# Patient Record
Sex: MALE | Race: WHITE | NOT HISPANIC OR LATINO
[De-identification: names, ages, dates, MRNs, and addresses within clinical notes are randomized per-mention and may not be internally consistent; named-entity substitution may affect disease eponyms.]

---

## 2017-03-21 ENCOUNTER — APPOINTMENT (OUTPATIENT)
Dept: INTERNAL MEDICINE | Facility: CLINIC | Age: 67
End: 2017-03-21

## 2017-03-21 VITALS
WEIGHT: 196 LBS | BODY MASS INDEX: 25.15 KG/M2 | OXYGEN SATURATION: 90 % | DIASTOLIC BLOOD PRESSURE: 66 MMHG | TEMPERATURE: 97.9 F | HEART RATE: 74 BPM | HEIGHT: 74 IN | SYSTOLIC BLOOD PRESSURE: 102 MMHG

## 2017-03-21 DIAGNOSIS — Z87.891 PERSONAL HISTORY OF NICOTINE DEPENDENCE: ICD-10-CM

## 2017-03-21 DIAGNOSIS — Z00.00 ENCOUNTER FOR GENERAL ADULT MEDICAL EXAMINATION W/OUT ABNORMAL FINDINGS: ICD-10-CM

## 2017-03-23 LAB
ALBUMIN SERPL ELPH-MCNC: 4.1 G/DL
ALP BLD-CCNC: 58 U/L
ALT SERPL-CCNC: 17 U/L
ANION GAP SERPL CALC-SCNC: 14 MMOL/L
APPEARANCE: CLEAR
AST SERPL-CCNC: 21 U/L
BACTERIA: NEGATIVE
BASOPHILS # BLD AUTO: 0.04 K/UL
BASOPHILS NFR BLD AUTO: 0.9 %
BILIRUB SERPL-MCNC: 0.3 MG/DL
BILIRUBIN URINE: NEGATIVE
BLOOD URINE: NEGATIVE
BUN SERPL-MCNC: 20 MG/DL
CALCIUM SERPL-MCNC: 9.1 MG/DL
CHLORIDE SERPL-SCNC: 102 MMOL/L
CHOLEST SERPL-MCNC: 197 MG/DL
CHOLEST/HDLC SERPL: 4 RATIO
CO2 SERPL-SCNC: 25 MMOL/L
COLOR: YELLOW
CREAT SERPL-MCNC: 0.93 MG/DL
EOSINOPHIL # BLD AUTO: 0.43 K/UL
EOSINOPHIL NFR BLD AUTO: 9.5 %
GLUCOSE QUALITATIVE U: NORMAL MG/DL
GLUCOSE SERPL-MCNC: 104 MG/DL
HBA1C MFR BLD HPLC: 5.4 %
HCT VFR BLD CALC: 39.1 %
HDLC SERPL-MCNC: 49 MG/DL
HGB BLD-MCNC: 13 G/DL
HYALINE CASTS: 4 /LPF
IMM GRANULOCYTES NFR BLD AUTO: 0 %
KETONES URINE: NEGATIVE
LDLC SERPL CALC-MCNC: 132 MG/DL
LEUKOCYTE ESTERASE URINE: NEGATIVE
LYMPHOCYTES # BLD AUTO: 1.2 K/UL
LYMPHOCYTES NFR BLD AUTO: 26.6 %
MAN DIFF?: NORMAL
MCHC RBC-ENTMCNC: 31.5 PG
MCHC RBC-ENTMCNC: 33.2 GM/DL
MCV RBC AUTO: 94.7 FL
MICROSCOPIC-UA: NORMAL
MONOCYTES # BLD AUTO: 0.39 K/UL
MONOCYTES NFR BLD AUTO: 8.6 %
NEUTROPHILS # BLD AUTO: 2.45 K/UL
NEUTROPHILS NFR BLD AUTO: 54.4 %
NITRITE URINE: NEGATIVE
PH URINE: 6
PLATELET # BLD AUTO: 235 K/UL
POTASSIUM SERPL-SCNC: 4.6 MMOL/L
PROT SERPL-MCNC: 6.5 G/DL
PROTEIN URINE: NEGATIVE MG/DL
PSA SERPL-MCNC: 2.19 NG/ML
RBC # BLD: 4.13 M/UL
RBC # FLD: 14.1 %
RED BLOOD CELLS URINE: 2 /HPF
SODIUM SERPL-SCNC: 141 MMOL/L
SPECIFIC GRAVITY URINE: 1.02
SQUAMOUS EPITHELIAL CELLS: 0 /HPF
T4 FREE SERPL-MCNC: 1.3 NG/DL
TRIGL SERPL-MCNC: 79 MG/DL
TSH SERPL-ACNC: 1 UIU/ML
UROBILINOGEN URINE: NORMAL MG/DL
WBC # FLD AUTO: 4.51 K/UL
WHITE BLOOD CELLS URINE: 0 /HPF

## 2017-07-10 ENCOUNTER — APPOINTMENT (OUTPATIENT)
Dept: INTERNAL MEDICINE | Facility: CLINIC | Age: 67
End: 2017-07-10
Payer: MEDICARE

## 2017-07-10 VITALS
RESPIRATION RATE: 16 BRPM | TEMPERATURE: 98.3 F | HEART RATE: 77 BPM | SYSTOLIC BLOOD PRESSURE: 110 MMHG | OXYGEN SATURATION: 96 % | DIASTOLIC BLOOD PRESSURE: 80 MMHG

## 2017-07-10 DIAGNOSIS — Z80.9 FAMILY HISTORY OF MALIGNANT NEOPLASM, UNSPECIFIED: ICD-10-CM

## 2017-07-10 PROCEDURE — G0009: CPT

## 2017-07-10 PROCEDURE — G0439: CPT

## 2017-07-10 PROCEDURE — 36415 COLL VENOUS BLD VENIPUNCTURE: CPT

## 2017-07-10 PROCEDURE — 93000 ELECTROCARDIOGRAM COMPLETE: CPT

## 2017-07-10 PROCEDURE — 90732 PPSV23 VACC 2 YRS+ SUBQ/IM: CPT

## 2017-07-11 LAB
HCV AB SER QL: NONREACTIVE
HCV S/CO RATIO: 0.18 S/CO

## 2017-07-21 ENCOUNTER — APPOINTMENT (OUTPATIENT)
Dept: HEART AND VASCULAR | Facility: CLINIC | Age: 67
End: 2017-07-21

## 2017-07-25 ENCOUNTER — APPOINTMENT (OUTPATIENT)
Dept: HEART AND VASCULAR | Facility: CLINIC | Age: 67
End: 2017-07-25

## 2017-07-26 ENCOUNTER — APPOINTMENT (OUTPATIENT)
Dept: CARDIOTHORACIC SURGERY | Facility: CLINIC | Age: 67
End: 2017-07-26

## 2017-07-26 VITALS
BODY MASS INDEX: 22.59 KG/M2 | HEART RATE: 67 BPM | HEIGHT: 74 IN | WEIGHT: 176 LBS | OXYGEN SATURATION: 98 % | TEMPERATURE: 98.1 F | RESPIRATION RATE: 17 BRPM | DIASTOLIC BLOOD PRESSURE: 90 MMHG | SYSTOLIC BLOOD PRESSURE: 133 MMHG

## 2017-07-26 DIAGNOSIS — Z86.59 PERSONAL HISTORY OF OTHER MENTAL AND BEHAVIORAL DISORDERS: ICD-10-CM

## 2017-07-26 DIAGNOSIS — Z87.898 PERSONAL HISTORY OF OTHER SPECIFIED CONDITIONS: ICD-10-CM

## 2017-07-26 DIAGNOSIS — Z86.39 PERSONAL HISTORY OF OTHER ENDOCRINE, NUTRITIONAL AND METABOLIC DISEASE: ICD-10-CM

## 2017-07-26 DIAGNOSIS — Z86.79 PERSONAL HISTORY OF OTHER DISEASES OF THE CIRCULATORY SYSTEM: ICD-10-CM

## 2017-07-26 DIAGNOSIS — Z87.39 PERSONAL HISTORY OF OTHER DISEASES OF THE MUSCULOSKELETAL SYSTEM AND CONNECTIVE TISSUE: ICD-10-CM

## 2017-07-26 DIAGNOSIS — F10.21 ALCOHOL DEPENDENCE, IN REMISSION: ICD-10-CM

## 2017-07-26 RX ORDER — OXYCODONE 5 MG/1
5 TABLET ORAL
Qty: 12 | Refills: 0 | Status: DISCONTINUED | COMMUNITY
Start: 2017-05-23 | End: 2017-07-26

## 2017-07-26 RX ORDER — AMOXICILLIN AND CLAVULANATE POTASSIUM 875; 125 MG/1; MG/1
875-125 TABLET, COATED ORAL
Qty: 14 | Refills: 0 | Status: DISCONTINUED | COMMUNITY
Start: 2017-05-17 | End: 2017-07-26

## 2017-07-26 RX ORDER — OXYCODONE AND ACETAMINOPHEN 5; 325 MG/1; MG/1
5-325 TABLET ORAL
Qty: 16 | Refills: 0 | Status: DISCONTINUED | COMMUNITY
Start: 2017-05-17 | End: 2017-07-26

## 2017-07-26 RX ORDER — AMOXICILLIN 500 MG/1
500 CAPSULE ORAL
Qty: 30 | Refills: 0 | Status: DISCONTINUED | COMMUNITY
Start: 2017-03-06 | End: 2017-07-26

## 2017-09-22 ENCOUNTER — APPOINTMENT (OUTPATIENT)
Dept: HEART AND VASCULAR | Facility: CLINIC | Age: 67
End: 2017-09-22

## 2017-09-25 ENCOUNTER — APPOINTMENT (OUTPATIENT)
Dept: HEART AND VASCULAR | Facility: CLINIC | Age: 67
End: 2017-09-25
Payer: MEDICARE

## 2017-09-25 VITALS
SYSTOLIC BLOOD PRESSURE: 126 MMHG | HEIGHT: 74 IN | HEART RATE: 94 BPM | BODY MASS INDEX: 22.59 KG/M2 | OXYGEN SATURATION: 97 % | DIASTOLIC BLOOD PRESSURE: 82 MMHG | WEIGHT: 176 LBS

## 2017-09-25 VITALS — TEMPERATURE: 98.3 F

## 2017-09-25 DIAGNOSIS — I34.0 NONRHEUMATIC MITRAL (VALVE) INSUFFICIENCY: ICD-10-CM

## 2017-09-25 PROCEDURE — 93306 TTE W/DOPPLER COMPLETE: CPT | Mod: XE

## 2017-09-25 PROCEDURE — 93000 ELECTROCARDIOGRAM COMPLETE: CPT

## 2017-09-25 PROCEDURE — 99214 OFFICE O/P EST MOD 30 MIN: CPT | Mod: 25

## 2017-10-11 ENCOUNTER — APPOINTMENT (OUTPATIENT)
Dept: HEART AND VASCULAR | Facility: CLINIC | Age: 67
End: 2017-10-11
Payer: MEDICARE

## 2017-10-11 VITALS
DIASTOLIC BLOOD PRESSURE: 80 MMHG | WEIGHT: 176 LBS | TEMPERATURE: 97.8 F | HEART RATE: 91 BPM | BODY MASS INDEX: 22.59 KG/M2 | SYSTOLIC BLOOD PRESSURE: 114 MMHG | OXYGEN SATURATION: 97 % | HEIGHT: 74 IN

## 2017-10-11 PROCEDURE — 93321 DOPPLER ECHO F-UP/LMTD STD: CPT

## 2017-10-11 PROCEDURE — 99214 OFFICE O/P EST MOD 30 MIN: CPT | Mod: 25

## 2017-10-11 PROCEDURE — 93308 TTE F-UP OR LMTD: CPT

## 2017-10-28 ENCOUNTER — TRANSCRIPTION ENCOUNTER (OUTPATIENT)
Age: 67
End: 2017-10-28

## 2018-07-30 PROBLEM — I34.0 SEVERE MITRAL REGURGITATION: Status: ACTIVE | Noted: 2017-07-21

## 2018-09-19 ENCOUNTER — APPOINTMENT (OUTPATIENT)
Dept: INTERNAL MEDICINE | Facility: CLINIC | Age: 68
End: 2018-09-19
Payer: MEDICARE

## 2018-09-19 VITALS
SYSTOLIC BLOOD PRESSURE: 130 MMHG | BODY MASS INDEX: 22.46 KG/M2 | TEMPERATURE: 98 F | HEIGHT: 74 IN | DIASTOLIC BLOOD PRESSURE: 88 MMHG | OXYGEN SATURATION: 98 % | HEART RATE: 85 BPM | WEIGHT: 175 LBS

## 2018-09-19 PROCEDURE — 93000 ELECTROCARDIOGRAM COMPLETE: CPT

## 2018-09-19 PROCEDURE — 90670 PCV13 VACCINE IM: CPT

## 2018-09-19 PROCEDURE — 36415 COLL VENOUS BLD VENIPUNCTURE: CPT

## 2018-09-19 PROCEDURE — G0009: CPT

## 2018-09-19 PROCEDURE — G0008: CPT | Mod: 59

## 2018-09-19 PROCEDURE — 99397 PER PM REEVAL EST PAT 65+ YR: CPT | Mod: 25

## 2018-09-19 PROCEDURE — 90662 IIV NO PRSV INCREASED AG IM: CPT

## 2018-09-19 RX ORDER — CLONAZEPAM 2 MG/1
2 TABLET ORAL
Refills: 0 | Status: DISCONTINUED | COMMUNITY
End: 2018-09-19

## 2018-09-19 RX ORDER — PAROXETINE HYDROCHLORIDE 40 MG/1
TABLET, FILM COATED ORAL
Refills: 0 | Status: DISCONTINUED | COMMUNITY
End: 2018-09-19

## 2018-09-19 RX ORDER — IBUPROFEN 600 MG/1
600 TABLET, FILM COATED ORAL
Qty: 30 | Refills: 0 | Status: DISCONTINUED | COMMUNITY
Start: 2017-03-06 | End: 2018-09-19

## 2018-09-19 RX ORDER — IBUPROFEN 800 MG/1
800 TABLET, FILM COATED ORAL
Qty: 30 | Refills: 0 | Status: DISCONTINUED | COMMUNITY
Start: 2017-05-17 | End: 2018-09-19

## 2018-09-19 NOTE — HISTORY OF PRESENT ILLNESS
[FreeTextEntry1] : wellness [de-identified] : He has been less active recently but overall feels well\par Occasioanl dizziness getting out of bed, lasts 15 seconds - denies room spinning\par His psychiatric medication regimen has not changed\par Has not seen Dr Ga since last October.\par OA of both knees - taking aleve taking it daily. for years

## 2018-09-19 NOTE — PLAN
[FreeTextEntry1] : Wellness complete labs today\par encourage f/up with  Min\par prenar and flu shot today\par f/up with psych routinely\par psa testing today\par  uptodate with GI-colonoscopy

## 2018-09-20 LAB
ALBUMIN SERPL ELPH-MCNC: 4.4 G/DL
ALP BLD-CCNC: 76 U/L
ALT SERPL-CCNC: 27 U/L
ANION GAP SERPL CALC-SCNC: 11 MMOL/L
APPEARANCE: CLEAR
AST SERPL-CCNC: 32 U/L
BASOPHILS # BLD AUTO: 0.03 K/UL
BASOPHILS NFR BLD AUTO: 0.6 %
BILIRUB SERPL-MCNC: 0.3 MG/DL
BILIRUBIN URINE: NEGATIVE
BLOOD URINE: NEGATIVE
BUN SERPL-MCNC: 21 MG/DL
CALCIUM SERPL-MCNC: 9.7 MG/DL
CHLORIDE SERPL-SCNC: 106 MMOL/L
CHOLEST SERPL-MCNC: 239 MG/DL
CHOLEST/HDLC SERPL: 3.3 RATIO
CO2 SERPL-SCNC: 24 MMOL/L
COLOR: YELLOW
CREAT SERPL-MCNC: 0.94 MG/DL
EOSINOPHIL # BLD AUTO: 0.21 K/UL
EOSINOPHIL NFR BLD AUTO: 4 %
GLUCOSE QUALITATIVE U: NEGATIVE MG/DL
GLUCOSE SERPL-MCNC: 90 MG/DL
HBA1C MFR BLD HPLC: 5.1 %
HCT VFR BLD CALC: 39.4 %
HDLC SERPL-MCNC: 73 MG/DL
HGB BLD-MCNC: 12.8 G/DL
IMM GRANULOCYTES NFR BLD AUTO: 0.6 %
KETONES URINE: NEGATIVE
LDLC SERPL CALC-MCNC: 152 MG/DL
LEUKOCYTE ESTERASE URINE: NEGATIVE
LYMPHOCYTES # BLD AUTO: 1.29 K/UL
LYMPHOCYTES NFR BLD AUTO: 24.8 %
MAN DIFF?: NORMAL
MCHC RBC-ENTMCNC: 30.5 PG
MCHC RBC-ENTMCNC: 32.5 GM/DL
MCV RBC AUTO: 93.8 FL
MONOCYTES # BLD AUTO: 0.51 K/UL
MONOCYTES NFR BLD AUTO: 9.8 %
NEUTROPHILS # BLD AUTO: 3.13 K/UL
NEUTROPHILS NFR BLD AUTO: 60.2 %
NITRITE URINE: NEGATIVE
PH URINE: 5
PLATELET # BLD AUTO: 302 K/UL
POTASSIUM SERPL-SCNC: 5.1 MMOL/L
PROT SERPL-MCNC: 6.9 G/DL
PROTEIN URINE: NEGATIVE MG/DL
PSA SERPL-MCNC: 2.14 NG/ML
RBC # BLD: 4.2 M/UL
RBC # FLD: 14.4 %
SODIUM SERPL-SCNC: 141 MMOL/L
SPECIFIC GRAVITY URINE: 1.03
TRIGL SERPL-MCNC: 70 MG/DL
UROBILINOGEN URINE: NEGATIVE MG/DL
WBC # FLD AUTO: 5.2 K/UL

## 2018-09-24 ENCOUNTER — MESSAGE (OUTPATIENT)
Age: 68
End: 2018-09-24

## 2018-12-10 ENCOUNTER — APPOINTMENT (OUTPATIENT)
Dept: HEART AND VASCULAR | Facility: CLINIC | Age: 68
End: 2018-12-10
Payer: MEDICARE

## 2018-12-10 VITALS
WEIGHT: 175 LBS | HEART RATE: 73 BPM | RESPIRATION RATE: 14 BRPM | DIASTOLIC BLOOD PRESSURE: 70 MMHG | SYSTOLIC BLOOD PRESSURE: 120 MMHG | TEMPERATURE: 98.3 F | BODY MASS INDEX: 22.46 KG/M2 | OXYGEN SATURATION: 99 % | HEIGHT: 74 IN

## 2018-12-10 DIAGNOSIS — I34.0 NONRHEUMATIC MITRAL (VALVE) INSUFFICIENCY: ICD-10-CM

## 2018-12-10 PROCEDURE — 93000 ELECTROCARDIOGRAM COMPLETE: CPT

## 2018-12-10 PROCEDURE — 99214 OFFICE O/P EST MOD 30 MIN: CPT

## 2018-12-10 PROCEDURE — 93306 TTE W/DOPPLER COMPLETE: CPT

## 2018-12-10 NOTE — DISCUSSION/SUMMARY
[FreeTextEntry1] : MR s/p repair stable by echo\par dizziness not cardiac related, recommend neuro eval if persists, \par increase activity

## 2018-12-10 NOTE — REASON FOR VISIT
[Follow-Up - Clinic] : a clinic follow-up of [Dizziness] : dizziness [Mitral Regurgitation] : mitral regurgitation

## 2018-12-10 NOTE — PHYSICAL EXAM
[General Appearance - Well Developed] : well developed [Normal Appearance] : normal appearance [Well Groomed] : well groomed [General Appearance - Well Nourished] : well nourished [No Deformities] : no deformities [General Appearance - In No Acute Distress] : no acute distress [Normal Conjunctiva] : the conjunctiva exhibited no abnormalities [Eyelids - No Xanthelasma] : the eyelids demonstrated no xanthelasmas [Normal Oral Mucosa] : normal oral mucosa [No Oral Pallor] : no oral pallor [No Oral Cyanosis] : no oral cyanosis [Normal Jugular Venous A Waves Present] : normal jugular venous A waves present [Normal Jugular Venous V Waves Present] : normal jugular venous V waves present [No Jugular Venous Watson A Waves] : no jugular venous watson A waves [Respiration, Rhythm And Depth] : normal respiratory rhythm and effort [Exaggerated Use Of Accessory Muscles For Inspiration] : no accessory muscle use [Auscultation Breath Sounds / Voice Sounds] : lungs were clear to auscultation bilaterally [Heart Rate And Rhythm] : heart rate and rhythm were normal [Heart Sounds] : normal S1 and S2 [Murmurs] : no murmurs present [Abdomen Soft] : soft [Abdomen Tenderness] : non-tender [Abdomen Mass (___ Cm)] : no abdominal mass palpated [Abnormal Walk] : normal gait [Gait - Sufficient For Exercise Testing] : the gait was sufficient for exercise testing [Nail Clubbing] : no clubbing of the fingernails [Cyanosis, Localized] : no localized cyanosis [Petechial Hemorrhages (___cm)] : no petechial hemorrhages [] : no ischemic changes [Oriented To Time, Place, And Person] : oriented to person, place, and time [Affect] : the affect was normal [Mood] : the mood was normal [No Anxiety] : not feeling anxious

## 2019-03-18 ENCOUNTER — RX RENEWAL (OUTPATIENT)
Age: 69
End: 2019-03-18

## 2019-05-10 ENCOUNTER — APPOINTMENT (OUTPATIENT)
Dept: INTERNAL MEDICINE | Facility: CLINIC | Age: 69
End: 2019-05-10
Payer: MEDICARE

## 2019-05-10 VITALS
RESPIRATION RATE: 14 BRPM | WEIGHT: 185 LBS | TEMPERATURE: 98.6 F | HEART RATE: 96 BPM | OXYGEN SATURATION: 98 % | BODY MASS INDEX: 23.75 KG/M2 | DIASTOLIC BLOOD PRESSURE: 72 MMHG | SYSTOLIC BLOOD PRESSURE: 120 MMHG

## 2019-05-10 PROCEDURE — 99214 OFFICE O/P EST MOD 30 MIN: CPT

## 2019-05-10 PROCEDURE — 36415 COLL VENOUS BLD VENIPUNCTURE: CPT

## 2019-05-10 NOTE — HISTORY OF PRESENT ILLNESS
[de-identified] : Here today with concerns of memory and speech, starts talking and has to pause - difficulty with word finding.   Mother had mild alzheimers,  at age 60.  Reports a long hx of memory issues but has been progressively getting worse over the past year.  \par Taking statin.  No longer having chest pains.  \par Has brought this up with psychiatrist in the past\par Used a lot of drugs growing up but nothing in 10 years.

## 2019-05-10 NOTE — PHYSICAL EXAM
[No Acute Distress] : no acute distress [Well Nourished] : well nourished [Well Developed] : well developed [Normal Gait] : normal gait [Coordination Grossly Intact] : coordination grossly intact [No Focal Deficits] : no focal deficits [Normal Affect] : the affect was normal [Normal Insight/Judgement] : insight and judgment were intact

## 2019-05-10 NOTE — PLAN
[FreeTextEntry1] : Concern for early onset dementia / alzheimers\par Plan for neuro eval and likely  neuro-psych eval as well\par check labs today\par encourgae activites- reading, socializing.  exercise, healthy diet

## 2019-05-12 LAB
ALBUMIN SERPL ELPH-MCNC: 4.2 G/DL
ALP BLD-CCNC: 75 U/L
ALT SERPL-CCNC: 15 U/L
ANION GAP SERPL CALC-SCNC: 12 MMOL/L
AST SERPL-CCNC: 15 U/L
BASOPHILS # BLD AUTO: 0.04 K/UL
BASOPHILS NFR BLD AUTO: 0.7 %
BILIRUB SERPL-MCNC: 0.4 MG/DL
BUN SERPL-MCNC: 23 MG/DL
CALCIUM SERPL-MCNC: 9.5 MG/DL
CHLORIDE SERPL-SCNC: 107 MMOL/L
CO2 SERPL-SCNC: 22 MMOL/L
CREAT SERPL-MCNC: 0.91 MG/DL
EOSINOPHIL # BLD AUTO: 0.16 K/UL
EOSINOPHIL NFR BLD AUTO: 2.9 %
FOLATE SERPL-MCNC: 6.2 NG/ML
GLUCOSE SERPL-MCNC: 100 MG/DL
HCT VFR BLD CALC: 42.7 %
HGB BLD-MCNC: 13.6 G/DL
IMM GRANULOCYTES NFR BLD AUTO: 0.4 %
LYMPHOCYTES # BLD AUTO: 1.08 K/UL
LYMPHOCYTES NFR BLD AUTO: 19.6 %
MAN DIFF?: NORMAL
MCHC RBC-ENTMCNC: 30.2 PG
MCHC RBC-ENTMCNC: 31.9 GM/DL
MCV RBC AUTO: 94.9 FL
MONOCYTES # BLD AUTO: 0.51 K/UL
MONOCYTES NFR BLD AUTO: 9.2 %
NEUTROPHILS # BLD AUTO: 3.71 K/UL
NEUTROPHILS NFR BLD AUTO: 67.2 %
PLATELET # BLD AUTO: 279 K/UL
POTASSIUM SERPL-SCNC: 4.6 MMOL/L
PROT SERPL-MCNC: 7 G/DL
RBC # BLD: 4.5 M/UL
RBC # FLD: 13.8 %
SODIUM SERPL-SCNC: 141 MMOL/L
TSH SERPL-ACNC: 1.08 UIU/ML
VIT B12 SERPL-MCNC: 541 PG/ML
WBC # FLD AUTO: 5.52 K/UL

## 2019-05-31 ENCOUNTER — APPOINTMENT (OUTPATIENT)
Dept: NEUROLOGY | Facility: CLINIC | Age: 69
End: 2019-05-31
Payer: MEDICARE

## 2019-05-31 ENCOUNTER — LABORATORY RESULT (OUTPATIENT)
Age: 69
End: 2019-05-31

## 2019-05-31 VITALS
BODY MASS INDEX: 23.74 KG/M2 | HEART RATE: 89 BPM | TEMPERATURE: 98.4 F | OXYGEN SATURATION: 96 % | DIASTOLIC BLOOD PRESSURE: 89 MMHG | SYSTOLIC BLOOD PRESSURE: 135 MMHG | HEIGHT: 74 IN | WEIGHT: 185 LBS

## 2019-05-31 PROCEDURE — 99205 OFFICE O/P NEW HI 60 MIN: CPT

## 2019-05-31 RX ORDER — BUPROPION HYDROCHLORIDE 150 MG/1
150 TABLET, EXTENDED RELEASE ORAL
Qty: 30 | Refills: 0 | Status: DISCONTINUED | COMMUNITY
Start: 2017-05-16 | End: 2019-05-31

## 2019-06-03 LAB
25(OH)D3 SERPL-MCNC: 23.9 NG/ML
TSH SERPL-ACNC: 0.84 UIU/ML

## 2019-06-27 ENCOUNTER — FORM ENCOUNTER (OUTPATIENT)
Age: 69
End: 2019-06-27

## 2019-06-28 ENCOUNTER — APPOINTMENT (OUTPATIENT)
Dept: MRI IMAGING | Facility: HOSPITAL | Age: 69
End: 2019-06-28
Payer: MEDICARE

## 2019-06-28 ENCOUNTER — OUTPATIENT (OUTPATIENT)
Dept: OUTPATIENT SERVICES | Facility: HOSPITAL | Age: 69
LOS: 1 days | End: 2019-06-28
Payer: MEDICARE

## 2019-06-28 PROCEDURE — 70551 MRI BRAIN STEM W/O DYE: CPT

## 2019-06-28 PROCEDURE — 70551 MRI BRAIN STEM W/O DYE: CPT | Mod: 26

## 2019-09-06 ENCOUNTER — APPOINTMENT (OUTPATIENT)
Dept: INTERNAL MEDICINE | Facility: CLINIC | Age: 69
End: 2019-09-06
Payer: MEDICARE

## 2019-09-06 VITALS
TEMPERATURE: 98.4 F | WEIGHT: 184 LBS | DIASTOLIC BLOOD PRESSURE: 80 MMHG | OXYGEN SATURATION: 96 % | SYSTOLIC BLOOD PRESSURE: 134 MMHG | HEIGHT: 74 IN | BODY MASS INDEX: 23.61 KG/M2 | RESPIRATION RATE: 14 BRPM | HEART RATE: 89 BPM

## 2019-09-06 PROCEDURE — 99214 OFFICE O/P EST MOD 30 MIN: CPT

## 2019-09-06 NOTE — HISTORY OF PRESENT ILLNESS
[de-identified] : He is s/p Neuro eval and waiting for a neuropsych evaluation now.  Not expected until Thanksgiving.  \par He reports he is unable to write his children;s names.  Hard time reading\par Trouble pronouncing words\par  occasional balance issues. \par Remembering names is an issue.    \par He is off most of his psych medications due to insurance issues.  Not seeing psychiatry now as well.  \par MMSE performed today

## 2019-09-06 NOTE — PHYSICAL EXAM
[Coordination Grossly Intact] : coordination grossly intact [Normal] : affect was normal and insight and judgment were intact [Normal Gait] : normal gait [de-identified] : MMSE 24/30

## 2019-09-06 NOTE — PLAN
[FreeTextEntry1] : Strong concern for Dementia.  Needs appropriate neuropsych evaluation.  Message left to speak with Dr Mena about this.  \par  - ? if MRI findings are pertinent?\par Anxiety - Concerning that all of his meds were stopped abruptly.  He needs to follow up with psychiatry ASAP.  Pt understands this.\par follow up 1 month

## 2019-09-11 ENCOUNTER — MESSAGE (OUTPATIENT)
Age: 69
End: 2019-09-11

## 2019-09-18 ENCOUNTER — MESSAGE (OUTPATIENT)
Age: 69
End: 2019-09-18

## 2019-10-09 ENCOUNTER — APPOINTMENT (OUTPATIENT)
Dept: INTERNAL MEDICINE | Facility: CLINIC | Age: 69
End: 2019-10-09
Payer: MEDICARE

## 2019-10-09 VITALS
OXYGEN SATURATION: 98 % | HEIGHT: 74 IN | RESPIRATION RATE: 14 BRPM | WEIGHT: 184 LBS | DIASTOLIC BLOOD PRESSURE: 70 MMHG | TEMPERATURE: 97 F | SYSTOLIC BLOOD PRESSURE: 132 MMHG | HEART RATE: 91 BPM | BODY MASS INDEX: 23.61 KG/M2

## 2019-10-09 DIAGNOSIS — J06.9 ACUTE UPPER RESPIRATORY INFECTION, UNSPECIFIED: ICD-10-CM

## 2019-10-09 DIAGNOSIS — Z23 ENCOUNTER FOR IMMUNIZATION: ICD-10-CM

## 2019-10-09 PROCEDURE — 90662 IIV NO PRSV INCREASED AG IM: CPT

## 2019-10-09 PROCEDURE — 99214 OFFICE O/P EST MOD 30 MIN: CPT

## 2019-10-09 PROCEDURE — G0008: CPT

## 2019-10-09 NOTE — PLAN
[FreeTextEntry1] : URI- viral-  symptomatic treatment discussed in detail\par \par Discussed cutting back on the clonazepam with his memory issues\par Rec bringing medicaitons with him to the neurologists office\par flu shot today

## 2019-10-09 NOTE — REVIEW OF SYSTEMS
[Chills] : no chills [Fever] : no fever [Sore Throat] : sore throat [Nasal Discharge] : nasal discharge [Shortness Of Breath] : no shortness of breath [Wheezing] : no wheezing [Cough] : cough [Negative] : Cardiovascular

## 2019-10-09 NOTE — HISTORY OF PRESENT ILLNESS
[de-identified] : Has a cold,  sinus congestion and sneezing.   - taking otc meds   \roney Has appt with the cognitive neurologist for this month.\roney Believes the donepezil may be helping a little.  could speak a little better yesterday, not as much today.   \par He is back on adderall, wellbutrin and paroxetine, clonazpem seeing Dr Zi Tran.    Reports he is on 8 pills at night and 4 pills in the AM  He uses the clonazepam for sleeping.  \par Taking a medicaiton otc for his knee but cannot remember the name.

## 2019-11-06 ENCOUNTER — MESSAGE (OUTPATIENT)
Age: 69
End: 2019-11-06

## 2019-12-04 ENCOUNTER — APPOINTMENT (OUTPATIENT)
Dept: NEUROLOGY | Facility: CLINIC | Age: 69
End: 2019-12-04

## 2020-07-20 ENCOUNTER — APPOINTMENT (OUTPATIENT)
Dept: INTERNAL MEDICINE | Facility: CLINIC | Age: 70
End: 2020-07-20
Payer: MEDICARE

## 2020-07-20 VITALS
TEMPERATURE: 97.7 F | BODY MASS INDEX: 23.61 KG/M2 | OXYGEN SATURATION: 99 % | SYSTOLIC BLOOD PRESSURE: 120 MMHG | RESPIRATION RATE: 14 BRPM | HEART RATE: 85 BPM | HEIGHT: 74 IN | DIASTOLIC BLOOD PRESSURE: 84 MMHG | WEIGHT: 184 LBS

## 2020-07-20 DIAGNOSIS — S09.93XA UNSPECIFIED INJURY OF FACE, INITIAL ENCOUNTER: ICD-10-CM

## 2020-07-20 PROCEDURE — 99213 OFFICE O/P EST LOW 20 MIN: CPT

## 2020-07-20 NOTE — HISTORY OF PRESENT ILLNESS
[de-identified] : Taking trash into basement, tripped on stairs - hit right cheek - 7 weeks ago.  Did not go to ER. - Bruised a lot - Not healing well.  \par no vision issues no issues smelling or breathing through the nose. \par no LOC , gait has been fine\par  was a mechanical fall, no dizziness .

## 2020-07-20 NOTE — PLAN
[FreeTextEntry1] : no evidence of fracture or sig trauma after this mechanical fall\par  no further workup is necessary = f/up for CPE\par reassurance given

## 2020-12-08 ENCOUNTER — APPOINTMENT (OUTPATIENT)
Dept: INTERNAL MEDICINE | Facility: CLINIC | Age: 70
End: 2020-12-08
Payer: MEDICARE

## 2020-12-08 VITALS
HEART RATE: 78 BPM | OXYGEN SATURATION: 99 % | SYSTOLIC BLOOD PRESSURE: 142 MMHG | RESPIRATION RATE: 14 BRPM | DIASTOLIC BLOOD PRESSURE: 86 MMHG | TEMPERATURE: 98.2 F | BODY MASS INDEX: 23.61 KG/M2 | HEIGHT: 74 IN | WEIGHT: 184 LBS

## 2020-12-08 DIAGNOSIS — R51.9 HEADACHE, UNSPECIFIED: ICD-10-CM

## 2020-12-08 PROCEDURE — 99214 OFFICE O/P EST MOD 30 MIN: CPT

## 2020-12-08 NOTE — HISTORY OF PRESENT ILLNESS
[FreeTextEntry1] : headaches. [de-identified] : 71 yo m with alzheimers bipolar depression.  \par reports headaches for 2 months \par previous psychiatrist retired saw him for 30 yrs.  new psychiatrist changed some of the medications including adderall last month - . Concern the headache could have been from the change in medications.  The psychiatrist does not believe it is related. Before changing medication was not having headaches.\par In the past when he has changed medication he has had similar effects\par Wakes up with it, lasts the whole day but not as strong.   was right sided, now left sided.  no vision changes.  Occasional dizziness. \par Reports he feels angry a lot.\par \par tylenol reportedly does help.  \par New Neurologist: had an MRI last year. \par \par also reporting worsening knee pain b/l  with occasional swelling

## 2020-12-08 NOTE — PLAN
[FreeTextEntry1] : \par Headaches- likely related to change in medicaiton\par no worrisome findings on exam\par \par Neurology referral -he will call to his neurologist right away\par \par Ortho referral for knee pain:   will need tkr likely

## 2020-12-08 NOTE — PHYSICAL EXAM
[Normal] : normal sclera/conjunctiva, pupils are equal, round and reactive to light and extraocular movements are intact [Coordination Grossly Intact] : coordination grossly intact [de-identified] : swelling of both knees, full rom .  [de-identified] : cn 2 - 12 intact.  occasional word finding difficulty

## 2020-12-14 ENCOUNTER — APPOINTMENT (OUTPATIENT)
Dept: ORTHOPEDIC SURGERY | Facility: CLINIC | Age: 70
End: 2020-12-14

## 2020-12-17 ENCOUNTER — APPOINTMENT (OUTPATIENT)
Dept: ORTHOPEDIC SURGERY | Facility: CLINIC | Age: 70
End: 2020-12-17
Payer: MEDICARE

## 2020-12-17 ENCOUNTER — RESULT REVIEW (OUTPATIENT)
Age: 70
End: 2020-12-17

## 2020-12-17 ENCOUNTER — OUTPATIENT (OUTPATIENT)
Dept: OUTPATIENT SERVICES | Facility: HOSPITAL | Age: 70
LOS: 1 days | End: 2020-12-17
Payer: MEDICARE

## 2020-12-17 VITALS
WEIGHT: 185 LBS | HEIGHT: 74 IN | DIASTOLIC BLOOD PRESSURE: 80 MMHG | OXYGEN SATURATION: 99 % | TEMPERATURE: 97.6 F | HEART RATE: 71 BPM | SYSTOLIC BLOOD PRESSURE: 120 MMHG | BODY MASS INDEX: 23.74 KG/M2

## 2020-12-17 DIAGNOSIS — M17.0 BILATERAL PRIMARY OSTEOARTHRITIS OF KNEE: ICD-10-CM

## 2020-12-17 PROCEDURE — 73564 X-RAY EXAM KNEE 4 OR MORE: CPT

## 2020-12-17 PROCEDURE — 99204 OFFICE O/P NEW MOD 45 MIN: CPT

## 2020-12-17 PROCEDURE — 72170 X-RAY EXAM OF PELVIS: CPT

## 2020-12-17 PROCEDURE — 72170 X-RAY EXAM OF PELVIS: CPT | Mod: 26

## 2020-12-17 PROCEDURE — 73564 X-RAY EXAM KNEE 4 OR MORE: CPT | Mod: 26,50

## 2020-12-17 NOTE — DISCUSSION/SUMMARY
[de-identified] : 69y/o male with advanced bilateral knee osteoarthritis\par - Given the failure of conservative treatment options to adequately control his pain and disability, I think he is an excellent candidate for staged bilateral total knee arthroplasty with the left side first.\par - We discussed the details of the procedure, the expected recovery period, and the expected outcome. We discussed the likelihood of satisfaction after complete recovery, and the potential causes of dissatisfaction. The importance of active patient participation in the rehabilitation protocol was emphasized, along with its influence on short and long-term outcomes. Specific risks of total knee replacement were discussed in detail. We discussed the risk of surgical site complications including but not limited to: surgical site infection, wound healing complications, bone fracture, tendon or ligament injury, neurovascular injury, hemorrhage, postoperative stiffness or instability, persistent pain and need for reoperation or manipulation under anesthesia. We discussed surgical blood loss and the possible need for blood transfusion. We discussed the risk of perioperative medical complications, including but not limited to catheter-associated urinary tract infection, venous thromboembolism and other cardiopulmonary complications. We discussed anesthetic options and the risk of anesthesia-related complications. We discussed implant fixation methods; my plan is to use fully cemented fixation in this case. We discussed the variable need to resurface the patella; my plan is to resurface in this case. We discussed the durability of prosthetic knees and limitations related to wear, osteolysis and loosening. The patient was given a copy of my preoperative packet with additional information about the procedure.\par - While he is a good surgical candidate and the disease is radiographically advanced, his pain and loss of function are still somewhat marginal for surgical indication. He will take some time to think it over and touch base once he has decided how he wants to proceed. I told him that I'm happy to continue to treat him either surgically or non-surgically per his own preference. If he decides to hold off on surgery, would probably start off with a left knee aspiration and injection.

## 2020-12-17 NOTE — HISTORY OF PRESENT ILLNESS
[Worsening] : worsening [___ yrs] : [unfilled] year(s) ago [5] : a current pain level of 5/10 [Constant] : ~He/She~ states the symptoms seem to be constant [Bending] : worsened by bending [Walking] : worsened by walking [NSAIDs] : relieved by nonsteroidal anti-inflammatory drugs [de-identified] : 69y/o male referred by Dr. Melara for evaluation of bilateral knee osteoarthritis. Gradually progressive symptoms for 15 years. The left knee is now more painful and swollen. Ambulatory tolerance still unlimited; walked 12 blocks to get to today's apartment after a snowstorm last night. However, he does have constant daily pain. Uses Aleve with some relief. Has been through multiple courses of PT and bilateral knee injections, most recently 3+ years ago. He has a history of MVR 3 years ago but is otherwise healthy. Interested in discussing TKA today. [de-identified] : describes sharp pain

## 2020-12-17 NOTE — CONSULT LETTER
[Dear  ___] : Dear  [unfilled], [Consult Letter:] : I had the pleasure of evaluating your patient, [unfilled]. [Please see my note below.] : Please see my note below. [Consult Closing:] : Thank you very much for allowing me to participate in the care of this patient.  If you have any questions, please do not hesitate to contact me. [Sincerely,] : Sincerely, [FreeTextEntry3] : Emmanuel Higuera MD\par Orthopaedic Surgery - Adult Reconstruction\par [facility] [place]\par [address] [place] [address]\par p. [phone]\par f. [phone]

## 2020-12-21 PROBLEM — J06.9 ACUTE URI: Status: RESOLVED | Noted: 2019-10-09 | Resolved: 2020-12-21

## 2021-01-09 NOTE — PHYSICAL EXAM

## 2021-01-17 ENCOUNTER — OUTPATIENT (OUTPATIENT)
Dept: OUTPATIENT SERVICES | Facility: HOSPITAL | Age: 71
LOS: 1 days | End: 2021-01-17
Payer: MEDICARE

## 2021-01-17 ENCOUNTER — RESULT REVIEW (OUTPATIENT)
Age: 71
End: 2021-01-17

## 2021-01-17 ENCOUNTER — APPOINTMENT (OUTPATIENT)
Dept: CT IMAGING | Facility: HOSPITAL | Age: 71
End: 2021-01-17
Payer: MEDICARE

## 2021-01-17 PROCEDURE — 73700 CT LOWER EXTREMITY W/O DYE: CPT

## 2021-01-17 PROCEDURE — 73700 CT LOWER EXTREMITY W/O DYE: CPT | Mod: 26,LT,MH

## 2021-01-19 ENCOUNTER — APPOINTMENT (OUTPATIENT)
Dept: INTERNAL MEDICINE | Facility: CLINIC | Age: 71
End: 2021-01-19
Payer: MEDICARE

## 2021-01-19 ENCOUNTER — OUTPATIENT (OUTPATIENT)
Dept: OUTPATIENT SERVICES | Facility: HOSPITAL | Age: 71
LOS: 1 days | End: 2021-01-19
Payer: MEDICARE

## 2021-01-19 ENCOUNTER — RESULT REVIEW (OUTPATIENT)
Age: 71
End: 2021-01-19

## 2021-01-19 VITALS
BODY MASS INDEX: 25.06 KG/M2 | RESPIRATION RATE: 14 BRPM | SYSTOLIC BLOOD PRESSURE: 144 MMHG | WEIGHT: 185 LBS | TEMPERATURE: 98.2 F | OXYGEN SATURATION: 99 % | DIASTOLIC BLOOD PRESSURE: 84 MMHG | HEIGHT: 72 IN | HEART RATE: 92 BPM

## 2021-01-19 PROCEDURE — 93000 ELECTROCARDIOGRAM COMPLETE: CPT | Mod: NC

## 2021-01-19 PROCEDURE — 71046 X-RAY EXAM CHEST 2 VIEWS: CPT

## 2021-01-19 PROCEDURE — 99214 OFFICE O/P EST MOD 30 MIN: CPT

## 2021-01-19 PROCEDURE — 71046 X-RAY EXAM CHEST 2 VIEWS: CPT | Mod: 26

## 2021-01-19 PROCEDURE — 36415 COLL VENOUS BLD VENIPUNCTURE: CPT

## 2021-01-19 RX ORDER — DEXTROAMPHETAMINE SULFATE, DEXTROAMPHETAMINE SACCHARATE, AMPHETAMINE SULFATE AND AMPHETAMINE ASPARTATE 7.5; 7.5; 7.5; 7.5 MG/1; MG/1; MG/1; MG/1
30 CAPSULE, EXTENDED RELEASE ORAL
Refills: 0 | Status: DISCONTINUED | COMMUNITY
End: 2021-01-19

## 2021-01-19 RX ORDER — PAROXETINE HYDROCHLORIDE 30 MG/1
30 TABLET, FILM COATED ORAL
Qty: 60 | Refills: 0 | Status: DISCONTINUED | COMMUNITY
Start: 2017-02-03 | End: 2021-01-19

## 2021-01-19 RX ORDER — CHLORHEXIDINE GLUCONATE, 0.12% ORAL RINSE 1.2 MG/ML
0.12 SOLUTION DENTAL
Qty: 473 | Refills: 0 | Status: DISCONTINUED | COMMUNITY
Start: 2017-02-17 | End: 2021-01-19

## 2021-01-19 RX ORDER — QUETIAPINE FUMARATE 25 MG/1
25 TABLET ORAL
Qty: 21 | Refills: 0 | Status: DISCONTINUED | COMMUNITY
Start: 2020-11-12 | End: 2021-01-19

## 2021-01-19 RX ORDER — ZOSTER VACCINE LIVE 19400 [PFU]/.65ML
19400 INJECTION, POWDER, LYOPHILIZED, FOR SUSPENSION SUBCUTANEOUS
Qty: 1 | Refills: 0 | Status: DISCONTINUED | OUTPATIENT
Start: 2017-07-10 | End: 2021-01-19

## 2021-01-19 RX ORDER — CLONAZEPAM 1 MG/1
1 TABLET ORAL
Qty: 120 | Refills: 0 | Status: DISCONTINUED | COMMUNITY
Start: 2017-01-23 | End: 2021-01-19

## 2021-01-19 RX ORDER — DEXTROAMPHETAMINE SACCHARATE, AMPHETAMINE ASPARTATE MONOHYDRATE, DEXTROAMPHETAMINE SULFATE AND AMPHETAMINE SULFATE 6.25; 6.25; 6.25; 6.25 MG/1; MG/1; MG/1; MG/1
25 CAPSULE, EXTENDED RELEASE ORAL
Qty: 30 | Refills: 0 | Status: DISCONTINUED | COMMUNITY
Start: 2017-02-23 | End: 2021-01-19

## 2021-01-19 RX ORDER — IBUPROFEN 400 MG/1
400 TABLET, FILM COATED ORAL
Qty: 30 | Refills: 0 | Status: DISCONTINUED | COMMUNITY
Start: 2020-10-20 | End: 2021-01-19

## 2021-01-19 RX ORDER — CLONAZEPAM 0.5 MG/1
0.5 TABLET ORAL
Qty: 90 | Refills: 0 | Status: DISCONTINUED | COMMUNITY
Start: 2020-09-16 | End: 2021-01-19

## 2021-01-19 RX ORDER — BUPROPION HYDROCHLORIDE 300 MG/1
300 TABLET, EXTENDED RELEASE ORAL
Refills: 0 | Status: DISCONTINUED | COMMUNITY
End: 2021-01-19

## 2021-01-19 RX ORDER — ZOSTER VACCINE RECOMBINANT, ADJUVANTED 50 MCG/0.5
50 KIT INTRAMUSCULAR
Qty: 1 | Refills: 1 | Status: DISCONTINUED | COMMUNITY
Start: 2018-09-19 | End: 2021-01-19

## 2021-01-19 RX ORDER — ARIPIPRAZOLE 10 MG/1
10 TABLET ORAL
Refills: 0 | Status: DISCONTINUED | COMMUNITY
End: 2021-01-19

## 2021-01-19 RX ORDER — DONEPEZIL HYDROCHLORIDE 5 MG/1
5 TABLET ORAL
Qty: 30 | Refills: 3 | Status: DISCONTINUED | COMMUNITY
Start: 2019-09-18 | End: 2021-01-19

## 2021-01-19 NOTE — REVIEW OF SYSTEMS
[Joint Pain] : joint pain [Memory Loss] : memory loss [Depression] : depression [Negative] : Genitourinary

## 2021-01-20 LAB
ALBUMIN SERPL ELPH-MCNC: 4.7 G/DL
ALP BLD-CCNC: 73 U/L
ALT SERPL-CCNC: 22 U/L
ANION GAP SERPL CALC-SCNC: 13 MMOL/L
APPEARANCE: CLEAR
APTT BLD: 33.7 SEC
AST SERPL-CCNC: 27 U/L
BASOPHILS # BLD AUTO: 0.05 K/UL
BASOPHILS NFR BLD AUTO: 1 %
BILIRUB SERPL-MCNC: 0.5 MG/DL
BILIRUBIN URINE: NEGATIVE
BLOOD URINE: NEGATIVE
BUN SERPL-MCNC: 16 MG/DL
CALCIUM SERPL-MCNC: 9.9 MG/DL
CHLORIDE SERPL-SCNC: 104 MMOL/L
CO2 SERPL-SCNC: 23 MMOL/L
COLOR: NORMAL
CREAT SERPL-MCNC: 1.06 MG/DL
EOSINOPHIL # BLD AUTO: 0.29 K/UL
EOSINOPHIL NFR BLD AUTO: 6.1 %
GLUCOSE QUALITATIVE U: NEGATIVE
GLUCOSE SERPL-MCNC: 97 MG/DL
HCT VFR BLD CALC: 42.5 %
HGB BLD-MCNC: 13 G/DL
IMM GRANULOCYTES NFR BLD AUTO: 0.2 %
INR PPP: 0.96 RATIO
KETONES URINE: NEGATIVE
LEUKOCYTE ESTERASE URINE: NEGATIVE
LYMPHOCYTES # BLD AUTO: 1.08 K/UL
LYMPHOCYTES NFR BLD AUTO: 22.6 %
MAN DIFF?: NORMAL
MCHC RBC-ENTMCNC: 30.1 PG
MCHC RBC-ENTMCNC: 30.6 GM/DL
MCV RBC AUTO: 98.4 FL
MONOCYTES # BLD AUTO: 0.53 K/UL
MONOCYTES NFR BLD AUTO: 11.1 %
NEUTROPHILS # BLD AUTO: 2.82 K/UL
NEUTROPHILS NFR BLD AUTO: 59 %
NITRITE URINE: NEGATIVE
PH URINE: 5.5
PLATELET # BLD AUTO: 255 K/UL
POTASSIUM SERPL-SCNC: 4.4 MMOL/L
PROT SERPL-MCNC: 7 G/DL
PROTEIN URINE: NEGATIVE
PT BLD: 11.3 SEC
RBC # BLD: 4.32 M/UL
RBC # FLD: 14.1 %
SODIUM SERPL-SCNC: 140 MMOL/L
SPECIFIC GRAVITY URINE: 1.01
UROBILINOGEN URINE: NORMAL
WBC # FLD AUTO: 4.78 K/UL

## 2021-01-20 NOTE — HISTORY OF PRESENT ILLNESS
[No Pertinent Pulmonary History] : no history of asthma, COPD, sleep apnea, or smoking [No Adverse Anesthesia Reaction] : no adverse anesthesia reaction in self or family member [Aortic Stenosis] : no aortic stenosis [Atrial Fibrillation] : no atrial fibrillation [Coronary Artery Disease] : no coronary artery disease [Recent Myocardial Infarction] : no recent myocardial infarction [Implantable Device/Pacemaker] : no implantable device/pacemaker [FreeTextEntry1] : B/l TKA [FreeTextEntry2] : 1/29/21 [FreeTextEntry3] : Dr. Higuera [FreeTextEntry4] : 71 yo M with hx of MVR in 2017, HLD, Bipolar depression, cognitive issues (multiple concussions from boxing), with chronic bilateral knee pain He has severe pain and has failed conservative therapy.  \par Psychiatric medications have changed,  Medication list updated.

## 2021-01-20 NOTE — PHYSICAL EXAM
[Regular Rhythm] : with a regular rhythm [Normal S1, S2] : normal S1 and S2 [Normal] : affect was normal and insight and judgment were intact [de-identified] : 3/6 claire

## 2021-01-21 ENCOUNTER — APPOINTMENT (OUTPATIENT)
Dept: HEART AND VASCULAR | Facility: CLINIC | Age: 71
End: 2021-01-21

## 2021-01-21 ENCOUNTER — APPOINTMENT (OUTPATIENT)
Dept: HEART AND VASCULAR | Facility: CLINIC | Age: 71
End: 2021-01-21
Payer: MEDICARE

## 2021-01-21 PROCEDURE — 93306 TTE W/DOPPLER COMPLETE: CPT

## 2021-01-22 ENCOUNTER — APPOINTMENT (OUTPATIENT)
Dept: HEART AND VASCULAR | Facility: CLINIC | Age: 71
End: 2021-01-22
Payer: MEDICARE

## 2021-01-22 VITALS
HEART RATE: 90 BPM | WEIGHT: 185 LBS | OXYGEN SATURATION: 99 % | HEIGHT: 72 IN | DIASTOLIC BLOOD PRESSURE: 100 MMHG | BODY MASS INDEX: 25.06 KG/M2 | SYSTOLIC BLOOD PRESSURE: 140 MMHG

## 2021-01-22 PROCEDURE — 99214 OFFICE O/P EST MOD 30 MIN: CPT

## 2021-01-22 NOTE — HISTORY OF PRESENT ILLNESS
[FreeTextEntry1] : for cardiac evaluation prior to knee surgery\par good functional capacity\par no cardiac c/o\par s/p MV repair

## 2021-01-22 NOTE — PHYSICAL EXAM
[Normal Appearance] : normal appearance [General Appearance - Well Developed] : well developed [Well Groomed] : well groomed [General Appearance - Well Nourished] : well nourished [No Deformities] : no deformities [General Appearance - In No Acute Distress] : no acute distress [Normal Conjunctiva] : the conjunctiva exhibited no abnormalities [Eyelids - No Xanthelasma] : the eyelids demonstrated no xanthelasmas [Normal Oral Mucosa] : normal oral mucosa [No Oral Pallor] : no oral pallor [No Oral Cyanosis] : no oral cyanosis [Normal Jugular Venous A Waves Present] : normal jugular venous A waves present [Normal Jugular Venous V Waves Present] : normal jugular venous V waves present [No Jugular Venous Watson A Waves] : no jugular venous watson A waves [Respiration, Rhythm And Depth] : normal respiratory rhythm and effort [Auscultation Breath Sounds / Voice Sounds] : lungs were clear to auscultation bilaterally [Exaggerated Use Of Accessory Muscles For Inspiration] : no accessory muscle use [Heart Rate And Rhythm] : heart rate and rhythm were normal [Heart Sounds] : normal S1 and S2 [Murmurs] : no murmurs present [Abdomen Soft] : soft [Abdomen Tenderness] : non-tender [Abdomen Mass (___ Cm)] : no abdominal mass palpated [Abnormal Walk] : normal gait [Gait - Sufficient For Exercise Testing] : the gait was sufficient for exercise testing [Nail Clubbing] : no clubbing of the fingernails [Cyanosis, Localized] : no localized cyanosis [Petechial Hemorrhages (___cm)] : no petechial hemorrhages [] : no ischemic changes [Oriented To Time, Place, And Person] : oriented to person, place, and time [Affect] : the affect was normal [Mood] : the mood was normal [No Anxiety] : not feeling anxious

## 2021-01-28 ENCOUNTER — TRANSCRIPTION ENCOUNTER (OUTPATIENT)
Age: 71
End: 2021-01-28

## 2021-01-28 VITALS
HEIGHT: 74 IN | OXYGEN SATURATION: 99 % | TEMPERATURE: 98 F | WEIGHT: 187.83 LBS | SYSTOLIC BLOOD PRESSURE: 139 MMHG | DIASTOLIC BLOOD PRESSURE: 84 MMHG | RESPIRATION RATE: 16 BRPM | HEART RATE: 86 BPM

## 2021-01-28 NOTE — H&P ADULT - PROBLEM SELECTOR PLAN 1
Admit to Orthopaedic Service.  Presents today for elective left total knee replacement   Pt medically stable and cleared for procedure today by  Admit to Orthopaedic Service.  Presents today for elective left total knee replacement   Pt medically stable and cleared for procedure today by Dr. Melara; cardiology clearance per Dr. Ga

## 2021-01-28 NOTE — H&P ADULT - NSHPPHYSICALEXAM_GEN_ALL_CORE
MSK:   decreased ROM left knee 2/2 pain  Remainder of physical exam as per medical clearance note MSK:   decreased ROM left knee 2/2 pain  Skin warm and well perfused, no visible wounds/erythema/ecchymoses  EHL/FHL/TA/GS 5/5 motor strength bilateral lower extremities   SLT in tact to distal bilateral lower extremities   DP/PT pulses palpable bilaterally   Remainder of physical exam as per medical clearance note

## 2021-01-28 NOTE — H&P ADULT - HISTORY OF PRESENT ILLNESS
70M with left knee pain  presents for elective left total knee replacement  70M with left knee pain x chronic. Pt denies preceding trauma/injury. Pt states his pain radiates down his left lower extremity  - he takes Aleve as needed for pain control. Pt denies numbness/tingling/weakness of bilateral lower extremities and does not use an assistive ambulatory device at baseline. He denies DVT hx; denies CP, SOB, N/V, tactile fevers, calf pain.   Pt has failed conservative management of his symptoms.     Presents for elective left total knee replacement

## 2021-01-28 NOTE — H&P ADULT - NSHPOUTPATIENTPROVIDERS_GEN_ALL_CORE
outpatient medical clearance per  outpatient medical clearance per Dr. Melara; cardiology clearance per Dr. Ga

## 2021-01-28 NOTE — H&P ADULT - NSHPLABSRESULTS_GEN_ALL_CORE
Povidone iodine nasal swab to be given day of surgery  covid pcr negative on 1/26/21 Povidone iodine nasal swab to be given day of surgery  covid pcr negative on 1/26/21  preop echo 1/22/21 - normal right and left ventricular systolic function; LVEF 69%  preop cbc/ptt/pt/inr/cmp/ua - within normal limits, reviewed by medical clearance   Cr 1.06 on 1/19/21  Preop EKG: NSR, reviewed by medical clearance.   CXR: Within normal limits, per medical clearance.

## 2021-01-29 ENCOUNTER — INPATIENT (INPATIENT)
Facility: HOSPITAL | Age: 71
LOS: 0 days | Discharge: HOME HEALTH | DRG: 470 | End: 2021-01-30
Attending: ORTHOPAEDIC SURGERY | Admitting: ORTHOPAEDIC SURGERY
Payer: COMMERCIAL

## 2021-01-29 ENCOUNTER — RESULT REVIEW (OUTPATIENT)
Age: 71
End: 2021-01-29

## 2021-01-29 ENCOUNTER — APPOINTMENT (OUTPATIENT)
Dept: ORTHOPEDIC SURGERY | Facility: HOSPITAL | Age: 71
End: 2021-01-29

## 2021-01-29 DIAGNOSIS — Z41.9 ENCOUNTER FOR PROCEDURE FOR PURPOSES OTHER THAN REMEDYING HEALTH STATE, UNSPECIFIED: Chronic | ICD-10-CM

## 2021-01-29 DIAGNOSIS — M17.12 UNILATERAL PRIMARY OSTEOARTHRITIS, LEFT KNEE: ICD-10-CM

## 2021-01-29 PROCEDURE — 88305 TISSUE EXAM BY PATHOLOGIST: CPT | Mod: 26

## 2021-01-29 PROCEDURE — S2900 ROBOTIC SURGICAL SYSTEM: CPT | Mod: NC

## 2021-01-29 PROCEDURE — 73560 X-RAY EXAM OF KNEE 1 OR 2: CPT | Mod: 26,LT

## 2021-01-29 PROCEDURE — 0055T BONE SRGRY CMPTR CT/MRI IMAG: CPT

## 2021-01-29 PROCEDURE — 27447 TOTAL KNEE ARTHROPLASTY: CPT | Mod: LT

## 2021-01-29 RX ORDER — POVIDONE-IODINE 5 %
1 AEROSOL (ML) TOPICAL ONCE
Refills: 0 | Status: COMPLETED | OUTPATIENT
Start: 2021-01-29 | End: 2021-01-29

## 2021-01-29 RX ORDER — CELECOXIB 200 MG/1
400 CAPSULE ORAL ONCE
Refills: 0 | Status: COMPLETED | OUTPATIENT
Start: 2021-01-29 | End: 2021-01-29

## 2021-01-29 RX ORDER — OXYCODONE HYDROCHLORIDE 5 MG/1
10 TABLET ORAL EVERY 4 HOURS
Refills: 0 | Status: DISCONTINUED | OUTPATIENT
Start: 2021-01-29 | End: 2021-01-30

## 2021-01-29 RX ORDER — ONDANSETRON 8 MG/1
4 TABLET, FILM COATED ORAL EVERY 6 HOURS
Refills: 0 | Status: DISCONTINUED | OUTPATIENT
Start: 2021-01-29 | End: 2021-01-30

## 2021-01-29 RX ORDER — LAMOTRIGINE 25 MG/1
25 TABLET, ORALLY DISINTEGRATING ORAL
Refills: 0 | Status: DISCONTINUED | OUTPATIENT
Start: 2021-01-29 | End: 2021-01-30

## 2021-01-29 RX ORDER — ARIPIPRAZOLE 15 MG/1
10 TABLET ORAL DAILY
Refills: 0 | Status: DISCONTINUED | OUTPATIENT
Start: 2021-01-29 | End: 2021-01-30

## 2021-01-29 RX ORDER — SODIUM CHLORIDE 9 MG/ML
1000 INJECTION, SOLUTION INTRAVENOUS
Refills: 0 | Status: DISCONTINUED | OUTPATIENT
Start: 2021-01-30 | End: 2021-01-30

## 2021-01-29 RX ORDER — HYDROMORPHONE HYDROCHLORIDE 2 MG/ML
0.5 INJECTION INTRAMUSCULAR; INTRAVENOUS; SUBCUTANEOUS EVERY 4 HOURS
Refills: 0 | Status: DISCONTINUED | OUTPATIENT
Start: 2021-01-29 | End: 2021-01-30

## 2021-01-29 RX ORDER — HYDROMORPHONE HYDROCHLORIDE 2 MG/ML
0.5 INJECTION INTRAMUSCULAR; INTRAVENOUS; SUBCUTANEOUS
Refills: 0 | Status: DISCONTINUED | OUTPATIENT
Start: 2021-01-29 | End: 2021-01-30

## 2021-01-29 RX ORDER — CHLORHEXIDINE GLUCONATE 213 G/1000ML
1 SOLUTION TOPICAL ONCE
Refills: 0 | Status: COMPLETED | OUTPATIENT
Start: 2021-01-29 | End: 2021-01-29

## 2021-01-29 RX ORDER — CEFAZOLIN SODIUM 1 G
2000 VIAL (EA) INJECTION EVERY 8 HOURS
Refills: 0 | Status: COMPLETED | OUTPATIENT
Start: 2021-01-29 | End: 2021-01-29

## 2021-01-29 RX ORDER — GABAPENTIN 400 MG/1
600 CAPSULE ORAL ONCE
Refills: 0 | Status: COMPLETED | OUTPATIENT
Start: 2021-01-29 | End: 2021-01-29

## 2021-01-29 RX ORDER — BUPIVACAINE 13.3 MG/ML
20 INJECTION, SUSPENSION, LIPOSOMAL INFILTRATION ONCE
Refills: 0 | Status: DISCONTINUED | OUTPATIENT
Start: 2021-01-29 | End: 2021-01-30

## 2021-01-29 RX ORDER — INFLUENZA VIRUS VACCINE 15; 15; 15; 15 UG/.5ML; UG/.5ML; UG/.5ML; UG/.5ML
0.7 SUSPENSION INTRAMUSCULAR ONCE
Refills: 0 | Status: DISCONTINUED | OUTPATIENT
Start: 2021-01-29 | End: 2021-01-30

## 2021-01-29 RX ORDER — INFLUENZA VIRUS VACCINE 15; 15; 15; 15 UG/.5ML; UG/.5ML; UG/.5ML; UG/.5ML
0.5 SUSPENSION INTRAMUSCULAR ONCE
Refills: 0 | Status: DISCONTINUED | OUTPATIENT
Start: 2021-01-29 | End: 2021-01-29

## 2021-01-29 RX ORDER — PANTOPRAZOLE SODIUM 20 MG/1
40 TABLET, DELAYED RELEASE ORAL
Refills: 0 | Status: DISCONTINUED | OUTPATIENT
Start: 2021-01-29 | End: 2021-01-30

## 2021-01-29 RX ORDER — MAGNESIUM HYDROXIDE 400 MG/1
30 TABLET, CHEWABLE ORAL DAILY
Refills: 0 | Status: DISCONTINUED | OUTPATIENT
Start: 2021-01-29 | End: 2021-01-30

## 2021-01-29 RX ORDER — KETOROLAC TROMETHAMINE 30 MG/ML
15 SYRINGE (ML) INJECTION EVERY 6 HOURS
Refills: 0 | Status: COMPLETED | OUTPATIENT
Start: 2021-01-29 | End: 2021-01-30

## 2021-01-29 RX ORDER — GABAPENTIN 400 MG/1
300 CAPSULE ORAL ONCE
Refills: 0 | Status: DISCONTINUED | OUTPATIENT
Start: 2021-01-29 | End: 2021-01-29

## 2021-01-29 RX ORDER — ACETAMINOPHEN 500 MG
1000 TABLET ORAL ONCE
Refills: 0 | Status: COMPLETED | OUTPATIENT
Start: 2021-01-29 | End: 2021-01-29

## 2021-01-29 RX ORDER — RIVAROXABAN 15 MG-20MG
10 KIT ORAL ONCE
Refills: 0 | Status: COMPLETED | OUTPATIENT
Start: 2021-01-30 | End: 2021-01-30

## 2021-01-29 RX ORDER — ACETAMINOPHEN 500 MG
975 TABLET ORAL EVERY 8 HOURS
Refills: 0 | Status: DISCONTINUED | OUTPATIENT
Start: 2021-01-29 | End: 2021-01-30

## 2021-01-29 RX ORDER — BUPROPION HYDROCHLORIDE 150 MG/1
300 TABLET, EXTENDED RELEASE ORAL DAILY
Refills: 0 | Status: DISCONTINUED | OUTPATIENT
Start: 2021-01-29 | End: 2021-01-30

## 2021-01-29 RX ORDER — SENNA PLUS 8.6 MG/1
2 TABLET ORAL AT BEDTIME
Refills: 0 | Status: DISCONTINUED | OUTPATIENT
Start: 2021-01-29 | End: 2021-01-30

## 2021-01-29 RX ORDER — OXYCODONE HYDROCHLORIDE 5 MG/1
5 TABLET ORAL EVERY 4 HOURS
Refills: 0 | Status: DISCONTINUED | OUTPATIENT
Start: 2021-01-29 | End: 2021-01-30

## 2021-01-29 RX ORDER — ATORVASTATIN CALCIUM 80 MG/1
20 TABLET, FILM COATED ORAL AT BEDTIME
Refills: 0 | Status: DISCONTINUED | OUTPATIENT
Start: 2021-01-29 | End: 2021-01-30

## 2021-01-29 RX ORDER — MIRTAZAPINE 45 MG/1
15 TABLET, ORALLY DISINTEGRATING ORAL AT BEDTIME
Refills: 0 | Status: DISCONTINUED | OUTPATIENT
Start: 2021-01-29 | End: 2021-01-30

## 2021-01-29 RX ORDER — QUETIAPINE FUMARATE 200 MG/1
100 TABLET, FILM COATED ORAL AT BEDTIME
Refills: 0 | Status: DISCONTINUED | OUTPATIENT
Start: 2021-01-29 | End: 2021-01-30

## 2021-01-29 RX ORDER — CELECOXIB 200 MG/1
200 CAPSULE ORAL DAILY
Refills: 0 | Status: CANCELLED | OUTPATIENT
Start: 2021-02-01 | End: 2021-01-30

## 2021-01-29 RX ORDER — POLYETHYLENE GLYCOL 3350 17 G/17G
17 POWDER, FOR SOLUTION ORAL AT BEDTIME
Refills: 0 | Status: DISCONTINUED | OUTPATIENT
Start: 2021-01-29 | End: 2021-01-30

## 2021-01-29 RX ADMIN — Medication 1 APPLICATION(S): at 07:17

## 2021-01-29 RX ADMIN — Medication 975 MILLIGRAM(S): at 13:55

## 2021-01-29 RX ADMIN — Medication 100 MILLIGRAM(S): at 15:16

## 2021-01-29 RX ADMIN — GABAPENTIN 600 MILLIGRAM(S): 400 CAPSULE ORAL at 07:16

## 2021-01-29 RX ADMIN — OXYCODONE HYDROCHLORIDE 10 MILLIGRAM(S): 5 TABLET ORAL at 23:34

## 2021-01-29 RX ADMIN — ATORVASTATIN CALCIUM 20 MILLIGRAM(S): 80 TABLET, FILM COATED ORAL at 21:33

## 2021-01-29 RX ADMIN — Medication 15 MILLIGRAM(S): at 17:14

## 2021-01-29 RX ADMIN — MIRTAZAPINE 15 MILLIGRAM(S): 45 TABLET, ORALLY DISINTEGRATING ORAL at 21:33

## 2021-01-29 RX ADMIN — LAMOTRIGINE 25 MILLIGRAM(S): 25 TABLET, ORALLY DISINTEGRATING ORAL at 18:24

## 2021-01-29 RX ADMIN — OXYCODONE HYDROCHLORIDE 10 MILLIGRAM(S): 5 TABLET ORAL at 13:55

## 2021-01-29 RX ADMIN — Medication 975 MILLIGRAM(S): at 21:33

## 2021-01-29 RX ADMIN — Medication 15 MILLIGRAM(S): at 11:58

## 2021-01-29 RX ADMIN — CHLORHEXIDINE GLUCONATE 1 APPLICATION(S): 213 SOLUTION TOPICAL at 07:17

## 2021-01-29 RX ADMIN — CELECOXIB 400 MILLIGRAM(S): 200 CAPSULE ORAL at 07:16

## 2021-01-29 RX ADMIN — Medication 100 MILLIGRAM(S): at 23:34

## 2021-01-29 RX ADMIN — Medication 15 MILLIGRAM(S): at 23:34

## 2021-01-29 RX ADMIN — OXYCODONE HYDROCHLORIDE 10 MILLIGRAM(S): 5 TABLET ORAL at 19:27

## 2021-01-29 RX ADMIN — Medication 1000 MILLIGRAM(S): at 07:16

## 2021-01-29 RX ADMIN — QUETIAPINE FUMARATE 100 MILLIGRAM(S): 200 TABLET, FILM COATED ORAL at 21:33

## 2021-01-29 NOTE — PHYSICAL THERAPY INITIAL EVALUATION ADULT - GAIT DEVIATIONS NOTED, PT EVAL
decreased rody/decreased step length/decreased stride length/increased stride width/decreased weight-shifting ability

## 2021-01-29 NOTE — PROGRESS NOTE ADULT - SUBJECTIVE AND OBJECTIVE BOX
Orthopaedic Surgery Post Operative Check    Procedure: left total knee replacement   Surgeon: Dr. Higuera     Pt comfortable without complaints, pain controlled  Denies CP, SOB, N/V, numbness/tingling    Vital Signs Last 24 Hrs  T(C): 36.6 (01-29-21 @ 12:16), Max: 36.6 (01-29-21 @ 12:16)  T(F): 97.9 (01-29-21 @ 12:16), Max: 97.9 (01-29-21 @ 12:16)  HR: 73 (01-29-21 @ 12:16) (70 - 86)  BP: 107/73 (01-29-21 @ 12:16) (98/62 - 112/74)  BP(mean): 72 (01-29-21 @ 11:53) (72 - 89)  RR: 15 (01-29-21 @ 12:16) (14 - 19)  SpO2: 98% (01-29-21 @ 12:16) (98% - 100%)  AVSS    General: Pt Alert and oriented, NAD  DSG C/D/I left knee aquacel   Pulses: DP pulse palpable left lower extremity   Sensation: intact to bilateral lower extremities   Motor: EHL/FHL/TA/GS 5/5 bilateral lower extremities         Post-op X-Ray: prosthesis in place     A/P: 70yMale POD#0 s/p left TKR   - Stable  - Pain Control  - DVT ppx: xarelto  - Post op abx: ancef   - PT, WBS:  wbat   - f/u AM labs     Ortho Pager [phone]

## 2021-01-29 NOTE — PHYSICAL THERAPY INITIAL EVALUATION ADULT - PHYSICAL ASSIST/NONPHYSICAL ASSIST: SIT/SUPINE, REHAB EVAL
Called to notify mom of [facility] results.   N/a message on voice mail with results.     ----- Message from Darlin Johnston MD sent at 2/10/2020  2:30 PM CST -----  Rare MSSA     verbal cues/nonverbal cues (demo/gestures)/1 person assist

## 2021-01-30 ENCOUNTER — TRANSCRIPTION ENCOUNTER (OUTPATIENT)
Age: 71
End: 2021-01-30

## 2021-01-30 VITALS
TEMPERATURE: 98 F | RESPIRATION RATE: 17 BRPM | HEART RATE: 78 BPM | SYSTOLIC BLOOD PRESSURE: 110 MMHG | OXYGEN SATURATION: 96 % | DIASTOLIC BLOOD PRESSURE: 69 MMHG

## 2021-01-30 LAB
ANION GAP SERPL CALC-SCNC: 8 MMOL/L — SIGNIFICANT CHANGE UP (ref 5–17)
BUN SERPL-MCNC: 18 MG/DL — SIGNIFICANT CHANGE UP (ref 7–23)
CALCIUM SERPL-MCNC: 8.4 MG/DL — SIGNIFICANT CHANGE UP (ref 8.4–10.5)
CHLORIDE SERPL-SCNC: 108 MMOL/L — SIGNIFICANT CHANGE UP (ref 96–108)
CO2 SERPL-SCNC: 25 MMOL/L — SIGNIFICANT CHANGE UP (ref 22–31)
CREAT SERPL-MCNC: 1.02 MG/DL — SIGNIFICANT CHANGE UP (ref 0.5–1.3)
GLUCOSE SERPL-MCNC: 111 MG/DL — HIGH (ref 70–99)
HCT VFR BLD CALC: 32 % — LOW (ref 39–50)
HCV AB S/CO SERPL IA: 0.07 S/CO — SIGNIFICANT CHANGE UP
HCV AB SERPL-IMP: SIGNIFICANT CHANGE UP
HGB BLD-MCNC: 10.3 G/DL — LOW (ref 13–17)
MCHC RBC-ENTMCNC: 29.9 PG — SIGNIFICANT CHANGE UP (ref 27–34)
MCHC RBC-ENTMCNC: 32.2 GM/DL — SIGNIFICANT CHANGE UP (ref 32–36)
MCV RBC AUTO: 92.8 FL — SIGNIFICANT CHANGE UP (ref 80–100)
NRBC # BLD: 0 /100 WBCS — SIGNIFICANT CHANGE UP (ref 0–0)
PLATELET # BLD AUTO: 197 K/UL — SIGNIFICANT CHANGE UP (ref 150–400)
POTASSIUM SERPL-MCNC: 4.2 MMOL/L — SIGNIFICANT CHANGE UP (ref 3.5–5.3)
POTASSIUM SERPL-SCNC: 4.2 MMOL/L — SIGNIFICANT CHANGE UP (ref 3.5–5.3)
RBC # BLD: 3.45 M/UL — LOW (ref 4.2–5.8)
RBC # FLD: 13.4 % — SIGNIFICANT CHANGE UP (ref 10.3–14.5)
SODIUM SERPL-SCNC: 141 MMOL/L — SIGNIFICANT CHANGE UP (ref 135–145)
WBC # BLD: 5.86 K/UL — SIGNIFICANT CHANGE UP (ref 3.8–10.5)
WBC # FLD AUTO: 5.86 K/UL — SIGNIFICANT CHANGE UP (ref 3.8–10.5)

## 2021-01-30 PROCEDURE — 36415 COLL VENOUS BLD VENIPUNCTURE: CPT

## 2021-01-30 PROCEDURE — 80048 BASIC METABOLIC PNL TOTAL CA: CPT

## 2021-01-30 PROCEDURE — 97530 THERAPEUTIC ACTIVITIES: CPT

## 2021-01-30 PROCEDURE — 88305 TISSUE EXAM BY PATHOLOGIST: CPT

## 2021-01-30 PROCEDURE — 97116 GAIT TRAINING THERAPY: CPT

## 2021-01-30 PROCEDURE — 97161 PT EVAL LOW COMPLEX 20 MIN: CPT

## 2021-01-30 PROCEDURE — 27447 TOTAL KNEE ARTHROPLASTY: CPT

## 2021-01-30 PROCEDURE — 73560 X-RAY EXAM OF KNEE 1 OR 2: CPT

## 2021-01-30 PROCEDURE — 86803 HEPATITIS C AB TEST: CPT

## 2021-01-30 PROCEDURE — S2900: CPT

## 2021-01-30 PROCEDURE — C1713: CPT

## 2021-01-30 PROCEDURE — C1776: CPT

## 2021-01-30 PROCEDURE — 85027 COMPLETE CBC AUTOMATED: CPT

## 2021-01-30 RX ORDER — SENNA PLUS 8.6 MG/1
2 TABLET ORAL
Qty: 0 | Refills: 0 | DISCHARGE
Start: 2021-01-30

## 2021-01-30 RX ORDER — ASPIRIN/CALCIUM CARB/MAGNESIUM 324 MG
0 TABLET ORAL
Qty: 0 | Refills: 0 | DISCHARGE

## 2021-01-30 RX ORDER — MAGNESIUM HYDROXIDE 400 MG/1
30 TABLET, CHEWABLE ORAL
Qty: 0 | Refills: 0 | DISCHARGE
Start: 2021-01-30

## 2021-01-30 RX ORDER — PANTOPRAZOLE SODIUM 20 MG/1
1 TABLET, DELAYED RELEASE ORAL
Qty: 28 | Refills: 0
Start: 2021-01-30 | End: 2021-02-26

## 2021-01-30 RX ORDER — MORPHINE SULFATE 50 MG/1
1 CAPSULE, EXTENDED RELEASE ORAL
Qty: 10 | Refills: 0
Start: 2021-01-30 | End: 2021-02-03

## 2021-01-30 RX ORDER — ACETAMINOPHEN 500 MG
3 TABLET ORAL
Qty: 0 | Refills: 0 | DISCHARGE
Start: 2021-01-30

## 2021-01-30 RX ORDER — CELECOXIB 200 MG/1
1 CAPSULE ORAL
Qty: 56 | Refills: 0
Start: 2021-01-30 | End: 2021-02-26

## 2021-01-30 RX ORDER — OXYCODONE HYDROCHLORIDE 5 MG/1
1 TABLET ORAL
Qty: 42 | Refills: 0
Start: 2021-01-30 | End: 2021-02-05

## 2021-01-30 RX ORDER — ACETAMINOPHEN 500 MG
3 TABLET ORAL
Qty: 252 | Refills: 0
Start: 2021-01-30 | End: 2021-02-26

## 2021-01-30 RX ORDER — RIVAROXABAN 15 MG-20MG
10 KIT ORAL ONCE
Refills: 0 | Status: COMPLETED | OUTPATIENT
Start: 2021-01-30 | End: 2021-01-30

## 2021-01-30 RX ORDER — POLYETHYLENE GLYCOL 3350 17 G/17G
17 POWDER, FOR SOLUTION ORAL
Qty: 0 | Refills: 0 | DISCHARGE
Start: 2021-01-30

## 2021-01-30 RX ORDER — PANTOPRAZOLE SODIUM 20 MG/1
1 TABLET, DELAYED RELEASE ORAL
Qty: 0 | Refills: 0 | DISCHARGE
Start: 2021-01-30

## 2021-01-30 RX ORDER — RIVAROXABAN 15 MG-20MG
1 KIT ORAL
Qty: 30 | Refills: 0
Start: 2021-01-30 | End: 2021-02-28

## 2021-01-30 RX ADMIN — PANTOPRAZOLE SODIUM 40 MILLIGRAM(S): 20 TABLET, DELAYED RELEASE ORAL at 05:53

## 2021-01-30 RX ADMIN — OXYCODONE HYDROCHLORIDE 10 MILLIGRAM(S): 5 TABLET ORAL at 05:53

## 2021-01-30 RX ADMIN — Medication 15 MILLIGRAM(S): at 05:53

## 2021-01-30 RX ADMIN — Medication 975 MILLIGRAM(S): at 05:53

## 2021-01-30 RX ADMIN — HYDROMORPHONE HYDROCHLORIDE 0.5 MILLIGRAM(S): 2 INJECTION INTRAMUSCULAR; INTRAVENOUS; SUBCUTANEOUS at 02:24

## 2021-01-30 RX ADMIN — OXYCODONE HYDROCHLORIDE 10 MILLIGRAM(S): 5 TABLET ORAL at 11:45

## 2021-01-30 RX ADMIN — Medication 15 MILLIGRAM(S): at 11:45

## 2021-01-30 RX ADMIN — RIVAROXABAN 10 MILLIGRAM(S): KIT at 12:03

## 2021-01-30 NOTE — PROGRESS NOTE ADULT - SUBJECTIVE AND OBJECTIVE BOX
Orthopaedic Surgery    Procedure: left total knee replacement   Surgeon: Dr. Higuera     Pt comfortable without complaints, pain controlled  Denies CP, SOB, N/V, numbness/tingling    Vital Signs Last 24 Hrs  T(C): 36.4 (30 Jan 2021 08:20), Max: 36.9 (29 Jan 2021 20:15)  T(F): 97.6 (30 Jan 2021 08:20), Max: 98.5 (29 Jan 2021 20:15)  HR: 78 (30 Jan 2021 08:20) (70 - 87)  BP: 110/69 (30 Jan 2021 08:20) (98/62 - 140/80)  BP(mean): 72 (29 Jan 2021 11:53) (72 - 89)  RR: 17 (30 Jan 2021 08:20) (14 - 19)  SpO2: 96% (30 Jan 2021 08:20) (96% - 100%)    General: Pt Alert and oriented, NAD  DSG C/D/I left knee aquacel   wwp  Sensation: intact to bilateral lower extremities   Motor: EHL/FHL/TA/GS 5/5 bilateral lower extremities       A/P: 70yMale s/p left TKR 1/29  - Stable  - Pain Control  - DVT ppx: xarelto  - Post op abx: ancef   - PT, WBS:  wbat   - f/u AM labs     Ortho Pager [phone]

## 2021-01-30 NOTE — DISCHARGE NOTE PROVIDER - CARE PROVIDER_API CALL
Emmanuel Higuera)  Orthopedics  [address] [facility], [place] [address]  Phone: [phone]  Fax: [phone]  Follow Up Time:

## 2021-01-30 NOTE — DISCHARGE NOTE PROVIDER - NSDCMRMEDTOKEN_GEN_ALL_CORE_FT
Abilify 10 mg oral tablet: 1 tab(s) orally once a day  acetaminophen 325 mg oral tablet: 3 tab(s) orally every 8 hours  aspirin 81 mg oral tablet:   atorvastatin 20 mg oral tablet: 1 tab(s) orally once a day  buPROPion 300 mg/24 hours (XL) oral tablet, extended release: 1 tab(s) orally every 24 hours  lamoTRIgine 25 mg oral tablet: 1 tab(s) orally 2 times a day  magnesium hydroxide 8% oral suspension: 30 milliliter(s) orally once a day, As needed, Constipation  mirtazapine 15 mg oral tablet: 1 tab(s) orally once a day (at bedtime)  pantoprazole 40 mg oral delayed release tablet: 1 tab(s) orally once a day (before a meal)  polyethylene glycol 3350 oral powder for reconstitution: 17 gram(s) orally once a day (at bedtime)  QUEtiapine 100 mg oral tablet: orally once a day  senna oral tablet: 2 tab(s) orally once a day (at bedtime)   Abilify 10 mg oral tablet: 1 tab(s) orally once a day  acetaminophen 325 mg oral tablet: 3 tab(s) orally every 8 hours MDD:3  Arymo ER 15 mg oral tablet, extended release: 1 tab(s) orally every 12 hours MDD:2   atorvastatin 20 mg oral tablet: 1 tab(s) orally once a day  buPROPion 300 mg/24 hours (XL) oral tablet, extended release: 1 tab(s) orally every 24 hours  celecoxib 200 mg oral capsule: 1 cap(s) orally 2 times a day MDD:2  lamoTRIgine 25 mg oral tablet: 1 tab(s) orally 2 times a day  magnesium hydroxide 8% oral suspension: 30 milliliter(s) orally once a day, As needed, Constipation  mirtazapine 15 mg oral tablet: 1 tab(s) orally once a day (at bedtime)  oxyCODONE 5 mg oral tablet: 1-2 tab(s) orally every 4 hours, As Needed - for severe pain MDD:6  pantoprazole 40 mg oral delayed release tablet: 1 tab(s) orally once a day (before a meal)  pantoprazole 40 mg oral delayed release tablet: 1 tab(s) orally once a day (before a meal)  polyethylene glycol 3350 oral powder for reconstitution: 17 gram(s) orally once a day (at bedtime)  QUEtiapine 100 mg oral tablet: orally once a day  rivaroxaban 10 mg oral tablet: 1 tab(s) orally once a day MDD:1  senna oral tablet: 2 tab(s) orally once a day (at bedtime)   Abilify 10 mg oral tablet: 1 tab(s) orally once a day  acetaminophen 325 mg oral tablet: 3 tab(s) orally every 8 hours MDD:3  Arymo ER 15 mg oral tablet, extended release: 1 tab(s) orally every 12 hours MDD:2   atorvastatin 20 mg oral tablet: 1 tab(s) orally once a day  buPROPion 300 mg/24 hours (XL) oral tablet, extended release: 1 tab(s) orally every 24 hours  celecoxib 200 mg oral capsule: 1 cap(s) orally 2 times a day MDD:2  lamoTRIgine 25 mg oral tablet: 1 tab(s) orally 2 times a day  magnesium hydroxide 8% oral suspension: 30 milliliter(s) orally once a day, As needed, Constipation  mirtazapine 15 mg oral tablet: 1 tab(s) orally once a day (at bedtime)  oxyCODONE 5 mg oral tablet: 1-2 tab(s) orally every 4 hours, As Needed - for severe pain MDD:6  pantoprazole 40 mg oral delayed release tablet: 1 tab(s) orally once a day (before a meal)  polyethylene glycol 3350 oral powder for reconstitution: 17 gram(s) orally once a day (at bedtime)  QUEtiapine 100 mg oral tablet: orally once a day  rivaroxaban 10 mg oral tablet: 1 tab(s) orally once a day MDD:1  senna oral tablet: 2 tab(s) orally once a day (at bedtime)

## 2021-01-30 NOTE — DISCHARGE NOTE PROVIDER - NSDCCPCAREPLAN_GEN_ALL_CORE_FT
PRINCIPAL DISCHARGE DIAGNOSIS  Diagnosis: Primary osteoarthritis of left knee  Assessment and Plan of Treatment: Primary osteoarthritis of left knee

## 2021-01-30 NOTE — DISCHARGE NOTE PROVIDER - NSDCFUADDINST_GEN_ALL_CORE_FT
Weight bear as tolerated with assistive device.  No strenuous activity, heavy lifting, driving or returning to work until cleared by MD.  You may shower - dressing is water-resistant, no soaking in bathtubs.  Remove dressing after post op day 5-7, then leave incision open to air. Keep incision clean and dry.  Try to have regular bowel movements, take stool softener or laxative if necessary.  May take Pepcid or Zantac for upset stomach.  May take Aleve or Naproxen instead of Meloxicam/Celebrex.  Swelling may travel all the way down leg to foot, this is normal and will subside in a few weeks.  Call to schedule an appt with Dr. Higuera for follow up, if you have staples or sutures they will be removed in office.  Contact your doctor if you experience: fever greater than 101.5, chills, chest pain, difficulty breathing, redness or excessive drainage around the incision, other concerns.  Follow up with your primary care provider.  Please follow Dr. Higuera's discharge instructions.    [facility] please take as prescribed.  Swelling may travel all the way down leg to foot, this is normal and will subside in a few weeks.  Call to schedule an appt with Dr. Higuera for follow up, if you have staples or sutures they will be removed in office.  Contact your doctor if you experience: fever greater than 101.5, chills, chest pain, difficulty breathing, redness or excessive drainage around the incision, other concerns.  Follow up with your primary care provider.

## 2021-01-30 NOTE — DISCHARGE NOTE PROVIDER - HOSPITAL COURSE
Admitted  Surgery  Dorita-op Antibiotics  Pain control  DVT prophylaxis  OOB/Physical Therapy Admitted  Surgery L TKA 1/29/2021  Dorita-op Antibiotics  Pain control  DVT prophylaxis  OOB/Physical Therapy

## 2021-01-30 NOTE — DISCHARGE NOTE NURSING/CASE MANAGEMENT/SOCIAL WORK - PATIENT PORTAL LINK FT
You can access the FollowMyHealth Patient Portal offered by [facility] by registering at the following website: [email or web address]. By joining "[employer]"’s FollowMyHealth portal, you will also be able to view your health information using other applications (apps) compatible with our system.

## 2021-01-30 NOTE — DISCHARGE NOTE PROVIDER - NSDCCPGOAL_GEN_ALL_CORE_FT
COPD/PN Week 2 Survey      Responses   [facility] patient discharged from?  [place]   Does the patient have one of the following disease processes/diagnoses(primary or secondary)?  COPD/Pneumonia   Was the primary reason for admission:  COPD exacerbation   Week 2 attempt successful?  No   Call start time  0750   Unsuccessful attempts  Attempt 2   Discharge diagnosis  Metabolic encephalopathy due to hypercarbic respiratory failure and hypoglycemia,     COPD with exacerbation,    L humerus fracture           Krys Napoles RN   To get better and follow your care plan as instructed.

## 2021-02-03 LAB — SURGICAL PATHOLOGY STUDY: SIGNIFICANT CHANGE UP

## 2021-02-05 ENCOUNTER — NON-APPOINTMENT (OUTPATIENT)
Age: 71
End: 2021-02-05

## 2021-02-05 DIAGNOSIS — F31.9 BIPOLAR DISORDER, UNSPECIFIED: ICD-10-CM

## 2021-02-05 DIAGNOSIS — M17.12 UNILATERAL PRIMARY OSTEOARTHRITIS, LEFT KNEE: ICD-10-CM

## 2021-02-05 DIAGNOSIS — I34.0 NONRHEUMATIC MITRAL (VALVE) INSUFFICIENCY: ICD-10-CM

## 2021-02-05 DIAGNOSIS — E78.5 HYPERLIPIDEMIA, UNSPECIFIED: ICD-10-CM

## 2021-02-11 ENCOUNTER — APPOINTMENT (OUTPATIENT)
Dept: ORTHOPEDIC SURGERY | Facility: CLINIC | Age: 71
End: 2021-02-11
Payer: MEDICARE

## 2021-02-11 VITALS
HEART RATE: 93 BPM | BODY MASS INDEX: 25.06 KG/M2 | OXYGEN SATURATION: 98 % | WEIGHT: 185 LBS | DIASTOLIC BLOOD PRESSURE: 70 MMHG | SYSTOLIC BLOOD PRESSURE: 130 MMHG | HEIGHT: 72 IN

## 2021-02-11 PROBLEM — F31.9 BIPOLAR DISORDER, UNSPECIFIED: Chronic | Status: ACTIVE | Noted: 2021-01-28

## 2021-02-11 PROBLEM — E78.5 HYPERLIPIDEMIA, UNSPECIFIED: Chronic | Status: ACTIVE | Noted: 2021-01-28

## 2021-02-11 PROCEDURE — 99024 POSTOP FOLLOW-UP VISIT: CPT

## 2021-02-11 NOTE — HISTORY OF PRESENT ILLNESS
[Neuro Intact] : an unremarkable neurological exam [Vascular Intact] : ~T peripheral vascular exam normal [Negative Bucky's] : maneuvers demonstrated a negative Bucky's sign [de-identified] : First post op left total knee replacement, surgical date 1/29/21 [de-identified] : Doing well. Receiving PT at home, feels that he would like to ramp up the intensity. No wound problems. Pain moderate, recently stopped the morphine ER and is tapering down the oxys. Using a cane. Right knee is moderate painful as well but not hampering his progress with the other side. Compliant with Xarelto. [de-identified] : L knee: incision healed with minimal inferior scabbing, no erythema. Moderate diffuse swelling and ecchymosis of posterior thigh with distal edema to the foot. No skin compromise. L knee ROM 0-90, lig stable ant/post/mary/steve, good quad strength. Ambulating with cane, mild left antalgia.  [de-identified] : 70y/o male 2 weeks s/p L TKA, doing well [de-identified] : Begin outpatient PT\par Taper opioids\par Finish out 30 days Xarelto\par RTC 4wk with new L knee XRs

## 2021-02-25 ENCOUNTER — APPOINTMENT (OUTPATIENT)
Dept: ORTHOPEDIC SURGERY | Facility: CLINIC | Age: 71
End: 2021-02-25
Payer: MEDICARE

## 2021-02-25 VITALS — OXYGEN SATURATION: 98 % | HEART RATE: 86 BPM | SYSTOLIC BLOOD PRESSURE: 120 MMHG | DIASTOLIC BLOOD PRESSURE: 70 MMHG

## 2021-02-25 PROCEDURE — 99024 POSTOP FOLLOW-UP VISIT: CPT

## 2021-02-25 NOTE — HISTORY OF PRESENT ILLNESS
[Neuro Intact] : an unremarkable neurological exam [Vascular Intact] : ~T peripheral vascular exam normal [Negative Bucky's] : maneuvers demonstrated a negative Bucky's sign [de-identified] : s/p left total knee replacement, surgical date 1/29/21 [de-identified] : Coming in a little early as was concerned that he developed some more pain after a PT session lately. Didn't hear a pop or feel anything give. Still walking over an hour each day with no cane. No knee weakness or buckling. No increased swelling. No wound issues. Still participating with PT. [de-identified] : R knee incision healed, benign. Moderate swelling appropriate for this stage. No erythema. ROM 0-115, lig stable mary/steve/ant/post, good quad strength. Ambulating with normal gait pattern. [de-identified] : 72y/o male 1mo s/p L TKA, doing well [de-identified] : Reassurance given\par Cont PT\par RTC 4wk with new L knee XRs

## 2021-03-11 ENCOUNTER — RESULT REVIEW (OUTPATIENT)
Age: 71
End: 2021-03-11

## 2021-03-11 ENCOUNTER — APPOINTMENT (OUTPATIENT)
Dept: ORTHOPEDIC SURGERY | Facility: CLINIC | Age: 71
End: 2021-03-11
Payer: MEDICARE

## 2021-03-11 ENCOUNTER — OUTPATIENT (OUTPATIENT)
Dept: OUTPATIENT SERVICES | Facility: HOSPITAL | Age: 71
LOS: 1 days | End: 2021-03-11
Payer: MEDICARE

## 2021-03-11 VITALS
DIASTOLIC BLOOD PRESSURE: 80 MMHG | HEART RATE: 83 BPM | SYSTOLIC BLOOD PRESSURE: 122 MMHG | TEMPERATURE: 97.7 F | WEIGHT: 185 LBS | HEIGHT: 78 IN | BODY MASS INDEX: 21.4 KG/M2

## 2021-03-11 DIAGNOSIS — Z41.9 ENCOUNTER FOR PROCEDURE FOR PURPOSES OTHER THAN REMEDYING HEALTH STATE, UNSPECIFIED: Chronic | ICD-10-CM

## 2021-03-11 PROCEDURE — 99024 POSTOP FOLLOW-UP VISIT: CPT

## 2021-03-11 PROCEDURE — 73562 X-RAY EXAM OF KNEE 3: CPT | Mod: 26,LT

## 2021-03-11 PROCEDURE — 73562 X-RAY EXAM OF KNEE 3: CPT

## 2021-03-11 NOTE — HISTORY OF PRESENT ILLNESS
[Neuro Intact] : an unremarkable neurological exam [Vascular Intact] : ~T peripheral vascular exam normal [Negative Bucky's] : maneuvers demonstrated a negative Bucky's sign [de-identified] : s/p left total knee replacement, surgical date 1/29/21 [de-identified] : Doing well. Still in outpatient PT. Making good functional progress, walking about an hour daily. No cane. Takes occasional Tylenol. He is thinking about getting the other side done in the near future. [de-identified] : R knee incision healed, benign. Mild swelling appropriate for this stage. No erythema. ROM 0-125, lig stable mary/steve/ant/post, good quad strength. Ambulating with normal gait pattern. [de-identified] : Left knee XRs taken today demonstrate stable position of the components. Limb alignment is normal. [de-identified] : 70y/o male 6wk s/p L TKA, doing well [de-identified] : Cont PT/HEP\par RTC 6wk with new L knee XRs; consider booking R TKA at that time

## 2021-03-11 NOTE — HISTORY OF PRESENT ILLNESS
[Neuro Intact] : an unremarkable neurological exam [Vascular Intact] : ~T peripheral vascular exam normal [Negative Bucky's] : maneuvers demonstrated a negative Bucky's sign [de-identified] : s/p left total knee replacement, surgical date 1/29/21 [de-identified] : Doing well. Still in outpatient PT. Making good functional progress, walking about an hour daily. No cane. Takes occasional Tylenol. He is thinking about getting the other side done in the near future. [de-identified] : R knee incision healed, benign. Mild swelling appropriate for this stage. No erythema. ROM 0-125, lig stable mary/steve/ant/post, good quad strength. Ambulating with normal gait pattern. [de-identified] : Left knee XRs taken today demonstrate stable position of the components. Limb alignment is normal. [de-identified] : 70y/o male 6wk s/p L TKA, doing well [de-identified] : Cont PT/HEP\par RTC 6wk with new L knee XRs; consider booking R TKA at that time

## 2021-03-26 ENCOUNTER — RX RENEWAL (OUTPATIENT)
Age: 71
End: 2021-03-26

## 2021-03-29 ENCOUNTER — RESULT REVIEW (OUTPATIENT)
Age: 71
End: 2021-03-29

## 2021-03-29 ENCOUNTER — OUTPATIENT (OUTPATIENT)
Dept: OUTPATIENT SERVICES | Facility: HOSPITAL | Age: 71
LOS: 1 days | End: 2021-03-29
Payer: MEDICARE

## 2021-03-29 ENCOUNTER — APPOINTMENT (OUTPATIENT)
Dept: ORTHOPEDIC SURGERY | Facility: CLINIC | Age: 71
End: 2021-03-29
Payer: MEDICARE

## 2021-03-29 VITALS — DIASTOLIC BLOOD PRESSURE: 70 MMHG | HEART RATE: 80 BPM | OXYGEN SATURATION: 98 % | SYSTOLIC BLOOD PRESSURE: 130 MMHG

## 2021-03-29 DIAGNOSIS — Z41.9 ENCOUNTER FOR PROCEDURE FOR PURPOSES OTHER THAN REMEDYING HEALTH STATE, UNSPECIFIED: Chronic | ICD-10-CM

## 2021-03-29 PROCEDURE — 73562 X-RAY EXAM OF KNEE 3: CPT | Mod: 26,LT

## 2021-03-29 PROCEDURE — 99024 POSTOP FOLLOW-UP VISIT: CPT

## 2021-03-29 PROCEDURE — 73562 X-RAY EXAM OF KNEE 3: CPT

## 2021-03-29 NOTE — HISTORY OF PRESENT ILLNESS
[de-identified] :  2 months s/p left total knee replacement, surgical date 1/29/21.  [de-identified] : Had a scare while vacationing in [place] last week; had some transiently increased left knee pain without fall or associated systemic symptoms. Feels better after having come back to [place]. Denies calf/thigh pain; no focal redness or swelling.  [de-identified] : R knee incision healed, benign. Mild swelling appropriate for this stage. No erythema. ROM 0-125, lig stable mary/steve/ant/post, good quad strength. Ambulating with normal gait pattern. Additional findings included an unremarkable neurological exam, peripheral vascular exam normal and maneuvers demonstrated a negative Bucky's sign.  [de-identified] : Left knee XRs taken today demonstrate stable position of the components. Limb alignment is normal. \par  [de-identified] : 72y/o male 2mo s/p L TKA, doing well [de-identified] : Reassurance given\par Cont PT/HEP\par RTC 6wk with new L knee XRs, consider booking R TKA at that time

## 2021-04-19 ENCOUNTER — NON-APPOINTMENT (OUTPATIENT)
Age: 71
End: 2021-04-19

## 2021-04-19 DIAGNOSIS — F51.04 PSYCHOPHYSIOLOGIC INSOMNIA: ICD-10-CM

## 2021-04-29 ENCOUNTER — FORM ENCOUNTER (OUTPATIENT)
Age: 71
End: 2021-04-29

## 2021-05-17 ENCOUNTER — RESULT REVIEW (OUTPATIENT)
Age: 71
End: 2021-05-17

## 2021-05-17 ENCOUNTER — APPOINTMENT (OUTPATIENT)
Dept: ORTHOPEDIC SURGERY | Facility: CLINIC | Age: 71
End: 2021-05-17
Payer: MEDICARE

## 2021-05-17 ENCOUNTER — OUTPATIENT (OUTPATIENT)
Dept: OUTPATIENT SERVICES | Facility: HOSPITAL | Age: 71
LOS: 1 days | End: 2021-05-17
Payer: MEDICARE

## 2021-05-17 VITALS — TEMPERATURE: 96.9 F

## 2021-05-17 DIAGNOSIS — Z41.9 ENCOUNTER FOR PROCEDURE FOR PURPOSES OTHER THAN REMEDYING HEALTH STATE, UNSPECIFIED: Chronic | ICD-10-CM

## 2021-05-17 DIAGNOSIS — M17.11 UNILATERAL PRIMARY OSTEOARTHRITIS, RIGHT KNEE: ICD-10-CM

## 2021-05-17 PROCEDURE — 73562 X-RAY EXAM OF KNEE 3: CPT

## 2021-05-17 PROCEDURE — 73562 X-RAY EXAM OF KNEE 3: CPT | Mod: 26,LT

## 2021-05-17 PROCEDURE — 99213 OFFICE O/P EST LOW 20 MIN: CPT

## 2021-05-19 NOTE — DISCUSSION/SUMMARY
[de-identified] : 72y/o male s/p well-functioning left TKA, right knee OA\par - Book for right TKA, he would like to go ASAP\par - Standard medical clearance pathways, Gaetano TENORIO

## 2021-05-19 NOTE — DISCUSSION/SUMMARY
[de-identified] : 72y/o male s/p well-functioning left TKA, right knee OA\par - Book for right TKA, he would like to go ASAP\par - Standard medical clearance pathways, Gaetano TENORIO

## 2021-05-19 NOTE — HISTORY OF PRESENT ILLNESS
[de-identified] : 70y/o male s/p L TKA 1/29/21. Doing well with the left. Nearly finished with PT. Feels like he has unlimited endurance on the left now. However, the right knee is becoming increasingly bothersome and he'd like to schedule R TKA.

## 2021-05-19 NOTE — HISTORY OF PRESENT ILLNESS
[de-identified] : 72y/o male s/p L TKA 1/29/21. Doing well with the left. Nearly finished with PT. Feels like he has unlimited endurance on the left now. However, the right knee is becoming increasingly bothersome and he'd like to schedule R TKA.

## 2021-05-19 NOTE — PHYSICAL EXAM
[de-identified] : General appearance: well nourished and hydrated, pleasant, alert and oriented x 3, cooperative.  \par HEENT: normocephalic, EOM intact, wearing mask, external auditory canal clear.  \par Cardiovascular: no lower leg edema, no varicosities, dorsalis pedis pulses palpable and symmetric.  \par Lymphatics: no palpable lymphadenopathy, no lymphedema.  \par Neurologic: sensation is normal, no muscle weakness in upper or lower extremities, patella tendon reflexes present and symmetric.  \par Dermatologic: skin moist, warm, no rash.  \par Spine: cervical spine with normal lordosis and painless range of motion, thoracic spine with normal kyphosis and painless range of motion, lumbosacral spine with normal lordosis and painless range of motion.\par Gait: normal.  \par \par Left knee:\par - Inspection: negative swelling, ecchymosis, and erythema.  \par - Wounds: healed midline incision.\par - Alignment: normal.  \par - Palpation: no specific tenderness on palpation.  \par - ROM active: 0-130, no pain on extremes of motion\par - Ligamentous laxity: stable to varus, valgus, anterior/posterior stresses.\par - Popliteal angle: 45 degrees\par - Muscle Test: 5/5 quad strength. \par \par Right knee:\par - Inspection: trace effusion, negative ecchymosis and erythema. \par - Wounds: none. \par - Alignment: mild correctable varus.\par - Palpation: medial joint line tenderness on palpation. \par - ROM active: 3-130, discomfort on extremes of motion\par - Ligamentous laxity: negative Lachman, negative ant. drawer test, negative post. drawer test, negative pivot shift test, stable to varus stress, stable to valgus stress. \par - Popliteal angle: 60 degrees\par - Muscle Test: 5/5 quad strength.  [de-identified] : Left knee XRs were taken today, interpreted by me, and reviewed with the patient.\par \par TKA in unchanged position. No evidence of mechanical complication. All components well fixed in acceptable alignment. Patella sits at appropriate height and tracks centrally.

## 2021-05-19 NOTE — PHYSICAL EXAM
[de-identified] : General appearance: well nourished and hydrated, pleasant, alert and oriented x 3, cooperative.  \par HEENT: normocephalic, EOM intact, wearing mask, external auditory canal clear.  \par Cardiovascular: no lower leg edema, no varicosities, dorsalis pedis pulses palpable and symmetric.  \par Lymphatics: no palpable lymphadenopathy, no lymphedema.  \par Neurologic: sensation is normal, no muscle weakness in upper or lower extremities, patella tendon reflexes present and symmetric.  \par Dermatologic: skin moist, warm, no rash.  \par Spine: cervical spine with normal lordosis and painless range of motion, thoracic spine with normal kyphosis and painless range of motion, lumbosacral spine with normal lordosis and painless range of motion.\par Gait: normal.  \par \par Left knee:\par - Inspection: negative swelling, ecchymosis, and erythema.  \par - Wounds: healed midline incision.\par - Alignment: normal.  \par - Palpation: no specific tenderness on palpation.  \par - ROM active: 0-130, no pain on extremes of motion\par - Ligamentous laxity: stable to varus, valgus, anterior/posterior stresses.\par - Popliteal angle: 45 degrees\par - Muscle Test: 5/5 quad strength. \par \par Right knee:\par - Inspection: trace effusion, negative ecchymosis and erythema. \par - Wounds: none. \par - Alignment: mild correctable varus.\par - Palpation: medial joint line tenderness on palpation. \par - ROM active: 3-130, discomfort on extremes of motion\par - Ligamentous laxity: negative Lachman, negative ant. drawer test, negative post. drawer test, negative pivot shift test, stable to varus stress, stable to valgus stress. \par - Popliteal angle: 60 degrees\par - Muscle Test: 5/5 quad strength.  [de-identified] : Left knee XRs were taken today, interpreted by me, and reviewed with the patient.\par \par TKA in unchanged position. No evidence of mechanical complication. All components well fixed in acceptable alignment. Patella sits at appropriate height and tracks centrally.

## 2021-06-11 ENCOUNTER — RX RENEWAL (OUTPATIENT)
Age: 71
End: 2021-06-11

## 2021-06-25 ENCOUNTER — APPOINTMENT (OUTPATIENT)
Dept: INTERNAL MEDICINE | Facility: CLINIC | Age: 71
End: 2021-06-25
Payer: MEDICARE

## 2021-06-25 VITALS
TEMPERATURE: 98.1 F | HEIGHT: 74 IN | HEART RATE: 71 BPM | BODY MASS INDEX: 21.82 KG/M2 | RESPIRATION RATE: 16 BRPM | DIASTOLIC BLOOD PRESSURE: 78 MMHG | OXYGEN SATURATION: 99 % | SYSTOLIC BLOOD PRESSURE: 110 MMHG | WEIGHT: 170 LBS

## 2021-06-25 DIAGNOSIS — K11.7 DISTURBANCES OF SALIVARY SECRETION: ICD-10-CM

## 2021-06-25 DIAGNOSIS — I31.9 DISEASE OF PERICARDIUM, UNSPECIFIED: ICD-10-CM

## 2021-06-25 PROCEDURE — 36415 COLL VENOUS BLD VENIPUNCTURE: CPT

## 2021-06-25 PROCEDURE — 93000 ELECTROCARDIOGRAM COMPLETE: CPT | Mod: NC

## 2021-06-25 PROCEDURE — 99214 OFFICE O/P EST MOD 30 MIN: CPT

## 2021-07-01 PROBLEM — I31.9 PERICARDITIS: Status: ACTIVE | Noted: 2017-09-25

## 2021-07-01 LAB
ALBUMIN SERPL ELPH-MCNC: 4.6 G/DL
ALP BLD-CCNC: 77 U/L
ALT SERPL-CCNC: 13 U/L
ANION GAP SERPL CALC-SCNC: 12 MMOL/L
APPEARANCE: CLEAR
APTT BLD: 34.8 SEC
AST SERPL-CCNC: 23 U/L
BASOPHILS # BLD AUTO: 0.03 K/UL
BASOPHILS NFR BLD AUTO: 0.8 %
BILIRUB SERPL-MCNC: 0.6 MG/DL
BILIRUBIN URINE: NEGATIVE
BLOOD URINE: NEGATIVE
BUN SERPL-MCNC: 17 MG/DL
CALCIUM SERPL-MCNC: 9.9 MG/DL
CHLORIDE SERPL-SCNC: 104 MMOL/L
CO2 SERPL-SCNC: 24 MMOL/L
COLOR: YELLOW
CREAT SERPL-MCNC: 0.9 MG/DL
EOSINOPHIL # BLD AUTO: 0.12 K/UL
EOSINOPHIL NFR BLD AUTO: 3.3 %
GLUCOSE QUALITATIVE U: NEGATIVE
GLUCOSE SERPL-MCNC: 97 MG/DL
HCT VFR BLD CALC: 41.2 %
HGB BLD-MCNC: 12.5 G/DL
IMM GRANULOCYTES NFR BLD AUTO: 0.3 %
INR PPP: 0.98 RATIO
KETONES URINE: NORMAL
LEUKOCYTE ESTERASE URINE: NEGATIVE
LYMPHOCYTES # BLD AUTO: 1 K/UL
LYMPHOCYTES NFR BLD AUTO: 27.6 %
MAN DIFF?: NORMAL
MCHC RBC-ENTMCNC: 29.1 PG
MCHC RBC-ENTMCNC: 30.3 GM/DL
MCV RBC AUTO: 96 FL
MONOCYTES # BLD AUTO: 0.45 K/UL
MONOCYTES NFR BLD AUTO: 12.4 %
NEUTROPHILS # BLD AUTO: 2.01 K/UL
NEUTROPHILS NFR BLD AUTO: 55.6 %
NITRITE URINE: NEGATIVE
PH URINE: 5.5
PLATELET # BLD AUTO: 266 K/UL
POTASSIUM SERPL-SCNC: 4.9 MMOL/L
PROT SERPL-MCNC: 7 G/DL
PROTEIN URINE: NORMAL
PT BLD: 11.6 SEC
RBC # BLD: 4.29 M/UL
RBC # FLD: 15.7 %
SODIUM SERPL-SCNC: 140 MMOL/L
SPECIFIC GRAVITY URINE: 1.03
TSH SERPL-ACNC: 1.15 UIU/ML
UROBILINOGEN URINE: ABNORMAL
VIT B12 SERPL-MCNC: 509 PG/ML
WBC # FLD AUTO: 3.62 K/UL

## 2021-07-01 NOTE — HISTORY OF PRESENT ILLNESS
[No Pertinent Pulmonary History] : no history of asthma, COPD, sleep apnea, or smoking [No Adverse Anesthesia Reaction] : no adverse anesthesia reaction in self or family member [Aortic Stenosis] : no aortic stenosis [Atrial Fibrillation] : no atrial fibrillation [Coronary Artery Disease] : no coronary artery disease [Recent Myocardial Infarction] : no recent myocardial infarction [Implantable Device/Pacemaker] : no implantable device/pacemaker [FreeTextEntry1] : right TKA [FreeTextEntry2] : 7/9/21 [FreeTextEntry3] : Dr. Higuera [FreeTextEntry4] : 69 yo M with hx of MVR in 2017, HLD, Bipolar depression, cognitive issues (multiple concussions from boxing), with chronic bilateral knee pain He has severe pain and has failed conservative therapy.  \par s/p  left TKA in January and doing well, now plan for right TKA.\par Current c/o dry mouth\par Recently stopped his psychiatric medication, but is realizing he needs it. will be seeing a new psychiatrist soon.\par

## 2021-07-01 NOTE — PLAN
[FreeTextEntry1] : Trial of pilocarpine for dry mouth symptoms.\par Rx for clonazpeam for nightime use for sleep

## 2021-07-01 NOTE — ASSESSMENT
Patient Optimized for Surgery] : Patient optimized for surgery [No Further Testing Recommended] : no further testing recommended

## 2021-07-01 NOTE — PHYSICAL EXAM
[Regular Rhythm] : with a regular rhythm [Normal S1, S2] : normal S1 and S2 [Normal] : affect was normal and insight and judgment were intact [de-identified] : 3/6 claire

## 2021-07-06 ENCOUNTER — APPOINTMENT (OUTPATIENT)
Dept: HEART AND VASCULAR | Facility: CLINIC | Age: 71
End: 2021-07-06
Payer: MEDICARE

## 2021-07-06 ENCOUNTER — RESULT CHARGE (OUTPATIENT)
Age: 71
End: 2021-07-06

## 2021-07-06 VITALS
OXYGEN SATURATION: 98 % | HEART RATE: 77 BPM | HEIGHT: 74 IN | TEMPERATURE: 97.8 F | BODY MASS INDEX: 21.82 KG/M2 | WEIGHT: 170 LBS | SYSTOLIC BLOOD PRESSURE: 100 MMHG | DIASTOLIC BLOOD PRESSURE: 70 MMHG

## 2021-07-06 VITALS
TEMPERATURE: 97.8 F | SYSTOLIC BLOOD PRESSURE: 100 MMHG | RESPIRATION RATE: 14 BRPM | BODY MASS INDEX: 21.82 KG/M2 | HEART RATE: 77 BPM | DIASTOLIC BLOOD PRESSURE: 70 MMHG | HEIGHT: 74 IN | WEIGHT: 170 LBS

## 2021-07-06 DIAGNOSIS — M19.90 UNSPECIFIED OSTEOARTHRITIS, UNSPECIFIED SITE: ICD-10-CM

## 2021-07-06 DIAGNOSIS — I65.29 OCCLUSION AND STENOSIS OF UNSPECIFIED CAROTID ARTERY: ICD-10-CM

## 2021-07-06 PROCEDURE — 99214 OFFICE O/P EST MOD 30 MIN: CPT

## 2021-07-06 PROCEDURE — 93880 EXTRACRANIAL BILAT STUDY: CPT

## 2021-07-06 NOTE — PHYSICAL EXAM

## 2021-07-06 NOTE — HISTORY OF PRESENT ILLNESS
[FreeTextEntry1] : for cardiac evaluation prior to TKR\par prior TKR went well\par increased functional capacity\par no cardiac c/o

## 2021-07-06 NOTE — DISCUSSION/SUMMARY
[FreeTextEntry1] : stable cardiac exam, stable echo in 1/21, no cardiac contra indication to planned procedure

## 2021-07-08 ENCOUNTER — TRANSCRIPTION ENCOUNTER (OUTPATIENT)
Age: 71
End: 2021-07-08

## 2021-07-08 VITALS
HEART RATE: 61 BPM | DIASTOLIC BLOOD PRESSURE: 70 MMHG | HEIGHT: 73 IN | TEMPERATURE: 98 F | WEIGHT: 168.43 LBS | SYSTOLIC BLOOD PRESSURE: 119 MMHG | OXYGEN SATURATION: 97 % | RESPIRATION RATE: 16 BRPM

## 2021-07-08 RX ORDER — LAMOTRIGINE 25 MG/1
1 TABLET, ORALLY DISINTEGRATING ORAL
Qty: 0 | Refills: 0 | DISCHARGE

## 2021-07-08 RX ORDER — BUPROPION HYDROCHLORIDE 150 MG/1
1 TABLET, EXTENDED RELEASE ORAL
Qty: 0 | Refills: 0 | DISCHARGE

## 2021-07-08 RX ORDER — POVIDONE-IODINE 5 %
1 AEROSOL (ML) TOPICAL ONCE
Refills: 0 | Status: COMPLETED | OUTPATIENT
Start: 2021-07-09 | End: 2021-07-09

## 2021-07-08 RX ORDER — ARIPIPRAZOLE 15 MG/1
1 TABLET ORAL
Qty: 0 | Refills: 0 | DISCHARGE

## 2021-07-08 RX ORDER — MIRTAZAPINE 45 MG/1
1 TABLET, ORALLY DISINTEGRATING ORAL
Qty: 0 | Refills: 0 | DISCHARGE

## 2021-07-08 RX ORDER — QUETIAPINE FUMARATE 200 MG/1
0 TABLET, FILM COATED ORAL
Qty: 0 | Refills: 0 | DISCHARGE

## 2021-07-08 RX ORDER — ATORVASTATIN CALCIUM 80 MG/1
1 TABLET, FILM COATED ORAL
Qty: 0 | Refills: 0 | DISCHARGE

## 2021-07-08 NOTE — H&P ADULT - NSHPLABSRESULTS_GEN_ALL_CORE
preop cbc/bmp/coags/ua wnl per medical clearance   Cr 0.9  EKG-  Preop chest x-ray wnl per clearance   Echo stable 1/21  [employer] 3/26/21 preop cbc/bmp/coags/ua wnl per medical clearance   Cr 0.9  Preop EKG wnl per clearance   Preop chest x-ray wnl per clearance   Echo stable 1/21 EF 69%  [employer] 3/26/21

## 2021-07-08 NOTE — H&P ADULT - NSHPPHYSICALEXAM_GEN_ALL_CORE
PE:  Decreased ROM secondary to pain. Rest of PE per medical clearance. PE: Skin warm and well perfused. DP pulse palpable right lower extremity. Sensation intact and equal bilateral lower extremities. EHL/TA/GS/FHL firing bilateral lower extremities.   Decreased ROM secondary to pain. Rest of PE per medical clearance.

## 2021-07-08 NOTE — H&P ADULT - PROBLEM SELECTOR PLAN 1
Admit to Orthopaedic Service.  Presents today for elective RIGHT TKR, ROBOTIC ASSISTED.   Pt medically stable and cleared for procedure today by Dr. Ga and Dr. Melara.

## 2021-07-08 NOTE — H&P ADULT - HISTORY OF PRESENT ILLNESS
70yo m c/o right knee pain x   Presents today for elective RIGHT TKR, ROBOTIC ASSISTED.  70yo m c/o right knee pain x   Pt. takes Jacques.   Presents today for elective RIGHT TKR, ROBOTIC ASSISTED.  70 y/o m c/o right knee pain x chronic which he thinks is exacerbated by several years of playing sports. Failed conservative therapies for his symptoms. Denies history of DVT.   Presents today for elective RIGHT TKR, ROBOTIC ASSISTED.

## 2021-07-08 NOTE — H&P ADULT - NSICDXPASTMEDICALHX_GEN_ALL_CORE_FT
PAST MEDICAL HISTORY:  Bipolar depression     HLD (hyperlipidemia)     Memory dysfunction alzheimer

## 2021-07-09 ENCOUNTER — APPOINTMENT (OUTPATIENT)
Dept: ORTHOPEDIC SURGERY | Facility: HOSPITAL | Age: 71
End: 2021-07-09

## 2021-07-09 ENCOUNTER — TRANSCRIPTION ENCOUNTER (OUTPATIENT)
Age: 71
End: 2021-07-09

## 2021-07-09 ENCOUNTER — INPATIENT (INPATIENT)
Facility: HOSPITAL | Age: 71
LOS: 0 days | Discharge: HOME HEALTH | DRG: 470 | End: 2021-07-10
Attending: ORTHOPAEDIC SURGERY | Admitting: ORTHOPAEDIC SURGERY
Payer: COMMERCIAL

## 2021-07-09 DIAGNOSIS — E78.5 HYPERLIPIDEMIA, UNSPECIFIED: ICD-10-CM

## 2021-07-09 DIAGNOSIS — F31.9 BIPOLAR DISORDER, UNSPECIFIED: ICD-10-CM

## 2021-07-09 DIAGNOSIS — Z41.9 ENCOUNTER FOR PROCEDURE FOR PURPOSES OTHER THAN REMEDYING HEALTH STATE, UNSPECIFIED: Chronic | ICD-10-CM

## 2021-07-09 DIAGNOSIS — M19.90 UNSPECIFIED OSTEOARTHRITIS, UNSPECIFIED SITE: ICD-10-CM

## 2021-07-09 DIAGNOSIS — R41.3 OTHER AMNESIA: ICD-10-CM

## 2021-07-09 PROCEDURE — 73560 X-RAY EXAM OF KNEE 1 OR 2: CPT | Mod: 26,RT

## 2021-07-09 PROCEDURE — 0055T BONE SRGRY CMPTR CT/MRI IMAG: CPT

## 2021-07-09 PROCEDURE — 27447 TOTAL KNEE ARTHROPLASTY: CPT | Mod: RT

## 2021-07-09 PROCEDURE — S2900 ROBOTIC SURGICAL SYSTEM: CPT | Mod: NC

## 2021-07-09 RX ORDER — CELECOXIB 200 MG/1
400 CAPSULE ORAL ONCE
Refills: 0 | Status: COMPLETED | OUTPATIENT
Start: 2021-07-09 | End: 2021-07-09

## 2021-07-09 RX ORDER — ACETAMINOPHEN 500 MG
1000 TABLET ORAL ONCE
Refills: 0 | Status: COMPLETED | OUTPATIENT
Start: 2021-07-09 | End: 2021-07-09

## 2021-07-09 RX ORDER — HYDROMORPHONE HYDROCHLORIDE 2 MG/ML
0.5 INJECTION INTRAMUSCULAR; INTRAVENOUS; SUBCUTANEOUS EVERY 4 HOURS
Refills: 0 | Status: DISCONTINUED | OUTPATIENT
Start: 2021-07-09 | End: 2021-07-10

## 2021-07-09 RX ORDER — SODIUM CHLORIDE 9 MG/ML
1000 INJECTION, SOLUTION INTRAVENOUS
Refills: 0 | Status: DISCONTINUED | OUTPATIENT
Start: 2021-07-09 | End: 2021-07-10

## 2021-07-09 RX ORDER — PANTOPRAZOLE SODIUM 20 MG/1
40 TABLET, DELAYED RELEASE ORAL
Refills: 0 | Status: DISCONTINUED | OUTPATIENT
Start: 2021-07-09 | End: 2021-07-10

## 2021-07-09 RX ORDER — OXYCODONE HYDROCHLORIDE 5 MG/1
5 TABLET ORAL EVERY 4 HOURS
Refills: 0 | Status: DISCONTINUED | OUTPATIENT
Start: 2021-07-09 | End: 2021-07-10

## 2021-07-09 RX ORDER — RIVAROXABAN 15 MG-20MG
10 KIT ORAL
Refills: 0 | Status: DISCONTINUED | OUTPATIENT
Start: 2021-07-10 | End: 2021-07-10

## 2021-07-09 RX ORDER — POLYETHYLENE GLYCOL 3350 17 G/17G
17 POWDER, FOR SOLUTION ORAL AT BEDTIME
Refills: 0 | Status: DISCONTINUED | OUTPATIENT
Start: 2021-07-09 | End: 2021-07-10

## 2021-07-09 RX ORDER — FLUOXETINE HCL 10 MG
0 CAPSULE ORAL
Qty: 0 | Refills: 0 | DISCHARGE

## 2021-07-09 RX ORDER — ATORVASTATIN CALCIUM 80 MG/1
20 TABLET, FILM COATED ORAL
Qty: 0 | Refills: 0 | DISCHARGE

## 2021-07-09 RX ORDER — ONDANSETRON 8 MG/1
4 TABLET, FILM COATED ORAL EVERY 6 HOURS
Refills: 0 | Status: DISCONTINUED | OUTPATIENT
Start: 2021-07-09 | End: 2021-07-10

## 2021-07-09 RX ORDER — MIRTAZAPINE 45 MG/1
1 TABLET, ORALLY DISINTEGRATING ORAL
Qty: 0 | Refills: 0 | DISCHARGE

## 2021-07-09 RX ORDER — LAMOTRIGINE 25 MG/1
1 TABLET, ORALLY DISINTEGRATING ORAL
Qty: 0 | Refills: 0 | DISCHARGE

## 2021-07-09 RX ORDER — ATORVASTATIN CALCIUM 80 MG/1
0 TABLET, FILM COATED ORAL
Qty: 0 | Refills: 0 | DISCHARGE

## 2021-07-09 RX ORDER — PILOCARPINE HCL
5 CRYSTALS MISCELLANEOUS THREE TIMES A DAY
Refills: 0 | Status: DISCONTINUED | OUTPATIENT
Start: 2021-07-09 | End: 2021-07-10

## 2021-07-09 RX ORDER — BUPROPION HYDROCHLORIDE 150 MG/1
0 TABLET, EXTENDED RELEASE ORAL
Qty: 0 | Refills: 0 | DISCHARGE

## 2021-07-09 RX ORDER — BUPROPION HYDROCHLORIDE 150 MG/1
1 TABLET, EXTENDED RELEASE ORAL
Qty: 0 | Refills: 0 | DISCHARGE

## 2021-07-09 RX ORDER — SERTRALINE 25 MG/1
25 TABLET, FILM COATED ORAL
Refills: 0 | Status: DISCONTINUED | OUTPATIENT
Start: 2021-07-09 | End: 2021-07-10

## 2021-07-09 RX ORDER — CEFAZOLIN SODIUM 1 G
2000 VIAL (EA) INJECTION EVERY 8 HOURS
Refills: 0 | Status: COMPLETED | OUTPATIENT
Start: 2021-07-09 | End: 2021-07-09

## 2021-07-09 RX ORDER — APREPITANT 80 MG/1
40 CAPSULE ORAL ONCE
Refills: 0 | Status: COMPLETED | OUTPATIENT
Start: 2021-07-09 | End: 2021-07-09

## 2021-07-09 RX ORDER — ATORVASTATIN CALCIUM 80 MG/1
20 TABLET, FILM COATED ORAL AT BEDTIME
Refills: 0 | Status: DISCONTINUED | OUTPATIENT
Start: 2021-07-09 | End: 2021-07-10

## 2021-07-09 RX ORDER — CELECOXIB 200 MG/1
200 CAPSULE ORAL EVERY 12 HOURS
Refills: 0 | Status: DISCONTINUED | OUTPATIENT
Start: 2021-07-10 | End: 2021-07-10

## 2021-07-09 RX ORDER — OXYCODONE HYDROCHLORIDE 5 MG/1
10 TABLET ORAL EVERY 4 HOURS
Refills: 0 | Status: DISCONTINUED | OUTPATIENT
Start: 2021-07-09 | End: 2021-07-10

## 2021-07-09 RX ORDER — RIVASTIGMINE 4.6 MG/24H
0 PATCH, EXTENDED RELEASE TRANSDERMAL
Qty: 0 | Refills: 0 | DISCHARGE

## 2021-07-09 RX ORDER — KETOROLAC TROMETHAMINE 30 MG/ML
15 SYRINGE (ML) INJECTION EVERY 6 HOURS
Refills: 0 | Status: COMPLETED | OUTPATIENT
Start: 2021-07-09 | End: 2021-07-10

## 2021-07-09 RX ORDER — CEFAZOLIN SODIUM 1 G
2000 VIAL (EA) INJECTION EVERY 8 HOURS
Refills: 0 | Status: DISCONTINUED | OUTPATIENT
Start: 2021-07-09 | End: 2021-07-09

## 2021-07-09 RX ORDER — HYDROMORPHONE HYDROCHLORIDE 2 MG/ML
0.5 INJECTION INTRAMUSCULAR; INTRAVENOUS; SUBCUTANEOUS
Refills: 0 | Status: DISCONTINUED | OUTPATIENT
Start: 2021-07-09 | End: 2021-07-10

## 2021-07-09 RX ORDER — SENNA PLUS 8.6 MG/1
2 TABLET ORAL AT BEDTIME
Refills: 0 | Status: DISCONTINUED | OUTPATIENT
Start: 2021-07-09 | End: 2021-07-10

## 2021-07-09 RX ORDER — QUETIAPINE FUMARATE 200 MG/1
1 TABLET, FILM COATED ORAL
Qty: 0 | Refills: 0 | DISCHARGE

## 2021-07-09 RX ORDER — RIVASTIGMINE 4.6 MG/24H
1 PATCH, EXTENDED RELEASE TRANSDERMAL
Qty: 0 | Refills: 0 | DISCHARGE

## 2021-07-09 RX ORDER — CELECOXIB 200 MG/1
0 CAPSULE ORAL
Qty: 0 | Refills: 0 | DISCHARGE

## 2021-07-09 RX ORDER — ACETAMINOPHEN 500 MG
975 TABLET ORAL EVERY 8 HOURS
Refills: 0 | Status: DISCONTINUED | OUTPATIENT
Start: 2021-07-09 | End: 2021-07-10

## 2021-07-09 RX ORDER — BUPIVACAINE 13.3 MG/ML
20 INJECTION, SUSPENSION, LIPOSOMAL INFILTRATION ONCE
Refills: 0 | Status: DISCONTINUED | OUTPATIENT
Start: 2021-07-09 | End: 2021-07-10

## 2021-07-09 RX ORDER — CLONAZEPAM 1 MG
0 TABLET ORAL
Qty: 0 | Refills: 0 | DISCHARGE

## 2021-07-09 RX ORDER — CHLORHEXIDINE GLUCONATE 213 G/1000ML
1 SOLUTION TOPICAL ONCE
Refills: 0 | Status: COMPLETED | OUTPATIENT
Start: 2021-07-09 | End: 2021-07-09

## 2021-07-09 RX ORDER — GABAPENTIN 400 MG/1
600 CAPSULE ORAL ONCE
Refills: 0 | Status: COMPLETED | OUTPATIENT
Start: 2021-07-09 | End: 2021-07-09

## 2021-07-09 RX ORDER — RIVASTIGMINE 4.6 MG/24H
1 PATCH, EXTENDED RELEASE TRANSDERMAL EVERY 24 HOURS
Refills: 0 | Status: DISCONTINUED | OUTPATIENT
Start: 2021-07-09 | End: 2021-07-10

## 2021-07-09 RX ORDER — MAGNESIUM HYDROXIDE 400 MG/1
30 TABLET, CHEWABLE ORAL DAILY
Refills: 0 | Status: DISCONTINUED | OUTPATIENT
Start: 2021-07-09 | End: 2021-07-10

## 2021-07-09 RX ADMIN — Medication 5 MILLIGRAM(S): at 21:37

## 2021-07-09 RX ADMIN — CELECOXIB 400 MILLIGRAM(S): 200 CAPSULE ORAL at 06:09

## 2021-07-09 RX ADMIN — CHLORHEXIDINE GLUCONATE 1 APPLICATION(S): 213 SOLUTION TOPICAL at 06:09

## 2021-07-09 RX ADMIN — RIVASTIGMINE 1 PATCH: 4.6 PATCH, EXTENDED RELEASE TRANSDERMAL at 19:37

## 2021-07-09 RX ADMIN — Medication 15 MILLIGRAM(S): at 17:06

## 2021-07-09 RX ADMIN — Medication 15 MILLIGRAM(S): at 23:45

## 2021-07-09 RX ADMIN — Medication 2000 MILLIGRAM(S): at 17:05

## 2021-07-09 RX ADMIN — APREPITANT 40 MILLIGRAM(S): 80 CAPSULE ORAL at 06:09

## 2021-07-09 RX ADMIN — Medication 15 MILLIGRAM(S): at 12:52

## 2021-07-09 RX ADMIN — OXYCODONE HYDROCHLORIDE 5 MILLIGRAM(S): 5 TABLET ORAL at 21:41

## 2021-07-09 RX ADMIN — SERTRALINE 25 MILLIGRAM(S): 25 TABLET, FILM COATED ORAL at 19:57

## 2021-07-09 RX ADMIN — Medication 15 MILLIGRAM(S): at 17:21

## 2021-07-09 RX ADMIN — Medication 15 MILLIGRAM(S): at 11:33

## 2021-07-09 RX ADMIN — RIVASTIGMINE 1 PATCH: 4.6 PATCH, EXTENDED RELEASE TRANSDERMAL at 17:05

## 2021-07-09 RX ADMIN — OXYCODONE HYDROCHLORIDE 5 MILLIGRAM(S): 5 TABLET ORAL at 22:41

## 2021-07-09 RX ADMIN — SENNA PLUS 2 TABLET(S): 8.6 TABLET ORAL at 21:35

## 2021-07-09 RX ADMIN — Medication 975 MILLIGRAM(S): at 22:35

## 2021-07-09 RX ADMIN — Medication 1000 MILLIGRAM(S): at 06:09

## 2021-07-09 RX ADMIN — Medication 1 APPLICATION(S): at 06:09

## 2021-07-09 RX ADMIN — Medication 975 MILLIGRAM(S): at 21:35

## 2021-07-09 RX ADMIN — Medication 2000 MILLIGRAM(S): at 23:45

## 2021-07-09 RX ADMIN — GABAPENTIN 600 MILLIGRAM(S): 400 CAPSULE ORAL at 06:09

## 2021-07-09 RX ADMIN — SERTRALINE 25 MILLIGRAM(S): 25 TABLET, FILM COATED ORAL at 14:41

## 2021-07-09 RX ADMIN — ATORVASTATIN CALCIUM 20 MILLIGRAM(S): 80 TABLET, FILM COATED ORAL at 21:35

## 2021-07-09 NOTE — DISCHARGE NOTE PROVIDER - NSDCFUADDINST_GEN_ALL_CORE_FT
Your post-operative medications have already been sent to [facility] by Dr. Higuera. Take as directed by Dr. Higuera.   **please refer to Dr. Higuera's attached post-operative instruction sheet for further instructions on your dressing/post-operative care**    Swelling may travel all the way down leg to foot, this is normal and will subside in a few weeks.  Call to schedule an appt with Dr. Higuera for follow up, if you have staples or sutures they will be removed in office.  Contact your doctor if you experience: fever greater than 101.5, chills, chest pain, difficulty breathing, redness or excessive drainage around the incision, other concerns.  Follow up with your primary care provider.  Your post-operative medications have already been sent to [facility] pharmacy by Dr. Higuera. Take as directed by Dr. Higuera.     Swelling may travel all the way down leg to foot, this is normal and will subside in a few weeks.  Call to schedule an appt with Dr. Higuera for follow up, if you have staples or sutures they will be removed in office.  Contact your doctor if you experience: fever greater than 101.5, chills, chest pain, difficulty breathing, redness or excessive drainage around the incision, other concerns.  Follow up with your primary care provider.

## 2021-07-09 NOTE — PHYSICAL THERAPY INITIAL EVALUATION ADULT - ADDITIONAL COMMENTS
Patient lives alone on 3rd floor walk-up apartment. Does not use any DME at baseline. Owns RW and SC. Has home health aid 5 days/ 4 hours. Denies recent Hx of falls.

## 2021-07-09 NOTE — PHYSICAL THERAPY INITIAL EVALUATION ADULT - GENERAL OBSERVATIONS, REHAB EVAL
Patient received semi-harper in bed  in NAD on RA, +SCDs, +PIV, +Cryocuff. Cleared by XIOMARA Pérez. Agreeable to PT.

## 2021-07-09 NOTE — DISCHARGE NOTE PROVIDER - CARE PROVIDER_API CALL
Emmanuel Higuera)  Orthopedics  [address] [facility], [place] [address]  Phone: [phone]  Fax: [phone]  Follow Up Time:

## 2021-07-09 NOTE — DISCHARGE NOTE PROVIDER - HOSPITAL COURSE
Admitted to Orthopedic service Dr. Higuera  Surgery on 7/9/21  Dorita-op Antibiotics  Pain control  DVT prophylaxis  OOB/Physical Therapy

## 2021-07-09 NOTE — PHYSICAL THERAPY INITIAL EVALUATION ADULT - PERTINENT HX OF CURRENT PROBLEM, REHAB EVAL
70 y/o m with PMH of Memory dysfunction alzheimer and bipolar depression, c/o right knee pain x chronic which he thinks is exacerbated by several years of playing sports. Failed conservative therapies for his symptoms. Denies history of DVT.

## 2021-07-09 NOTE — DISCHARGE NOTE PROVIDER - NSDCMRMEDTOKEN_GEN_ALL_CORE_FT
ATORVASTATIN TAB 20M: tab(s) orally once a day (at bedtime)  buPROPion 150 mg/24 hours (XL) oral tablet, extended release: 1 tab(s) orally every 24 hours  clonazePAM 2 mg oral tablet: 1 tab(s) orally once a day (at bedtime)  FLUOXETINE   CAP 20M: cap(s) orally once a day  lamoTRIgine 25 mg oral tablet: 1 tab(s) orally 3 times a day  mirtazapine 15 mg oral tablet: 1 tab(s) orally once a day (at bedtime)  pilocarpine 5 mg oral tablet: 1 tab(s) orally 3 times a day  rivastigmine 9.5 mg/24 hr transdermal film, extended release: 1 patch transdermal once a day  SEROquel 50 mg oral tablet: 1 tab(s) orally once a day (at bedtime)  sertraline 25 mg oral tablet: 1 tab(s) orally 3 times a day   clonazePAM 2 mg oral tablet: 1 tab(s) orally once a day (at bedtime)  FLUOXETINE   CAP 20M: cap(s) orally once a day  pilocarpine 5 mg oral tablet: 1 tab(s) orally 3 times a day  rivastigmine 9.5 mg/24 hr transdermal film, extended release: 1 patch transdermal once a day  sertraline 25 mg oral tablet: 1 tab(s) orally 3 times a day

## 2021-07-10 ENCOUNTER — TRANSCRIPTION ENCOUNTER (OUTPATIENT)
Age: 71
End: 2021-07-10

## 2021-07-10 VITALS
TEMPERATURE: 97 F | HEART RATE: 72 BPM | RESPIRATION RATE: 17 BRPM | SYSTOLIC BLOOD PRESSURE: 127 MMHG | DIASTOLIC BLOOD PRESSURE: 81 MMHG | OXYGEN SATURATION: 98 %

## 2021-07-10 LAB
ANION GAP SERPL CALC-SCNC: 9 MMOL/L — SIGNIFICANT CHANGE UP (ref 5–17)
BUN SERPL-MCNC: 19 MG/DL — SIGNIFICANT CHANGE UP (ref 7–23)
CALCIUM SERPL-MCNC: 8.7 MG/DL — SIGNIFICANT CHANGE UP (ref 8.4–10.5)
CHLORIDE SERPL-SCNC: 108 MMOL/L — SIGNIFICANT CHANGE UP (ref 96–108)
CO2 SERPL-SCNC: 23 MMOL/L — SIGNIFICANT CHANGE UP (ref 22–31)
COVID-19 SPIKE DOMAIN AB INTERP: POSITIVE
COVID-19 SPIKE DOMAIN ANTIBODY RESULT: >250 U/ML — HIGH
CREAT SERPL-MCNC: 0.85 MG/DL — SIGNIFICANT CHANGE UP (ref 0.5–1.3)
GLUCOSE SERPL-MCNC: 93 MG/DL — SIGNIFICANT CHANGE UP (ref 70–99)
HCT VFR BLD CALC: 31.3 % — LOW (ref 39–50)
HGB BLD-MCNC: 10.5 G/DL — LOW (ref 13–17)
MCHC RBC-ENTMCNC: 29.9 PG — SIGNIFICANT CHANGE UP (ref 27–34)
MCHC RBC-ENTMCNC: 33.5 GM/DL — SIGNIFICANT CHANGE UP (ref 32–36)
MCV RBC AUTO: 89.2 FL — SIGNIFICANT CHANGE UP (ref 80–100)
NRBC # BLD: 0 /100 WBCS — SIGNIFICANT CHANGE UP (ref 0–0)
PLATELET # BLD AUTO: 184 K/UL — SIGNIFICANT CHANGE UP (ref 150–400)
POTASSIUM SERPL-MCNC: 3.5 MMOL/L — SIGNIFICANT CHANGE UP (ref 3.5–5.3)
POTASSIUM SERPL-SCNC: 3.5 MMOL/L — SIGNIFICANT CHANGE UP (ref 3.5–5.3)
RBC # BLD: 3.51 M/UL — LOW (ref 4.2–5.8)
RBC # FLD: 14.1 % — SIGNIFICANT CHANGE UP (ref 10.3–14.5)
SARS-COV-2 IGG+IGM SERPL QL IA: >250 U/ML — HIGH
SARS-COV-2 IGG+IGM SERPL QL IA: POSITIVE
SODIUM SERPL-SCNC: 140 MMOL/L — SIGNIFICANT CHANGE UP (ref 135–145)
WBC # BLD: 11.33 K/UL — HIGH (ref 3.8–10.5)
WBC # FLD AUTO: 11.33 K/UL — HIGH (ref 3.8–10.5)

## 2021-07-10 PROCEDURE — 86900 BLOOD TYPING SEROLOGIC ABO: CPT

## 2021-07-10 PROCEDURE — 27447 TOTAL KNEE ARTHROPLASTY: CPT

## 2021-07-10 PROCEDURE — 86769 SARS-COV-2 COVID-19 ANTIBODY: CPT

## 2021-07-10 PROCEDURE — 85027 COMPLETE CBC AUTOMATED: CPT

## 2021-07-10 PROCEDURE — 97116 GAIT TRAINING THERAPY: CPT

## 2021-07-10 PROCEDURE — 86850 RBC ANTIBODY SCREEN: CPT

## 2021-07-10 PROCEDURE — 97161 PT EVAL LOW COMPLEX 20 MIN: CPT

## 2021-07-10 PROCEDURE — 36415 COLL VENOUS BLD VENIPUNCTURE: CPT

## 2021-07-10 PROCEDURE — S2900: CPT

## 2021-07-10 PROCEDURE — 97530 THERAPEUTIC ACTIVITIES: CPT

## 2021-07-10 PROCEDURE — 86901 BLOOD TYPING SEROLOGIC RH(D): CPT

## 2021-07-10 PROCEDURE — 80048 BASIC METABOLIC PNL TOTAL CA: CPT

## 2021-07-10 PROCEDURE — 73560 X-RAY EXAM OF KNEE 1 OR 2: CPT

## 2021-07-10 PROCEDURE — C1776: CPT

## 2021-07-10 RX ORDER — ATORVASTATIN CALCIUM 80 MG/1
0 TABLET, FILM COATED ORAL
Qty: 0 | Refills: 0 | DISCHARGE

## 2021-07-10 RX ADMIN — CELECOXIB 200 MILLIGRAM(S): 200 CAPSULE ORAL at 07:53

## 2021-07-10 RX ADMIN — Medication 975 MILLIGRAM(S): at 07:54

## 2021-07-10 RX ADMIN — Medication 15 MILLIGRAM(S): at 06:55

## 2021-07-10 RX ADMIN — Medication 975 MILLIGRAM(S): at 06:54

## 2021-07-10 RX ADMIN — Medication 15 MILLIGRAM(S): at 07:55

## 2021-07-10 RX ADMIN — Medication 15 MILLIGRAM(S): at 00:00

## 2021-07-10 RX ADMIN — PANTOPRAZOLE SODIUM 40 MILLIGRAM(S): 20 TABLET, DELAYED RELEASE ORAL at 06:55

## 2021-07-10 RX ADMIN — CELECOXIB 200 MILLIGRAM(S): 200 CAPSULE ORAL at 08:53

## 2021-07-10 RX ADMIN — Medication 5 MILLIGRAM(S): at 06:55

## 2021-07-10 RX ADMIN — RIVASTIGMINE 1 PATCH: 4.6 PATCH, EXTENDED RELEASE TRANSDERMAL at 07:51

## 2021-07-10 RX ADMIN — SERTRALINE 25 MILLIGRAM(S): 25 TABLET, FILM COATED ORAL at 07:52

## 2021-07-10 NOTE — PROGRESS NOTE ADULT - SUBJECTIVE AND OBJECTIVE BOX
Ortho Post Op Check    Procedure: R TKR   Surgeon: Dr. Higuera     Pt comfortable without complaints, pain controlled. Denies CP, SOB, N/V, numbness/tingling     T(C): 36.7 (07-09-21 @ 15:42), Max: 36.8 (07-09-21 @ 10:35)  HR: 69 (07-09-21 @ 15:42) (64 - 78)  BP: 108/65 (07-09-21 @ 15:42) (17/63 - 124/60)  RR: 17 (07-09-21 @ 15:42) (10 - 21)  SpO2: 98% (07-09-21 @ 15:42) (95% - 98%)  AVSS    General: Pt Alert and oriented, NAD  Aquacel DSG C/D/I R knee   Pulses: 2+ DP BLE   Sensation: SILT BLE   Motor: EHL/FHL/TA/GS 5/5 BLE     Post-op X-Ray: R knee xray demonstrates total knee prosthesis in good position     A/P: 71yMale POD#0 s/p R TKR   - Stable  - Pain Control  - DVT ppx: Xarelto   - Post op abx: Ancef   - PT, WBS: WBAT    Ortho Pager [phone]
Ortho Progress Note    Subjective:  Pt comfortable without complaints, pain controlled with medication.  Denies CP, SOB, N/V, numbness/tingling.    Objective:    Vital Signs Last 24 Hrs  T(C): 36.3 (07-10-21 @ 08:10), Max: 36.3 (07-10-21 @ 08:10)  T(F): 97.4 (07-10-21 @ 08:10), Max: 97.4 (07-10-21 @ 08:10)  HR: 72 (07-10-21 @ 08:10) (72 - 72)  BP: 127/81 (07-10-21 @ 08:10) (127/81 - 127/81)  BP(mean): --  RR: 17 (07-10-21 @ 08:10) (17 - 17)  SpO2: 98% (07-10-21 @ 08:10) (98% - 98%)  AVSS    Physical Exam:  General: Pt Alert and oriented, NAD  Dressing C/D/I  RLE:  Pulses: DP/PT palpable  Sensation: SILT throughout  Motor: 5/5 EHL/FHL/TA/GS    A/P: 71yMale s/p R TKA by Dr. Higuera on 7/9.  - Stable  - Pain Control  - DVT ppx: SCDs, Xarelto 10mg daily  - PT, WBS: WBAT  - DIspo: HPT    Ortho Pager [phone]

## 2021-07-10 NOTE — DISCHARGE NOTE NURSING/CASE MANAGEMENT/SOCIAL WORK - PATIENT PORTAL LINK FT
You can access the FollowMyHealth Patient Portal offered by [facility] by registering at the following website: [email or web address]. By joining [employer]’s FollowMyHealth portal, you will also be able to view your health information using other applications (apps) compatible with our system.

## 2021-07-13 PROBLEM — R41.3 OTHER AMNESIA: Chronic | Status: ACTIVE | Noted: 2021-01-28

## 2021-07-19 DIAGNOSIS — G30.9 ALZHEIMER'S DISEASE, UNSPECIFIED: ICD-10-CM

## 2021-07-19 DIAGNOSIS — F31.9 BIPOLAR DISORDER, UNSPECIFIED: ICD-10-CM

## 2021-07-19 DIAGNOSIS — F02.80 DEMENTIA IN OTHER DISEASES CLASSIFIED ELSEWHERE, UNSPECIFIED SEVERITY, WITHOUT BEHAVIORAL DISTURBANCE, PSYCHOTIC DISTURBANCE, MOOD DISTURBANCE, AND ANXIETY: ICD-10-CM

## 2021-07-19 DIAGNOSIS — Z79.82 LONG TERM (CURRENT) USE OF ASPIRIN: ICD-10-CM

## 2021-07-19 DIAGNOSIS — E78.5 HYPERLIPIDEMIA, UNSPECIFIED: ICD-10-CM

## 2021-07-19 DIAGNOSIS — M17.11 UNILATERAL PRIMARY OSTEOARTHRITIS, RIGHT KNEE: ICD-10-CM

## 2021-07-19 DIAGNOSIS — M25.761 OSTEOPHYTE, RIGHT KNEE: ICD-10-CM

## 2021-07-26 ENCOUNTER — APPOINTMENT (OUTPATIENT)
Dept: ORTHOPEDIC SURGERY | Facility: CLINIC | Age: 71
End: 2021-07-26
Payer: MEDICARE

## 2021-07-26 ENCOUNTER — NON-APPOINTMENT (OUTPATIENT)
Age: 71
End: 2021-07-26

## 2021-07-26 VITALS
TEMPERATURE: 98 F | DIASTOLIC BLOOD PRESSURE: 70 MMHG | BODY MASS INDEX: 21.82 KG/M2 | HEIGHT: 74 IN | HEART RATE: 73 BPM | SYSTOLIC BLOOD PRESSURE: 120 MMHG | WEIGHT: 170 LBS | OXYGEN SATURATION: 98 %

## 2021-07-26 PROCEDURE — 99024 POSTOP FOLLOW-UP VISIT: CPT

## 2021-07-26 RX ORDER — OXYCODONE 5 MG/1
5 TABLET ORAL
Qty: 50 | Refills: 0 | Status: COMPLETED | COMMUNITY
Start: 2021-01-28 | End: 2021-07-26

## 2021-07-26 RX ORDER — RIVASTIGMINE 9.5 MG/24H
9.5 PATCH, EXTENDED RELEASE TRANSDERMAL
Qty: 30 | Refills: 0 | Status: ACTIVE | COMMUNITY
Start: 2021-07-19

## 2021-07-26 RX ORDER — MORPHINE SULFATE 15 MG/1
15 TABLET, FILM COATED, EXTENDED RELEASE ORAL
Qty: 28 | Refills: 0 | Status: COMPLETED | COMMUNITY
Start: 2021-01-28 | End: 2021-07-26

## 2021-07-26 NOTE — HISTORY OF PRESENT ILLNESS
[de-identified] : 1st POA s/p R TKA, 7/9/21 [de-identified] : Doing well. Pain well controlled, not taking any pain meds. Just finished home PT. No wound or systemic issues. Walking without cane. Compliant with Xarelto. [de-identified] : R knee incision healed with some mild central scabbing, no erythema; benign appearing. Moderate soft tissue swelling, anteromedial thigh ecchymosis. ROM 0-110, lig stable mary/steve/ant/post, good quad strength. Ambulating without assistive device, normal stride length and rody. [de-identified] : 72y/o male 2wk s/p R TKA, doing well [de-identified] : Transition to outpatient PT\par Finish out month of Xarelto\par RTC 4wk with R knee XRs

## 2021-07-29 ENCOUNTER — APPOINTMENT (OUTPATIENT)
Dept: OTOLARYNGOLOGY | Facility: CLINIC | Age: 71
End: 2021-07-29
Payer: MEDICARE

## 2021-07-29 VITALS
DIASTOLIC BLOOD PRESSURE: 80 MMHG | SYSTOLIC BLOOD PRESSURE: 124 MMHG | HEART RATE: 81 BPM | RESPIRATION RATE: 14 BRPM | HEIGHT: 73 IN | TEMPERATURE: 98 F | BODY MASS INDEX: 21.07 KG/M2 | WEIGHT: 159 LBS | OXYGEN SATURATION: 98 %

## 2021-07-29 DIAGNOSIS — R43.0 ANOSMIA: ICD-10-CM

## 2021-07-29 PROCEDURE — 99203 OFFICE O/P NEW LOW 30 MIN: CPT

## 2021-08-11 ENCOUNTER — APPOINTMENT (OUTPATIENT)
Dept: ORTHOPEDIC SURGERY | Facility: CLINIC | Age: 71
End: 2021-08-11
Payer: MEDICARE

## 2021-08-11 ENCOUNTER — TRANSCRIPTION ENCOUNTER (OUTPATIENT)
Age: 71
End: 2021-08-11

## 2021-08-11 ENCOUNTER — INPATIENT (INPATIENT)
Facility: HOSPITAL | Age: 71
LOS: 6 days | Discharge: NURSING, REHAB OR LONG-TERM CARE | DRG: 463 | End: 2021-08-18
Attending: ORTHOPAEDIC SURGERY | Admitting: ORTHOPAEDIC SURGERY
Payer: MEDICARE

## 2021-08-11 VITALS
OXYGEN SATURATION: 97 % | TEMPERATURE: 100 F | WEIGHT: 169.98 LBS | DIASTOLIC BLOOD PRESSURE: 68 MMHG | RESPIRATION RATE: 18 BRPM | HEIGHT: 73 IN | HEART RATE: 69 BPM | SYSTOLIC BLOOD PRESSURE: 120 MMHG

## 2021-08-11 DIAGNOSIS — Z41.9 ENCOUNTER FOR PROCEDURE FOR PURPOSES OTHER THAN REMEDYING HEALTH STATE, UNSPECIFIED: Chronic | ICD-10-CM

## 2021-08-11 LAB
ALBUMIN SERPL ELPH-MCNC: 3.8 G/DL — SIGNIFICANT CHANGE UP (ref 3.3–5)
ALP SERPL-CCNC: 89 U/L — SIGNIFICANT CHANGE UP (ref 40–120)
ALT FLD-CCNC: 10 U/L — SIGNIFICANT CHANGE UP (ref 10–45)
ANION GAP SERPL CALC-SCNC: 10 MMOL/L — SIGNIFICANT CHANGE UP (ref 5–17)
ANISOCYTOSIS BLD QL: SLIGHT — SIGNIFICANT CHANGE UP
APPEARANCE UR: CLEAR — SIGNIFICANT CHANGE UP
APTT BLD: 30.4 SEC — SIGNIFICANT CHANGE UP (ref 27.5–35.5)
AST SERPL-CCNC: 14 U/L — SIGNIFICANT CHANGE UP (ref 10–40)
B PERT IGG+IGM PNL SER: SIGNIFICANT CHANGE UP
BACTERIA # UR AUTO: PRESENT /HPF
BASOPHILS # BLD AUTO: 0 K/UL — SIGNIFICANT CHANGE UP (ref 0–0.2)
BASOPHILS NFR BLD AUTO: 0 % — SIGNIFICANT CHANGE UP (ref 0–2)
BILIRUB SERPL-MCNC: 0.8 MG/DL — SIGNIFICANT CHANGE UP (ref 0.2–1.2)
BILIRUB UR-MCNC: NEGATIVE — SIGNIFICANT CHANGE UP
BLD GP AB SCN SERPL QL: NEGATIVE — SIGNIFICANT CHANGE UP
BUN SERPL-MCNC: 20 MG/DL — SIGNIFICANT CHANGE UP (ref 7–23)
BURR CELLS BLD QL SMEAR: SLIGHT — SIGNIFICANT CHANGE UP
CALCIUM SERPL-MCNC: 9.5 MG/DL — SIGNIFICANT CHANGE UP (ref 8.4–10.5)
CHLORIDE SERPL-SCNC: 100 MMOL/L — SIGNIFICANT CHANGE UP (ref 96–108)
CO2 SERPL-SCNC: 22 MMOL/L — SIGNIFICANT CHANGE UP (ref 22–31)
COLOR FLD: YELLOW — SIGNIFICANT CHANGE UP
COLOR SPEC: YELLOW — SIGNIFICANT CHANGE UP
CREAT SERPL-MCNC: 0.86 MG/DL — SIGNIFICANT CHANGE UP (ref 0.5–1.3)
CRP SERPL-MCNC: 185.3 MG/L — HIGH (ref 0–4)
DIFF PNL FLD: ABNORMAL
EOSINOPHIL # BLD AUTO: 0 K/UL — SIGNIFICANT CHANGE UP (ref 0–0.5)
EOSINOPHIL NFR BLD AUTO: 0 % — SIGNIFICANT CHANGE UP (ref 0–6)
EPI CELLS # UR: SIGNIFICANT CHANGE UP /HPF (ref 0–5)
ERYTHROCYTE [SEDIMENTATION RATE] IN BLOOD: 50 MM/HR — HIGH
FLUID INTAKE SUBSTANCE CLASS: SIGNIFICANT CHANGE UP
FLUID SEGMENTED GRANULOCYTES: 92 % — SIGNIFICANT CHANGE UP
GIANT PLATELETS BLD QL SMEAR: PRESENT — SIGNIFICANT CHANGE UP
GLUCOSE SERPL-MCNC: 117 MG/DL — HIGH (ref 70–99)
GLUCOSE UR QL: NEGATIVE — SIGNIFICANT CHANGE UP
GRAM STN FLD: SIGNIFICANT CHANGE UP
HCT VFR BLD CALC: 32 % — LOW (ref 39–50)
HGB BLD-MCNC: 10.5 G/DL — LOW (ref 13–17)
INR BLD: 1.04 — SIGNIFICANT CHANGE UP (ref 0.88–1.16)
KETONES UR-MCNC: NEGATIVE — SIGNIFICANT CHANGE UP
LEUKOCYTE ESTERASE UR-ACNC: NEGATIVE — SIGNIFICANT CHANGE UP
LYMPHOCYTES # BLD AUTO: 0.43 K/UL — LOW (ref 1–3.3)
LYMPHOCYTES # BLD AUTO: 4.4 % — LOW (ref 13–44)
LYMPHOCYTES # FLD: 2 % — SIGNIFICANT CHANGE UP
MANUAL SMEAR VERIFICATION: SIGNIFICANT CHANGE UP
MCHC RBC-ENTMCNC: 30.2 PG — SIGNIFICANT CHANGE UP (ref 27–34)
MCHC RBC-ENTMCNC: 32.8 GM/DL — SIGNIFICANT CHANGE UP (ref 32–36)
MCV RBC AUTO: 92 FL — SIGNIFICANT CHANGE UP (ref 80–100)
MONOCYTES # BLD AUTO: 0.69 K/UL — SIGNIFICANT CHANGE UP (ref 0–0.9)
MONOCYTES NFR BLD AUTO: 7.1 % — SIGNIFICANT CHANGE UP (ref 2–14)
MONOS+MACROS # FLD: 6 % — SIGNIFICANT CHANGE UP
NEUTROPHILS # BLD AUTO: 8.64 K/UL — HIGH (ref 1.8–7.4)
NEUTROPHILS NFR BLD AUTO: 88.5 % — HIGH (ref 43–77)
NITRITE UR-MCNC: NEGATIVE — SIGNIFICANT CHANGE UP
OVALOCYTES BLD QL SMEAR: SLIGHT — SIGNIFICANT CHANGE UP
PH UR: 6 — SIGNIFICANT CHANGE UP (ref 5–8)
PLAT MORPH BLD: NORMAL — SIGNIFICANT CHANGE UP
PLATELET # BLD AUTO: 242 K/UL — SIGNIFICANT CHANGE UP (ref 150–400)
POIKILOCYTOSIS BLD QL AUTO: SLIGHT — SIGNIFICANT CHANGE UP
POLYCHROMASIA BLD QL SMEAR: SLIGHT — SIGNIFICANT CHANGE UP
POTASSIUM SERPL-MCNC: 3.8 MMOL/L — SIGNIFICANT CHANGE UP (ref 3.5–5.3)
POTASSIUM SERPL-SCNC: 3.8 MMOL/L — SIGNIFICANT CHANGE UP (ref 3.5–5.3)
PROT SERPL-MCNC: 6.9 G/DL — SIGNIFICANT CHANGE UP (ref 6–8.3)
PROT UR-MCNC: NEGATIVE MG/DL — SIGNIFICANT CHANGE UP
PROTHROM AB SERPL-ACNC: 12.5 SEC — SIGNIFICANT CHANGE UP (ref 10.6–13.6)
RBC # BLD: 3.48 M/UL — LOW (ref 4.2–5.8)
RBC # FLD: 13.5 % — SIGNIFICANT CHANGE UP (ref 10.3–14.5)
RBC BLD AUTO: ABNORMAL
RBC CASTS # UR COMP ASSIST: < 5 /HPF — SIGNIFICANT CHANGE UP
RCV VOL RI: HIGH /UL (ref 0–0)
RH IG SCN BLD-IMP: POSITIVE — SIGNIFICANT CHANGE UP
SARS-COV-2 RNA SPEC QL NAA+PROBE: SIGNIFICANT CHANGE UP
SODIUM SERPL-SCNC: 132 MMOL/L — LOW (ref 135–145)
SP GR SPEC: 1.02 — SIGNIFICANT CHANGE UP (ref 1–1.03)
SPECIMEN SOURCE FLD: SIGNIFICANT CHANGE UP
SPECIMEN SOURCE: SIGNIFICANT CHANGE UP
SYNOVIAL CRYSTALS CLARITY: SIGNIFICANT CHANGE UP
SYNOVIAL CRYSTALS COLOR: YELLOW
SYNOVIAL CRYSTALS ID: SIGNIFICANT CHANGE UP
SYNOVIAL CRYSTALS TUBE: SIGNIFICANT CHANGE UP
TOTAL NUCLEATED CELL COUNT, BODY FLUID: SIGNIFICANT CHANGE UP /UL
TUBE TYPE: SIGNIFICANT CHANGE UP
UROBILINOGEN FLD QL: 2 E.U./DL
WBC # BLD: 9.76 K/UL — SIGNIFICANT CHANGE UP (ref 3.8–10.5)
WBC # FLD AUTO: 9.76 K/UL — SIGNIFICANT CHANGE UP (ref 3.8–10.5)
WBC UR QL: < 5 /HPF — SIGNIFICANT CHANGE UP

## 2021-08-11 PROCEDURE — 20610 DRAIN/INJ JOINT/BURSA W/O US: CPT | Mod: 79,RT

## 2021-08-11 PROCEDURE — 93010 ELECTROCARDIOGRAM REPORT: CPT

## 2021-08-11 PROCEDURE — 73562 X-RAY EXAM OF KNEE 3: CPT | Mod: RT

## 2021-08-11 PROCEDURE — 99024 POSTOP FOLLOW-UP VISIT: CPT

## 2021-08-11 PROCEDURE — 99285 EMERGENCY DEPT VISIT HI MDM: CPT

## 2021-08-11 PROCEDURE — 71045 X-RAY EXAM CHEST 1 VIEW: CPT | Mod: 26

## 2021-08-11 PROCEDURE — 99223 1ST HOSP IP/OBS HIGH 75: CPT | Mod: GC

## 2021-08-11 RX ORDER — CLONAZEPAM 1 MG
2 TABLET ORAL AT BEDTIME
Refills: 0 | Status: DISCONTINUED | OUTPATIENT
Start: 2021-08-11 | End: 2021-08-12

## 2021-08-11 RX ORDER — POVIDONE-IODINE 5 %
1 AEROSOL (ML) TOPICAL ONCE
Refills: 0 | Status: DISCONTINUED | OUTPATIENT
Start: 2021-08-11 | End: 2021-08-18

## 2021-08-11 RX ORDER — FLUOXETINE HCL 10 MG
20 CAPSULE ORAL DAILY
Refills: 0 | Status: DISCONTINUED | OUTPATIENT
Start: 2021-08-11 | End: 2021-08-15

## 2021-08-11 RX ORDER — CHLORHEXIDINE GLUCONATE 213 G/1000ML
1 SOLUTION TOPICAL
Refills: 0 | Status: COMPLETED | OUTPATIENT
Start: 2021-08-11 | End: 2021-08-15

## 2021-08-11 RX ORDER — SODIUM CHLORIDE 9 MG/ML
1000 INJECTION, SOLUTION INTRAVENOUS
Refills: 0 | Status: DISCONTINUED | OUTPATIENT
Start: 2021-08-11 | End: 2021-08-18

## 2021-08-11 RX ADMIN — SODIUM CHLORIDE 120 MILLILITER(S): 9 INJECTION, SOLUTION INTRAVENOUS at 23:03

## 2021-08-11 RX ADMIN — Medication 2 MILLIGRAM(S): at 23:02

## 2021-08-11 NOTE — ED PROVIDER NOTE - MUSCULOSKELETAL, MLM
Spine appears normal. R knee swelling, mild effusion, and erythema to anterior knee, pain with flexion and extension, but pt is able to flex and extend.

## 2021-08-11 NOTE — CONSULT NOTE ADULT - PROBLEM SELECTOR RECOMMENDATION 7
Hx of MVR for severe MR in 2017 - repeat ECHO shows mild MR. Patient asymptomatic at this time  -No need for further cardiac imaging

## 2021-08-11 NOTE — HISTORY OF PRESENT ILLNESS
[de-identified] : s/p R TKA, 7/9/21 [de-identified] : Reports that he had been doing well until he overexerted himself at PT about 1 week ago. At that time he noted rapid development of knee swelling, redness, and sharply increased pain. Has been ambulating with a cane. No fever/chills. The central scab from last visit has remained dry per his report. [de-identified] : R knee incision healed with central scabbing. I unroofed the scab and was able to express purulent fluid. The entire knee is erythematous with a tense effusion. ROM 10-40 limited by pain. Ligamentously stable. Ambulating with cane and pronounced right antalgia. [de-identified] : Right knee XRs taken today demonstrate stable position of the components without evidence of mechanical complication. Patella sits at appropriate height and tracks centrally. [de-identified] : 72y/o male with acute periprosthetic infection right knee [de-identified] : Right knee was aspirated with retrieval of frankly purulent fluid, which was sent for the usual panel\par Directed him to go immediately to the [facility] ED for urgent I&D and polyethylene exchange. We discussed the r/b/a of surgery. We discussed the possible need for multistage explant and revision should the DAIR procedure fail. He understands.

## 2021-08-11 NOTE — ED ADULT NURSE NOTE - OBJECTIVE STATEMENT
Presents for R knee pain with planned admission for OR for R knee infection, presenting with pain and swelling to R knee.

## 2021-08-11 NOTE — CONSULT NOTE ADULT - PROBLEM SELECTOR RECOMMENDATION 6
Hemoglobin 10.5 on admission, at baseline per past records.   -Would recommend obtaining further anemia workup on an outpatient basis including iron panel, B12, folate, LDH, retic, haptoglobin, etc. Please resume patient's home medications:  -Rivastigmine 9.5mg transdermal patch

## 2021-08-11 NOTE — CONSULT NOTE ADULT - ASSESSMENT
70 y/o Male with Alzheimer's disease and bipolar depression s/p R TKA by Dr. Higuera on 7/9 who presents to the ED after worsening knee pain and swelling for the past 3 days. Admitted for periprosthetic joint infection with plan for OR washout and possible hardware replacement by ortho. Medicine consulted for pre-operative clearance.

## 2021-08-11 NOTE — CONSULT NOTE ADULT - PROBLEM SELECTOR RECOMMENDATION 5
Hx of MVR for severe MR in 2017 - repeat ECHO shows mild MR. Patient asymptomatic at this time  -No need for further cardiac imaging   -Cleared from a medical standpoint for orthopedic procedure Please resume atorvastatin 20mg (home dose) and obtain repeat lipid panel on this admission

## 2021-08-11 NOTE — H&P ADULT - NSHPPHYSICALEXAM_GEN_ALL_CORE
Physical Exam:   AAOx3  Right knee warm, erythematous and moderately edematous  Passive and Active ROM 0-90 degrees in right knee  SILT in b/l LE  2+ Pedal pulses, warm and well perfused bilaterally  5/5 strength EHL/FHL/TA/GC

## 2021-08-11 NOTE — H&P ADULT - HISTORY OF PRESENT ILLNESS
Orthopaedic Surgery Consult Note    Attending Physician: Dr. Higuera  Consult requested by: ED    CC: R knee pain/swelling    HPI:  71yMale with Alzheimer's disease s/p R TKA by Dr. Higuera on 7/9 who presents to the ED after worsening knee pain and swelling for the past 3 days. Patient denies any recent trauma to the knee. Patient visited Dr. Higuera's office earlier today where a knee aspiration perform by Dr. Higuera which yielded purulent fluid. Otherwise, patient denies fevers, chills, numbness or focal weakness.          Orthopaedic Surgery H&P    Attending Physician: Dr. Higuera    CC: R knee pain/swelling    HPI:  71yMale with Alzheimer's disease s/p R TKA by Dr. Higuera on 7/9 who presents to the ED after worsening knee pain and swelling for the past 3 days. Patient denies any recent trauma to the knee. Patient visited Dr. Higuera's office earlier today where a knee aspiration perform by Dr. Higuera which yielded purulent fluid. Otherwise, patient denies fevers, chills, numbness or focal weakness.

## 2021-08-11 NOTE — CONSULT NOTE ADULT - SUBJECTIVE AND OBJECTIVE BOX
INTERNAL MEDICINE SERVICE INITIAL CONSULT NOTE    HPI:    Attending Physician: Dr. Higuera  Consult requested by: ED    CC: R knee pain/swelling    HPI:  71yMale with Alzheimer's disease s/p R TKA by Dr. Higuera on 7/9 who presents to the ED after worsening knee pain and swelling for the past 3 days. Patient denies any recent trauma to the knee. Patient visited Dr. Higuera's office earlier today where a knee aspiration perform by Dr. Higuera which yielded purulent fluid. Otherwise, patient denies fevers, chills, numbness or focal weakness.       ADDITIONAL MEDICINE HPI:    REVIEW OF SYSTEMS:   Otherwise negative except as specified in HPI    PAST MEDICAL HISTORY:     PAST SURGICAL HISTORY:    FAMILY HISTORY:    SOCIAL HISTORY:  Tobacco use:  EtOH use:  Illicit drug use:    MEDICATIONS:  MEDICATIONS  (STANDING):  chlorhexidine 2% Cloths 1 Application(s) Topical <User Schedule>  lactated ringers. 1000 milliLiter(s) (120 mL/Hr) IV Continuous <Continuous>  povidone iodine 5% Nasal Swab 1 Application(s) Both Nostrils once    MEDICATIONS  (PRN):      ALLERGIES:  Allergies    No Known Allergies    Intolerances        VITAL SIGNS:  Vital Signs Last 24 Hrs  T(C): 37.1 (11 Aug 2021 18:54), Max: 37.5 (11 Aug 2021 16:13)  T(F): 98.8 (11 Aug 2021 18:54), Max: 99.5 (11 Aug 2021 16:13)  HR: 70 (11 Aug 2021 18:54) (69 - 70)  BP: 107/66 (11 Aug 2021 18:54) (107/66 - 120/68)  BP(mean): --  RR: 18 (11 Aug 2021 18:54) (18 - 18)  SpO2: 99% (11 Aug 2021 18:54) (97% - 99%)      PHYSICAL EXAM:  Constitutional: WDWN resting comfortably in bed; NAD  Head: NC/AT  Eyes: PERRL, EOMI, anicteric sclera  ENT: no nasal discharge; uvula midline, no oropharyngeal erythema or exudates; MMM  Neck: supple; no JVD or thyromegaly  Respiratory: CTA B/L; no W/R/R, no retractions  Cardiac: +S1/S2; RRR; no M/R/G; PMI non-displaced  Gastrointestinal: abdomen soft, NT/ND; no rebound or guarding; +BSx4  Genitourinary: normal external genitalia  Back: spine midline, no bony tenderness or step-offs; no CVAT B/L  Extremities: WWP, no clubbing or cyanosis; no peripheral edema  Musculoskeletal: NROM x4; no joint swelling, tenderness or erythema  Vascular: 2+ radial, femoral, DP/PT pulses B/L  Dermatologic: skin warm, dry and intact; no rashes, wounds, or scars  Lymphatic: no submandibular or cervical LAD  Neurologic: AAOx3; CNII-XII grossly intact; no focal deficits  Psychiatric: affect and characteristics of appearance, verbalizations, behaviors are appropriate    LABS:                        10.5   9.76  )-----------( 242      ( 11 Aug 2021 16:41 )             32.0     08-11    132<L>  |  100  |  20  ----------------------------<  117<H>  3.8   |  22  |  0.86    Ca    9.5      11 Aug 2021 16:41    TPro  6.9  /  Alb  3.8  /  TBili  0.8  /  DBili  x   /  AST  14  /  ALT  10  /  AlkPhos  89  08-11    PT/INR - ( 11 Aug 2021 16:41 )   PT: 12.5 sec;   INR: 1.04          PTT - ( 11 Aug 2021 16:41 )  PTT:30.4 sec        CAPILLARY BLOOD GLUCOSE              RADIOLOGY & ADDITIONAL TESTS: Reviewed.   INTERNAL MEDICINE SERVICE INITIAL CONSULT NOTE    Attending Physician: Dr. 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 Higuera on 7/9. Patient was evaluated routinely by Dr. Higuera on 8/11 for post-operative progress and was noted to have purulent discharge from his knee as well as decreased ROM and antalgic gait. Patient denies any recent trauma to the knee. An in-office knee aspiration performed  by Dr. Higuera yielded purulent fluid with 173K nucleated cells. Patient was therefore advised to come to 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 Dr. Higuera also discussed the possibility of multiple stage explant revisions if the DAIR (debridement, antibiotics and implant retention) fails.    Patient was seen and examined in ED. He states that he is in severe pain of his R knee, cannot move R knee. Prior to TKA in July was able to walk long distances with no issue. However since surgery and recent prosthetic infection, he cannot ambulate more than a few feet without being in significant pain. He currently uses a cane to ambulate. Denies any chest pain, trouble breathing, leg swelling, etc.     Other notable HPI: Patient used to box in his youth, and has sustained multiple concussions and head trauma, resulting in memory loss and cognitive issues. He follows with a psychiatrist for bipolar depression. In 2017, patient was noted to have audible heart murmur of mitral regurgitation during routine visit with his PCP, Dr. Zay Melara. At that time he c/o exertional dyspnea and episodic chest pain. He was referred to cardiology, where an ECHO in 2017 revealed partial flail posterior leaflets of the mitral valve, severe MR and EF 65% with biatrial enlargement. Patient was then referred to CT surgery for MVR, which he underwent in 2017. Patient has since had improvement in his exercise tolerance. He was evaluated by a neurologist for memory loss and was started on various AD medications.     REVIEW OF SYSTEMS:   Otherwise negative except as specified in HPI    PAST MEDICAL HISTORY: Alzheimer's disease, depression     PAST SURGICAL HISTORY: R TKA in July 2021, L TKA in Jan 2021. Nasal surgery, MVR     FAMILY HISTORY: no family history of diabetes, heart conditions, etc    SOCIAL HISTORY:  Tobacco use: smoked 1.5 ppd x 15 years, quit 40 years ago   EtOH use: used to drink socially, does not drink anymore  Illicit drug use: denies     Patient lives in an apartment in [place] by himself. Prior to his surgery he was able to ambulate without assistance, however he now uses a cane and his ambulation is severely limited 2/2 pain. He has an aide that visits his house for a couple of hours a day. He is not currently employed but used to work as an .     MEDICATIONS:  MEDICATIONS  (STANDING):  chlorhexidine 2% Cloths 1 Application(s) Topical <User Schedule>  lactated ringers. 1000 milliLiter(s) (120 mL/Hr) IV Continuous <Continuous>  povidone iodine 5% Nasal Swab 1 Application(s) Both Nostrils once    MEDICATIONS  (PRN):      ALLERGIES:  Allergies    No Known Allergies    Intolerances        VITAL SIGNS:  Vital Signs Last 24 Hrs  T(C): 37.1 (11 Aug 2021 18:54), Max: 37.5 (11 Aug 2021 16:13)  T(F): 98.8 (11 Aug 2021 18:54), Max: 99.5 (11 Aug 2021 16:13)  HR: 70 (11 Aug 2021 18:54) (69 - 70)  BP: 107/66 (11 Aug 2021 18:54) (107/66 - 120/68)  BP(mean): --  RR: 18 (11 Aug 2021 18:54) (18 - 18)  SpO2: 99% (11 Aug 2021 18:54) (97% - 99%)      PHYSICAL EXAM:  General: well appearing male resting in bed in NAD  HEENT: PERRLA, EOMI, MMM  Heart: RRR no m/r/g  Lungs: CTAB, no w/r/r  Abdomen: soft, NT, ND   Extremities: R knee warm to touch, slightly more edematous than L. ROM approximately 10-20 degrees on R. Surgical scar present over R knee. No active drainage or excoriations noted. L knee with full ROM. Pulses 2+ in all extremities, WWP.   Neuro: AAOx3, no FNDs    LABS:                        10.5   9.76  )-----------( 242      ( 11 Aug 2021 16:41 )             32.0     08-11    132<L>  |  100  |  20  ----------------------------<  117<H>  3.8   |  22  |  0.86    Ca    9.5      11 Aug 2021 16:41    TPro  6.9  /  Alb  3.8  /  TBili  0.8  /  DBili  x   /  AST  14  /  ALT  10  /  AlkPhos  89  08-11    PT/INR - ( 11 Aug 2021 16:41 )   PT: 12.5 sec;   INR: 1.04          PTT - ( 11 Aug 2021 16:41 )  PTT:30.4 sec        CAPILLARY BLOOD GLUCOSE              RADIOLOGY & ADDITIONAL TESTS: Reviewed.   INTERNAL MEDICINE SERVICE INITIAL CONSULT NOTE    Attending Physician: Dr. 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 Higuera on 7/9. Patient was evaluated routinely by Dr. Higuera on 8/11 for post-operative progress and was noted to have purulent discharge from his knee as well as decreased ROM and antalgic gait. Patient denies any recent trauma to the knee. An in-office knee aspiration performed  by Dr. Higuera yielded purulent fluid with 173K nucleated cells. Patient was therefore advised to come to 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 Dr. Higuera also discussed the possibility of multiple stage explant revisions if the DAIR (debridement, antibiotics and implant retention) fails.    Patient was seen and examined in ED. He states that he is in severe pain of his R knee, cannot move R knee. Prior to TKA in July was able to walk long distances with no issue. However since surgery and recent prosthetic infection, he cannot ambulate more than a few feet without being in significant pain. He currently uses a cane to ambulate. Denies any chest pain, trouble breathing, leg swelling, etc.     Other notable HPI: Patient used to box in his youth, and has sustained multiple concussions and head trauma, resulting in memory loss and cognitive issues. He follows with a psychiatrist for bipolar depression. In 2017, patient was noted to have audible heart murmur of mitral regurgitation during routine visit with his PCP, Dr. Zay Melara. At that time he c/o exertional dyspnea and episodic chest pain. He was referred to cardiology, where an ECHO in 2017 revealed partial flail posterior leaflets of the mitral valve, severe MR and EF 65% with biatrial enlargement. Stress test normal in 2017. Patient was then referred to CT surgery for MVR, which he underwent in 2017. Repeat ECHO in 2021 shows EF 58%, s/p MVR with mild MR, mild AR and mild TR. CCTA in 2021 shows mild (<50% CCA thickening). Patient has since had improvement in his exercise tolerance. He was evaluated by a neurologist for memory loss and was started on various AD medications.     REVIEW OF SYSTEMS:   Otherwise negative except as specified in HPI    PAST MEDICAL HISTORY: Alzheimer's disease, depression     PAST SURGICAL HISTORY: R TKA in July 2021, L TKA in Jan 2021. Nasal surgery, MVR     FAMILY HISTORY: no family history of diabetes, heart conditions, etc    SOCIAL HISTORY:  Tobacco use: smoked 1.5 ppd x 15 years, quit 40 years ago   EtOH use: used to drink socially, does not drink anymore  Illicit drug use: denies     Patient lives in an apartment in [place] by himself. Prior to his surgery he was able to ambulate without assistance, however he now uses a cane and his ambulation is severely limited 2/2 pain. He has an aide that visits his house for a couple of hours a day. He is not currently employed but used to work as an .     MEDICATIONS:  MEDICATIONS  (STANDING):  chlorhexidine 2% Cloths 1 Application(s) Topical <User Schedule>  lactated ringers. 1000 milliLiter(s) (120 mL/Hr) IV Continuous <Continuous>  povidone iodine 5% Nasal Swab 1 Application(s) Both Nostrils once    MEDICATIONS  (PRN):      ALLERGIES:  Allergies    No Known Allergies    Intolerances        VITAL SIGNS:  Vital Signs Last 24 Hrs  T(C): 37.1 (11 Aug 2021 18:54), Max: 37.5 (11 Aug 2021 16:13)  T(F): 98.8 (11 Aug 2021 18:54), Max: 99.5 (11 Aug 2021 16:13)  HR: 70 (11 Aug 2021 18:54) (69 - 70)  BP: 107/66 (11 Aug 2021 18:54) (107/66 - 120/68)  BP(mean): --  RR: 18 (11 Aug 2021 18:54) (18 - 18)  SpO2: 99% (11 Aug 2021 18:54) (97% - 99%)      PHYSICAL EXAM:  General: well appearing male resting in bed in NAD  HEENT: PERRLA, EOMI, MMM  Heart: RRR no m/r/g  Lungs: CTAB, no w/r/r  Abdomen: soft, NT, ND   Extremities: R knee warm to touch, slightly more edematous than L. ROM approximately 10-20 degrees on R. Surgical scar present over R knee. No active drainage or excoriations noted. L knee with full ROM. Pulses 2+ in all extremities, WWP.   Neuro: AAOx3, no FNDs    LABS:                        10.5   9.76  )-----------( 242      ( 11 Aug 2021 16:41 )             32.0     08-11    132<L>  |  100  |  20  ----------------------------<  117<H>  3.8   |  22  |  0.86    Ca    9.5      11 Aug 2021 16:41    TPro  6.9  /  Alb  3.8  /  TBili  0.8  /  DBili  x   /  AST  14  /  ALT  10  /  AlkPhos  89  08-11    PT/INR - ( 11 Aug 2021 16:41 )   PT: 12.5 sec;   INR: 1.04          PTT - ( 11 Aug 2021 16:41 )  PTT:30.4 sec        CAPILLARY BLOOD GLUCOSE              RADIOLOGY & ADDITIONAL TESTS: Reviewed.   INTERNAL MEDICINE SERVICE INITIAL CONSULT NOTE    Attending Physician: Dr. 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 Higuera on 7/9. Patient was evaluated routinely by Dr. Higuera on 8/11 for post-operative progress and was noted to have purulent discharge from his knee as well as decreased ROM and antalgic gait. Patient denies any recent trauma to the knee. An in-office knee aspiration performed  by Dr. Higuera yielded purulent fluid with 173K nucleated cells. 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 Dr. Higuera also discussed the possibility of multiple stage explant revisions if the DAIR (debridement, antibiotics and implant retention) fails.    Patient was seen and examined in ED. He states that he is in severe pain of his R knee, cannot move R knee. Prior to TKA in July was able to walk long distances with no issue. However since surgery and recent prosthetic infection, he cannot ambulate more than a few feet without being in significant pain. He currently uses a cane to ambulate. Denies any chest pain, trouble breathing, leg swelling, etc.     Other notable HPI: Patient used to box in his youth, and has sustained multiple concussions and head trauma, resulting in memory loss and cognitive issues. He follows with a psychiatrist for bipolar depression. In 2017, patient was noted to have audible heart murmur of mitral regurgitation during routine visit with his PCP, Dr. Zay Melara. At that time he c/o exertional dyspnea and episodic chest pain. He was referred to cardiology, where an ECHO in 2017 revealed partial flail posterior leaflets of the mitral valve, severe MR and EF 65% with biatrial enlargement. Stress test normal in 2017. Patient was then referred to CT surgery for MVR, which he underwent in 2017. Repeat ECHO in 2021 shows EF 58%, s/p MVR with mild MR, mild AR and mild TR. CCTA in 2021 shows mild (<50% CCA thickening). Patient has since had improvement in his exercise tolerance. He was evaluated by a neurologist for memory loss and was started on various AD medications.     REVIEW OF SYSTEMS:   Otherwise negative except as specified in HPI    PAST MEDICAL HISTORY: Alzheimer's disease, depression     PAST SURGICAL HISTORY: R TKA in July 2021, L TKA in Jan 2021. Nasal surgery, MVR     FAMILY HISTORY: no family history of diabetes, heart conditions, etc    SOCIAL HISTORY:  Tobacco use: smoked 1.5 ppd x 15 years, quit 40 years ago   EtOH use: used to drink socially, does not drink anymore  Illicit drug use: denies     Patient lives in an apartment in [place] by himself. Prior to his surgery he was able to ambulate without assistance, however he now uses a cane and his ambulation is severely limited 2/2 pain. He has an aide that visits his house for a couple of hours a day. He is not currently employed but used to work as an .     MEDICATIONS:  MEDICATIONS  (STANDING):  chlorhexidine 2% Cloths 1 Application(s) Topical <User Schedule>  lactated ringers. 1000 milliLiter(s) (120 mL/Hr) IV Continuous <Continuous>  povidone iodine 5% Nasal Swab 1 Application(s) Both Nostrils once    MEDICATIONS  (PRN):      ALLERGIES:  Allergies    No Known Allergies    Intolerances      VITAL SIGNS:  Vital Signs Last 24 Hrs  T(C): 37.1 (11 Aug 2021 18:54), Max: 37.5 (11 Aug 2021 16:13)  T(F): 98.8 (11 Aug 2021 18:54), Max: 99.5 (11 Aug 2021 16:13)  HR: 70 (11 Aug 2021 18:54) (69 - 70)  BP: 107/66 (11 Aug 2021 18:54) (107/66 - 120/68)  BP(mean): --  RR: 18 (11 Aug 2021 18:54) (18 - 18)  SpO2: 99% (11 Aug 2021 18:54) (97% - 99%)      PHYSICAL EXAM:  General: well appearing male resting in bed in NAD  HEENT: PERRLA, EOMI, MMM  Heart: RRR no m/r/g  Lungs: CTAB, no w/r/r  Abdomen: soft, NT, ND   Extremities: R knee warm to touch, slightly more edematous than L. ROM approximately 10-20 degrees on R. Surgical scar present over R knee. No active drainage or excoriations noted. L knee with full ROM. Pulses 2+ in all extremities, WWP.   Neuro: AAOx3, no FNDs    LABS:                        10.5   9.76  )-----------( 242      ( 11 Aug 2021 16:41 )             32.0     08-11    132<L>  |  100  |  20  ----------------------------<  117<H>  3.8   |  22  |  0.86    Ca    9.5      11 Aug 2021 16:41    TPro  6.9  /  Alb  3.8  /  TBili  0.8  /  DBili  x   /  AST  14  /  ALT  10  /  AlkPhos  89  08-11    PT/INR - ( 11 Aug 2021 16:41 )   PT: 12.5 sec;   INR: 1.04          PTT - ( 11 Aug 2021 16:41 )  PTT:30.4 sec        CAPILLARY BLOOD GLUCOSE              RADIOLOGY & ADDITIONAL TESTS: Reviewed.   INTERNAL MEDICINE SERVICE INITIAL CONSULT NOTE    Attending Physician: Dr. 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 Higuera on 7/9. Patient was evaluated routinely by Dr. Higuera on 8/11 for post-operative progress and was noted to have purulent discharge from his knee as well as decreased ROM and antalgic gait. Patient denies any recent trauma to the knee. An in-office knee aspiration performed  by Dr. Higuera yielded purulent fluid with 173K nucleated cells. 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 Dr. Higuera also discussed the possibility of multiple stage explant revisions if the DAIR (debridement, antibiotics and implant retention) fails.    Patient was seen and examined in ED. He states that he is in severe pain of his R knee, cannot move R knee. Prior to TKA in July was able to walk long distances with no issue. However since surgery and recent prosthetic infection, he cannot ambulate more than a few feet without being in significant pain. He currently uses a cane to ambulate. Denies any chest pain, trouble breathing, leg swelling, etc.     Other notable HPI: Patient used to box in his youth, and has sustained multiple concussions and head trauma, resulting in memory loss and cognitive issues. He follows with a psychiatrist for bipolar depression. In 2017, patient was noted to have audible heart murmur of mitral regurgitation during routine visit with his PCP, Dr. Zay Melara. At that time he c/o exertional dyspnea and episodic chest pain. He was referred to cardiology, where an ECHO in 2017 revealed partial flail posterior leaflets of the mitral valve, severe MR and EF 65% with biatrial enlargement. Stress test normal in 2017. Patient was then referred to CT surgery for MVR, which he underwent in 2017. Repeat ECHO in 2021 shows EF 58%, s/p MVR with mild MR, mild AR and mild TR. CCTA in 2021 shows mild (<50% CCA thickening). Patient has since had improvement in his exercise tolerance. He was evaluated by a neurologist for memory loss and was started on various AD medications.     REVIEW OF SYSTEMS:   Otherwise negative except as specified in HPI    PAST MEDICAL HISTORY: Alzheimer's disease, depression     PAST SURGICAL HISTORY: R TKA in July 2021, L TKA in Jan 2021. Nasal surgery, MVR in 2017    FAMILY HISTORY: no family history of diabetes, heart conditions, etc    SOCIAL HISTORY:  Tobacco use: smoked 1.5 ppd x 15 years, quit 40 years ago   EtOH use: used to drink socially, does not drink anymore  Illicit drug use: denies     Patient lives in an apartment in [place] by himself. Prior to his surgery he was able to ambulate without assistance, however he now uses a cane and his ambulation is severely limited 2/2 pain. He has an aide that visits his house for a couple of hours a day. He is not currently employed but used to work as an .     MEDICATIONS:  MEDICATIONS  (STANDING):  chlorhexidine 2% Cloths 1 Application(s) Topical <User Schedule>  lactated ringers. 1000 milliLiter(s) (120 mL/Hr) IV Continuous <Continuous>  povidone iodine 5% Nasal Swab 1 Application(s) Both Nostrils once    MEDICATIONS  (PRN):      ALLERGIES:  Allergies    No Known Allergies    Intolerances      VITAL SIGNS:  Vital Signs Last 24 Hrs  T(C): 37.1 (11 Aug 2021 18:54), Max: 37.5 (11 Aug 2021 16:13)  T(F): 98.8 (11 Aug 2021 18:54), Max: 99.5 (11 Aug 2021 16:13)  HR: 70 (11 Aug 2021 18:54) (69 - 70)  BP: 107/66 (11 Aug 2021 18:54) (107/66 - 120/68)  BP(mean): --  RR: 18 (11 Aug 2021 18:54) (18 - 18)  SpO2: 99% (11 Aug 2021 18:54) (97% - 99%)      PHYSICAL EXAM:  General: well appearing male resting in bed in NAD  HEENT: PERRLA, EOMI, MMM  Heart: RRR no m/r/g  Lungs: CTAB, no w/r/r  Abdomen: soft, NT, ND   Extremities: R knee warm to touch, slightly more edematous than L. ROM approximately 10-20 degrees on R. Surgical scar present over R knee. No active drainage or excoriations noted. L knee with full ROM. Pulses 2+ in all extremities, WWP.   Neuro: AAOx3, no FNDs    LABS:                        10.5   9.76  )-----------( 242      ( 11 Aug 2021 16:41 )             32.0     08-11    132<L>  |  100  |  20  ----------------------------<  117<H>  3.8   |  22  |  0.86    Ca    9.5      11 Aug 2021 16:41    TPro  6.9  /  Alb  3.8  /  TBili  0.8  /  DBili  x   /  AST  14  /  ALT  10  /  AlkPhos  89  08-11    PT/INR - ( 11 Aug 2021 16:41 )   PT: 12.5 sec;   INR: 1.04          PTT - ( 11 Aug 2021 16:41 )  PTT:30.4 sec        CAPILLARY BLOOD GLUCOSE              RADIOLOGY & ADDITIONAL TESTS: Reviewed.

## 2021-08-11 NOTE — CONSULT NOTE ADULT - PROBLEM SELECTOR RECOMMENDATION 2
-Would recommend restarting home medications given sundowning episode after admission. Please perform official medication reconciliation by calling patient's pharmacy as he is unable to recall the names of his medications:  -Sertraline 25mg TID  -Buproprion 150mg   -Clonazepam 2mg   -Fluoxetine 20mg   -Lamotrigine 25mg   -Mirtazapine 30mg Patient with hx of recent R knee TKA by Dr. Higuera on July 9th, now with e/o periprosthetic joint infection. No leukocytosis present on CBC. In-office joint aspiration revealed >180K PMNs in purulent fluid. Patient was referred immediately to [facility] ED for next-day washout and polyethylene exchange.   -F/u joint aspirate fluid   -F/u blood cultures   -Post-operative abx pending intraoperative cultures

## 2021-08-11 NOTE — H&P ADULT - NSHPLABSRESULTS_GEN_ALL_CORE
Vital Signs Last 24 Hrs  T(C): 37.1 (11 Aug 2021 18:54), Max: 37.5 (11 Aug 2021 16:13)  T(F): 98.8 (11 Aug 2021 18:54), Max: 99.5 (11 Aug 2021 16:13)  HR: 70 (11 Aug 2021 18:54) (69 - 70)  BP: 107/66 (11 Aug 2021 18:54) (107/66 - 120/68)  BP(mean): --  RR: 18 (11 Aug 2021 18:54) (18 - 18)  SpO2: 99% (11 Aug 2021 18:54) (97% - 99%)      Labs:                        10.5   9.76  )-----------( 242      ( 11 Aug 2021 16:41 )             32.0     08-11    132<L>  |  100  |  20  ----------------------------<  117<H>  3.8   |  22  |  0.86    Ca    9.5      11 Aug 2021 16:41    TPro  6.9  /  Alb  3.8  /  TBili  0.8  /  DBili  x   /  AST  14  /  ALT  10  /  AlkPhos  89  08-11    PT/INR - ( 11 Aug 2021 16:41 )   PT: 12.5 sec;   INR: 1.04          PTT - ( 11 Aug 2021 16:41 )  PTT:30.4 sec    Right Knee Aspiration:  Cell count - [id number]  RBC - 02712  Seg Granulocytes - 92  Culture: Gram + cocci in pairs

## 2021-08-11 NOTE — CONSULT NOTE ADULT - PROBLEM SELECTOR RECOMMENDATION 9
Patient with hx of recent R knee TKA by Dr. Higuera on July 9th, now with e/o periprosthetic joint infection Patient with hx of recent R knee TKA by Dr. Higuera on July 9th, now with e/o periprosthetic joint infection - joint fluid purulent on Patient with hx of recent R knee TKA by Dr. Higuera on July 9th, now with e/o periprosthetic joint infection. No leukocytosis present on CBC. In-office joint aspiration revealed >180K PMNs in purulent fluid. Patient was referred immediately to [facility] ED for next-day washout and polyethylene exchange.   -F/u joint aspirate fluid   -F/u blood cultures   -Post-operative abx pending intraoperative cultures     Medical clearance: patient is medically cleared for OR washout of periprosthetic joint infection of R TKA. Velasquez score 0.3% and RCRI 0. Patient has no hx of adverse reactions to anesthesia and is not on any blood thinning medications except for ASA, which can be held at this time and resumed post-procedure 70 y/o Male with Alzheimer's disease and bipolar depression s/p R TKA by Dr. Higuera on 7/9 who presents to the ED after worsening knee pain and swelling for the past 3 days. Admitted for periprosthetic joint infection with plan for OR washout and possible hardware replacement by ortho. Patient with hx of severe MR s/p MVR in 2017, with subsequent TTEs (most recent in Jan 2021) showing mild MR, TR, AR and normal EF. Patient's METS were >4 prior to R TKA in July 2021, however decreased s/p surgery 2/2 pain. Patient has no hx of CAD, EKG on this admission shows no ischemic changes. No need for further workup or imaging. RCRI 0, Velasquez score 0.3%.   -Patient is at intermediate risk given age for intermediate risk procedure 70 y/o Male with Alzheimer's disease and bipolar depression s/p R TKA by Dr. Higuera on 7/9 who presents to the ED after worsening knee pain and swelling for the past 3 days. Admitted for periprosthetic joint infection with plan for OR washout and possible hardware replacement by ortho. Patient with hx of severe MR s/p MVR in 2017, with subsequent TTEs (most recent in Jan 2021) showing mild MR, TR, AR and normal EF.   -Patient's METS were >4 prior to R TKA in July 2021, however decreased s/p surgery 2/2 pain.   -Denies CP, SOB, AARON, euvolemic on exam. No previous rxn to anesthesia  -Patient has no hx of CAD, EKG NSR, non ischemic.   -RCRI 0, Velasquez score 0.3%.   -Patient is at intermediate risk for intermediate risk procedure, no further workup needed at this time

## 2021-08-11 NOTE — PROCEDURE
[Aspiration] : Aspiration [Right] : of the right [Knee Joint] : knee joint [Diagnostic] : Diagnostic [Patient] : patient [Alcohol] : Alcohol [Betadine] : Betadine [Ethyl Chloride Spray] : ethyl chloride spray was used as a topical anesthetic [Lateral] : lateral [Superior] : superior [18] : an 18-gauge [___ mL Fluid] : [unfilled] mL of [Cloudy] : cloudy [Yellow] : yellow [Bandage Applied] : a bandage [Culture] : culture [Cell Count] : cell count [Gram Stain] : gram stain [Crystal Analysis] : crystal analysis [de-identified] : purulent appearing

## 2021-08-11 NOTE — ED PROVIDER NOTE - CLINICAL SUMMARY MEDICAL DECISION MAKING FREE TEXT BOX
72 y/o M pt presents to ED with R knee pain and swelling. Plan for pre-op labs, consult ortho, and will admit pt to Dr. Higuera's service.

## 2021-08-11 NOTE — CONSULT NOTE ADULT - PROBLEM SELECTOR RECOMMENDATION 8
Hemoglobin 10.5 on admission, at baseline per past records.   -Would recommend obtaining further anemia workup on an outpatient basis including iron panel, B12, folate, LDH, retic, haptoglobin, etc.

## 2021-08-11 NOTE — ED PROVIDER NOTE - OBJECTIVE STATEMENT
70 y/o M pt s/p R knee replacement on 7/9-7/10 performed by Dr. Higuera presents to ED c/o R knee pain and swelling. Pt had visited Dr. Higuera's office today for more pain and swelling to his R knee, and Dr. Higuera thought it looked infected. Dr. Higuera aspirated the knee and found what looked like purulence, so had pt sent in for OR wash out and admission. Pt relates more pain with walking, flexion, and extension. Pt denies fever, or any other acute complaints. He is a poor historian secondary to Alzheimer's, and is taking meds but doesn't remember what it is.

## 2021-08-11 NOTE — CONSULT NOTE ADULT - PROBLEM SELECTOR RECOMMENDATION 3
Please resume atorvastatin 20mg (home dose) and obtain repeat lipid panel on this admission Patient met sepsis criteria on admission, likely 2/2 periprosthetic joint infection  -Should receive 1L bolus to meet fluid resuscitation goals in sepsis   -Continue MIVF   -Post-operative abx pending intraoperative cultures

## 2021-08-11 NOTE — CONSULT NOTE ADULT - PROBLEM SELECTOR RECOMMENDATION 4
Statement Selected Please resume patient's home medications:  -Rivastigmine 9.5mg transdermal patch -Would recommend restarting home medications given sundowning episode after admission. Please perform official medication reconciliation by calling patient's pharmacy as he is unable to recall the names of his medications:  -Please start the following medications: fluoxetine 20mg, sertraline 25mg TID and buproprion 150mg (please clarify buproprion with patient as witholding this medication can decrease seizure threshold)  -Obtain official med rec in AM -Would recommend restarting home medications given sundowning episode after admission. Please perform official medication reconciliation by calling patient's pharmacy as he is unable to recall the names of his medications:  -Please start the following medications: fluoxetine 20mg and buproprion 150mg (please clarify buproprion with patient as witholding this medication can decrease seizure threshold)  -Obtain official med rec in AM

## 2021-08-11 NOTE — H&P ADULT - ATTENDING COMMENTS
Primary R TKA on 7/9/21 with unremarkable immediate postoperative course and benign 2wk followup evaluation. Reports sharply worsening pain, swelling, erythema about the knee for something like a week - short term recall not the best secondary to the moderate dementia from Alzheimer's. I aspirated bailey pus from his knee today. Dx is acute periprosthetic infection. Plan for DAIR tomorrow. He is aware that multistage explant and revision may be required if this procedure fails. Hold antibiotics until surgery.

## 2021-08-11 NOTE — H&P ADULT - ASSESSMENT
71yMale with Alzheimer's disease s/p R TKA by Dr. Higuera on 7/9 who presents with R PJI.  -admit to ortho  -consented for Right knee washout and poly exchange tomorrow with Dr. Higuera  -preop labs  -npo after midnight  -hold antibiotics pending OR cultures  -PT,WBS: WBAT RLE  -Dispo: pending OR

## 2021-08-12 DIAGNOSIS — A41.9 SEPSIS, UNSPECIFIED ORGANISM: ICD-10-CM

## 2021-08-12 DIAGNOSIS — Z01.818 ENCOUNTER FOR OTHER PREPROCEDURAL EXAMINATION: ICD-10-CM

## 2021-08-12 DIAGNOSIS — Z98.890 OTHER SPECIFIED POSTPROCEDURAL STATES: ICD-10-CM

## 2021-08-12 DIAGNOSIS — E78.5 HYPERLIPIDEMIA, UNSPECIFIED: ICD-10-CM

## 2021-08-12 DIAGNOSIS — T84.50XA INFECTION AND INFLAMMATORY REACTION DUE TO UNSPECIFIED INTERNAL JOINT PROSTHESIS, INITIAL ENCOUNTER: ICD-10-CM

## 2021-08-12 DIAGNOSIS — G30.9 ALZHEIMER'S DISEASE, UNSPECIFIED: ICD-10-CM

## 2021-08-12 DIAGNOSIS — F31.9 BIPOLAR DISORDER, UNSPECIFIED: ICD-10-CM

## 2021-08-12 DIAGNOSIS — D64.9 ANEMIA, UNSPECIFIED: ICD-10-CM

## 2021-08-12 LAB
B PERT IGG+IGM PNL SER: ABNORMAL
COLOR FLD: NORMAL
COVID-19 SPIKE DOMAIN AB INTERP: POSITIVE
COVID-19 SPIKE DOMAIN ANTIBODY RESULT: 227 U/ML — HIGH
EOSINOPHIL # FLD MANUAL: 0 %
FLUID INTAKE SUBSTANCE CLASS: NORMAL
GRAM STN FLD: SIGNIFICANT CHANGE UP
LYMPHOCYTES # FLD MANUAL: 4 %
MESOTHL CELL NFR FLD: 0 %
METHOD TYPE: SIGNIFICANT CHANGE UP
MONOS+MACROS NFR FLD MANUAL: 8 %
MSSA DNA SPEC QL NAA+PROBE: SIGNIFICANT CHANGE UP
NEUTS SEG # FLD MANUAL: 88 %
NIGHT BLUE STAIN TISS: SIGNIFICANT CHANGE UP
NRBC # FLD: 0
RBC # FLD MANUAL: ABNORMAL /UL
SARS-COV-2 IGG+IGM SERPL QL IA: 227 U/ML — HIGH
SARS-COV-2 IGG+IGM SERPL QL IA: POSITIVE
SPECIMEN SOURCE: SIGNIFICANT CHANGE UP
SYCRY CLARITY: ABNORMAL
SYCRY COLOR: ABNORMAL
SYCRY ID: NORMAL
SYCRY TUBE: NORMAL
TOTAL CELLS COUNTED FLD: NORMAL /UL
TUBE TYPE: NORMAL
UNIDENT CELLS NFR FLD MANUAL: 0 %
VARIANT LYMPHS # FLD MANUAL: 0 %

## 2021-08-12 PROCEDURE — 93010 ELECTROCARDIOGRAM REPORT: CPT

## 2021-08-12 PROCEDURE — 73560 X-RAY EXAM OF KNEE 1 OR 2: CPT | Mod: 26,RT

## 2021-08-12 PROCEDURE — 27486 REVISE/REPLACE KNEE JOINT: CPT | Mod: 78,52,RT

## 2021-08-12 RX ORDER — CEFAZOLIN SODIUM 1 G
2000 VIAL (EA) INJECTION EVERY 8 HOURS
Refills: 0 | Status: DISCONTINUED | OUTPATIENT
Start: 2021-08-12 | End: 2021-08-18

## 2021-08-12 RX ORDER — RISPERIDONE 4 MG/1
0.5 TABLET ORAL ONCE
Refills: 0 | Status: COMPLETED | OUTPATIENT
Start: 2021-08-12 | End: 2021-08-12

## 2021-08-12 RX ORDER — ACETAMINOPHEN 500 MG
975 TABLET ORAL EVERY 8 HOURS
Refills: 0 | Status: DISCONTINUED | OUTPATIENT
Start: 2021-08-12 | End: 2021-08-18

## 2021-08-12 RX ORDER — ACETAMINOPHEN 500 MG
1000 TABLET ORAL ONCE
Refills: 0 | Status: DISCONTINUED | OUTPATIENT
Start: 2021-08-12 | End: 2021-08-12

## 2021-08-12 RX ORDER — BUPIVACAINE 13.3 MG/ML
20 INJECTION, SUSPENSION, LIPOSOMAL INFILTRATION ONCE
Refills: 0 | Status: DISCONTINUED | OUTPATIENT
Start: 2021-08-12 | End: 2021-08-18

## 2021-08-12 RX ORDER — SERTRALINE 25 MG/1
25 TABLET, FILM COATED ORAL DAILY
Refills: 0 | Status: DISCONTINUED | OUTPATIENT
Start: 2021-08-12 | End: 2021-08-13

## 2021-08-12 RX ORDER — VANCOMYCIN HCL 1 G
1000 VIAL (EA) INTRAVENOUS EVERY 12 HOURS
Refills: 0 | Status: DISCONTINUED | OUTPATIENT
Start: 2021-08-12 | End: 2021-08-12

## 2021-08-12 RX ORDER — ASPIRIN/CALCIUM CARB/MAGNESIUM 324 MG
81 TABLET ORAL
Refills: 0 | Status: DISCONTINUED | OUTPATIENT
Start: 2021-08-13 | End: 2021-08-18

## 2021-08-12 RX ORDER — HYDROMORPHONE HYDROCHLORIDE 2 MG/ML
0.5 INJECTION INTRAMUSCULAR; INTRAVENOUS; SUBCUTANEOUS EVERY 4 HOURS
Refills: 0 | Status: DISCONTINUED | OUTPATIENT
Start: 2021-08-12 | End: 2021-08-18

## 2021-08-12 RX ORDER — HYDROMORPHONE HYDROCHLORIDE 2 MG/ML
0.5 INJECTION INTRAMUSCULAR; INTRAVENOUS; SUBCUTANEOUS
Refills: 0 | Status: DISCONTINUED | OUTPATIENT
Start: 2021-08-12 | End: 2021-08-12

## 2021-08-12 RX ORDER — OXYCODONE HYDROCHLORIDE 5 MG/1
5 TABLET ORAL EVERY 4 HOURS
Refills: 0 | Status: DISCONTINUED | OUTPATIENT
Start: 2021-08-12 | End: 2021-08-18

## 2021-08-12 RX ORDER — OXYCODONE HYDROCHLORIDE 5 MG/1
10 TABLET ORAL EVERY 4 HOURS
Refills: 0 | Status: DISCONTINUED | OUTPATIENT
Start: 2021-08-12 | End: 2021-08-18

## 2021-08-12 RX ORDER — ACETAMINOPHEN 500 MG
1000 TABLET ORAL ONCE
Refills: 0 | Status: COMPLETED | OUTPATIENT
Start: 2021-08-12 | End: 2021-08-12

## 2021-08-12 RX ORDER — SODIUM CHLORIDE 9 MG/ML
250 INJECTION, SOLUTION INTRAVENOUS ONCE
Refills: 0 | Status: COMPLETED | OUTPATIENT
Start: 2021-08-12 | End: 2021-08-12

## 2021-08-12 RX ADMIN — HYDROMORPHONE HYDROCHLORIDE 0.5 MILLIGRAM(S): 2 INJECTION INTRAMUSCULAR; INTRAVENOUS; SUBCUTANEOUS at 15:45

## 2021-08-12 RX ADMIN — Medication 100 MILLIGRAM(S): at 17:17

## 2021-08-12 RX ADMIN — HYDROMORPHONE HYDROCHLORIDE 0.5 MILLIGRAM(S): 2 INJECTION INTRAMUSCULAR; INTRAVENOUS; SUBCUTANEOUS at 15:30

## 2021-08-12 RX ADMIN — Medication 1000 MILLIGRAM(S): at 06:30

## 2021-08-12 RX ADMIN — Medication 400 MILLIGRAM(S): at 06:06

## 2021-08-12 RX ADMIN — SODIUM CHLORIDE 500 MILLILITER(S): 9 INJECTION, SOLUTION INTRAVENOUS at 11:30

## 2021-08-12 RX ADMIN — CHLORHEXIDINE GLUCONATE 1 APPLICATION(S): 213 SOLUTION TOPICAL at 06:29

## 2021-08-12 RX ADMIN — SERTRALINE 25 MILLIGRAM(S): 25 TABLET, FILM COATED ORAL at 19:26

## 2021-08-12 RX ADMIN — SODIUM CHLORIDE 120 MILLILITER(S): 9 INJECTION, SOLUTION INTRAVENOUS at 11:46

## 2021-08-12 RX ADMIN — Medication 975 MILLIGRAM(S): at 22:05

## 2021-08-12 RX ADMIN — Medication 975 MILLIGRAM(S): at 23:00

## 2021-08-12 RX ADMIN — Medication 20 MILLIGRAM(S): at 19:26

## 2021-08-12 RX ADMIN — OXYCODONE HYDROCHLORIDE 10 MILLIGRAM(S): 5 TABLET ORAL at 02:52

## 2021-08-12 RX ADMIN — OXYCODONE HYDROCHLORIDE 10 MILLIGRAM(S): 5 TABLET ORAL at 03:44

## 2021-08-12 RX ADMIN — RISPERIDONE 0.5 MILLIGRAM(S): 4 TABLET ORAL at 05:04
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
## 2021-08-12 NOTE — BRIEF OPERATIVE NOTE - NSICDXBRIEFPROCEDURE_GEN_ALL_CORE_FT
PROCEDURES:  Incision and drainage of right knee with polyethylene liner exchange 12-Aug-2021 10:53:35  Andres Kirby

## 2021-08-12 NOTE — PROVIDER CONTACT NOTE (CHANGE IN STATUS NOTIFICATION) - BACKGROUND
70 y/o M pt s/p R knee replacement on 7/9-7/10 performed by Dr. Higuera presents to ED c/o R knee pain and swelling. 70 y/o M pt s/p R knee replacement on 7/9-7/10 performed by Dr. Higuera presents to ED c/o R knee pain and swelling. hx of Alzheimers, bipolar, and etc.

## 2021-08-12 NOTE — PROGRESS NOTE ADULT - SUBJECTIVE AND OBJECTIVE BOX
Ortho Note    Subjective:  Patient see postop on 1wo. Patient Axox1, alert to self, reoriented to surgery and location. Patient reports sending medication to [facility] on [address] in [place]. reports living alone and is unable to have someone bring in home medication for verification. Patient Right lower extremity wrapped with ace wrap, covered with a knee immobilizer, x1 prevena drain, x1 krysta drain. Patient reports sensation to bilateral lower extremities, able to wiggle toes. ID consulted.  Denies CP, SOB, N/V, numbness/tingling       Vital Signs Last 24 Hrs  T(C): 36.9 (08-12-21 @ 10:43), Max: 36.9 (08-12-21 @ 10:43)  T(F): 98.5 (08-12-21 @ 10:43), Max: 98.5 (08-12-21 @ 10:43)  HR: 80 (08-12-21 @ 14:53) (70 - 82)  BP: 126/73 (08-12-21 @ 14:53) (88/53 - 126/73)  BP(mean): 94 (08-12-21 @ 14:53) (65 - 94)  RR: 26 (08-12-21 @ 14:53) (14 - 26)  SpO2: 99% (08-12-21 @ 14:53) (94% - 100%)  AVSS    Objective:    Physical Exam:  General: Pt Alert and oriented, NAD  Right lower extremity ace warp DSG with knee immoblizer C/D/I  Pulses: +2 pedal pulses wwp toes,   Sensation: silt intact  Motor: EHL/FHL/TA/GS- firing        Plan of Care:  A/P: 71yMale POD#0 s/p Right knee washout and poly exchange  - afebrile, continue to trend wbcs, cultures  - Pain Control- Oxycodone 5-10mg PO Q4h prn moderate to severe pain, Dilaudid 0.5mg Q4h prn breakthrough pain   - DVT ppx: aspirin 81mg PO BID  - PT, WBS: WBAT RLE in KI at all times  - TTE  - appreciate ID recs  bowel regimen, IS use  - Dispo- pending medical clearance and pt clearance    Ortho Pager [phone]

## 2021-08-12 NOTE — CHART NOTE - NSCHARTNOTEFT_GEN_A_CORE
R knee PJI and associated MSSA BSI s/p R knee DAIR 8/12.  Advise d/c vancomycin, continue cefazolin 2g IV q8h, start rifampin 600mg PO q24h, obtain surveillance bcx today, and TTE.  Full consult to follow once I am able to assess patient on floor (presently in post op recovery unit).  Recommendations communicated to ortho TRINO Campos and Dr. Higuera

## 2021-08-12 NOTE — PROVIDER CONTACT NOTE (CHANGE IN STATUS NOTIFICATION) - ACTION/TREATMENT ORDERED:
Provider to bedside. Reoriented pt. oxycodone administered. Pt placed on 1:1 and bed alarms on. Provider to bedside. Reoriented pt. oxycodone administered. Pt placed on enhanced and bed alarms on.

## 2021-08-13 ENCOUNTER — TRANSCRIPTION ENCOUNTER (OUTPATIENT)
Age: 71
End: 2021-08-13

## 2021-08-13 LAB
-  CEFAZOLIN: SIGNIFICANT CHANGE UP
-  CLINDAMYCIN: SIGNIFICANT CHANGE UP
-  ERYTHROMYCIN: SIGNIFICANT CHANGE UP
-  LINEZOLID: SIGNIFICANT CHANGE UP
-  OXACILLIN: SIGNIFICANT CHANGE UP
-  RIFAMPIN: SIGNIFICANT CHANGE UP
-  TRIMETHOPRIM/SULFAMETHOXAZOLE: SIGNIFICANT CHANGE UP
-  VANCOMYCIN: SIGNIFICANT CHANGE UP
ANION GAP SERPL CALC-SCNC: 8 MMOL/L — SIGNIFICANT CHANGE UP (ref 5–17)
BUN SERPL-MCNC: 11 MG/DL — SIGNIFICANT CHANGE UP (ref 7–23)
CALCIUM SERPL-MCNC: 8.9 MG/DL — SIGNIFICANT CHANGE UP (ref 8.4–10.5)
CHLORIDE SERPL-SCNC: 103 MMOL/L — SIGNIFICANT CHANGE UP (ref 96–108)
CO2 SERPL-SCNC: 24 MMOL/L — SIGNIFICANT CHANGE UP (ref 22–31)
CREAT SERPL-MCNC: 0.73 MG/DL — SIGNIFICANT CHANGE UP (ref 0.5–1.3)
CULTURE RESULTS: SIGNIFICANT CHANGE UP
GLUCOSE SERPL-MCNC: 131 MG/DL — HIGH (ref 70–99)
HCT VFR BLD CALC: 28 % — LOW (ref 39–50)
HGB BLD-MCNC: 9.2 G/DL — LOW (ref 13–17)
MCHC RBC-ENTMCNC: 30.4 PG — SIGNIFICANT CHANGE UP (ref 27–34)
MCHC RBC-ENTMCNC: 32.9 GM/DL — SIGNIFICANT CHANGE UP (ref 32–36)
MCV RBC AUTO: 92.4 FL — SIGNIFICANT CHANGE UP (ref 80–100)
METHOD TYPE: SIGNIFICANT CHANGE UP
NRBC # BLD: 0 /100 WBCS — SIGNIFICANT CHANGE UP (ref 0–0)
ORGANISM # SPEC MICROSCOPIC CNT: SIGNIFICANT CHANGE UP
ORGANISM # SPEC MICROSCOPIC CNT: SIGNIFICANT CHANGE UP
PLATELET # BLD AUTO: 215 K/UL — SIGNIFICANT CHANGE UP (ref 150–400)
POTASSIUM SERPL-MCNC: 3.6 MMOL/L — SIGNIFICANT CHANGE UP (ref 3.5–5.3)
POTASSIUM SERPL-SCNC: 3.6 MMOL/L — SIGNIFICANT CHANGE UP (ref 3.5–5.3)
RBC # BLD: 3.03 M/UL — LOW (ref 4.2–5.8)
RBC # FLD: 13.3 % — SIGNIFICANT CHANGE UP (ref 10.3–14.5)
SODIUM SERPL-SCNC: 135 MMOL/L — SIGNIFICANT CHANGE UP (ref 135–145)
SPECIMEN SOURCE: SIGNIFICANT CHANGE UP
WBC # BLD: 7.39 K/UL — SIGNIFICANT CHANGE UP (ref 3.8–10.5)
WBC # FLD AUTO: 7.39 K/UL — SIGNIFICANT CHANGE UP (ref 3.8–10.5)

## 2021-08-13 PROCEDURE — 99233 SBSQ HOSP IP/OBS HIGH 50: CPT

## 2021-08-13 PROCEDURE — 93306 TTE W/DOPPLER COMPLETE: CPT | Mod: 26

## 2021-08-13 PROCEDURE — 99222 1ST HOSP IP/OBS MODERATE 55: CPT

## 2021-08-13 RX ORDER — ATORVASTATIN CALCIUM 80 MG/1
20 TABLET, FILM COATED ORAL AT BEDTIME
Refills: 0 | Status: DISCONTINUED | OUTPATIENT
Start: 2021-08-13 | End: 2021-08-18

## 2021-08-13 RX ORDER — RIVASTIGMINE 4.6 MG/24H
1 PATCH, EXTENDED RELEASE TRANSDERMAL EVERY 24 HOURS
Refills: 0 | Status: DISCONTINUED | OUTPATIENT
Start: 2021-08-13 | End: 2021-08-18

## 2021-08-13 RX ORDER — SERTRALINE 25 MG/1
125 TABLET, FILM COATED ORAL DAILY
Refills: 0 | Status: DISCONTINUED | OUTPATIENT
Start: 2021-08-13 | End: 2021-08-15

## 2021-08-13 RX ADMIN — OXYCODONE HYDROCHLORIDE 10 MILLIGRAM(S): 5 TABLET ORAL at 13:27

## 2021-08-13 RX ADMIN — OXYCODONE HYDROCHLORIDE 10 MILLIGRAM(S): 5 TABLET ORAL at 12:05

## 2021-08-13 RX ADMIN — OXYCODONE HYDROCHLORIDE 10 MILLIGRAM(S): 5 TABLET ORAL at 21:13

## 2021-08-13 RX ADMIN — RIVASTIGMINE 1 PATCH: 4.6 PATCH, EXTENDED RELEASE TRANSDERMAL at 11:08

## 2021-08-13 RX ADMIN — SERTRALINE 125 MILLIGRAM(S): 25 TABLET, FILM COATED ORAL at 13:41

## 2021-08-13 RX ADMIN — Medication 81 MILLIGRAM(S): at 18:53

## 2021-08-13 RX ADMIN — Medication 975 MILLIGRAM(S): at 13:41

## 2021-08-13 RX ADMIN — Medication 100 MILLIGRAM(S): at 18:52

## 2021-08-13 RX ADMIN — Medication 975 MILLIGRAM(S): at 07:10

## 2021-08-13 RX ADMIN — Medication 975 MILLIGRAM(S): at 06:10

## 2021-08-13 RX ADMIN — Medication 975 MILLIGRAM(S): at 21:12

## 2021-08-13 RX ADMIN — RIVASTIGMINE 1 PATCH: 4.6 PATCH, EXTENDED RELEASE TRANSDERMAL at 18:42

## 2021-08-13 RX ADMIN — ATORVASTATIN CALCIUM 20 MILLIGRAM(S): 80 TABLET, FILM COATED ORAL at 21:12

## 2021-08-13 RX ADMIN — Medication 975 MILLIGRAM(S): at 14:26

## 2021-08-13 RX ADMIN — Medication 100 MILLIGRAM(S): at 00:52

## 2021-08-13 RX ADMIN — SODIUM CHLORIDE 120 MILLILITER(S): 9 INJECTION, SOLUTION INTRAVENOUS at 00:51

## 2021-08-13 RX ADMIN — OXYCODONE HYDROCHLORIDE 10 MILLIGRAM(S): 5 TABLET ORAL at 22:10

## 2021-08-13 RX ADMIN — Medication 81 MILLIGRAM(S): at 06:10

## 2021-08-13 RX ADMIN — Medication 975 MILLIGRAM(S): at 21:52

## 2021-08-13 RX ADMIN — Medication 100 MILLIGRAM(S): at 11:08

## 2021-08-13 RX ADMIN — Medication 20 MILLIGRAM(S): at 12:05

## 2021-08-13 NOTE — PROGRESS NOTE ADULT - SUBJECTIVE AND OBJECTIVE BOX
INTERVAL HPI/OVERNIGHT EVENTS: NUNU O/N    SUBJECTIVE: Patient seen and examined at bedside.   NUNU O/N. Pt states pain in knee is controlled. Looking forward to working w PT. No further fevers (borderline temp 100.1 this AM), denies chills, sweats, cough. No chest pain, dyspnea, nausea, abd pain. +Flatus wo BM. Voiding wo issues.     OBJECTIVE:    VITAL SIGNS:  ICU Vital Signs Last 24 Hrs  T(C): 37.2 (13 Aug 2021 10:26), Max: 37.9 (12 Aug 2021 21:20)  T(F): 99 (13 Aug 2021 10:), Max: 100.3 (12 Aug 2021 21:20)  HR: 83 (13 Aug 2021 13:) (70 - 95)  BP: 141/72 (13 Aug 2021 13:) (105/64 - 142/70)  BP(mean): --  ABP: --  ABP(mean): --  RR: 16 (13 Aug 2021 10:) (16 - 20)  SpO2: 97% (13 Aug 2021 13:25) (97% - 100%)         @ : @ 07:00  --------------------------------------------------------  IN: 2760 mL / OUT: 2815 mL / NET: -55 mL     @ : @ 20:26  --------------------------------------------------------  IN: 1770 mL / OUT: 570 mL / NET: 1200 mL      CAPILLARY BLOOD GLUCOSE          PHYSICAL EXAM:  GEN: Male in NAD on RA  HEENT: NC/AT, MMM  CV: RRR, nml S1S2, no murmurs  PULM: nml effort, CTAB  ABD: Soft, non-distended, NABS, non-tender  NEURO  A/O x3, moving all extremities, Sensation intact  Brace over RLE  PSYCH: Appropriate      MEDICATIONS:  MEDICATIONS  (STANDING):  acetaminophen   Tablet .. 975 milliGRAM(s) Oral every 8 hours  aspirin enteric coated 81 milliGRAM(s) Oral two times a day  atorvastatin 20 milliGRAM(s) Oral at bedtime  BUpivacaine liposome 1.3% Injectable (no eMAR) 20 milliLiter(s) Local Injection once  ceFAZolin   IVPB 2000 milliGRAM(s) IV Intermittent every 8 hours  chlorhexidine 2% Cloths 1 Application(s) Topical <User Schedule>  FLUoxetine 20 milliGRAM(s) Oral daily  lactated ringers. 1000 milliLiter(s) (120 mL/Hr) IV Continuous <Continuous>  povidone iodine 5% Nasal Swab 1 Application(s) Both Nostrils once  rifAMPin 600 milliGRAM(s) Oral daily  rivastigmine patch  9.5 mG/24 Hr(s) 1 Patch Transdermal every 24 hours  sertraline 125 milliGRAM(s) Oral daily    MEDICATIONS  (PRN):  HYDROmorphone  Injectable 0.5 milliGRAM(s) IV Push every 4 hours PRN Breakthrough pain  oxyCODONE    IR 5 milliGRAM(s) Oral every 4 hours PRN Moderate Pain (4 - 6)  oxyCODONE    IR 10 milliGRAM(s) Oral every 4 hours PRN Severe Pain (7 - 10)      ALLERGIES:  Allergies    No Known Allergies    Intolerances        LABS:                        9.2    7.39  )-----------( 215      ( 13 Aug 2021 06:09 )             28.0     08-13    135  |  103  |  11  ----------------------------<  131<H>  3.6   |  24  |  0.73    Ca    8.9      13 Aug 2021 06:09        Urinalysis Basic - ( 11 Aug 2021 22:36 )    Color: Yellow / Appearance: Clear / S.020 / pH: x  Gluc: x / Ketone: NEGATIVE  / Bili: Negative / Urobili: 2.0 E.U./dL   Blood: x / Protein: NEGATIVE mg/dL / Nitrite: NEGATIVE   Leuk Esterase: NEGATIVE / RBC: < 5 /HPF / WBC < 5 /HPF   Sq Epi: x / Non Sq Epi: 0-5 /HPF / Bacteria: Present /HPF        RADIOLOGY & ADDITIONAL TESTS: Reviewed.

## 2021-08-13 NOTE — DISCHARGE NOTE PROVIDER - NSDCCPTREATMENT_GEN_ALL_CORE_FT
PRINCIPAL PROCEDURE  Procedure: Incision and drainage of right knee with polyethylene liner exchange  Findings and Treatment: right knee infection

## 2021-08-13 NOTE — DISCHARGE NOTE PROVIDER - NSDCMRMEDTOKEN_GEN_ALL_CORE_FT
clonazePAM 2 mg oral tablet: 1 tab(s) orally once a day (at bedtime)  FLUOXETINE   CAP 20M: cap(s) orally once a day  pilocarpine 5 mg oral tablet: 1 tab(s) orally 3 times a day  rivastigmine 9.5 mg/24 hr transdermal film, extended release: 1 patch transdermal once a day  sertraline 25 mg oral tablet: 1 tab(s) orally 3 times a day   acetaminophen 325 mg oral tablet: 3 tab(s) orally every 8 hours  aspirin 81 mg oral delayed release tablet: 1 tab(s) orally 2 times a day  atorvastatin 20 mg oral tablet: 1 tab(s) orally once a day (at bedtime)  bisacodyl 10 mg rectal suppository: 1 suppository(ies) rectal once a day, As needed, Constipation  ceFAZolin:   clonazePAM 2 mg oral tablet: 1 tab(s) orally once a day (at bedtime)  FLUOXETINE   CAP 20M: cap(s) orally once a day  melatonin 5 mg oral tablet: 1 tab(s) orally once a day (at bedtime), As needed, Sleep  oxyCODONE 10 mg oral tablet: 1 tab(s) orally every 4 hours, As needed, Severe Pain (7 - 10)  oxyCODONE 5 mg oral tablet: 1 tab(s) orally every 4 hours, As needed, Moderate Pain (4 - 6)  pilocarpine 5 mg oral tablet: 1 tab(s) orally 3 times a day  rifAMPin 300 mg oral capsule: 2 cap(s) orally once a day  rivastigmine 9.5 mg/24 hr transdermal film, extended release: 1 patch transdermal once a day  sertraline 25 mg oral tablet: 1 tab(s) orally 3 times a day  sodium chloride 0.9% injectable solution:  injectable    acetaminophen 325 mg oral tablet: 3 tab(s) orally every 8 hours  aspirin 81 mg oral delayed release tablet: 1 tab(s) orally 2 times a day  atorvastatin 20 mg oral tablet: 1 tab(s) orally once a day (at bedtime)  bisacodyl 10 mg rectal suppository: 1 suppository(ies) rectal once a day, As needed, Constipation  ceFAZolin:   clonazePAM 2 mg oral tablet: 1 tab(s) orally once a day (at bedtime)  FLUOXETINE   CAP 20M: cap(s) orally once a day  melatonin 5 mg oral tablet: 1 tab(s) orally once a day (at bedtime), As needed, Sleep  oxyCODONE 10 mg oral tablet: 1 tab(s) orally every 4 hours, As needed, Severe Pain (7 - 10)  oxyCODONE 5 mg oral tablet: 1 tab(s) orally every 4 hours, As needed, Moderate Pain (4 - 6)  pilocarpine 5 mg oral tablet: 1 tab(s) orally 3 times a day  rifAMPin 300 mg oral capsule: 2 cap(s) orally once a day through 9/22/21  sertraline 100 mg oral tablet: 1 tab(s) orally once a day   acetaminophen 325 mg oral tablet: 3 tab(s) orally every 8 hours  aspirin 81 mg oral delayed release tablet: 1 tab(s) orally 2 times a day for 1 month after surgery  atorvastatin 20 mg oral tablet: 1 tab(s) orally once a day (at bedtime)  bisacodyl 10 mg rectal suppository: 1 suppository(ies) rectal once a day, As needed, Constipation  ceFAZolin: 2g IV every 8 hours for 6 weeks  last date of administration: 9/22/21  melatonin 5 mg oral tablet: 1 tab(s) orally once a day (at bedtime), As needed, Sleep  oxyCODONE 10 mg oral tablet: 1 tab(s) orally every 4 hours, As needed, Severe Pain (7 - 10)  oxyCODONE 5 mg oral tablet: 1 tab(s) orally every 4 hours, As needed, Moderate Pain (4 - 6)  rifAMPin 300 mg oral capsule: 2 cap(s) orally once a day through 9/22/21  sertraline 100 mg oral tablet: 1 tab(s) orally once a day

## 2021-08-13 NOTE — DISCHARGE NOTE PROVIDER - CARE PROVIDER_API CALL
Emmanuel Higuera)  Orthopedics  [address], [address] [place] [address]  Phone: [phone]  Fax: [phone]  Follow Up Time: 1 week   Emmanuel Higuera)  Orthopedics  [address], [address] [place] [address]  Phone: [phone]  Fax: [phone]  Follow Up Time: 1 week    Rupert Falcon)  Internal Medicine  [address], [address] [place] [address]  Phone: [phone]  Fax: [phone]  Follow Up Time: 2 weeks

## 2021-08-13 NOTE — PHYSICAL THERAPY INITIAL EVALUATION ADULT - PERTINENT HX OF CURRENT PROBLEM, REHAB EVAL
71yMale with Alzheimer's disease s/p R TKA by Dr. Higuera on 7/9 who presents to the ED after worsening knee pain and swelling for the past 3 days. Patient denies any recent trauma to the knee. Patient visited Dr. Higuera's office earlier today where a knee aspiration perform by Dr. Higuera which yielded purulent fluid. Otherwise, patient denies fevers, chills, numbness or focal weakness. Now, POD#1 s/p Right knee washout and poly exchange

## 2021-08-13 NOTE — PHYSICAL THERAPY INITIAL EVALUATION ADULT - DID THE PATIENT HAVE SURGERY?
71yMale POD#1 s/p Right knee washout and poly exchange/yes 71y Male POD#1 s/p Right knee washout and poly exchange/yes

## 2021-08-13 NOTE — PHYSICAL THERAPY INITIAL EVALUATION ADULT - ADDITIONAL COMMENTS
Patient lives alone on 3rd floor walk up. Does not use any Assistive Devices at baseline. Owns RW and SC after original TKR.  Has home health aid 5 days a week/4 hours a day.

## 2021-08-13 NOTE — DISCHARGE NOTE PROVIDER - PROVIDER TOKENS
PROVIDER:[TOKEN:[[id number]],FOLLOWUP:[1 week]] PROVIDER:[TOKEN:[[id number]],FOLLOWUP:[1 week]],PROVIDER:[TOKEN:[[id number]],FOLLOWUP:[2 weeks]]

## 2021-08-13 NOTE — PROGRESS NOTE ADULT - SUBJECTIVE AND OBJECTIVE BOX
SUBJECTIVE: Pt seen and examined at bedside. Pt feeling well without major complaints. Knee pain is controlled at rest, feeling well. Denies any numbness/tingling.    Vital Signs Last 24 Hrs  T(C): 37.2 (13 Aug 2021 10:26), Max: 37.8 (13 Aug 2021 06:00)  T(F): 99 (13 Aug 2021 10:26), Max: 100.1 (13 Aug 2021 06:00)  HR: 83 (13 Aug 2021 13:25) (70 - 89)  BP: 141/72 (13 Aug 2021 13:25) (105/64 - 141/72)  BP(mean): --  RR: 16 (13 Aug 2021 10:26) (16 - 20)  SpO2: 97% (13 Aug 2021 13:25) (97% - 100%)    Physical Exam:  General: Pt Alert and oriented, NAD  Right lower extremity ace wrap, Cryocuff DSG with knee immoblizer C/D/I  Pulses: +2 pedal pulses wwp toes,   Sensation: silt intact  Motor: EHL/FHL/TA/GS- firing    Assessment/Plan:  71yM s/p R TKA poly exchange/washout by Dr. SORIN Higuera on 08-12  - Afebrile today; continue to trend fever  - Pain Control- tylenol 975mg PO Q8h, Oxycodone 5mg  - DVT ppx: aspirin 81mg PO BID  - PT, WBS: WBAT RLE with knee immobilizer x6 weeks  - Medicine Recs  - ID Recs - Cefazolin 2g Q8 IV + Rifampin 600 PO Q24 until 9/22, followed by chronic PO suppressive therapy with Cephalexin  - F/u surveillance BCx  - TTE neg for vegetations  - bowel regimen, IS use, PPT  - moniter drain output, discontinue only after less than 30cc for 24 hour shift x2 days with PT  - Dispo: [facility] pending auth and acceptance, medical clearance  - PICC line Monday 8/16    Chasity Villalpando, PGY-2  Orthopedic Surgery

## 2021-08-13 NOTE — PHYSICAL THERAPY INITIAL EVALUATION ADULT - MODALITIES TREATMENT COMMENTS
Supine, bilateral LEs: glute sets, quad sets, left Lower Extremity heel slides, ankle pumps (all through full range, 1 set, 10 reps). Patient tolerated therex well. Educated patient to perform therex regimen 3-5x/day, patient verbalized understanding; written handout provided.

## 2021-08-13 NOTE — DISCHARGE NOTE PROVIDER - NSDCFUADDINST_GEN_ALL_CORE_FT
Weight bear as tolerated with assistive device.  No strenuous activity, heavy lifting, driving or returning to work until cleared by MD.  You may shower - dressing is water-resistant, no soaking in bathtubs.  Remove dressing after post op day 5-7, then leave incision open to air. Keep incision clean and dry.  Try to have regular bowel movements, take stool softener or laxative if necessary.  Swelling may travel all the way down leg to foot, this is normal and will subside in a few weeks.  Call to schedule an appt with Dr. TSANG for follow up, if you have staples or sutures they will be removed in office.  Contact your doctor if you experience: fever greater than 101.5, chills, chest pain, difficulty breathing, redness or excessive drainage around the incision, other concerns.  Follow up with your primary care provider.   Weight bear as tolerated with assistive device.  No strenuous activity, heavy lifting, driving or returning to work until cleared by MD.  You may shower - dressing is water-resistant, no soaking in bathtubs.  Remove dressing after post op day 5-7, then leave incision open to air. Keep incision clean and dry.  Try to have regular bowel movements, take stool softener or laxative if necessary.  Swelling may travel all the way down leg to foot, this is normal and will subside in a few weeks.  Call to schedule an appt with Dr. TSANG for follow up, if you have staples or sutures they will be removed in office.  Contact your doctor if you experience: fever greater than 101.5, chills, chest pain, difficulty breathing, redness or excessive drainage around the incision, other concerns.  Follow up with your primary care provider.  We were unable to obtain a dental consult in the hospital for a mouthguard. Skilled rehab facilities have on call dental services, we recommend the facility consult dental services for a mouth guard.    Weight bear as tolerated with assistive device.  No strenuous activity, heavy lifting, driving, exercising or returning to work until cleared by MD.  You may shower - dressing is water-resistant, no soaking in bathtubs.  Remove dressing after post op day 5-7, then leave incision open to air. Keep incision clean and dry.  Try to have regular bowel movements, take stool softener or laxative if necessary.  Swelling may travel all the way down leg to foot, this is normal and will subside in a few weeks.  Call to schedule an appt with Dr. TSANG for follow up, if you have staples or sutures they will be removed in office.  Contact your doctor if you experience: fever greater than 101.5, chills, chest pain, difficulty breathing, redness or excessive drainage around the incision, other concerns.  Follow up with your primary care provider.  We were unable to obtain a dental consult in the hospital for a mouthguard. Skilled rehab facilities have on call dental services, we recommend the facility consult dental services for a mouth guard.   Your in hospital TTE was negative.   Cefazolin 2grams IV Q8h   Rifampin 600mg PO Q24h for 6 week course  until 9-  weekly cbc, cmp, esr, CRP to be faxed to Dr. Gagnon [phone]  Implement picc care daily.   Flush with normal saline before antibiotics and after antibiotics. You have a knee infection with staph aureus. The recommended treatment plan is Ancef 2g IV every 8 hours for 6 weeks (through 9/22/21), and rifampin 600mg orally once daily through 9/22/21. Weekly labs including CBC, CMP, ESR, CRP should be drawn and results faxed to ID Dr. Falcon at [phone].    Additionally, BMP should be drawn while at rehab to assess sodium levels remain improved. Your rivastigmine,  pilocarpine, and klonopin were discontinued due to dry mouth. You should have a follow-up outpatient with your primary care doctor to assess your need for these medications long-term.    Weight bear as tolerated with assistive device and in knee immobilizer. Be sure to keep knee in immobilizer whenever out of bed. Knee immobilizer may be removed when in bed only.     No strenuous activity, heavy lifting, driving, exercising or returning to work until cleared by MD.  You may shower - dressing is water-resistant, no soaking in bathtubs.  Remove dressing after 7 days, then leave incision open to air. Keep incision clean and dry.  Try to have regular bowel movements, take stool softener or laxative if necessary.  Swelling may travel all the way down leg to foot, this is normal and will subside in a few weeks.  Call to schedule an appt with Dr. TSANG for follow up, if you have staples or sutures they will be removed in office.  Contact your doctor if you experience: fever greater than 101.5, chills, chest pain, difficulty breathing, redness or excessive drainage around the incision, other concerns.  Follow up with your primary care provider.    We were unable to obtain a dental consult in the hospital for a mouthguard you requested. Skilled rehab facilities have on call dental services. We recommend the facility consult dental services for a mouth guard.

## 2021-08-13 NOTE — DISCHARGE NOTE PROVIDER - NSDCCPCAREPLAN_GEN_ALL_CORE_FT
PRINCIPAL DISCHARGE DIAGNOSIS  Diagnosis: Infection of knee  Assessment and Plan of Treatment: s/p Right knee washout and poly exchange

## 2021-08-13 NOTE — PHYSICAL THERAPY INITIAL EVALUATION ADULT - GENERAL OBSERVATIONS, REHAB EVAL
Patient received semi-harper in bed  in NAD on RA, +SCDs, +KI, +Telemetry, +PIV, +Dominguez. Cleared by XIOMARA Underwood. Agreeable to PT.

## 2021-08-13 NOTE — CONSULT NOTE ADULT - SUBJECTIVE AND OBJECTIVE BOX
HPI:  Orthopaedic Surgery H&P    Attending Physician: Dr. Higuera    CC: R knee pain/swelling    HPI:  71yMale with Alzheimer's disease s/p R TKA by Dr. Higuera on  who presents to the ED after worsening knee pain and swelling for the past 3 days. Patient denies any recent trauma to the knee. Patient visited Dr. Higuera's office earlier today where a knee aspiration perform by Dr. Higuera which yielded purulent fluid. Otherwise, patient denies fevers, chills, numbness or focal weakness.          (11 Aug 2021 19:49)      PAST MEDICAL & SURGICAL HISTORY:  Bipolar depression    Memory dysfunction  alzheimer    HLD (hyperlipidemia)    Surgery, elective  Mitral valve repair 2017          REVIEW OF SYSTEMS:    General:	 no weakness; no fevers, no chills  Skin/Breast: no rash  Respiratory and Thorax: no SOB, no cough  Cardiovascular:	No chest pain  Gastrointestinal:	 no nausea, vomiting , diarrhea  Genitourinary:	no dysuria, no difficulty urinating, no hematuria  Musculoskeletal:	no weakness, no joint swelling/pain  Neurological:	no focal weakness/numbness  Endocrine:	no polyuria, no polydipsia      ANTIBIOTICS:  MEDICATIONS  (STANDING):  acetaminophen   Tablet .. 975 milliGRAM(s) Oral every 8 hours  aspirin enteric coated 81 milliGRAM(s) Oral two times a day  atorvastatin 20 milliGRAM(s) Oral at bedtime  BUpivacaine liposome 1.3% Injectable (no eMAR) 20 milliLiter(s) Local Injection once  ceFAZolin   IVPB 2000 milliGRAM(s) IV Intermittent every 8 hours  chlorhexidine 2% Cloths 1 Application(s) Topical <User Schedule>  FLUoxetine 20 milliGRAM(s) Oral daily  lactated ringers. 1000 milliLiter(s) (120 mL/Hr) IV Continuous <Continuous>  povidone iodine 5% Nasal Swab 1 Application(s) Both Nostrils once  rifAMPin 600 milliGRAM(s) Oral daily  rivastigmine patch  9.5 mG/24 Hr(s) 1 Patch Transdermal every 24 hours  sertraline 125 milliGRAM(s) Oral daily    MEDICATIONS  (PRN):  HYDROmorphone  Injectable 0.5 milliGRAM(s) IV Push every 4 hours PRN Breakthrough pain  oxyCODONE    IR 5 milliGRAM(s) Oral every 4 hours PRN Moderate Pain (4 - 6)  oxyCODONE    IR 10 milliGRAM(s) Oral every 4 hours PRN Severe Pain (7 - 10)      Allergies    No Known Allergies    Intolerances        SOCIAL HISTORY:    FAMILY HISTORY:      Vital Signs Last 24 Hrs  T(C): 37.8 (13 Aug 2021 06:00), Max: 37.9 (12 Aug 2021 21:20)  T(F): 100.1 (13 Aug 2021 06:00), Max: 100.3 (12 Aug 2021 21:20)  HR: 72 (13 Aug 2021 06:00) (70 - 95)  BP: 132/76 (13 Aug 2021 06:00) (116/69 - 145/88)  BP(mean): 94 (12 Aug 2021 17:53) (81 - 94)  RR: 20 (13 Aug 2021 06:00) (16 - 22)  SpO2: 98% (13 Aug 2021 06:00) (97% - 100%)    PHYSICAL EXAM:  Constitutional:Well-developed, well nourished  Eyes:DANIELLE, EOMI  Ear/Nose/Throat: no oral lesion, no sinus tenderness on percussion	  Neck:no JVD, no lymphadenopathy, supple  Respiratory: CTA oscar  Cardiovascular: S1S2 RRR, no murmurs  Gastrointestinal:soft, (+) BS, no HSM  Extremities: R knee brace  Vascular: DP Pulse:	right normal; left normal            LABS:                        9.2    7.39  )-----------( 215      ( 13 Aug 2021 06:09 )             28.0     08-13    135  |  103  |  11  ----------------------------<  131<H>  3.6   |  24  |  0.73    Ca    8.9      13 Aug 2021 06:09    TPro  6.9  /  Alb  3.8  /  TBili  0.8  /  DBili  x   /  AST  14  /  ALT  10  /  AlkPhos  89  08-11    PT/INR - ( 11 Aug 2021 16:41 )   PT: 12.5 sec;   INR: 1.04          PTT - ( 11 Aug 2021 16:41 )  PTT:30.4 sec  Urinalysis Basic - ( 11 Aug 2021 22:36 )    Color: Yellow / Appearance: Clear / S.020 / pH: x  Gluc: x / Ketone: NEGATIVE  / Bili: Negative / Urobili: 2.0 E.U./dL   Blood: x / Protein: NEGATIVE mg/dL / Nitrite: NEGATIVE   Leuk Esterase: NEGATIVE / RBC: < 5 /HPF / WBC < 5 /HPF   Sq Epi: x / Non Sq Epi: 0-5 /HPF / Bacteria: Present /HPF        MICROBIOLOGY: Culture - Blood (21 @ 17:24)    Specimen Source: .Blood left arm    Culture Results:   No growth at 12 hours    Culture - Body Fluid with Gram Stain (21 @ 18:16)    Gram Stain:   Rare to few Gram positive cocci in pairs  Numerous white blood cells    -  Cefazolin: S <=4    -  Clindamycin: R <=0.25 This isolate is presumed to be clindamycin resistant based on detection of inducible resistance. Clindamycin may still be effective in some patients.    -  Erythromycin: R >4    -  Linezolid: S 2    -  Oxacillin: S <=0.25    -  RIF- Rifampin: S <=1 Should not be used as monotherapy    -  Trimethoprim/Sulfamethoxazole: S <=0.5/9.5    -  Vancomycin: S 2    Specimen Source: Knee Right Knee Aspirate    Culture Results:   Numerous Staphylococcus aureus    Organism Identification: Staphylococcus aureus    Organism: Staphylococcus aureus    Method Type: RENAY      RADIOLOGY & ADDITIONAL STUDIES: reviewed

## 2021-08-13 NOTE — PROGRESS NOTE ADULT - SUBJECTIVE AND OBJECTIVE BOX
Ortho Note    Subjective:  Pt comfortable without complaints, pain controlled with current pain medication regimen. Patient Alert to self, able to express needs.  Patient with right knee immobilizer and x1 krysta drain.    Denies CP, SOB, N/V, numbness/tingling       Vital Signs Last 24 Hrs  T(C): --  T(F): --  HR: --  BP: --  BP(mean): --  RR: --  SpO2: --  AVSS    Objective:    Physical Exam:  General: Pt Alert and oriented, NAD  Right knee DSG C/D/I x1 krysta drain  Pulses: +2 pedal pulses, wwp toes, cap refill less than 3 seconds  Sensation: silt intact  Motor: EHL/FHL/TA/GS- firing        Plan of Care:  A/P: 71yMale POD#1 s/p Right knee washout and poly exchange  - tmax 100.3, wbcs 7.39, continue to trend, continue abxs  - Pain Control- tylenol 975mg PO Q8h, Oxycodone 5mg  - DVT ppx: aspirin 81mg PO BID  - PT, WBS: WBAT RLE with knee immobilizer x6 weeks  - Appreciate Medicine Recs  - Appreciate ID Recs  - bowel regimen, IS use, PPT  - moniter drain output, discontinue only after less than 30cc for 24 hour shift x2 days with PT  -Dispo: [facility] pending auth and acceptance, medical clearance, PICC line    Ortho Pager [phone]

## 2021-08-13 NOTE — CONSULT NOTE ADULT - ASSESSMENT
70 yo male s/p R TKA 7/9/21 with course c/b fever, R knee pain and swelling, found to have R knee PJI s/p DAIR 8/12/21 and associated MSSA BSI.   - f/u surveillance bcx 8/12  - TTE negative for vegetations; defer SUJATHA at this time  - continue cefazolin 2g IV q8h plus rifampin 600mg PO [id number] 6 week course through 9/22/21 followed by transition to chronic suppressive therapy (likely with PO cephalexin)  - anticipate PICC (single lumen) placement Monday    Discussed with primary team and Dr. Chirinos    ID Team 2

## 2021-08-13 NOTE — DISCHARGE NOTE PROVIDER - HOSPITAL COURSE
Admitted- 8-  Surgery- s/p Right knee  Dorita-op Antibiotics  Pain control  DVT prophylaxis  OOB/Physical Therapy  Medicine Consult  ID consult Admitted- 8-  Surgery- s/p Right knee  Dorita-op Antibiotics  Pain control  DVT prophylaxis  OOB/Physical Therapy  Medicine Consult  ID consult  TTE- negative Admitted- 8- with R knee periprosthetic joint infection  Surgery- s/p Right knee washout and polyexchange 8/12/21  Medicine consult for pre-op clearance and optimization/ co-management: Patient had episode of hyponatremia post-operatively (Na-130). Urine lytes were WNL, and sodium improved when labs were repeated later the same day. A repeat BMP should be done at rehab to assess continuing resolution of hyponatremia. Patient also continuously complained of dry mouth. His home rivastigmine patch was discontinued. He should follow-up with his primary care doctor outpatient.  ID consult- OR cultures growing staph aureus. Recommeded treatment plan : Ancef 2g IV every 8 hours for 6 weeks (through 9/22/21), with rifampin 600mg oral daily. Will likely need chronic suppressive therapy with keflex after IV course completed. Weekly CBC, CMP, ESR, CRP should be drawn and results faxed to ID Dr. Falcon at [phone]. A TTE was performed while admitted to assess for vegetations and negative.   Pain control  Physical therapy- Weight bearing as tolerated in knee immobilizer. Knee immobilizer may be removed while patient in bed. Knee immobilizer must be in place at all times when patient is out of bed.  DVT prophylaxis- Aspirin 81mg twice daily for 4 weeks post-op   Admitted- 8- with R knee periprosthetic joint infection  Surgery- s/p Right knee washout and polyexchange 8/12/21  Medicine consult for pre-op clearance and optimization/ co-management: Patient had episode of hyponatremia post-operatively (Na-130). Urine lytes were WNL, and sodium improved when labs were repeated later the same day. A repeat BMP should be done at rehab to assess continuing resolution of hyponatremia. Patient also continuously complained of dry mouth. Patient's home rivastigmine patch (9.5mg/24h), pilocarpine (5mg three times a day), and klonopin (2mg at bedtime) was discontinued. He should follow-up with his primary care doctor outpatient.  ID consult- OR cultures growing staph aureus. Recommeded treatment plan : Ancef 2g IV every 8 hours for 6 weeks (through 9/22/21), with rifampin 600mg oral daily. Will likely need chronic suppressive therapy with keflex after IV course completed. Weekly CBC, CMP, ESR, CRP should be drawn and results faxed to ID Dr. Falcon at [phone]. A TTE was performed while admitted to assess for vegetations and negative.   Pain control  Physical therapy- Weight bearing as tolerated in knee immobilizer. Knee immobilizer may be removed while patient in bed. Knee immobilizer must be in place at all times when patient is out of bed.  DVT prophylaxis- Aspirin 81mg twice daily for 4 weeks post-op

## 2021-08-13 NOTE — DISCHARGE NOTE PROVIDER - CARE PROVIDERS DIRECT ADDRESSES
,DirectAddress_Unknown ,DirectAddress_Unknown,[email or web address]

## 2021-08-14 LAB
-  CEFAZOLIN: SIGNIFICANT CHANGE UP
-  CLINDAMYCIN: SIGNIFICANT CHANGE UP
-  ERYTHROMYCIN: SIGNIFICANT CHANGE UP
-  LINEZOLID: SIGNIFICANT CHANGE UP
-  OXACILLIN: SIGNIFICANT CHANGE UP
-  RIFAMPIN: SIGNIFICANT CHANGE UP
-  TRIMETHOPRIM/SULFAMETHOXAZOLE: SIGNIFICANT CHANGE UP
-  VANCOMYCIN: SIGNIFICANT CHANGE UP
ANION GAP SERPL CALC-SCNC: 9 MMOL/L — SIGNIFICANT CHANGE UP (ref 5–17)
BUN SERPL-MCNC: 17 MG/DL — SIGNIFICANT CHANGE UP (ref 7–23)
CALCIUM SERPL-MCNC: 8.5 MG/DL — SIGNIFICANT CHANGE UP (ref 8.4–10.5)
CHLORIDE SERPL-SCNC: 102 MMOL/L — SIGNIFICANT CHANGE UP (ref 96–108)
CO2 SERPL-SCNC: 25 MMOL/L — SIGNIFICANT CHANGE UP (ref 22–31)
CREAT SERPL-MCNC: 0.79 MG/DL — SIGNIFICANT CHANGE UP (ref 0.5–1.3)
CULTURE RESULTS: SIGNIFICANT CHANGE UP
GLUCOSE SERPL-MCNC: 116 MG/DL — HIGH (ref 70–99)
GRAM STN FLD: SIGNIFICANT CHANGE UP
HCT VFR BLD CALC: 25.7 % — LOW (ref 39–50)
HGB BLD-MCNC: 8.3 G/DL — LOW (ref 13–17)
MCHC RBC-ENTMCNC: 30.3 PG — SIGNIFICANT CHANGE UP (ref 27–34)
MCHC RBC-ENTMCNC: 32.3 GM/DL — SIGNIFICANT CHANGE UP (ref 32–36)
MCV RBC AUTO: 93.8 FL — SIGNIFICANT CHANGE UP (ref 80–100)
METHOD TYPE: SIGNIFICANT CHANGE UP
NRBC # BLD: 0 /100 WBCS — SIGNIFICANT CHANGE UP (ref 0–0)
ORGANISM # SPEC MICROSCOPIC CNT: SIGNIFICANT CHANGE UP
PLATELET # BLD AUTO: 209 K/UL — SIGNIFICANT CHANGE UP (ref 150–400)
POTASSIUM SERPL-MCNC: 3.8 MMOL/L — SIGNIFICANT CHANGE UP (ref 3.5–5.3)
POTASSIUM SERPL-SCNC: 3.8 MMOL/L — SIGNIFICANT CHANGE UP (ref 3.5–5.3)
RBC # BLD: 2.74 M/UL — LOW (ref 4.2–5.8)
RBC # FLD: 13.3 % — SIGNIFICANT CHANGE UP (ref 10.3–14.5)
SODIUM SERPL-SCNC: 136 MMOL/L — SIGNIFICANT CHANGE UP (ref 135–145)
SPECIMEN SOURCE: SIGNIFICANT CHANGE UP
WBC # BLD: 6.34 K/UL — SIGNIFICANT CHANGE UP (ref 3.8–10.5)
WBC # FLD AUTO: 6.34 K/UL — SIGNIFICANT CHANGE UP (ref 3.8–10.5)

## 2021-08-14 RX ADMIN — Medication 81 MILLIGRAM(S): at 17:20

## 2021-08-14 RX ADMIN — CHLORHEXIDINE GLUCONATE 1 APPLICATION(S): 213 SOLUTION TOPICAL at 06:44

## 2021-08-14 RX ADMIN — Medication 975 MILLIGRAM(S): at 14:33

## 2021-08-14 RX ADMIN — OXYCODONE HYDROCHLORIDE 10 MILLIGRAM(S): 5 TABLET ORAL at 12:30

## 2021-08-14 RX ADMIN — SODIUM CHLORIDE 120 MILLILITER(S): 9 INJECTION, SOLUTION INTRAVENOUS at 09:16

## 2021-08-14 RX ADMIN — Medication 975 MILLIGRAM(S): at 22:08

## 2021-08-14 RX ADMIN — ATORVASTATIN CALCIUM 20 MILLIGRAM(S): 80 TABLET, FILM COATED ORAL at 22:09

## 2021-08-14 RX ADMIN — Medication 975 MILLIGRAM(S): at 23:08

## 2021-08-14 RX ADMIN — Medication 100 MILLIGRAM(S): at 09:16

## 2021-08-14 RX ADMIN — RIVASTIGMINE 1 PATCH: 4.6 PATCH, EXTENDED RELEASE TRANSDERMAL at 10:44

## 2021-08-14 RX ADMIN — Medication 20 MILLIGRAM(S): at 11:56

## 2021-08-14 RX ADMIN — Medication 975 MILLIGRAM(S): at 06:44

## 2021-08-14 RX ADMIN — Medication 100 MILLIGRAM(S): at 17:17

## 2021-08-14 RX ADMIN — Medication 100 MILLIGRAM(S): at 01:30

## 2021-08-14 RX ADMIN — Medication 975 MILLIGRAM(S): at 13:14

## 2021-08-14 RX ADMIN — OXYCODONE HYDROCHLORIDE 10 MILLIGRAM(S): 5 TABLET ORAL at 12:13

## 2021-08-14 RX ADMIN — SERTRALINE 125 MILLIGRAM(S): 25 TABLET, FILM COATED ORAL at 11:56

## 2021-08-14 RX ADMIN — OXYCODONE HYDROCHLORIDE 5 MILLIGRAM(S): 5 TABLET ORAL at 04:04

## 2021-08-14 RX ADMIN — Medication 81 MILLIGRAM(S): at 05:27

## 2021-08-14 RX ADMIN — RIVASTIGMINE 1 PATCH: 4.6 PATCH, EXTENDED RELEASE TRANSDERMAL at 06:45

## 2021-08-14 RX ADMIN — Medication 975 MILLIGRAM(S): at 05:27

## 2021-08-14 RX ADMIN — RIVASTIGMINE 1 PATCH: 4.6 PATCH, EXTENDED RELEASE TRANSDERMAL at 17:47

## 2021-08-14 RX ADMIN — OXYCODONE HYDROCHLORIDE 5 MILLIGRAM(S): 5 TABLET ORAL at 05:00

## 2021-08-15 LAB
ANION GAP SERPL CALC-SCNC: 10 MMOL/L — SIGNIFICANT CHANGE UP (ref 5–17)
BUN SERPL-MCNC: 12 MG/DL — SIGNIFICANT CHANGE UP (ref 7–23)
CALCIUM SERPL-MCNC: 8.8 MG/DL — SIGNIFICANT CHANGE UP (ref 8.4–10.5)
CHLORIDE SERPL-SCNC: 102 MMOL/L — SIGNIFICANT CHANGE UP (ref 96–108)
CO2 SERPL-SCNC: 25 MMOL/L — SIGNIFICANT CHANGE UP (ref 22–31)
CREAT SERPL-MCNC: 0.78 MG/DL — SIGNIFICANT CHANGE UP (ref 0.5–1.3)
GLUCOSE SERPL-MCNC: 111 MG/DL — HIGH (ref 70–99)
HCT VFR BLD CALC: 27 % — LOW (ref 39–50)
HGB BLD-MCNC: 9 G/DL — LOW (ref 13–17)
MCHC RBC-ENTMCNC: 30 PG — SIGNIFICANT CHANGE UP (ref 27–34)
MCHC RBC-ENTMCNC: 33.3 GM/DL — SIGNIFICANT CHANGE UP (ref 32–36)
MCV RBC AUTO: 90 FL — SIGNIFICANT CHANGE UP (ref 80–100)
NRBC # BLD: 0 /100 WBCS — SIGNIFICANT CHANGE UP (ref 0–0)
PLATELET # BLD AUTO: 257 K/UL — SIGNIFICANT CHANGE UP (ref 150–400)
POTASSIUM SERPL-MCNC: 3.7 MMOL/L — SIGNIFICANT CHANGE UP (ref 3.5–5.3)
POTASSIUM SERPL-SCNC: 3.7 MMOL/L — SIGNIFICANT CHANGE UP (ref 3.5–5.3)
RBC # BLD: 3 M/UL — LOW (ref 4.2–5.8)
RBC # FLD: 13.2 % — SIGNIFICANT CHANGE UP (ref 10.3–14.5)
SODIUM SERPL-SCNC: 137 MMOL/L — SIGNIFICANT CHANGE UP (ref 135–145)
WBC # BLD: 6.95 K/UL — SIGNIFICANT CHANGE UP (ref 3.8–10.5)
WBC # FLD AUTO: 6.95 K/UL — SIGNIFICANT CHANGE UP (ref 3.8–10.5)

## 2021-08-15 PROCEDURE — 99233 SBSQ HOSP IP/OBS HIGH 50: CPT

## 2021-08-15 RX ORDER — LANOLIN ALCOHOL/MO/W.PET/CERES
5 CREAM (GRAM) TOPICAL AT BEDTIME
Refills: 0 | Status: DISCONTINUED | OUTPATIENT
Start: 2021-08-15 | End: 2021-08-17

## 2021-08-15 RX ORDER — SERTRALINE 25 MG/1
100 TABLET, FILM COATED ORAL DAILY
Refills: 0 | Status: DISCONTINUED | OUTPATIENT
Start: 2021-08-15 | End: 2021-08-18

## 2021-08-15 RX ADMIN — Medication 100 MILLIGRAM(S): at 17:25

## 2021-08-15 RX ADMIN — Medication 975 MILLIGRAM(S): at 21:10

## 2021-08-15 RX ADMIN — Medication 975 MILLIGRAM(S): at 14:31

## 2021-08-15 RX ADMIN — Medication 100 MILLIGRAM(S): at 00:26

## 2021-08-15 RX ADMIN — RIVASTIGMINE 1 PATCH: 4.6 PATCH, EXTENDED RELEASE TRANSDERMAL at 10:35

## 2021-08-15 RX ADMIN — Medication 100 MILLIGRAM(S): at 08:49

## 2021-08-15 RX ADMIN — Medication 975 MILLIGRAM(S): at 13:27

## 2021-08-15 RX ADMIN — RIVASTIGMINE 1 PATCH: 4.6 PATCH, EXTENDED RELEASE TRANSDERMAL at 10:27

## 2021-08-15 RX ADMIN — OXYCODONE HYDROCHLORIDE 10 MILLIGRAM(S): 5 TABLET ORAL at 08:50

## 2021-08-15 RX ADMIN — OXYCODONE HYDROCHLORIDE 10 MILLIGRAM(S): 5 TABLET ORAL at 09:50

## 2021-08-15 RX ADMIN — ATORVASTATIN CALCIUM 20 MILLIGRAM(S): 80 TABLET, FILM COATED ORAL at 21:10

## 2021-08-15 RX ADMIN — CHLORHEXIDINE GLUCONATE 1 APPLICATION(S): 213 SOLUTION TOPICAL at 05:36

## 2021-08-15 RX ADMIN — RIVASTIGMINE 1 PATCH: 4.6 PATCH, EXTENDED RELEASE TRANSDERMAL at 06:01

## 2021-08-15 RX ADMIN — Medication 81 MILLIGRAM(S): at 17:25

## 2021-08-15 RX ADMIN — Medication 975 MILLIGRAM(S): at 22:10

## 2021-08-15 RX ADMIN — RIVASTIGMINE 1 PATCH: 4.6 PATCH, EXTENDED RELEASE TRANSDERMAL at 18:31

## 2021-08-15 NOTE — PROGRESS NOTE ADULT - SUBJECTIVE AND OBJECTIVE BOX
Ortho     Procedure: R TKA I&D, polyexchange   Procedure Date: 8/12  Surgeon: Oh     SUBJECTIVE: Pt seen and examined at bedside. Pt feeling well without major complaints. Knee pain is controlled at rest, feeling well. Denies any numbness/tingling.    Vital Signs Last 24 Hrs  T(C): 36.4 (14 Aug 2021 16:04), Max: 36.8 (14 Aug 2021 13:36)  T(F): 97.6 (14 Aug 2021 16:04), Max: 98.3 (14 Aug 2021 13:36)  HR: 74 (15 Aug 2021 05:31) (67 - 80)  BP: 123/82 (15 Aug 2021 05:31) (114/61 - 148/76)  BP(mean): 92 (14 Aug 2021 16:04) (82 - 92)  RR: 17 (15 Aug 2021 05:31) (16 - 18)  SpO2: 98% (15 Aug 2021 05:31) (92% - 100%)    Physical Exam:  General: Pt Alert and oriented, NAD  Right lower extremity ace wrap, Cryocuff DSG with knee immoblizer C/D/I. Robert drain intact, holding suction, ssg output   Pulses: 2+ DP    Sensation: silt intact sp/dp/t   Motor: EHL/FHL/TA/GS- firing    Assessment/Plan:  71yM s/p R TKA poly exchange/washout by Dr. SORIN Higuera on 08-12  - Afebrile - continue to monitor   - Pain Control  - DVT ppx: aspirin 81 BID   - PT, WBS: WBAT RLE with knee immobilizer x6 weeks  - Monitor STEWART output - remove only when less than 30 cc in 24 hr x 2 days once ambulatory   - Abx:  Cefazolin 2g Q8 IV + Rifampin 600 PO Q24 until 9/22, followed by chronic PO suppressive therapy with Cephalexin, appreciate recs   - F/u surveillance BCx - NGTD   - TTE neg for vegetations  - bowel regimen, IS use, PPT  - Dispo: [facility] pending auth and acceptance, medical clearance  - PICC line Monday 8/16

## 2021-08-16 LAB
ANION GAP SERPL CALC-SCNC: 8 MMOL/L — SIGNIFICANT CHANGE UP (ref 5–17)
BUN SERPL-MCNC: 13 MG/DL — SIGNIFICANT CHANGE UP (ref 7–23)
CALCIUM SERPL-MCNC: 9 MG/DL — SIGNIFICANT CHANGE UP (ref 8.4–10.5)
CHLORIDE SERPL-SCNC: 103 MMOL/L — SIGNIFICANT CHANGE UP (ref 96–108)
CO2 SERPL-SCNC: 25 MMOL/L — SIGNIFICANT CHANGE UP (ref 22–31)
CREAT SERPL-MCNC: 0.91 MG/DL — SIGNIFICANT CHANGE UP (ref 0.5–1.3)
CRP SERPL-MCNC: 91.7 MG/L — HIGH (ref 0–4)
CULTURE RESULTS: SIGNIFICANT CHANGE UP
CULTURE RESULTS: SIGNIFICANT CHANGE UP
ERYTHROCYTE [SEDIMENTATION RATE] IN BLOOD: 64 MM/HR — HIGH
GLUCOSE SERPL-MCNC: 105 MG/DL — HIGH (ref 70–99)
HCT VFR BLD CALC: 29 % — LOW (ref 39–50)
HGB BLD-MCNC: 9.4 G/DL — LOW (ref 13–17)
MCHC RBC-ENTMCNC: 30 PG — SIGNIFICANT CHANGE UP (ref 27–34)
MCHC RBC-ENTMCNC: 32.4 GM/DL — SIGNIFICANT CHANGE UP (ref 32–36)
MCV RBC AUTO: 92.7 FL — SIGNIFICANT CHANGE UP (ref 80–100)
NRBC # BLD: 0 /100 WBCS — SIGNIFICANT CHANGE UP (ref 0–0)
ORGANISM # SPEC MICROSCOPIC CNT: SIGNIFICANT CHANGE UP
PLATELET # BLD AUTO: 284 K/UL — SIGNIFICANT CHANGE UP (ref 150–400)
POTASSIUM SERPL-MCNC: 3.8 MMOL/L — SIGNIFICANT CHANGE UP (ref 3.5–5.3)
POTASSIUM SERPL-SCNC: 3.8 MMOL/L — SIGNIFICANT CHANGE UP (ref 3.5–5.3)
RBC # BLD: 3.13 M/UL — LOW (ref 4.2–5.8)
RBC # FLD: 13.5 % — SIGNIFICANT CHANGE UP (ref 10.3–14.5)
SODIUM SERPL-SCNC: 136 MMOL/L — SIGNIFICANT CHANGE UP (ref 135–145)
SPECIMEN SOURCE: SIGNIFICANT CHANGE UP
SPECIMEN SOURCE: SIGNIFICANT CHANGE UP
WBC # BLD: 6.18 K/UL — SIGNIFICANT CHANGE UP (ref 3.8–10.5)
WBC # FLD AUTO: 6.18 K/UL — SIGNIFICANT CHANGE UP (ref 3.8–10.5)

## 2021-08-16 PROCEDURE — 99233 SBSQ HOSP IP/OBS HIGH 50: CPT

## 2021-08-16 PROCEDURE — 36569 INSJ PICC 5 YR+ W/O IMAGING: CPT

## 2021-08-16 PROCEDURE — 99232 SBSQ HOSP IP/OBS MODERATE 35: CPT

## 2021-08-16 RX ORDER — CHLORHEXIDINE GLUCONATE 213 G/1000ML
1 SOLUTION TOPICAL
Refills: 0 | Status: DISCONTINUED | OUTPATIENT
Start: 2021-08-16 | End: 2021-08-18

## 2021-08-16 RX ORDER — SODIUM CHLORIDE 9 MG/ML
10 INJECTION INTRAMUSCULAR; INTRAVENOUS; SUBCUTANEOUS
Refills: 0 | Status: DISCONTINUED | OUTPATIENT
Start: 2021-08-16 | End: 2021-08-18

## 2021-08-16 RX ADMIN — RIVASTIGMINE 1 PATCH: 4.6 PATCH, EXTENDED RELEASE TRANSDERMAL at 07:26

## 2021-08-16 RX ADMIN — Medication 975 MILLIGRAM(S): at 05:14

## 2021-08-16 RX ADMIN — Medication 100 MILLIGRAM(S): at 00:25

## 2021-08-16 RX ADMIN — Medication 975 MILLIGRAM(S): at 14:54

## 2021-08-16 RX ADMIN — ATORVASTATIN CALCIUM 20 MILLIGRAM(S): 80 TABLET, FILM COATED ORAL at 21:40

## 2021-08-16 RX ADMIN — Medication 100 MILLIGRAM(S): at 18:28

## 2021-08-16 RX ADMIN — Medication 81 MILLIGRAM(S): at 18:27

## 2021-08-16 RX ADMIN — Medication 975 MILLIGRAM(S): at 06:14

## 2021-08-16 RX ADMIN — Medication 100 MILLIGRAM(S): at 09:31

## 2021-08-16 RX ADMIN — SERTRALINE 100 MILLIGRAM(S): 25 TABLET, FILM COATED ORAL at 12:14

## 2021-08-16 RX ADMIN — Medication 81 MILLIGRAM(S): at 05:14

## 2021-08-16 RX ADMIN — RIVASTIGMINE 1 PATCH: 4.6 PATCH, EXTENDED RELEASE TRANSDERMAL at 09:35

## 2021-08-16 RX ADMIN — RIVASTIGMINE 1 PATCH: 4.6 PATCH, EXTENDED RELEASE TRANSDERMAL at 09:31

## 2021-08-16 RX ADMIN — RIVASTIGMINE 1 PATCH: 4.6 PATCH, EXTENDED RELEASE TRANSDERMAL at 18:41

## 2021-08-16 NOTE — PROGRESS NOTE ADULT - SUBJECTIVE AND OBJECTIVE BOX
INTERVAL HPI/OVERNIGHT EVENTS: NUNU O/N    SUBJECTIVE: Patient seen and examined at bedside.   Had BM yesterday. States pain is better today. No numbness. Denies any fever, cough, chest pain, dyspnea, nausea, abd pain. Voiding wo dysuria.       OBJECTIVE:    VITAL SIGNS:  ICU Vital Signs Last 24 Hrs  T(C): 36.8 (16 Aug 2021 08:52), Max: 36.8 (16 Aug 2021 08:52)  T(F): 98.2 (16 Aug 2021 08:52), Max: 98.2 (16 Aug 2021 08:52)  HR: 61 (16 Aug 2021 08:52) (61 - 69)  BP: 133/78 (16 Aug 2021 08:52) (110/66 - 136/93)  BP(mean): --  ABP: --  ABP(mean): --  RR: 17 (16 Aug 2021 08:52) (16 - 18)  SpO2: 97% (16 Aug 2021 08:52) (95% - 100%)        08-15 @ 07:01  -  08-16 @ 07:00  --------------------------------------------------------  IN: 1980 mL / OUT: 3755 mL / NET: -1775 mL    08-16 @ 07:01  -  08-16 @ 13:03  --------------------------------------------------------  IN: 0 mL / OUT: 25 mL / NET: -25 mL      CAPILLARY BLOOD GLUCOSE          PHYSICAL EXAM:  GEN: Male in NAD on RA  HEENT: NC/AT, MMM  CV: RRR, nml S1S2, no murmurs  PULM: nml effort, CTAB  ABD: Soft, non-distended, NABS, non-tender  NEURO  A/O x3, moving all extremities, Sensation intact  Brace over RLE - STEWART drain present  PSYCH: Appropriate    MEDICATIONS:  MEDICATIONS  (STANDING):  acetaminophen   Tablet .. 975 milliGRAM(s) Oral every 8 hours  aspirin enteric coated 81 milliGRAM(s) Oral two times a day  atorvastatin 20 milliGRAM(s) Oral at bedtime  BUpivacaine liposome 1.3% Injectable (no eMAR) 20 milliLiter(s) Local Injection once  ceFAZolin   IVPB 2000 milliGRAM(s) IV Intermittent every 8 hours  chlorhexidine 2% Cloths 1 Application(s) Topical <User Schedule>  lactated ringers. 1000 milliLiter(s) (120 mL/Hr) IV Continuous <Continuous>  povidone iodine 5% Nasal Swab 1 Application(s) Both Nostrils once  rifAMPin 600 milliGRAM(s) Oral daily  rivastigmine patch  9.5 mG/24 Hr(s) 1 Patch Transdermal every 24 hours  sertraline 100 milliGRAM(s) Oral daily    MEDICATIONS  (PRN):  bisacodyl Suppository 10 milliGRAM(s) Rectal daily PRN Constipation  HYDROmorphone  Injectable 0.5 milliGRAM(s) IV Push every 4 hours PRN Breakthrough pain  melatonin 5 milliGRAM(s) Oral at bedtime PRN Sleep  oxyCODONE    IR 5 milliGRAM(s) Oral every 4 hours PRN Moderate Pain (4 - 6)  oxyCODONE    IR 10 milliGRAM(s) Oral every 4 hours PRN Severe Pain (7 - 10)  sodium chloride 0.9% lock flush 10 milliLiter(s) IV Push every 1 hour PRN Pre/post blood products, medications, blood draw, and to maintain line patency      ALLERGIES:  Allergies    No Known Allergies    Intolerances        LABS:                        9.4    6.18  )-----------( 284      ( 16 Aug 2021 08:23 )             29.0     08-16    136  |  103  |  13  ----------------------------<  105<H>  3.8   |  25  |  0.91    Ca    9.0      16 Aug 2021 09:02            RADIOLOGY & ADDITIONAL TESTS: Reviewed.

## 2021-08-16 NOTE — DIETITIAN INITIAL EVALUATION ADULT. - OTHER INFO
Pt admitted for worsening knee pain and swelling x3 days. Recent admission for Rt TKA on 7/09. Now with rt knee infection s/p I&D of rt knee with polyethylene liner exchange on 8/12. PICC line placement today (8/16) for IV abx. SW for d/c needs.   Unable to speak with pt this morning d/t PICC line being placed. Per RN pt with good po intake. Does report dry mouth x 1.5 months, but tries to eat moist foods.   GI: +BM 8/15, abd-soft  Skin: surgical incision noted

## 2021-08-16 NOTE — PROGRESS NOTE ADULT - ATTENDING COMMENTS
Seen by me this morning. Comfortable in bed, though verbalizes ongoing pain in the knee. Has been struggling to walk with PT.     Knee moderately swollen  Robert draining serosanguinous  Prevena intact, no underlying erythema    Plan is for PICC today. Drain removal as well. Cont current antibiotics as per ID. Will monitor for 24hrs and change dressing tomorrow. If all looks well then may be able to consider d/c to [facility] end of day tomorrow.

## 2021-08-16 NOTE — PROGRESS NOTE ADULT - SUBJECTIVE AND OBJECTIVE BOX
Ortho       SUBJECTIVE: Pt seen and examined at bedside. Pt feeling well without major complaints. Knee pain is controlled at rest, feeling well, does not have much else to add this morning. Denies any numbness/tingling.    Vital Signs Last 24 Hrs  T(C): 36.3 (15 Aug 2021 21:09), Max: 37.3 (15 Aug 2021 08:34)  T(F): 97.3 (15 Aug 2021 21:09), Max: 99.1 (15 Aug 2021 08:34)  HR: 61 (16 Aug 2021 00:21) (61 - 72)  BP: 121/71 (16 Aug 2021 00:21) (110/66 - 130/71)  BP(mean): --  RR: 18 (16 Aug 2021 00:21) (16 - 18)  SpO2: 99% (16 Aug 2021 00:21) (99% - 100%)    Physical Exam:  General: Pt Alert and oriented, NAD  Right lower extremity ace wrap, Cryocuff DSG with knee immoblizer C/D/I. Robert drain intact, holding suction, ssg output   Pulses: 2+ DP    Sensation: silt intact sp/dp/t   Motor: EHL/FHL/TA/GS- firing    Assessment/Plan:  71yM s/p R TKA poly exchange/washout by Dr. SORIN Higuera on 08-12  - Afebrile - continue to monitor   - Pain Control  - DVT ppx: aspirin 81 BID   - PT, WBS: WBAT RLE with knee immobilizer x6 weeks  - DC STEWART drain today, monitor for 24 hours after drain pull  - PICC line today 8/16  - Abx:  Cefazolin 2g Q8 IV + Rifampin 600 PO Q24 until 9/22, followed by chronic PO suppressive therapy with Cephalexin, appreciate recs   - F/u surveillance BCx - NGTD   - TTE neg for vegetations  - bowel regimen, IS use, PPT  - Dispo: Home PT, Home infusion services pending clearance    Chasity Villalpando, PGY-2  Orthopedic Surgery

## 2021-08-16 NOTE — PROGRESS NOTE ADULT - SUBJECTIVE AND OBJECTIVE BOX
Ortho Note      Subjective:  Pt comfortable without complaints, pain controlled with current pain medication. Patient Axox1-2 (self and location), reoriented to situation and surgery. RLE wrapped with knee immobilizer and ace wrap and x1 krysta drain.   Denies CP, SOB, N/V, numbness/tingling       Vital Signs Last 24 Hrs  T(C): 36.8 (08-16-21 @ 08:52), Max: 36.8 (08-16-21 @ 08:52)  T(F): 98.2 (08-16-21 @ 08:52), Max: 98.2 (08-16-21 @ 08:52)  HR: 61 (08-16-21 @ 08:52) (61 - 66)  BP: 133/78 (08-16-21 @ 08:52) (133/78 - 136/93)  BP(mean): --  RR: 17 (08-16-21 @ 08:52) (17 - 17)  SpO2: 97% (08-16-21 @ 08:52) (95% - 97%)  AVSS    Objective:    Physical Exam:  General: Pt Alert and oriented, NAD  Right knee immobolizer DSG C/D/I  Pulses: +2 pedal pulses, wwp toes, cap refill less than 3 seconds  Sensation: silt intact  Motor: EHL/FHL/TA/GS- firing        Plan of Care:  A/P: 71yMale POD# s/p   - Stable  - Pain Control  - DVT ppx:  - PT, WBS:     Ortho Pager [phone] Ortho Note      Subjective:  Pt comfortable without complaints, pain controlled with current pain medication. Patient Axox1-2 (self and location), reoriented to situation and surgery. RLE wrapped with knee immobilizer and ace wrap and x1 krysta drain.   Denies CP, SOB, N/V, numbness/tingling       Vital Signs Last 24 Hrs  T(C): 36.8 (08-16-21 @ 08:52), Max: 36.8 (08-16-21 @ 08:52)  T(F): 98.2 (08-16-21 @ 08:52), Max: 98.2 (08-16-21 @ 08:52)  HR: 61 (08-16-21 @ 08:52) (61 - 66)  BP: 133/78 (08-16-21 @ 08:52) (133/78 - 136/93)  BP(mean): --  RR: 17 (08-16-21 @ 08:52) (17 - 17)  SpO2: 97% (08-16-21 @ 08:52) (95% - 97%)  AVSS    Objective:    Physical Exam:  General: Pt Alert and oriented, NAD  Right knee immobolizer DSG C/D/I  Pulses: +2 pedal pulses, wwp toes, cap refill less than 3 seconds  Sensation: silt intact  Motor: EHL/FHL/TA/GS- firing        Plan of Care:  A/P: 71yMale POD#4 s/p Right Knee washout and poly exchange  - afebrile, nontoxic appearance continue to trend WBCS, ESR, CRP  - Pain Control- tylenol 975mg PO Q8h, Dilaudid 0.5mg Q4h prn breakthrough pain, Oxycodone 5-10mg PO Q4h prn moderate to severe pain  - DVT ppx: SCDS, aspirin 81mg PO BID  - PT, WBS: WBAT RLE  - bowel regimen, IS use  - d/c drain today.   - Appreciate medicine recs  - Appreciate ID recs  - Dispo- [facility] pending picc line placement, final ID recs and acceptance    Ortho Pager [phone]

## 2021-08-16 NOTE — PROGRESS NOTE ADULT - SUBJECTIVE AND OBJECTIVE BOX
INTERVAL HPI/OVERNIGHT EVENTS: NUNU. s/p PICC.    CONSTITUTIONAL:  Negative fever or chills, feels well, good appetite  EYES:  Negative  blurry vision or double vision  CARDIOVASCULAR:  Negative for chest pain or palpitations  RESPIRATORY:  Negative for cough, wheezing, or SOB   GASTROINTESTINAL:  Negative for nausea, vomiting, diarrhea, constipation, or abdominal pain  GENITOURINARY:  Negative frequency, urgency or dysuria  NEUROLOGIC:  No headache, confusion, dizziness, lightheadedness      ANTIBIOTICS/RELEVANT:    MEDICATIONS  (STANDING):  acetaminophen   Tablet .. 975 milliGRAM(s) Oral every 8 hours  aspirin enteric coated 81 milliGRAM(s) Oral two times a day  atorvastatin 20 milliGRAM(s) Oral at bedtime  BUpivacaine liposome 1.3% Injectable (no eMAR) 20 milliLiter(s) Local Injection once  ceFAZolin   IVPB 2000 milliGRAM(s) IV Intermittent every 8 hours  chlorhexidine 2% Cloths 1 Application(s) Topical <User Schedule>  lactated ringers. 1000 milliLiter(s) (120 mL/Hr) IV Continuous <Continuous>  povidone iodine 5% Nasal Swab 1 Application(s) Both Nostrils once  rifAMPin 600 milliGRAM(s) Oral daily  rivastigmine patch  9.5 mG/24 Hr(s) 1 Patch Transdermal every 24 hours  sertraline 100 milliGRAM(s) Oral daily    MEDICATIONS  (PRN):  bisacodyl Suppository 10 milliGRAM(s) Rectal daily PRN Constipation  HYDROmorphone  Injectable 0.5 milliGRAM(s) IV Push every 4 hours PRN Breakthrough pain  melatonin 5 milliGRAM(s) Oral at bedtime PRN Sleep  oxyCODONE    IR 5 milliGRAM(s) Oral every 4 hours PRN Moderate Pain (4 - 6)  oxyCODONE    IR 10 milliGRAM(s) Oral every 4 hours PRN Severe Pain (7 - 10)  sodium chloride 0.9% lock flush 10 milliLiter(s) IV Push every 1 hour PRN Pre/post blood products, medications, blood draw, and to maintain line patency        Vital Signs Last 24 Hrs  T(C): 37.1 (16 Aug 2021 13:42), Max: 37.1 (16 Aug 2021 13:42)  T(F): 98.7 (16 Aug 2021 13:42), Max: 98.7 (16 Aug 2021 13:42)  HR: 93 (16 Aug 2021 13:42) (61 - 93)  BP: 119/74 (16 Aug 2021 13:42) (119/74 - 136/93)  BP(mean): --  RR: 20 (16 Aug 2021 13:42) (16 - 20)  SpO2: 100% (16 Aug 2021 13:42) (95% - 100%)    PHYSICAL EXAM:  Constitutional:Well-developed, well nourished  Eyes:DANIELLE, EOMI  Ear/Nose/Throat: no oral lesion, no sinus tenderness on percussion	  Neck:no JVD, no lymphadenopathy, supple  Respiratory: CTA oscar  Cardiovascular: S1S2 RRR, no murmurs  Gastrointestinal:soft, (+) BS, no HSM  Extremities:no e/e/c  Vascular: DP Pulse:	right normal; left normal      LABS:                        9.4    6.18  )-----------( 284      ( 16 Aug 2021 08:23 )             29.0     08-16    136  |  103  |  13  ----------------------------<  105<H>  3.8   |  25  |  0.91    Ca    9.0      16 Aug 2021 09:02            MICROBIOLOGY: reviewed    RADIOLOGY & ADDITIONAL STUDIES: reviewed

## 2021-08-16 NOTE — CONSULT NOTE ADULT - SUBJECTIVE AND OBJECTIVE BOX
Vascular Access Service Consult Note    [facility]         Diagnosis: knee infection    Indications for Vascular Access (Check all that apply)  [ x ]  Antibiotic Therapy       Antibiotic Prescribed:   ceftriaxone x 6 weeks                                                                    [  ]  IV Hydration  [  ]  Total Parenteral Nutrition  [  ]  Chemotherapy  [  ]  Difficult Venous Access  [  ]  CVP monitoring  [  ]  Medications with high potential for tissue necrosis on extravasation  [  ]  Other    Screening (Check all that apply)  Previous Radiation to chest  [  ] Yes      [ x ]  No  Breast Cancer                          [  ] Left     [  ]  Right    [ x ]  No  Lymph Node Dissection         [  ] Left     [  ]  Right    [x  ]  No  Pacemaker or ICD                   [  ] Left     [  ]  Right    [ x ]  No  Upper Extremity DVT             [  ] Left     [  ]  Right    [ x ]  No  Chronic Kidney Disease         [  ]  Yes     [ x ]  No  Hemodialysis                           [  ]  Yes     [x  ]  No  AV Fistula/ Graft                     [  ]  Left    [  ]  Right    [ x ]  No  Temp>101F in past 24 H       [  ]  Yes     [x  ]  No  H/O PICC/Midline                   [  ]  Yes     [x  ]  No    Lab data:                        9.4    6.18  )-----------( 284      ( 16 Aug 2021 08:23 )             29.0     08-15    137  |  102  |  12  ----------------------------<  111<H>  3.7   |  25  |  0.78    Ca    8.8      15 Aug 2021 07:55                I have reviewed the chart, interviewed and examined the patient and determined that this patient:  [x  ] Is a candidate for a PICC line  [  ] Is a candidate for a Midline  [  ] Is not a candidate for vascular access device (reason)    Lumens:    [ x ] Single  [  ] Double

## 2021-08-17 LAB
ANION GAP SERPL CALC-SCNC: 9 MMOL/L — SIGNIFICANT CHANGE UP (ref 5–17)
BUN SERPL-MCNC: 16 MG/DL — SIGNIFICANT CHANGE UP (ref 7–23)
CALCIUM SERPL-MCNC: 9 MG/DL — SIGNIFICANT CHANGE UP (ref 8.4–10.5)
CHLORIDE SERPL-SCNC: 102 MMOL/L — SIGNIFICANT CHANGE UP (ref 96–108)
CO2 SERPL-SCNC: 23 MMOL/L — SIGNIFICANT CHANGE UP (ref 22–31)
CREAT SERPL-MCNC: 0.82 MG/DL — SIGNIFICANT CHANGE UP (ref 0.5–1.3)
CULTURE RESULTS: SIGNIFICANT CHANGE UP
GLUCOSE SERPL-MCNC: 99 MG/DL — SIGNIFICANT CHANGE UP (ref 70–99)
HCT VFR BLD CALC: 28.2 % — LOW (ref 39–50)
HGB BLD-MCNC: 9.2 G/DL — LOW (ref 13–17)
MCHC RBC-ENTMCNC: 29.8 PG — SIGNIFICANT CHANGE UP (ref 27–34)
MCHC RBC-ENTMCNC: 32.6 GM/DL — SIGNIFICANT CHANGE UP (ref 32–36)
MCV RBC AUTO: 91.3 FL — SIGNIFICANT CHANGE UP (ref 80–100)
NRBC # BLD: 0 /100 WBCS — SIGNIFICANT CHANGE UP (ref 0–0)
PLATELET # BLD AUTO: 339 K/UL — SIGNIFICANT CHANGE UP (ref 150–400)
POTASSIUM SERPL-MCNC: 3.6 MMOL/L — SIGNIFICANT CHANGE UP (ref 3.5–5.3)
POTASSIUM SERPL-SCNC: 3.6 MMOL/L — SIGNIFICANT CHANGE UP (ref 3.5–5.3)
RBC # BLD: 3.09 M/UL — LOW (ref 4.2–5.8)
RBC # FLD: 13.3 % — SIGNIFICANT CHANGE UP (ref 10.3–14.5)
SARS-COV-2 RNA SPEC QL NAA+PROBE: SIGNIFICANT CHANGE UP
SODIUM SERPL-SCNC: 134 MMOL/L — LOW (ref 135–145)
SPECIMEN SOURCE: SIGNIFICANT CHANGE UP
WBC # BLD: 8.05 K/UL — SIGNIFICANT CHANGE UP (ref 3.8–10.5)
WBC # FLD AUTO: 8.05 K/UL — SIGNIFICANT CHANGE UP (ref 3.8–10.5)

## 2021-08-17 PROCEDURE — 99233 SBSQ HOSP IP/OBS HIGH 50: CPT | Mod: GC

## 2021-08-17 RX ORDER — LANOLIN ALCOHOL/MO/W.PET/CERES
1 CREAM (GRAM) TOPICAL
Qty: 0 | Refills: 0 | DISCHARGE
Start: 2021-08-17

## 2021-08-17 RX ORDER — OXYCODONE HYDROCHLORIDE 5 MG/1
1 TABLET ORAL
Qty: 0 | Refills: 0 | DISCHARGE
Start: 2021-08-17

## 2021-08-17 RX ORDER — ACETAMINOPHEN 500 MG
3 TABLET ORAL
Qty: 0 | Refills: 0 | DISCHARGE
Start: 2021-08-17

## 2021-08-17 RX ORDER — SODIUM CHLORIDE 9 MG/ML
0 INJECTION INTRAMUSCULAR; INTRAVENOUS; SUBCUTANEOUS
Qty: 0 | Refills: 0 | DISCHARGE
Start: 2021-08-17

## 2021-08-17 RX ORDER — CEFAZOLIN SODIUM 1 G
0 VIAL (EA) INJECTION
Qty: 0 | Refills: 0 | DISCHARGE
Start: 2021-08-17

## 2021-08-17 RX ORDER — ASPIRIN/CALCIUM CARB/MAGNESIUM 324 MG
1 TABLET ORAL
Qty: 0 | Refills: 0 | DISCHARGE
Start: 2021-08-17

## 2021-08-17 RX ORDER — LANOLIN ALCOHOL/MO/W.PET/CERES
5 CREAM (GRAM) TOPICAL AT BEDTIME
Refills: 0 | Status: DISCONTINUED | OUTPATIENT
Start: 2021-08-17 | End: 2021-08-17

## 2021-08-17 RX ORDER — ZALEPLON 10 MG
5 CAPSULE ORAL AT BEDTIME
Refills: 0 | Status: DISCONTINUED | OUTPATIENT
Start: 2021-08-17 | End: 2021-08-18

## 2021-08-17 RX ORDER — ATORVASTATIN CALCIUM 80 MG/1
1 TABLET, FILM COATED ORAL
Qty: 0 | Refills: 0 | DISCHARGE
Start: 2021-08-17

## 2021-08-17 RX ADMIN — Medication 100 MILLIGRAM(S): at 11:06

## 2021-08-17 RX ADMIN — Medication 975 MILLIGRAM(S): at 06:13

## 2021-08-17 RX ADMIN — RIVASTIGMINE 1 PATCH: 4.6 PATCH, EXTENDED RELEASE TRANSDERMAL at 18:15

## 2021-08-17 RX ADMIN — CHLORHEXIDINE GLUCONATE 1 APPLICATION(S): 213 SOLUTION TOPICAL at 06:32

## 2021-08-17 RX ADMIN — Medication 100 MILLIGRAM(S): at 17:59

## 2021-08-17 RX ADMIN — RIVASTIGMINE 1 PATCH: 4.6 PATCH, EXTENDED RELEASE TRANSDERMAL at 06:48

## 2021-08-17 RX ADMIN — Medication 650 MILLIGRAM(S): at 21:30

## 2021-08-17 RX ADMIN — RIVASTIGMINE 1 PATCH: 4.6 PATCH, EXTENDED RELEASE TRANSDERMAL at 09:32

## 2021-08-17 RX ADMIN — Medication 5 MILLIGRAM(S): at 21:30

## 2021-08-17 RX ADMIN — Medication 975 MILLIGRAM(S): at 15:18

## 2021-08-17 RX ADMIN — Medication 975 MILLIGRAM(S): at 05:35

## 2021-08-17 RX ADMIN — Medication 81 MILLIGRAM(S): at 05:35

## 2021-08-17 RX ADMIN — Medication 81 MILLIGRAM(S): at 17:19

## 2021-08-17 RX ADMIN — ATORVASTATIN CALCIUM 20 MILLIGRAM(S): 80 TABLET, FILM COATED ORAL at 21:30

## 2021-08-17 RX ADMIN — Medication 975 MILLIGRAM(S): at 21:37

## 2021-08-17 RX ADMIN — SERTRALINE 100 MILLIGRAM(S): 25 TABLET, FILM COATED ORAL at 12:12

## 2021-08-17 RX ADMIN — Medication 100 MILLIGRAM(S): at 00:31

## 2021-08-17 RX ADMIN — Medication 975 MILLIGRAM(S): at 14:18

## 2021-08-17 NOTE — PROGRESS NOTE ADULT - SUBJECTIVE AND OBJECTIVE BOX
Ortho Note      Subjective:  Pt comfortable without complaints, pain controlled with current pain medication regimen. Patient [id number] today, able to express needs, patient reoriented by provider, goals of plan of care and dispo reviewed with patient.  Patient with right knee immobilizer, right knee prevena dressing intact. Patient expresses frustration with being unable to sleep for the past few nights at the hospital, reviewed medication regimen with patient. Discussed plan to start melatonin po this evening as a sleep aid. Patient verbalized understanding.     Denies CP, SOB, N/V, numbness/tingling       Vital Signs Last 24 Hrs  T(C): 36.3 (08-17-21 @ 09:17), Max: 36.4 (08-17-21 @ 05:46)  T(F): 97.3 (08-17-21 @ 09:17), Max: 97.6 (08-17-21 @ 05:46)  HR: 64 (08-17-21 @ 09:17) (64 - 68)  BP: 131/77 (08-17-21 @ 09:17) (131/77 - 138/78)  BP(mean): --  RR: 18 (08-17-21 @ 09:17) (16 - 18)  SpO2: 97% (08-17-21 @ 09:17) (97% - 98%)  AVSS      Objective:    Physical Exam:  General: Pt Alert and oriented, NAD  Right knee DSG C/D/I x1 krysta drain  Pulses: +2 pedal pulses, wwp toes, cap refill less than 3 seconds  Sensation: silt intact  Motor: EHL/FHL/TA/GS- firing        Plan of Care:  A/P: 71yMale POD#5 s/p Right knee washout and poly exchange  - afebrile, wbcs 8.05, continue to trend, continue abxs as per ID recs  - Pain Control- tylenol 975mg PO Q8h, Oxycodone 5-10mg PO Q4h prn moderate to severe pain   - DVT ppx: aspirin 81mg PO BID  - PT, WBS: WBAT RLE with knee immobilizer x6 weeks  - Appreciate Medicine Recs  - Appreciate ID Recs  - bowel regimen, IS use, PPT  - moniter prevena drain and output  - insomnia- melatonin 5mg PO prn sleep  -Dispo: [facility] pending auth and acceptance, medical clearance,   Ortho Pager [phone]

## 2021-08-17 NOTE — PROGRESS NOTE ADULT - SUBJECTIVE AND OBJECTIVE BOX
Ortho     SUBJECTIVE: Pt seen and examined at bedside. Pt feeling well without major complaints. Knee pain is controlled at rest, unchanged from prior. Denies any numbness/tingling.    Vital Signs Last 24 Hrs  T(C): 36.4 (17 Aug 2021 05:46), Max: 37.1 (16 Aug 2021 13:42)  T(F): 97.6 (17 Aug 2021 05:46), Max: 98.7 (16 Aug 2021 13:42)  HR: 68 (17 Aug 2021 05:46) (61 - 93)  BP: 138/78 (17 Aug 2021 05:46) (119/74 - 148/86)  BP(mean): --  RR: 16 (17 Aug 2021 05:46) (16 - 20)  SpO2: 98% (17 Aug 2021 05:46) (97% - 100%)    Physical Exam:  General: Pt Alert and oriented, NAD  Right lower extremity ace wrap, with knee immoblizer C/D/I. Prevena holding suction  Knee soft and compressible without effusion  Pulses: 2+ DP    Sensation: silt intact sp/dp/t   Motor: EHL/FHL/TA/GS- firing    Assessment/Plan:  71yM s/p R TKA poly exchange/washout by Dr. SORIN Higuera on 08-12  - Afebrile - continue to monitor   - Pain Control  - DVT ppx: aspirin 81 BID   - PT, WBS: WBAT RLE with knee immobilizer x6 weeks  - Drain removed 8/16, monitor for effusion  - s/p PICC line 8/16  - Abx:  Cefazolin 2g Q8 IV + Rifampin 600 PO Q24 until 9/22, followed by chronic PO suppressive therapy with Cephalexin, appreciate recs   - F/u surveillance BCx - NGTD   - TTE neg for vegetations  - bowel regimen, IS use, PPT  - Dispo: Changed to [facility], referrals sent, awaiting acceptance/auth    Chasity Villalpando, PGY-2  Orthopedic Surgery

## 2021-08-17 NOTE — PROGRESS NOTE ADULT - SUBJECTIVE AND OBJECTIVE BOX
Patient is a 71y old  Male who presents with a chief complaint of R Knee pain/swelling (17 Aug 2021 11:24)      INTERVAL HPI/OVERNIGHT EVENTS:  Patient was seen and examined at bedside. As per nurse, no o/n events, patient resting comfortably. Per patient, he states he had some difficulty sleeping and hoping for better sleep tonight. Notes that his pain is controlled, tolerating diet, with ROBF. Patient denies: fever, chills, dizziness, weakness, HA, Changes in vision, CP, palpitations, SOB, cough, N/V/D/C, dysuria, changes in bowel movements, LE numbness or tingling.      PAST MEDICAL & SURGICAL HISTORY:  Bipolar depression    Memory dysfunction  alzheimer    HLD (hyperlipidemia)    Surgery, elective  Mitral valve repair 2017      T(C): 36.3 (08-17-21 @ 09:17), Max: 36.7 (08-16-21 @ 17:50)  HR: 64 (08-17-21 @ 09:17) (62 - 68)  BP: 131/77 (08-17-21 @ 09:17) (121/73 - 148/86)  RR: 18 (08-17-21 @ 09:17) (16 - 18)  SpO2: 97% (08-17-21 @ 09:17) (97% - 99%)  Wt(kg): --  I&O's Summary    16 Aug 2021 07:01  -  17 Aug 2021 07:00  --------------------------------------------------------  IN: 150 mL / OUT: 2325 mL / NET: -2175 mL    17 Aug 2021 07:01  -  17 Aug 2021 13:42  --------------------------------------------------------  IN: 50 mL / OUT: 0 mL / NET: 50 mL        PHYSICAL EXAM:  GENERAL: NAD, laying comfortably in bed  HEAD:  Atraumatic, Normocephalic  EYES: EOMI, PERRLA, conjunctiva and sclera clear  ENMT: No tonsillar erythema, exudates, or enlargement; MMM  NECK: Supple, No JVD  NERVOUS SYSTEM:  Alert & Oriented X2-3 with reorientation, no focal deficits   CHEST/LUNG: Clear to percussion bilaterally; No rales, rhonchi, wheezing, or rubs  HEART: Regular rate and rhythm; No murmurs, rubs, or gallops  ABDOMEN: Soft, Nontender, Nondistended; Bowel sounds present  EXTREMITIES:  2+ Peripheral Pulses, No clubbing, cyanosis, or edema  LYMPH: No lymphadenopathy noted  SKIN: RLE with ace bandage and brace present        LABS:                        9.2    8.05  )-----------( 339      ( 17 Aug 2021 07:46 )             28.2     08-17    134<L>  |  102  |  16  ----------------------------<  99  3.6   |  23  |  0.82    Ca    9.0      17 Aug 2021 07:46          CAPILLARY BLOOD GLUCOSE                MEDICATIONS  (STANDING):  acetaminophen   Tablet .. 975 milliGRAM(s) Oral every 8 hours  aspirin enteric coated 81 milliGRAM(s) Oral two times a day  atorvastatin 20 milliGRAM(s) Oral at bedtime  BUpivacaine liposome 1.3% Injectable (no eMAR) 20 milliLiter(s) Local Injection once  ceFAZolin   IVPB 2000 milliGRAM(s) IV Intermittent every 8 hours  chlorhexidine 2% Cloths 1 Application(s) Topical <User Schedule>  lactated ringers. 1000 milliLiter(s) (120 mL/Hr) IV Continuous <Continuous>  povidone iodine 5% Nasal Swab 1 Application(s) Both Nostrils once  rifAMPin 600 milliGRAM(s) Oral daily  rivastigmine patch  9.5 mG/24 Hr(s) 1 Patch Transdermal every 24 hours  sertraline 100 milliGRAM(s) Oral daily    MEDICATIONS  (PRN):  bisacodyl Suppository 10 milliGRAM(s) Rectal daily PRN Constipation  HYDROmorphone  Injectable 0.5 milliGRAM(s) IV Push every 4 hours PRN Breakthrough pain  melatonin 5 milliGRAM(s) Oral at bedtime PRN Sleep  oxyCODONE    IR 5 milliGRAM(s) Oral every 4 hours PRN Moderate Pain (4 - 6)  oxyCODONE    IR 10 milliGRAM(s) Oral every 4 hours PRN Severe Pain (7 - 10)  sodium chloride 0.9% lock flush 10 milliLiter(s) IV Push every 1 hour PRN Pre/post blood products, medications, blood draw, and to maintain line patency      RADIOLOGY & ADDITIONAL TESTS:    Imaging Personally Reviewed:  [ ] YES  [ ] NO    Consultant(s) Notes Reviewed:  [ ] YES  [ ] NO    Care Discussed with Consultants/Other Providers [ ] YES  [ ] NO

## 2021-08-18 ENCOUNTER — TRANSCRIPTION ENCOUNTER (OUTPATIENT)
Age: 71
End: 2021-08-18

## 2021-08-18 VITALS
TEMPERATURE: 98 F | HEART RATE: 70 BPM | DIASTOLIC BLOOD PRESSURE: 68 MMHG | SYSTOLIC BLOOD PRESSURE: 107 MMHG | OXYGEN SATURATION: 100 % | RESPIRATION RATE: 18 BRPM

## 2021-08-18 LAB
ANION GAP SERPL CALC-SCNC: 8 MMOL/L — SIGNIFICANT CHANGE UP (ref 5–17)
ANION GAP SERPL CALC-SCNC: 9 MMOL/L — SIGNIFICANT CHANGE UP (ref 5–17)
APPEARANCE UR: CLEAR — SIGNIFICANT CHANGE UP
BILIRUB UR-MCNC: NEGATIVE — SIGNIFICANT CHANGE UP
BUN SERPL-MCNC: 15 MG/DL — SIGNIFICANT CHANGE UP (ref 7–23)
BUN SERPL-MCNC: 16 MG/DL — SIGNIFICANT CHANGE UP (ref 7–23)
CALCIUM SERPL-MCNC: 9.1 MG/DL — SIGNIFICANT CHANGE UP (ref 8.4–10.5)
CALCIUM SERPL-MCNC: 9.4 MG/DL — SIGNIFICANT CHANGE UP (ref 8.4–10.5)
CHLORIDE SERPL-SCNC: 101 MMOL/L — SIGNIFICANT CHANGE UP (ref 96–108)
CHLORIDE SERPL-SCNC: 104 MMOL/L — SIGNIFICANT CHANGE UP (ref 96–108)
CO2 SERPL-SCNC: 20 MMOL/L — LOW (ref 22–31)
CO2 SERPL-SCNC: 24 MMOL/L — SIGNIFICANT CHANGE UP (ref 22–31)
COLOR SPEC: YELLOW — SIGNIFICANT CHANGE UP
CREAT SERPL-MCNC: 0.89 MG/DL — SIGNIFICANT CHANGE UP (ref 0.5–1.3)
CREAT SERPL-MCNC: 0.93 MG/DL — SIGNIFICANT CHANGE UP (ref 0.5–1.3)
CRP SERPL-MCNC: 63.5 MG/L — HIGH (ref 0–4)
DIFF PNL FLD: NEGATIVE — SIGNIFICANT CHANGE UP
ERYTHROCYTE [SEDIMENTATION RATE] IN BLOOD: 70 MM/HR — HIGH
GLUCOSE SERPL-MCNC: 108 MG/DL — HIGH (ref 70–99)
GLUCOSE SERPL-MCNC: 108 MG/DL — HIGH (ref 70–99)
GLUCOSE UR QL: NEGATIVE — SIGNIFICANT CHANGE UP
HCT VFR BLD CALC: 30.1 % — LOW (ref 39–50)
HGB BLD-MCNC: 9.8 G/DL — LOW (ref 13–17)
KETONES UR-MCNC: NEGATIVE — SIGNIFICANT CHANGE UP
LEUKOCYTE ESTERASE UR-ACNC: NEGATIVE — SIGNIFICANT CHANGE UP
MCHC RBC-ENTMCNC: 29.8 PG — SIGNIFICANT CHANGE UP (ref 27–34)
MCHC RBC-ENTMCNC: 32.6 GM/DL — SIGNIFICANT CHANGE UP (ref 32–36)
MCV RBC AUTO: 91.5 FL — SIGNIFICANT CHANGE UP (ref 80–100)
NITRITE UR-MCNC: NEGATIVE — SIGNIFICANT CHANGE UP
NRBC # BLD: 0 /100 WBCS — SIGNIFICANT CHANGE UP (ref 0–0)
OSMOLALITY UR: 342 MOSM/KG — SIGNIFICANT CHANGE UP (ref 300–900)
PH UR: 6 — SIGNIFICANT CHANGE UP (ref 5–8)
PLATELET # BLD AUTO: 371 K/UL — SIGNIFICANT CHANGE UP (ref 150–400)
POTASSIUM SERPL-MCNC: 3.9 MMOL/L — SIGNIFICANT CHANGE UP (ref 3.5–5.3)
POTASSIUM SERPL-MCNC: 3.9 MMOL/L — SIGNIFICANT CHANGE UP (ref 3.5–5.3)
POTASSIUM SERPL-SCNC: 3.9 MMOL/L — SIGNIFICANT CHANGE UP (ref 3.5–5.3)
POTASSIUM SERPL-SCNC: 3.9 MMOL/L — SIGNIFICANT CHANGE UP (ref 3.5–5.3)
PROT UR-MCNC: NEGATIVE MG/DL — SIGNIFICANT CHANGE UP
RBC # BLD: 3.29 M/UL — LOW (ref 4.2–5.8)
RBC # FLD: 13.2 % — SIGNIFICANT CHANGE UP (ref 10.3–14.5)
SODIUM SERPL-SCNC: 130 MMOL/L — LOW (ref 135–145)
SODIUM SERPL-SCNC: 136 MMOL/L — SIGNIFICANT CHANGE UP (ref 135–145)
SODIUM UR-SCNC: 60 MMOL/L — SIGNIFICANT CHANGE UP
SP GR SPEC: 1.01 — SIGNIFICANT CHANGE UP (ref 1–1.03)
T4 FREE SERPL-MCNC: 1.25 NG/DL — SIGNIFICANT CHANGE UP (ref 0.93–1.7)
TSH SERPL-MCNC: 1.67 UIU/ML — SIGNIFICANT CHANGE UP (ref 0.27–4.2)
UROBILINOGEN FLD QL: 0.2 E.U./DL — SIGNIFICANT CHANGE UP
WBC # BLD: 7.58 K/UL — SIGNIFICANT CHANGE UP (ref 3.8–10.5)
WBC # FLD AUTO: 7.58 K/UL — SIGNIFICANT CHANGE UP (ref 3.8–10.5)

## 2021-08-18 PROCEDURE — 87040 BLOOD CULTURE FOR BACTERIA: CPT

## 2021-08-18 PROCEDURE — 99285 EMERGENCY DEPT VISIT HI MDM: CPT

## 2021-08-18 PROCEDURE — 84300 ASSAY OF URINE SODIUM: CPT

## 2021-08-18 PROCEDURE — 99233 SBSQ HOSP IP/OBS HIGH 50: CPT

## 2021-08-18 PROCEDURE — 85027 COMPLETE CBC AUTOMATED: CPT

## 2021-08-18 PROCEDURE — 97162 PT EVAL MOD COMPLEX 30 MIN: CPT

## 2021-08-18 PROCEDURE — 87015 SPECIMEN INFECT AGNT CONCNTJ: CPT

## 2021-08-18 PROCEDURE — C1713: CPT

## 2021-08-18 PROCEDURE — 83935 ASSAY OF URINE OSMOLALITY: CPT

## 2021-08-18 PROCEDURE — 85610 PROTHROMBIN TIME: CPT

## 2021-08-18 PROCEDURE — 93307 TTE W/O DOPPLER COMPLETE: CPT

## 2021-08-18 PROCEDURE — 84439 ASSAY OF FREE THYROXINE: CPT

## 2021-08-18 PROCEDURE — 87075 CULTR BACTERIA EXCEPT BLOOD: CPT

## 2021-08-18 PROCEDURE — 85025 COMPLETE CBC W/AUTO DIFF WBC: CPT

## 2021-08-18 PROCEDURE — 86850 RBC ANTIBODY SCREEN: CPT

## 2021-08-18 PROCEDURE — 84443 ASSAY THYROID STIM HORMONE: CPT

## 2021-08-18 PROCEDURE — 93005 ELECTROCARDIOGRAM TRACING: CPT

## 2021-08-18 PROCEDURE — 80053 COMPREHEN METABOLIC PANEL: CPT

## 2021-08-18 PROCEDURE — 36415 COLL VENOUS BLD VENIPUNCTURE: CPT

## 2021-08-18 PROCEDURE — 36573 INSJ PICC RS&I 5 YR+: CPT

## 2021-08-18 PROCEDURE — 86900 BLOOD TYPING SEROLOGIC ABO: CPT

## 2021-08-18 PROCEDURE — 85652 RBC SED RATE AUTOMATED: CPT

## 2021-08-18 PROCEDURE — 81001 URINALYSIS AUTO W/SCOPE: CPT

## 2021-08-18 PROCEDURE — 73560 X-RAY EXAM OF KNEE 1 OR 2: CPT

## 2021-08-18 PROCEDURE — 80048 BASIC METABOLIC PNL TOTAL CA: CPT

## 2021-08-18 PROCEDURE — 97116 GAIT TRAINING THERAPY: CPT

## 2021-08-18 PROCEDURE — 87206 SMEAR FLUORESCENT/ACID STAI: CPT

## 2021-08-18 PROCEDURE — 87186 SC STD MICRODIL/AGAR DIL: CPT

## 2021-08-18 PROCEDURE — 87205 SMEAR GRAM STAIN: CPT

## 2021-08-18 PROCEDURE — 87150 DNA/RNA AMPLIFIED PROBE: CPT

## 2021-08-18 PROCEDURE — U0003: CPT

## 2021-08-18 PROCEDURE — 81003 URINALYSIS AUTO W/O SCOPE: CPT

## 2021-08-18 PROCEDURE — 71045 X-RAY EXAM CHEST 1 VIEW: CPT

## 2021-08-18 PROCEDURE — 87635 SARS-COV-2 COVID-19 AMP PRB: CPT

## 2021-08-18 PROCEDURE — 86901 BLOOD TYPING SEROLOGIC RH(D): CPT

## 2021-08-18 PROCEDURE — 89060 EXAM SYNOVIAL FLUID CRYSTALS: CPT

## 2021-08-18 PROCEDURE — U0005: CPT

## 2021-08-18 PROCEDURE — 86769 SARS-COV-2 COVID-19 ANTIBODY: CPT

## 2021-08-18 PROCEDURE — 97530 THERAPEUTIC ACTIVITIES: CPT

## 2021-08-18 PROCEDURE — C1776: CPT

## 2021-08-18 PROCEDURE — 86140 C-REACTIVE PROTEIN: CPT

## 2021-08-18 PROCEDURE — 87070 CULTURE OTHR SPECIMN AEROBIC: CPT

## 2021-08-18 PROCEDURE — 87116 MYCOBACTERIA CULTURE: CPT

## 2021-08-18 PROCEDURE — 87102 FUNGUS ISOLATION CULTURE: CPT

## 2021-08-18 PROCEDURE — 85730 THROMBOPLASTIN TIME PARTIAL: CPT

## 2021-08-18 PROCEDURE — 89051 BODY FLUID CELL COUNT: CPT

## 2021-08-18 RX ORDER — SERTRALINE 25 MG/1
1 TABLET, FILM COATED ORAL
Qty: 0 | Refills: 0 | DISCHARGE

## 2021-08-18 RX ORDER — FLUOXETINE HCL 10 MG
0 CAPSULE ORAL
Qty: 0 | Refills: 0 | DISCHARGE

## 2021-08-18 RX ORDER — SERTRALINE 25 MG/1
1 TABLET, FILM COATED ORAL
Qty: 0 | Refills: 0 | DISCHARGE
Start: 2021-08-18

## 2021-08-18 RX ORDER — PILOCARPINE HCL
1 CRYSTALS MISCELLANEOUS
Qty: 0 | Refills: 0 | DISCHARGE

## 2021-08-18 RX ORDER — DIPHENHYDRAMINE HCL 50 MG
25 CAPSULE ORAL ONCE
Refills: 0 | Status: COMPLETED | OUTPATIENT
Start: 2021-08-18 | End: 2021-08-18

## 2021-08-18 RX ORDER — CLONAZEPAM 1 MG
1 TABLET ORAL
Qty: 0 | Refills: 0 | DISCHARGE

## 2021-08-18 RX ORDER — RIVASTIGMINE 4.6 MG/24H
1 PATCH, EXTENDED RELEASE TRANSDERMAL
Qty: 0 | Refills: 0 | DISCHARGE

## 2021-08-18 RX ADMIN — SERTRALINE 100 MILLIGRAM(S): 25 TABLET, FILM COATED ORAL at 12:24

## 2021-08-18 RX ADMIN — RIVASTIGMINE 1 PATCH: 4.6 PATCH, EXTENDED RELEASE TRANSDERMAL at 09:47

## 2021-08-18 RX ADMIN — Medication 975 MILLIGRAM(S): at 14:26

## 2021-08-18 RX ADMIN — Medication 975 MILLIGRAM(S): at 06:01

## 2021-08-18 RX ADMIN — Medication 81 MILLIGRAM(S): at 17:40

## 2021-08-18 RX ADMIN — Medication 81 MILLIGRAM(S): at 05:30

## 2021-08-18 RX ADMIN — Medication 100 MILLIGRAM(S): at 10:02

## 2021-08-18 RX ADMIN — Medication 100 MILLIGRAM(S): at 16:47

## 2021-08-18 RX ADMIN — CHLORHEXIDINE GLUCONATE 1 APPLICATION(S): 213 SOLUTION TOPICAL at 06:01

## 2021-08-18 RX ADMIN — Medication 975 MILLIGRAM(S): at 05:30

## 2021-08-18 RX ADMIN — Medication 25 MILLIGRAM(S): at 01:07

## 2021-08-18 RX ADMIN — Medication 100 MILLIGRAM(S): at 00:26

## 2021-08-18 RX ADMIN — Medication 975 MILLIGRAM(S): at 15:26

## 2021-08-18 RX ADMIN — RIVASTIGMINE 1 PATCH: 4.6 PATCH, EXTENDED RELEASE TRANSDERMAL at 06:01

## 2021-08-18 NOTE — PROGRESS NOTE ADULT - REASON FOR ADMISSION
R Knee pain/swelling

## 2021-08-18 NOTE — DISCHARGE NOTE NURSING/CASE MANAGEMENT/SOCIAL WORK - NSDPACMPNYOTHER_GEN_ALL_CORE
Spoke to brother Manuel as Joel is currently finishing his COVID-19 quarantine in [place] with his brother at this time. He reports that Joel is doing okay at home with no emesis. Appetite not at full baseline however he is drinking enough to stay hydrated. Attempted to call mother to update her as well and left a message however Manuel assures me that she has been updated on his condition and that he has been discharged from the hospital and doing much better. I educated Manuel about Joel's discharge medications and he expressed understanding.    Melinda Wilson MD  Pediatric Hospitalist  [facility]     EMT x2

## 2021-08-18 NOTE — PROGRESS NOTE ADULT - SUBJECTIVE AND OBJECTIVE BOX
Ortho Note    Pt comfortable without complaints, pain controlled  Denies CP, SOB, N/V, numbness/tingling     Vital Signs Last 24 Hrs  T(C): 37.4 (08-18-21 @ 08:12), Max: 37.4 (08-18-21 @ 08:12)  T(F): 99.4 (08-18-21 @ 08:12), Max: 99.4 (08-18-21 @ 08:12)  HR: 74 (08-18-21 @ 08:12) (72 - 74)  BP: 133/81 (08-18-21 @ 08:12) (124/79 - 133/81)  BP(mean): --  RR: 17 (08-18-21 @ 08:12) (16 - 17)  SpO2: 99% (08-18-21 @ 08:12) (99% - 99%)  AVSS    General: Pt Alert and oriented, NAD  DSG- prevena changed to aquacel; KI in place  Pulses: +2DP, WWP feet  Sensation: SILT BLE  Motor: 5/5 EHL/FHL/TA/GS                          9.8    7.58  )-----------( 371      ( 18 Aug 2021 05:51 )             30.1     08-18    130<L>  |  101  |  16  ----------------------------<  108<H>  3.9   |  20<L>  |  0.93    Ca    9.1      18 Aug 2021 05:51        A/P: 71yMale POD#6 s/p R knee washout and polyexchange  - CRP downtrending; continue to monitor CBC, CMP, ESR, CRP  - dry mouth- stopping rivastigmine as per medicine recs  - hyponatremia (na -130), fluid restriction added, f/u repeat BMP at 12pm, and UA and lytes  - Pain Control  - DVT ppx: ASA  - PT, WBS: WBAT in KI; KI can be removed while in bed but not while ambulating  - appreciate ID recs- continue ancef 2g q8h through 9/22/22, rifampin 600mg PO; likely chronic suppressive therapy with keflex after completion of IV abx; will follow-up with [facility]  - dispo: [facility] today pending improving hyponatremia    Ortho Pager [phone]

## 2021-08-18 NOTE — PROGRESS NOTE ADULT - PROVIDER SPECIALTY LIST ADULT
Hospitalist
Orthopedics
Hospitalist
Hospitalist
Orthopedics
Hospitalist
Hospitalist
Infectious Disease

## 2021-08-18 NOTE — PROVIDER CONTACT NOTE (OTHER) - SITUATION
Patient completed physical therapy session and monitor technician called to report 5 beats of V-Tach.patient in bed now,alert and denies chest pain, palpitations, or shortness of breath.

## 2021-08-18 NOTE — PROGRESS NOTE ADULT - SUBJECTIVE AND OBJECTIVE BOX
Ortho     SUBJECTIVE: Pt seen and examined at bedside. Pt feeling well without major complaints. Knee pain is controlled at rest, unchanged from prior. Denies any numbness/tingling. Excited to go to rehab today    Vital Signs Last 24 Hrs  T(C): 36.7 (17 Aug 2021 20:22), Max: 36.8 (17 Aug 2021 14:16)  T(F): 98.1 (17 Aug 2021 20:22), Max: 98.2 (17 Aug 2021 14:16)  HR: 64 (18 Aug 2021 00:28) (62 - 70)  BP: 118/87 (18 Aug 2021 00:28) (112/68 - 138/78)  BP(mean): --  RR: 17 (18 Aug 2021 00:28) (16 - 18)  SpO2: 99% (18 Aug 2021 00:28) (97% - 100%)    Physical Exam:  General: Pt Alert and oriented, NAD  Right lower extremity ace wrap, with knee immoblizer C/D/I. Prevena holding suction  Knee soft and compressible without effusion  Pulses: 2+ DP    Sensation: silt intact sp/dp/t   Motor: EHL/FHL/TA/GS- firing    Assessment/Plan:  71yM s/p R TKA poly exchange/washout by Dr. SORIN Higuera on 08-12  - Afebrile - continue to monitor   - Pain Control  - DVT ppx: aspirin 81 BID   - PT, WBS: WBAT RLE with knee immobilizer x6 weeks  - Drain removed 8/16, monitor for effusion  - s/p PICC line 8/16  - Abx:  Cefazolin 2g Q8 IV + Rifampin 600 PO Q24 until 9/22, followed by chronic PO suppressive therapy with Cephalexin, appreciate recs   - F/u surveillance BCx - NGTD   - TTE neg for vegetations  - bowel regimen, IS use, PPT  - Dispo: [facility], accepted and will DC today    Chasity Villalpando, PGY-2  Orthopedic Surgery

## 2021-08-18 NOTE — PROGRESS NOTE ADULT - ASSESSMENT
70 yo male s/p R TKA 7/9/21 with course c/b fever, R knee pain and swelling, found to have R knee PJI s/p DAIR 8/12/21 and associated MSSA BSI.   - f/u surveillance bcx 8/12  - TTE negative for vegetations; defer SUJATHA at this time  - continue cefazolin 2g IV q8h plus rifampin 600mg PO [id number] 6 week course through 9/22/21 followed by transition to chronic suppressive therapy (likely with PO cephalexin)  - weekly CBC, CMP, ESR, CRP to be faxed to [phone]    Will arrange post hospital f/u with me for next month    Discussed with primary team    ID Team 2
71M w h/o alzheimer's disease, HLD, Depression and anxiety, recently s/p R TKA w Dr. Higuera     #Sepsis due to Dorita-joint infection - R knee complicated by MSSA Bacteremia (+BCx 8/11). s/p R TKA 7. ID following  -IV Ancef 2g q8h w PO Rifampin 600mg daily  #Post-op state - pain controlled. On ASA BID for PPX per ortho. On bowel regimen and incentive spirometer  #Normocytic anemia - 9.2 from 10.5 pre-op. Asymptomatic. continue to monitor  #Hyponatremia - resolved  #Alzheimers - c/w rivastigmine 9.5mg patch daily  #HLD - chronic. c/w home atorva 20 qHS  #Depression/Anxiety - on fluoxetine 20mg daily and sertraline 125mg. Would confirm [facility] - pt states psychiatrist recently consolidated medications 1mo ago but pt does not remember specifics    Recs  -F/U PT recs  -F/U ID recs  -Mobilize as able  -Please confirm fluoxetine and sertraline [facility] and prescriber     DISPO: TBD pending progress. Anticipate 6wk IV antibiotics pending clearing BCx, PICC placement w home infusion vs [facility].
71M w h/o alzheimer's disease, HLD, Depression and anxiety, recently s/p R TKA w Dr. Higuera found to have andreea-joint infection growing MSSA – c/b MSSA bacteremia, s/p washout 8/12, TTE negative now on Rifampin 600mg daily and Ancef 2g q8h thru 9/22    #Sepsis due to Andreea-joint infection - R knee complicated by MSSA Bacteremia (+BCx 8/11). s/p R TKA 7. ID following  -IV Ancef 2g q8h w PO Rifampin 600mg daily  #Post-op state - pain controlled. On ASA BID for PPX per ortho. On bowel regimen and incentive spirometer  #Normocytic anemia - stable at 9 from 10.5 pre-op. Asymptomatic. continue to monitor  #Hyponatremia - resolved  #Alzheimers - c/w rivastigmine 9.5mg patch daily  #HLD - chronic. c/w home atorva 20 qHS  #Depression/Anxiety - Started back on sertraline 100mg daily: pt states psychiatrist recently consolidated medications 1mo ago but pt does not remember specifics  #Insomnia- with difficulty sleeping, recommend trial of melatonin, ensure lights out, quiet room at night    Recommendations  -s/p PICC placement, for IV abx to complete course  -dry mouth likely 2/2 rivastigmine (anti-cholinergic properties) which can also contribute to retention. May need to weigh risk/benefit if has urinary retention, has been urinating normally, monitor I/O  -F/U ID recs - BCx 8/12 NGTD. 6wk abx thru 9/22    PMD: Dr. Woods [phone]   DISPO: for [facility], pending discussion with step wife re which rehab
71M w h/o alzheimer's disease, HLD, Depression and anxiety, recently s/p R TKA w Dr. Higuera found to have andreea-prosthetic infection of R knee growing MSSA complicated by MSSA bacteremia, TTE negative, now on IV Ancef and PO Rifampin thru 9/22 - for dispo to [facility]    #Sepsis due to Andreea-joint infection - R knee complicated by MSSA Bacteremia (+BCx 8/11). s/p R TKA 7. ID following  -IV Ancef 2g q8h w PO Rifampin 600mg daily thru 9/22  #Post-op state - pain controlled. On ASA BID for PPX per ortho. On bowel regimen and incentive spirometer  #Normocytic anemia - stable at 9.8 from 9.2.  from 10.5 pre-op. Asymptomatic. continue to monitor  #Hyponatremia -worsened today to 130 from 145.  #Alzheimers - he is on rivastigmine 9.5mg patch daily for more than a year but pt in recent months having more dry mouth. He did note improvement in memory/vocabulary after taking this  #HLD - chronic. c/w home atorva 20 qHS  #Depression/Anxiety - chronic - sertraline 100mg daily d/w outpatient provider.     Recommendations  -UA, Fredy, UOsm ordered and reviewed - consistent with SIADH - which may be 2/2 pain. Started on fluid restriction 1.5L.  -dry mouth likely 2/2 rivastigmine (anti-cholinergic properties). Risks and benefits to stopping this were discussed with patient - counseled pt to continue monitoring memory and vocabulary after stopping. Informed Dr. Woods - PMD  -From medical standpoint, optimized for disposition    DISPO: [facility] for 6wk IV antibiotics pending clearing BCx, PICC placement w home infusion vs KALLI.
71M w h/o alzheimer's disease, HLD, Depression and anxiety, recently s/p R TKA w Dr. Higuera     #Sepsis due to Dorita-joint infection - R knee complicated by MSSA Bacteremia (+BCx 8/11). s/p R TKA 7. ID following  -IV Ancef 2g q8h w PO Rifampin 600mg daily  #Post-op state - pain controlled. On ASA BID for PPX per ortho. On bowel regimen and incentive spirometer  #Normocytic anemia - stable at 9 from 10.5 pre-op. Asymptomatic. continue to monitor  #Hyponatremia - resolved  #Alzheimers - c/w rivastigmine 9.5mg patch daily  #HLD - chronic. c/w home atorva 20 qHS  #Depression/Anxiety - see below: pt states psychiatrist recently consolidated medications 1mo ago but pt does not remember specifics    Recommendations  -Restart sertraline at 100mg daily - to confirm w Dr. Woods [phone] regarding dosage  -stop fluoxetine  -TOV today  -dry mouth likely 2/2 rivastigmine (anti-cholinergic properties) which can also contribute to retention. May need to weigh risk/benefit if has urinary retention    -F/U ID recs - BCx 8/12 NGTD.     DISPO: TBD pending progress. Anticipate 6wk IV antibiotics pending clearing BCx, PICC placement w home infusion vs KALLI.  
71M w h/o alzheimer's disease, HLD, Depression and anxiety, recently s/p R TKA w Dr. Higuera found to have andreea-joint infection growing MSSA – c/b MSSA bacteremia, s/p washout 8/12, TTE negative now on Rifampin 600mg daily and Ancef 2g q8h thru 9/22    #Sepsis due to Andreea-joint infection - R knee complicated by MSSA Bacteremia (+BCx 8/11). s/p R TKA 7. ID following  -IV Ancef 2g q8h w PO Rifampin 600mg daily  #Post-op state - pain controlled. On ASA BID for PPX per ortho. On bowel regimen and incentive spirometer  #Normocytic anemia - stable at 9 from 10.5 pre-op. Asymptomatic. continue to monitor  #Hyponatremia - resolved  #Alzheimers - c/w rivastigmine 9.5mg patch daily  #HLD - chronic. c/w home atorva 20 qHS  #Depression/Anxiety - Started back on sertraline 100mg daily: pt states psychiatrist recently consolidated medications 1mo ago but pt does not remember specifics    Recommendations  -PICC today  -dry mouth likely 2/2 rivastigmine (anti-cholinergic properties) which can also contribute to retention. May need to weigh risk/benefit if has urinary retention  -F/U ID recs - BCx 8/12 NGTD. 6wk abx thru 9/22    PMD: Dr. Woods [phone]   DISPO: [facility] pending referrals. Anticipate 6wk IV antibiotics pending clearing BCx, PICC placement w home infusion vs KALLI.

## 2021-08-18 NOTE — DISCHARGE NOTE NURSING/CASE MANAGEMENT/SOCIAL WORK - NSDPFAC_GEN_ALL_CORE
[facility]/ [address] [place] [address], [place] [address]/ Phone: [phone]

## 2021-08-18 NOTE — PROGRESS NOTE ADULT - SUBJECTIVE AND OBJECTIVE BOX
INTERVAL HPI/OVERNIGHT EVENTS: NUNU O/N    SUBJECTIVE: Patient seen and examined at bedside.   Pt continues to report dry mouth.   States he has been eating and drinking better. Pain is controlled. Denies any fever, chest pain, dyspnea, nausea, abd pain. +BM and is voiding without issues.     OBJECTIVE:    VITAL SIGNS:  ICU Vital Signs Last 24 Hrs  T(C): 37.2 (18 Aug 2021 12:54), Max: 37.4 (18 Aug 2021 08:12)  T(F): 99 (18 Aug 2021 12:54), Max: 99.4 (18 Aug 2021 08:12)  HR: 82 (18 Aug 2021 12:54) (62 - 82)  BP: 112/72 (18 Aug 2021 12:54) (112/68 - 133/81)  BP(mean): --  ABP: --  ABP(mean): --  RR: 18 (18 Aug 2021 12:54) (16 - 18)  SpO2: 98% (18 Aug 2021 12:54) (98% - 100%)        0817 @ 07:  -  18 @ 07:00  --------------------------------------------------------  IN: 150 mL / OUT: 2100 mL / NET: -1950 mL    0818 @ 07:01  -  18 @ 14:38  --------------------------------------------------------  IN: 240 mL / OUT: 1700 mL / NET: -1460 mL      CAPILLARY BLOOD GLUCOSE          PHYSICAL EXAM:  PHYSICAL EXAM:  GEN: Male in NAD on RA  HEENT: NC/AT, MMM  CV: RRR, nml S1S2, no murmurs  PULM: nml effort, CTAB  ABD: Soft, non-distended, NABS, non-tender  NEURO  A/O x3, moving all extremities, Sensation intact  Brace over RLE   PSYCH: Appropriate    MEDICATIONS:  MEDICATIONS  (STANDING):  acetaminophen   Tablet .. 975 milliGRAM(s) Oral every 8 hours  aspirin enteric coated 81 milliGRAM(s) Oral two times a day  atorvastatin 20 milliGRAM(s) Oral at bedtime  BUpivacaine liposome 1.3% Injectable (no eMAR) 20 milliLiter(s) Local Injection once  ceFAZolin   IVPB 2000 milliGRAM(s) IV Intermittent every 8 hours  chlorhexidine 2% Cloths 1 Application(s) Topical <User Schedule>  lactated ringers. 1000 milliLiter(s) (120 mL/Hr) IV Continuous <Continuous>  povidone iodine 5% Nasal Swab 1 Application(s) Both Nostrils once  rifAMPin 600 milliGRAM(s) Oral daily  sertraline 100 milliGRAM(s) Oral daily    MEDICATIONS  (PRN):  bisacodyl Suppository 10 milliGRAM(s) Rectal daily PRN Constipation  HYDROmorphone  Injectable 0.5 milliGRAM(s) IV Push every 4 hours PRN Breakthrough pain  oxyCODONE    IR 5 milliGRAM(s) Oral every 4 hours PRN Moderate Pain (4 - 6)  oxyCODONE    IR 10 milliGRAM(s) Oral every 4 hours PRN Severe Pain (7 - 10)  sodium chloride 0.9% lock flush 10 milliLiter(s) IV Push every 1 hour PRN Pre/post blood products, medications, blood draw, and to maintain line patency  zaleplon 5 milliGRAM(s) Oral at bedtime PRN Insomnia      ALLERGIES:  Allergies    No Known Allergies    Intolerances        LABS:                        9.8    7.58  )-----------( 371      ( 18 Aug 2021 05:51 )             30.1         136  |  104  |  15  ----------------------------<  108<H>  3.9   |  24  |  0.89    Ca    9.4      18 Aug 2021 12:37        Urinalysis Basic - ( 18 Aug 2021 10:32 )    Color: Yellow / Appearance: Clear / S.015 / pH: x  Gluc: x / Ketone: NEGATIVE  / Bili: Negative / Urobili: 0.2 E.U./dL   Blood: x / Protein: NEGATIVE mg/dL / Nitrite: NEGATIVE   Leuk Esterase: NEGATIVE / RBC: x / WBC x   Sq Epi: x / Non Sq Epi: x / Bacteria: x        RADIOLOGY & ADDITIONAL TESTS: Reviewed.

## 2021-08-18 NOTE — DISCHARGE NOTE NURSING/CASE MANAGEMENT/SOCIAL WORK - PATIENT PORTAL LINK FT
You can access the FollowMyHealth Patient Portal offered by [facility] by registering at the following website: [email or web address]. By joining [facility]’s FollowMyHealth portal, you will also be able to view your health information using other applications (apps) compatible with our system.

## 2021-08-18 NOTE — PROVIDER CONTACT NOTE (OTHER) - ACTION/TREATMENT ORDERED:
Angelica murrell notified of event.Vs taken bp 112/72,heart [id number] in NSR,O2 saturation 98% on room air,12 lead EKG done.

## 2021-08-26 DIAGNOSIS — F31.9 BIPOLAR DISORDER, UNSPECIFIED: ICD-10-CM

## 2021-08-26 DIAGNOSIS — T84.53XA INFECTION AND INFLAMMATORY REACTION DUE TO INTERNAL RIGHT KNEE PROSTHESIS, INITIAL ENCOUNTER: ICD-10-CM

## 2021-08-26 DIAGNOSIS — F32.9 MAJOR DEPRESSIVE DISORDER, SINGLE EPISODE, UNSPECIFIED: ICD-10-CM

## 2021-08-26 DIAGNOSIS — E78.5 HYPERLIPIDEMIA, UNSPECIFIED: ICD-10-CM

## 2021-08-26 DIAGNOSIS — G30.9 ALZHEIMER'S DISEASE, UNSPECIFIED: ICD-10-CM

## 2021-08-26 DIAGNOSIS — D64.9 ANEMIA, UNSPECIFIED: ICD-10-CM

## 2021-08-26 DIAGNOSIS — M25.561 PAIN IN RIGHT KNEE: ICD-10-CM

## 2021-08-26 DIAGNOSIS — M65.9 SYNOVITIS AND TENOSYNOVITIS, UNSPECIFIED: ICD-10-CM
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
## 2021-08-26 DIAGNOSIS — E87.1 HYPO-OSMOLALITY AND HYPONATREMIA: ICD-10-CM

## 2021-08-26 DIAGNOSIS — F02.80 DEMENTIA IN OTHER DISEASES CLASSIFIED ELSEWHERE, UNSPECIFIED SEVERITY, WITHOUT BEHAVIORAL DISTURBANCE, PSYCHOTIC DISTURBANCE, MOOD DISTURBANCE, AND ANXIETY: ICD-10-CM

## 2021-08-26 DIAGNOSIS — A41.01 SEPSIS DUE TO METHICILLIN SUSCEPTIBLE STAPHYLOCOCCUS AUREUS: ICD-10-CM

## 2021-08-26 LAB — BACTERIA FLD CULT: ABNORMAL

## 2021-08-30 NOTE — CONSULT LETTER
[Dear  ___] : Dear  [unfilled], [Consult Letter:] : I had the pleasure of evaluating your patient, [unfilled]. [Please see my note below.] : Please see my note below. [Consult Closing:] : Thank you very much for allowing me to participate in the care of this patient.  If you have any questions, please do not hesitate to contact me. [Sincerely,] : Sincerely, [FreeTextEntry3] : Ramsey Brantley MD, FACS\par Professor of Otolaryngology, [employer] at [facility]\par Director, [facility], Department of Otolaryngology, [facility]\par , Head & Neck Service Line, [facility]\par

## 2021-08-30 NOTE — HISTORY OF PRESENT ILLNESS
[de-identified] : 71 years old male patient with history of Persistent dry mouth for the past couple of months.   Patient is present today in the office with medication administration induced dry mouth

## 2021-08-30 NOTE — REASON FOR VISIT
[Initial Evaluation] : an initial evaluation for [FreeTextEntry2] : Persistent dry mouth for the past couple of months.  Patient states his level of severity is a level 10 out of 10 and it occurs constant.  Patient states nothing helps to improve or worsens his Persistent dry mouth for the past couple of months.

## 2021-09-01 ENCOUNTER — APPOINTMENT (OUTPATIENT)
Dept: ORTHOPEDIC SURGERY | Facility: CLINIC | Age: 71
End: 2021-09-01

## 2021-09-07 ENCOUNTER — APPOINTMENT (OUTPATIENT)
Dept: ORTHOPEDIC SURGERY | Facility: CLINIC | Age: 71
End: 2021-09-07
Payer: MEDICARE

## 2021-09-07 PROCEDURE — 99024 POSTOP FOLLOW-UP VISIT: CPT

## 2021-09-07 NOTE — HISTORY OF PRESENT ILLNESS
[Neuro Intact] : an unremarkable neurological exam [Vascular Intact] : ~T peripheral vascular exam normal [Negative Bcuky's] : maneuvers demonstrated a negative Bucky's sign [de-identified] : 1st POA s/p R knee I&D and poly exchange for acute MSSA PJI, 8/12/21. Primary R TKA on 7/9/21. [de-identified] : Still at [facility] mostly for medication administration purposes. Pain has been well controlled for the most part. Has been on Ancef and rifampin as per ID. Still in KI. No wound issues or drainage. Participating in PT. Denies fevers/chills. [de-identified] : R knee: KI removed. ROM 0-70, lig stable mary/steve, incision healed and staples removed by me. Lateral aspect of knee mildly erythematous, small effusion, no fluctuance.  [de-identified] : 72y/o male about 4 weeks s/p R knee I&D, poly exchange for acute MSSA PJI [de-identified] : Cont PT, no more KI\par Cont Ancef/rifampin thru 9/23/21\par F/U ID, labs from facility\par RTC 9/22/21 for right knee aspiration

## 2021-09-11 LAB
CULTURE RESULTS: SIGNIFICANT CHANGE UP
SPECIMEN SOURCE: SIGNIFICANT CHANGE UP

## 2021-09-14 LAB — FUNGUS FLD CULT: NORMAL

## 2021-09-20 ENCOUNTER — APPOINTMENT (OUTPATIENT)
Dept: INFECTIOUS DISEASE | Facility: CLINIC | Age: 71
End: 2021-09-20

## 2021-09-22 ENCOUNTER — APPOINTMENT (OUTPATIENT)
Dept: ORTHOPEDIC SURGERY | Facility: CLINIC | Age: 71
End: 2021-09-22

## 2021-09-28 ENCOUNTER — APPOINTMENT (OUTPATIENT)
Dept: ORTHOPEDIC SURGERY | Facility: CLINIC | Age: 71
End: 2021-09-28
Payer: MEDICARE

## 2021-09-28 PROCEDURE — 99024 POSTOP FOLLOW-UP VISIT: CPT

## 2021-09-28 PROCEDURE — 20610 DRAIN/INJ JOINT/BURSA W/O US: CPT | Mod: 58,RT

## 2021-09-29 LAB
B PERT IGG+IGM PNL SER: ABNORMAL
BASOPHILS # BLD AUTO: 0.04 K/UL
BASOPHILS NFR BLD AUTO: 0.7 %
COLOR FLD: NORMAL
CRP SERPL-MCNC: <3 MG/L
EOSINOPHIL # BLD AUTO: 0.4 K/UL
EOSINOPHIL # FLD MANUAL: 1 %
EOSINOPHIL NFR BLD AUTO: 6.8 %
ERYTHROCYTE [SEDIMENTATION RATE] IN BLOOD BY WESTERGREN METHOD: 9 MM/HR
FLUID INTAKE SUBSTANCE CLASS: NORMAL
HCT VFR BLD CALC: 36.6 %
HGB BLD-MCNC: 11.2 G/DL
IMM GRANULOCYTES NFR BLD AUTO: 0.3 %
LYMPHOCYTES # BLD AUTO: 1.25 K/UL
LYMPHOCYTES # FLD MANUAL: 12 %
LYMPHOCYTES NFR BLD AUTO: 21.2 %
MAN DIFF?: NORMAL
MCHC RBC-ENTMCNC: 28.4 PG
MCHC RBC-ENTMCNC: 30.6 GM/DL
MCV RBC AUTO: 92.9 FL
MONOCYTES # BLD AUTO: 0.65 K/UL
MONOCYTES NFR BLD AUTO: 11 %
MONOS+MACROS NFR FLD MANUAL: 4 %
NEUTROPHILS # BLD AUTO: 3.55 K/UL
NEUTROPHILS NFR BLD AUTO: 60 %
NEUTS SEG # FLD MANUAL: 83 %
PLATELET # BLD AUTO: 303 K/UL
RBC # BLD: 3.94 M/UL
RBC # FLD MANUAL: ABNORMAL /UL
RBC # FLD: 13.7 %
SYCRY CLARITY: ABNORMAL
SYCRY COLOR: ABNORMAL
SYCRY ID: NORMAL
SYCRY TUBE: NORMAL
TOTAL CELLS COUNTED FLD: 3182 /UL
TUBE TYPE: NORMAL
WBC # FLD AUTO: 5.91 K/UL

## 2021-10-01 NOTE — PROCEDURE
[Aspiration] : Aspiration [Right] : of the right [Diagnostic] : Diagnostic [Knee Joint] : knee joint [Patient] : patient [Alcohol] : Alcohol [Betadine] : Betadine [Ethyl Chloride Spray] : ethyl chloride spray was used as a topical anesthetic [Lateral] : lateral [Superior] : superior [18] : an 18-gauge [___ mL Fluid] : [unfilled] mL of [Clear] : clear [Bloody] : bloody [Bandage Applied] : a bandage [Culture] : culture [Cell Count] : cell count [Gram Stain] : gram stain [Crystal Analysis] : crystal analysis [de-identified] : Aspiration performed today, Synovasure negative at 10/15 minutes and questionably faintly positive at 20min,

## 2021-10-01 NOTE — HISTORY OF PRESENT ILLNESS
[de-identified] : 2nd POA s/p R knee I&D and poly exchange for acute MSSA PJI, index 7/9/21, revision 8/12/21. [de-identified] : Went home at end of last week. Has been doing HEP. Right knee pain seems a bit better than previous. Moderate constant swelling, but no wound problems or systemic symptoms. Has been walking with or without cane. [de-identified] : R knee: incision healed, no dehiscence. Moderate effusion. Mild generalized erythema with leg down that mostly resolves with limb elevation. ROM 0-110, lig stable mary/steve/ant/post, good quad strength. Ambulating with mild right antalgia. [de-identified] : 72y/o male 6 weeks s/p R knee I&D and poly exchange for acute MSSA PJI [de-identified] : Aspiration performed today, Synovasure negative at 10/15 minutes and questionably faintly positive at 20min, follow up rest of synovial panel\par Repeat serum labs today\par Resume PT\par Follow up in 2 weeks for repeat serum and synovial fluid testing

## 2021-10-02 LAB
CULTURE RESULTS: SIGNIFICANT CHANGE UP
SPECIMEN SOURCE: SIGNIFICANT CHANGE UP

## 2021-10-12 ENCOUNTER — LABORATORY RESULT (OUTPATIENT)
Age: 71
End: 2021-10-12

## 2021-10-12 ENCOUNTER — APPOINTMENT (OUTPATIENT)
Dept: ORTHOPEDIC SURGERY | Facility: CLINIC | Age: 71
End: 2021-10-12
Payer: MEDICARE

## 2021-10-12 LAB
BASOPHILS # BLD AUTO: 0.05 K/UL
BASOPHILS NFR BLD AUTO: 0.9 %
CRP SERPL-MCNC: <3 MG/L
EOSINOPHIL # BLD AUTO: 0.14 K/UL
EOSINOPHIL NFR BLD AUTO: 2.4 %
ERYTHROCYTE [SEDIMENTATION RATE] IN BLOOD BY WESTERGREN METHOD: 11 MM/HR
HCT VFR BLD CALC: 36.2 %
HGB BLD-MCNC: 11.2 G/DL
IMM GRANULOCYTES NFR BLD AUTO: 0.2 %
LYMPHOCYTES # BLD AUTO: 1.01 K/UL
LYMPHOCYTES NFR BLD AUTO: 17.4 %
MAN DIFF?: NORMAL
MCHC RBC-ENTMCNC: 27.6 PG
MCHC RBC-ENTMCNC: 30.9 GM/DL
MCV RBC AUTO: 89.2 FL
MONOCYTES # BLD AUTO: 0.53 K/UL
MONOCYTES NFR BLD AUTO: 9.1 %
NEUTROPHILS # BLD AUTO: 4.06 K/UL
NEUTROPHILS NFR BLD AUTO: 70 %
PLATELET # BLD AUTO: 339 K/UL
RBC # BLD: 4.06 M/UL
RBC # FLD: 14.4 %
WBC # FLD AUTO: 5.8 K/UL

## 2021-10-12 PROCEDURE — 20610 DRAIN/INJ JOINT/BURSA W/O US: CPT

## 2021-10-12 PROCEDURE — 99024 POSTOP FOLLOW-UP VISIT: CPT

## 2021-10-12 NOTE — HISTORY OF PRESENT ILLNESS
[de-identified] : 3rd POA s/p R knee I&D and poly exchange for acute MSSA PJI, index 7/9/21, revision 8/12/21. [de-identified] : Returning after 2wk antibiotic holiday per my instructions. Reports interval improvement in right knee pain and swelling. No fevers, chills, or other systemic symptoms. [facility] No complaints. [de-identified] : R knee: incision healed, no dehiscence. Moderate suprapatellar swelling with small associated effusion. Resolution of the erythema noted at prior visit. ROM 0-110, lig stable mary/steve/ant/post, good quad strength. Ambulating with mild right limp. [de-identified] : Labs reviewed from last visit. Normalization of serum markers, no leukocytosis, no crystals, about 3200 synovial WBC with 83% PMN. [de-identified] : 70y/o male 8 weeks s/p R knee I&D and poly exchange for acute MSSA PJI [de-identified] : Aspiration done today with 5cc yield of normal blood-tinged synovial fluid, sufficient to send for culture and cell count and also applied to Synovasure assay by me. The assay was negative for periprosthetic infection at 10, 15 and 20 minutes post-procedure.\par Repeat serum labs today\par Cont PT\par RTC 6wk with repeat right knee XRs

## 2021-10-12 NOTE — PROCEDURE
[Aspiration] : Aspiration [Right] : of the right [Knee Joint] : knee joint [Diagnostic] : Diagnostic [Patient] : patient [Alcohol] : Alcohol [Betadine] : Betadine [Ethyl Chloride Spray] : ethyl chloride spray was used as a topical anesthetic [Lateral] : lateral [Superior] : superior [18] : an 18-gauge [___ mL Fluid] : [unfilled] mL of [Clear] : clear [Bloody] : bloody [Bandage Applied] : a bandage [Culture] : culture [Cell Count] : cell count [Gram Stain] : gram stain [Crystal Analysis] : crystal analysis [Tolerated Well] : The patient tolerated the procedure well

## 2021-10-18 LAB — BACTERIA FLD CULT: NORMAL

## 2021-10-28 ENCOUNTER — APPOINTMENT (OUTPATIENT)
Dept: INTERNAL MEDICINE | Facility: CLINIC | Age: 71
End: 2021-10-28
Payer: MEDICARE

## 2021-10-28 VITALS
RESPIRATION RATE: 14 BRPM | OXYGEN SATURATION: 98 % | SYSTOLIC BLOOD PRESSURE: 122 MMHG | HEIGHT: 73 IN | HEART RATE: 68 BPM | DIASTOLIC BLOOD PRESSURE: 78 MMHG | TEMPERATURE: 96.8 F

## 2021-10-28 DIAGNOSIS — R68.2 DRY MOUTH, UNSPECIFIED: ICD-10-CM

## 2021-10-28 DIAGNOSIS — E55.9 VITAMIN D DEFICIENCY, UNSPECIFIED: ICD-10-CM

## 2021-10-28 LAB — FUNGUS FLD CULT: NORMAL

## 2021-10-28 PROCEDURE — 36415 COLL VENOUS BLD VENIPUNCTURE: CPT

## 2021-10-28 NOTE — HISTORY OF PRESENT ILLNESS
[FreeTextEntry1] : headaches. [de-identified] : 71 yo m with alzheimers bipolar depression. HLD \par developed mssa post knee surgery, remains on antibiotics. \par \par Here with c/o dry mouth for one year,  \par \par

## 2021-10-28 NOTE — PLAN
[FreeTextEntry1] : no evidence of thrush reassurance given\par  check vit d level and sjogrens abs\par \par dry mouth most likely related to psychiatric medications\par \par knee infection - close f/up with  Dr Higuera\par \par Bipolar- cont psych regimen

## 2021-10-31 LAB
ENA SS-A AB SER IA-ACNC: <0.2 AL
ENA SS-B AB SER IA-ACNC: <0.2 AL

## 2021-11-15 ENCOUNTER — APPOINTMENT (OUTPATIENT)
Dept: INFECTIOUS DISEASE | Facility: CLINIC | Age: 71
End: 2021-11-15
Payer: MEDICARE

## 2021-11-15 VITALS
HEIGHT: 66 IN | WEIGHT: 178.38 LBS | OXYGEN SATURATION: 99 % | BODY MASS INDEX: 28.67 KG/M2 | TEMPERATURE: 96.7 F | HEART RATE: 70 BPM

## 2021-11-15 VITALS — SYSTOLIC BLOOD PRESSURE: 134 MMHG | DIASTOLIC BLOOD PRESSURE: 95 MMHG

## 2021-11-15 DIAGNOSIS — R41.3 OTHER AMNESIA: ICD-10-CM

## 2021-11-15 PROCEDURE — 99214 OFFICE O/P EST MOD 30 MIN: CPT

## 2021-11-15 NOTE — ASSESSMENT
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 He finished a 6 week course of cefazolin and rifampin and was transitioned to PO cephalexin on 10/12/21, to which he has been adherent. \par - CBC, CMP, ESR, CRP\par - continue cephalexin 500mg PO q8h chronic suppressive therapy--not sure that this is driving vertigo/HA symptoms but if does not improve with trial of meclizine can consider switch to alternative agent\par \par rtc 2 mos [Treatment Education] : treatment education [Treatment Adherence] : treatment adherence [Anticipatory Guidance] : anticipatory guidance

## 2021-11-15 NOTE — HISTORY OF PRESENT ILLNESS
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 He finished a 6 week course of cefazolin and rifampin and was transitioned to PO cephalexin on 10/12/21, to which he has been adherent. For the last 3 weeks he notices new onset HA as well as vertigo symptoms (worse when standing) and associated nausea; rx'd trial of meclizine by PCP. \par Doing PT for R knee. Denies knee pain. No fever or chills. Had arthrocentesis 10/12 with negative culture.

## 2021-11-16 ENCOUNTER — APPOINTMENT (OUTPATIENT)
Dept: INTERNAL MEDICINE | Facility: CLINIC | Age: 71
End: 2021-11-16
Payer: MEDICARE

## 2021-11-16 VITALS
OXYGEN SATURATION: 97 % | DIASTOLIC BLOOD PRESSURE: 80 MMHG | HEART RATE: 86 BPM | HEIGHT: 66 IN | WEIGHT: 177 LBS | RESPIRATION RATE: 14 BRPM | BODY MASS INDEX: 28.45 KG/M2 | SYSTOLIC BLOOD PRESSURE: 124 MMHG | TEMPERATURE: 98.2 F

## 2021-11-16 DIAGNOSIS — R42 DIZZINESS AND GIDDINESS: ICD-10-CM

## 2021-11-16 PROCEDURE — 99214 OFFICE O/P EST MOD 30 MIN: CPT

## 2021-11-16 RX ORDER — RIVAROXABAN 10 MG/1
10 TABLET, FILM COATED ORAL
Qty: 30 | Refills: 0 | Status: DISCONTINUED | COMMUNITY
Start: 2021-01-28 | End: 2021-11-16

## 2021-11-16 RX ORDER — CLONAZEPAM 2 MG/1
2 TABLET ORAL AT BEDTIME
Qty: 30 | Refills: 0 | Status: DISCONTINUED | COMMUNITY
Start: 2021-04-19 | End: 2021-11-16

## 2021-11-16 RX ORDER — SERTRALINE HYDROCHLORIDE 100 MG/1
100 TABLET, FILM COATED ORAL
Qty: 90 | Refills: 0 | Status: DISCONTINUED | COMMUNITY
Start: 2021-07-19 | End: 2021-11-16

## 2021-11-16 RX ORDER — SERTRALINE 25 MG/1
25 TABLET, FILM COATED ORAL
Qty: 60 | Refills: 0 | Status: DISCONTINUED | COMMUNITY
Start: 2021-07-24 | End: 2021-11-16

## 2021-11-16 RX ORDER — CELECOXIB 200 MG/1
200 CAPSULE ORAL TWICE DAILY
Qty: 60 | Refills: 2 | Status: DISCONTINUED | COMMUNITY
Start: 2021-01-28 | End: 2021-11-16

## 2021-11-16 RX ORDER — OXYCODONE 5 MG/1
5 TABLET ORAL
Qty: 40 | Refills: 0 | Status: DISCONTINUED | COMMUNITY
Start: 2021-09-28 | End: 2021-11-16

## 2021-11-16 RX ORDER — ONDANSETRON 4 MG/1
4 TABLET, ORALLY DISINTEGRATING ORAL EVERY 6 HOURS
Qty: 20 | Refills: 2 | Status: DISCONTINUED | COMMUNITY
Start: 2021-11-11 | End: 2021-11-16

## 2021-11-16 RX ORDER — QUETIAPINE FUMARATE 100 MG/1
100 TABLET ORAL
Qty: 30 | Refills: 0 | Status: DISCONTINUED | COMMUNITY
Start: 2021-03-19 | End: 2021-11-16

## 2021-11-16 RX ORDER — MIRTAZAPINE 15 MG/1
15 TABLET, ORALLY DISINTEGRATING ORAL
Qty: 30 | Refills: 0 | Status: DISCONTINUED | COMMUNITY
Start: 2021-04-15 | End: 2021-11-16

## 2021-11-16 RX ORDER — FLUOXETINE HYDROCHLORIDE 20 MG/1
20 CAPSULE ORAL
Qty: 30 | Refills: 0 | Status: DISCONTINUED | COMMUNITY
Start: 2021-06-15 | End: 2021-11-16

## 2021-11-16 RX ORDER — MIRTAZAPINE 30 MG/1
30 TABLET, FILM COATED ORAL
Qty: 30 | Refills: 0 | Status: DISCONTINUED | COMMUNITY
Start: 2021-03-19 | End: 2021-11-16

## 2021-11-16 RX ORDER — FLUOXETINE HYDROCHLORIDE 10 MG/1
10 CAPSULE ORAL
Qty: 45 | Refills: 0 | Status: DISCONTINUED | COMMUNITY
Start: 2021-05-14 | End: 2021-11-16

## 2021-11-16 RX ORDER — LAMOTRIGINE 100 MG/1
100 TABLET ORAL
Qty: 30 | Refills: 0 | Status: DISCONTINUED | COMMUNITY
Start: 2021-03-19 | End: 2021-11-16

## 2021-11-16 RX ORDER — RIVASTIGMINE 4.6 MG/24H
4.6 PATCH, EXTENDED RELEASE TRANSDERMAL
Qty: 30 | Refills: 0 | Status: DISCONTINUED | COMMUNITY
Start: 2021-02-07 | End: 2021-11-16

## 2021-11-16 RX ORDER — ACETAMINOPHEN 500 MG/1
500 TABLET ORAL
Qty: 180 | Refills: 1 | Status: DISCONTINUED | COMMUNITY
Start: 2021-01-28 | End: 2021-11-16

## 2021-11-16 RX ORDER — PILOCARPINE HYDROCHLORIDE 5 MG/1
5 TABLET, FILM COATED ORAL 3 TIMES DAILY
Qty: 90 | Refills: 5 | Status: DISCONTINUED | COMMUNITY
Start: 2021-06-25 | End: 2021-11-16

## 2021-11-16 RX ORDER — MECLIZINE HYDROCHLORIDE 25 MG/1
25 TABLET ORAL 3 TIMES DAILY
Qty: 20 | Refills: 3 | Status: DISCONTINUED | COMMUNITY
Start: 2021-11-08 | End: 2021-11-16

## 2021-11-16 RX ORDER — LAMOTRIGINE 25 MG/1
25 TABLET ORAL
Qty: 90 | Refills: 0 | Status: DISCONTINUED | COMMUNITY
Start: 2021-04-15 | End: 2021-11-16

## 2021-11-16 RX ORDER — MIRTAZAPINE 15 MG/1
15 TABLET, FILM COATED ORAL
Qty: 30 | Refills: 0 | Status: DISCONTINUED | COMMUNITY
Start: 2020-12-24 | End: 2021-11-16

## 2021-11-16 RX ORDER — PANTOPRAZOLE 40 MG/1
40 TABLET, DELAYED RELEASE ORAL DAILY
Qty: 30 | Refills: 2 | Status: DISCONTINUED | COMMUNITY
Start: 2021-01-28 | End: 2021-11-16

## 2021-11-16 RX ORDER — QUETIAPINE FUMARATE 50 MG/1
50 TABLET ORAL
Qty: 30 | Refills: 0 | Status: DISCONTINUED | COMMUNITY
Start: 2020-12-24 | End: 2021-11-16

## 2021-11-16 NOTE — HISTORY OF PRESENT ILLNESS
[FreeTextEntry1] : headaches. [de-identified] : 69 yo m with alzheimers bipolar depression. HLD \par developed mssa post knee surgery, remains on antibiotics. \par recent issue is dizziness - began 2-3 weeks ago.  Started meclizine one week ago which did not help  \par discussion with sister on the phone.\par Taking trazodone at night. \par

## 2021-11-16 NOTE — PHYSICAL EXAM
[Normal] : no acute distress, well nourished, well developed and well-appearing [Normal Insight/Judgement] : insight and judgment were intact [Coordination Grossly Intact] : coordination grossly intact [Normal Gait] : normal gait [de-identified] : rafaln impaciton left > right [de-identified] : cn 2-12 intact [de-identified] : memory issues with recall

## 2021-11-16 NOTE — PLAN
[FreeTextEntry1] : \par \par Dizziness\par Very important to follow up with Dr Mccall- his neurologist\par ophtho referral\par ENT referral (cerumen impaction)\par Alzheimers - for now continue rivazastigmine\par Pt gave permission to speak with sister Shaunna\par

## 2021-11-16 NOTE — REVIEW OF SYSTEMS
[Fatigue] : fatigue [Vision Problems] : vision problems [Earache] : no earache [Hearing Loss] : no hearing loss [Dizziness] : dizziness [Memory Loss] : memory loss [Negative] : Respiratory

## 2021-11-18 LAB
ALBUMIN SERPL ELPH-MCNC: 4.4 G/DL
ALP BLD-CCNC: 86 U/L
ALT SERPL-CCNC: 13 U/L
ANION GAP SERPL CALC-SCNC: 11 MMOL/L
AST SERPL-CCNC: 19 U/L
BASOPHILS # BLD AUTO: 0.04 K/UL
BASOPHILS NFR BLD AUTO: 0.8 %
BILIRUB SERPL-MCNC: 0.4 MG/DL
BUN SERPL-MCNC: 19 MG/DL
CALCIUM SERPL-MCNC: 9.5 MG/DL
CHLORIDE SERPL-SCNC: 103 MMOL/L
CO2 SERPL-SCNC: 25 MMOL/L
CREAT SERPL-MCNC: 0.95 MG/DL
CRP SERPL-MCNC: <3 MG/L
EOSINOPHIL # BLD AUTO: 0.19 K/UL
EOSINOPHIL NFR BLD AUTO: 3.6 %
ERYTHROCYTE [SEDIMENTATION RATE] IN BLOOD BY WESTERGREN METHOD: 8 MM/HR
GLUCOSE SERPL-MCNC: 99 MG/DL
HCT VFR BLD CALC: 37.6 %
HGB BLD-MCNC: 11.4 G/DL
IMM GRANULOCYTES NFR BLD AUTO: 0.4 %
LYMPHOCYTES # BLD AUTO: 0.81 K/UL
LYMPHOCYTES NFR BLD AUTO: 15.5 %
MAN DIFF?: NORMAL
MCHC RBC-ENTMCNC: 25.8 PG
MCHC RBC-ENTMCNC: 30.3 GM/DL
MCV RBC AUTO: 85.1 FL
MONOCYTES # BLD AUTO: 0.61 K/UL
MONOCYTES NFR BLD AUTO: 11.7 %
NEUTROPHILS # BLD AUTO: 3.54 K/UL
NEUTROPHILS NFR BLD AUTO: 68 %
PLATELET # BLD AUTO: 327 K/UL
POTASSIUM SERPL-SCNC: 5.3 MMOL/L
PROT SERPL-MCNC: 6.8 G/DL
RBC # BLD: 4.42 M/UL
RBC # FLD: 14.7 %
SODIUM SERPL-SCNC: 139 MMOL/L
WBC # FLD AUTO: 5.21 K/UL

## 2021-11-19 ENCOUNTER — NON-APPOINTMENT (OUTPATIENT)
Age: 71
End: 2021-11-19

## 2021-11-22 ENCOUNTER — APPOINTMENT (OUTPATIENT)
Dept: OTOLARYNGOLOGY | Facility: CLINIC | Age: 71
End: 2021-11-22
Payer: MEDICARE

## 2021-11-22 VITALS
SYSTOLIC BLOOD PRESSURE: 125 MMHG | BODY MASS INDEX: 24.38 KG/M2 | WEIGHT: 180 LBS | TEMPERATURE: 97.9 F | HEIGHT: 72 IN | OXYGEN SATURATION: 100 % | DIASTOLIC BLOOD PRESSURE: 84 MMHG | HEART RATE: 71 BPM

## 2021-11-22 DIAGNOSIS — H61.20 IMPACTED CERUMEN, UNSPECIFIED EAR: ICD-10-CM

## 2021-11-22 DIAGNOSIS — Z80.9 FAMILY HISTORY OF MALIGNANT NEOPLASM, UNSPECIFIED: ICD-10-CM

## 2021-11-22 DIAGNOSIS — Z78.9 OTHER SPECIFIED HEALTH STATUS: ICD-10-CM

## 2021-11-22 PROCEDURE — 99214 OFFICE O/P EST MOD 30 MIN: CPT | Mod: 25

## 2021-11-22 PROCEDURE — 69210 REMOVE IMPACTED EAR WAX UNI: CPT

## 2021-11-23 ENCOUNTER — APPOINTMENT (OUTPATIENT)
Dept: ORTHOPEDIC SURGERY | Facility: CLINIC | Age: 71
End: 2021-11-23
Payer: MEDICARE

## 2021-11-23 ENCOUNTER — RESULT REVIEW (OUTPATIENT)
Age: 71
End: 2021-11-23

## 2021-11-23 ENCOUNTER — OUTPATIENT (OUTPATIENT)
Dept: OUTPATIENT SERVICES | Facility: HOSPITAL | Age: 71
LOS: 1 days | End: 2021-11-23
Payer: MEDICARE

## 2021-11-23 VITALS
DIASTOLIC BLOOD PRESSURE: 74 MMHG | SYSTOLIC BLOOD PRESSURE: 119 MMHG | OXYGEN SATURATION: 98 % | HEIGHT: 72 IN | WEIGHT: 180 LBS | BODY MASS INDEX: 24.38 KG/M2 | HEART RATE: 61 BPM

## 2021-11-23 DIAGNOSIS — Z41.9 ENCOUNTER FOR PROCEDURE FOR PURPOSES OTHER THAN REMEDYING HEALTH STATE, UNSPECIFIED: Chronic | ICD-10-CM

## 2021-11-23 PROCEDURE — 73564 X-RAY EXAM KNEE 4 OR MORE: CPT | Mod: 26,RT

## 2021-11-23 PROCEDURE — 73564 X-RAY EXAM KNEE 4 OR MORE: CPT

## 2021-11-23 PROCEDURE — 99213 OFFICE O/P EST LOW 20 MIN: CPT

## 2021-11-23 NOTE — HISTORY OF PRESENT ILLNESS
[de-identified] : R TKA 7/9/21\par R knee I&D and poly exchange for MSSA PJI, 8/12/21\par L TKA 1/29/21\par \par 11/23/21: Reports ongoing headaches, dizziness, mild nausea. Has not responded to meclizine therapy. Saw ENT yesterday and has additional workup scheduled. Has mild bilateral knee pain, right worse than left, not to the point that he needs to take any pain medications. Walking an hour a day without assistive device. Feeling more confident with stairs over time. Has not had any recurrence of right knee erythema. No fevers/chills. Compliant with Keflex.

## 2021-11-23 NOTE — DISCUSSION/SUMMARY
[de-identified] : 70y/o male about 3mo s/p R knee I&D and poly exchange for acute MSSA PJI, about 5mo s/p index R TKA and 10mo s/p L TKA\par - Cont PT/HEP\par - Cont Keflex for now; will discuss possibly switching agent with Dr. Falcon given the dizziness. Likely continue antibiotics thru mid-Jan to complete a 3 month PO course\par - RTC 2mo, no new XRs needed

## 2021-11-23 NOTE — HISTORY OF PRESENT ILLNESS
[de-identified] : R TKA 7/9/21\par R knee I&D and poly exchange for MSSA PJI, 8/12/21\par L TKA 1/29/21\par \par 11/23/21: Reports ongoing headaches, dizziness, mild nausea. Has not responded to meclizine therapy. Saw ENT yesterday and has additional workup scheduled. Has mild bilateral knee pain, right worse than left, not to the point that he needs to take any pain medications. Walking an hour a day without assistive device. Feeling more confident with stairs over time. Has not had any recurrence of right knee erythema. No fevers/chills. Compliant with Keflex.

## 2021-11-23 NOTE — DISCUSSION/SUMMARY
[de-identified] : 72y/o male about 3mo s/p R knee I&D and poly exchange for acute MSSA PJI, about 5mo s/p index R TKA and 10mo s/p L TKA\par - Cont PT/HEP\par - Cont Keflex for now; will discuss possibly switching agent with Dr. Falcon given the dizziness. Likely continue antibiotics thru mid-Jan to complete a 3 month PO course\par - RTC 2mo, no new XRs needed

## 2021-11-23 NOTE — PHYSICAL EXAM
[de-identified] : General appearance: well nourished and hydrated, pleasant, alert and oriented x 3, cooperative.  \par HEENT: normocephalic, EOM intact, wearing mask, external auditory canal clear.  \par Cardiovascular: no lower leg edema, no varicosities, dorsalis pedis pulses palpable and symmetric.  \par Lymphatics: no palpable lymphadenopathy, no lymphedema.  \par Neurologic: sensation is normal, no muscle weakness in upper or lower extremities, patella tendon reflexes present and symmetric.  \par Dermatologic: skin moist, warm, no rash.  \par Spine: cervical spine with normal lordosis and painless range of motion, thoracic spine with normal kyphosis and painless range of motion, lumbosacral spine with normal lordosis and painless range of motion.\par Gait: normal.  \par \par Left knee:\par - Soft tissue swelling: none\par - Ecchymosis: none\par - Erythema: none\par - Effusion: none\par - Wounds: healed midline incision\par - Alignment: normal\par - Tenderness: none\par - ROM: 0-125\par - Collateral laxity: none\par - Cruciate laxity: none\par - Popliteal angle (degrees): 40\par - Quad strength: 5/5\par \par Right knee:\par - Soft tissue swelling: increased deep subcutaneous bulk in the suprapatellar pouch, nothing boggy or fluctuant\par - Ecchymosis: none\par - Erythema: none\par - Effusion: small\par - Wounds: healed midline incision\par - Alignment: normal\par - Tenderness: none\par - ROM: 0-100\par - Collateral laxity: none\par - Cruciate laxity: none\par - Popliteal angle (degrees): 40\par - Quad strength: 5/5 [de-identified] : Right knee XRs taken today demonstrate stable position of the components without evidence of mechanical complication. No bony erosions or other osseous change over time. Patella sits at appropriate height and tracks centrally.\par \par All cultures from last aspiration in October remain negative.

## 2021-11-23 NOTE — PHYSICAL EXAM
[de-identified] : General appearance: well nourished and hydrated, pleasant, alert and oriented x 3, cooperative.  \par HEENT: normocephalic, EOM intact, wearing mask, external auditory canal clear.  \par Cardiovascular: no lower leg edema, no varicosities, dorsalis pedis pulses palpable and symmetric.  \par Lymphatics: no palpable lymphadenopathy, no lymphedema.  \par Neurologic: sensation is normal, no muscle weakness in upper or lower extremities, patella tendon reflexes present and symmetric.  \par Dermatologic: skin moist, warm, no rash.  \par Spine: cervical spine with normal lordosis and painless range of motion, thoracic spine with normal kyphosis and painless range of motion, lumbosacral spine with normal lordosis and painless range of motion.\par Gait: normal.  \par \par Left knee:\par - Soft tissue swelling: none\par - Ecchymosis: none\par - Erythema: none\par - Effusion: none\par - Wounds: healed midline incision\par - Alignment: normal\par - Tenderness: none\par - ROM: 0-125\par - Collateral laxity: none\par - Cruciate laxity: none\par - Popliteal angle (degrees): 40\par - Quad strength: 5/5\par \par Right knee:\par - Soft tissue swelling: increased deep subcutaneous bulk in the suprapatellar pouch, nothing boggy or fluctuant\par - Ecchymosis: none\par - Erythema: none\par - Effusion: small\par - Wounds: healed midline incision\par - Alignment: normal\par - Tenderness: none\par - ROM: 0-100\par - Collateral laxity: none\par - Cruciate laxity: none\par - Popliteal angle (degrees): 40\par - Quad strength: 5/5 [de-identified] : Right knee XRs taken today demonstrate stable position of the components without evidence of mechanical complication. No bony erosions or other osseous change over time. Patella sits at appropriate height and tracks centrally.\par \par All cultures from last aspiration in October remain negative.

## 2021-11-24 NOTE — HISTORY OF PRESENT ILLNESS
[de-identified] : 72 yo M getting dizzy for 3 weeks with headaches. He said he is seeing a neurologist and has a dx of Alzheimer's dz. He came here alone. No inciting event and the dizziness is hard for him to describe. He has seen Dr Brantley in the past and has been found to have an opacified r maxillary sinus on imaging. -f/sc denies spinning.

## 2021-11-24 NOTE — PHYSICAL EXAM
[Midline] : trachea located in midline position [Normal] : no rashes [de-identified] : b copious cerumen impaction removed  atraumatically with suction [de-identified] : gait steady

## 2021-11-24 NOTE — ASSESSMENT
[FreeTextEntry1] : cerumen removed\par he said he felt no different\par ha and dizziness, h/o opacified sinus\par rtc with  vng mri

## 2021-12-06 ENCOUNTER — APPOINTMENT (OUTPATIENT)
Dept: OPHTHALMOLOGY | Facility: CLINIC | Age: 71
End: 2021-12-06

## 2021-12-06 ENCOUNTER — APPOINTMENT (OUTPATIENT)
Dept: OTOLARYNGOLOGY | Facility: CLINIC | Age: 71
End: 2021-12-06
Payer: MEDICARE

## 2021-12-06 VITALS
HEART RATE: 59 BPM | DIASTOLIC BLOOD PRESSURE: 84 MMHG | SYSTOLIC BLOOD PRESSURE: 145 MMHG | TEMPERATURE: 98 F | OXYGEN SATURATION: 98 %

## 2021-12-06 DIAGNOSIS — J33.8 OTHER POLYP OF SINUS: ICD-10-CM

## 2021-12-06 DIAGNOSIS — J34.89 OTHER SPECIFIED DISORDERS OF NOSE AND NASAL SINUSES: ICD-10-CM

## 2021-12-06 DIAGNOSIS — R51.9 HEADACHE, UNSPECIFIED: ICD-10-CM

## 2021-12-06 DIAGNOSIS — R42 DIZZINESS AND GIDDINESS: ICD-10-CM

## 2021-12-06 PROCEDURE — 92537 CALORIC VSTBLR TEST W/REC: CPT

## 2021-12-06 PROCEDURE — 31231 NASAL ENDOSCOPY DX: CPT

## 2021-12-06 PROCEDURE — 99214 OFFICE O/P EST MOD 30 MIN: CPT | Mod: 25

## 2021-12-06 PROCEDURE — 92550 TYMPANOMETRY & REFLEX THRESH: CPT

## 2021-12-06 PROCEDURE — 92557 COMPREHENSIVE HEARING TEST: CPT

## 2021-12-06 PROCEDURE — 92540 BASIC VESTIBULAR EVALUATION: CPT

## 2021-12-06 NOTE — PHYSICAL EXAM
[Nasal Endoscopy Performed] : nasal endoscopy was performed, see procedure section for findings [Normal] : no neck adenopathy [de-identified] : gait steady

## 2021-12-06 NOTE — DATA REVIEWED
[de-identified] :  b symm hf snhl reviewed with pt and sister. taeg r uvw reviewed with pt and sister [de-identified] : mri atrophy, t2 signal abnormality r opacified max sinus reviewed with pt and sister

## 2021-12-06 NOTE — ASSESSMENT
[FreeTextEntry1] : 1.r maxillary sinus mass\par -CT scan to r/o mass\par RTC with CT to review findings \par 2, dizziness- likely due ot uvw as it began suddenly could be vn- v rehab ordered\par 3- headache - referred back to his neurologist for this as it is not in area of sinus\par rtc  with ct

## 2021-12-06 NOTE — HISTORY OF PRESENT ILLNESS
[de-identified] : 2 week followup appt for this 70 yo M with Alzheimer's disease, headache and dizziness. He had  vng and mri and is here to review. Here with his sister. The dizziness is disequilibrium. Also had h/o maxillary sinus opacification on old imaging study. Denies facial pain or sinus drainage

## 2021-12-06 NOTE — PROCEDURE
[None] : none [Posterior Lesion] : posterior lesion [Anterior rhinoscopy insufficient to account for symptoms] : anterior rhinoscopy insufficient to account for symptoms [Flexible Endoscope] : examined with the flexible endoscope [Serial Number: ___] : Serial Number: [unfilled] [___ cm] : [unfilled]Ucm polyp(s) on the right [Normal] : the paranasal sinuses had no abnormalities [FreeTextEntry6] : done for sinus opacification on ct\par polypoid r max sinus mass

## 2021-12-09 ENCOUNTER — APPOINTMENT (OUTPATIENT)
Dept: OTOLARYNGOLOGY | Facility: CLINIC | Age: 71
End: 2021-12-09
Payer: MEDICARE

## 2021-12-09 VITALS
HEIGHT: 72 IN | BODY MASS INDEX: 24.38 KG/M2 | HEART RATE: 61 BPM | TEMPERATURE: 97.7 F | WEIGHT: 180 LBS | DIASTOLIC BLOOD PRESSURE: 91 MMHG | OXYGEN SATURATION: 98 % | SYSTOLIC BLOOD PRESSURE: 146 MMHG

## 2021-12-09 DIAGNOSIS — J32.0 CHRONIC MAXILLARY SINUSITIS: ICD-10-CM

## 2021-12-09 PROCEDURE — 99213 OFFICE O/P EST LOW 20 MIN: CPT

## 2021-12-09 NOTE — HISTORY OF PRESENT ILLNESS
[de-identified] : followup 70 yo M with r opacified maxillary sinus, seen with his sister on speakerphone. He has h/o Alzheimer's. He had ct scan and is here to review. he is asymptomatic.

## 2021-12-09 NOTE — DATA REVIEWED
A/O, VSS Irregular heart rate history of afib. pain 7/10 Tylenol, dilaudid iv and po, atarax given for pain control.  Edema and bruising to Left hand.  Elevated on pillows ice applied. Cool to touch Denies numbness able to wiggle. Scruggs catheter dcd due to void  
[de-identified] : ct reviewed with pt in office and sister on cell- r opacified max sinus but I found old ct in [facility] system- no change since 2015.

## 2021-12-09 NOTE — ASSESSMENT
[FreeTextEntry1] : opacified r max sinus\par collapsed, not expansile\par I recommended observation, rtc 1 yr with ct\par he also has l mild sinusitis and tooth erosion into it radiologist read - I called pt and he told me he is scheduled to see detitst to remove this tooth and take care of this problem tmw.

## 2021-12-09 NOTE — REASON FOR VISIT
[Subsequent Evaluation] : a subsequent evaluation for [FreeTextEntry2] : followup opacified r maxillary sinus

## 2021-12-20 ENCOUNTER — APPOINTMENT (OUTPATIENT)
Dept: OPHTHALMOLOGY | Facility: CLINIC | Age: 71
End: 2021-12-20
Payer: MEDICARE

## 2021-12-20 ENCOUNTER — NON-APPOINTMENT (OUTPATIENT)
Age: 71
End: 2021-12-20

## 2021-12-20 PROCEDURE — 92134 CPTRZ OPH DX IMG PST SGM RTA: CPT

## 2021-12-20 PROCEDURE — 92004 COMPRE OPH EXAM NEW PT 1/>: CPT

## 2021-12-28 ENCOUNTER — RX RENEWAL (OUTPATIENT)
Age: 71
End: 2021-12-28

## 2022-01-10 ENCOUNTER — APPOINTMENT (OUTPATIENT)
Dept: INFECTIOUS DISEASE | Facility: CLINIC | Age: 72
End: 2022-01-10
Payer: MEDICARE

## 2022-01-10 ENCOUNTER — APPOINTMENT (OUTPATIENT)
Dept: PHARMACY | Facility: CLINIC | Age: 72
End: 2022-01-10

## 2022-01-10 VITALS
HEART RATE: 60 BPM | HEIGHT: 72 IN | OXYGEN SATURATION: 100 % | SYSTOLIC BLOOD PRESSURE: 143 MMHG | BODY MASS INDEX: 25.36 KG/M2 | TEMPERATURE: 97.6 F | WEIGHT: 187.25 LBS | DIASTOLIC BLOOD PRESSURE: 86 MMHG

## 2022-01-10 DIAGNOSIS — Z96.659 INFECTION AND INFLAMMATORY REACTION DUE TO OTHER INTERNAL JOINT PROSTHESIS, SEQUELA: ICD-10-CM

## 2022-01-10 DIAGNOSIS — T84.59XS INFECTION AND INFLAMMATORY REACTION DUE TO OTHER INTERNAL JOINT PROSTHESIS, SEQUELA: ICD-10-CM

## 2022-01-10 PROCEDURE — 99213 OFFICE O/P EST LOW 20 MIN: CPT

## 2022-01-10 RX ORDER — CEPHALEXIN 500 MG/1
500 CAPSULE ORAL EVERY 8 HOURS
Qty: 270 | Refills: 0 | Status: DISCONTINUED | COMMUNITY
Start: 2021-10-12 | End: 2022-01-10

## 2022-01-10 NOTE — ASSESSMENT
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 He finished a 6 week course of cefazolin and rifampin and was transitioned to PO cephalexin on 10/12/21, then to doxycycline on 11/23/21 for ?vertigo symptoms from cephalexin (persisted after discontinuation of cephalexin so likely not the cause of vertigo).\par - ESR and CRP\par - plan to complete doxycycline 1/12/22 followed by observation off antibiotics \par \par rtc prn [Treatment Education] : treatment education [Risk Reduction] : risk reduction [Anticipatory Guidance] : anticipatory guidance

## 2022-01-10 NOTE — PHYSICAL EXAM
[General Appearance - Alert] : alert [General Appearance - In No Acute Distress] : in no acute distress [] : no respiratory distress [Auscultation Breath Sounds / Voice Sounds] : lungs were clear to auscultation bilaterally [Heart Rate And Rhythm] : heart rate was normal and rhythm regular [Heart Sounds] : normal S1 and S2 [Heart Sounds Gallop] : no gallops [Murmurs] : no murmurs [Heart Sounds Pericardial Friction Rub] : no pericardial rub [Musculoskeletal - Swelling] : no joint swelling [Nail Clubbing] : no clubbing  or cyanosis of the fingernails [Motor Tone] : muscle strength and tone were normal [Sensation] : the sensory exam was normal to light touch and pinprick [Deep Tendon Reflexes (DTR)] : deep tendon reflexes were 2+ and symmetric [No Focal Deficits] : no focal deficits [Oriented To Time, Place, And Person] : oriented to person, place, and time [Affect] : the affect was normal

## 2022-01-11 LAB
CRP SERPL-MCNC: <3 MG/L
ERYTHROCYTE [SEDIMENTATION RATE] IN BLOOD BY WESTERGREN METHOD: 6 MM/HR

## 2022-01-18 ENCOUNTER — APPOINTMENT (OUTPATIENT)
Dept: ORTHOPEDIC SURGERY | Facility: CLINIC | Age: 72
End: 2022-01-18
Payer: MEDICARE

## 2022-01-18 VITALS — WEIGHT: 180 LBS | BODY MASS INDEX: 24.38 KG/M2 | HEIGHT: 72 IN

## 2022-01-18 DIAGNOSIS — Z47.1 AFTERCARE FOLLOWING JOINT REPLACEMENT SURGERY: ICD-10-CM

## 2022-01-18 DIAGNOSIS — Z96.651 AFTERCARE FOLLOWING JOINT REPLACEMENT SURGERY: ICD-10-CM

## 2022-01-18 DIAGNOSIS — Z96.652 AFTERCARE FOLLOWING JOINT REPLACEMENT SURGERY: ICD-10-CM

## 2022-01-18 PROCEDURE — 99213 OFFICE O/P EST LOW 20 MIN: CPT

## 2022-01-18 RX ORDER — TRAZODONE HYDROCHLORIDE 50 MG/1
50 TABLET ORAL
Qty: 20 | Refills: 5 | Status: DISCONTINUED | COMMUNITY
Start: 2021-09-23 | End: 2022-01-18

## 2022-01-18 RX ORDER — DOXYCYCLINE HYCLATE 100 MG/1
100 CAPSULE ORAL
Qty: 100 | Refills: 0 | Status: DISCONTINUED | COMMUNITY
Start: 2021-11-23 | End: 2022-01-18

## 2022-01-18 RX ORDER — TRAZODONE HYDROCHLORIDE 100 MG/1
100 TABLET ORAL
Qty: 90 | Refills: 0 | Status: DISCONTINUED | COMMUNITY
Start: 2021-10-22 | End: 2022-01-18

## 2022-01-18 RX ORDER — ESCITALOPRAM OXALATE 10 MG/1
10 TABLET ORAL
Qty: 60 | Refills: 0 | Status: DISCONTINUED | COMMUNITY
Start: 2021-11-24 | End: 2022-01-18

## 2022-01-18 NOTE — PHYSICAL EXAM
[de-identified] : General appearance: well nourished and hydrated, pleasant, alert and oriented x 3, cooperative.  \par HEENT: normocephalic, EOM intact, wearing mask, external auditory canal clear.  \par Cardiovascular: no lower leg edema, no varicosities, dorsalis pedis pulses palpable and symmetric.  \par Lymphatics: no palpable lymphadenopathy, no lymphedema.  \par Neurologic: sensation is normal, no muscle weakness in upper or lower extremities, patella tendon reflexes present and symmetric.  \par Dermatologic: skin moist, warm, no rash.  \par Spine: cervical spine with normal lordosis and painless range of motion, thoracic spine with normal kyphosis and painless range of motion, lumbosacral spine with normal lordosis and painless range of motion.\par Gait: normal.  \par \par Left knee:\par - Soft tissue swelling: none\par - Ecchymosis: none\par - Erythema: none\par - Effusion: none\par - Wounds: healed midline incision\par - Alignment: normal\par - Tenderness: none\par - ROM: 0-125\par - Collateral laxity: none\par - Cruciate laxity: none\par - Popliteal angle (degrees): 40\par - Quad strength: 5/5\par \par Right knee:\par - Soft tissue swelling: mild suprapatellar\par - Ecchymosis: none\par - Erythema: none\par - Effusion: small\par - Wounds: healed midline incision\par - Alignment: normal\par - Tenderness: none\par - ROM: 0-100\par - Collateral laxity: none\par - Cruciate laxity: none\par - Popliteal angle (degrees): 40\par - Quad strength: 5/5

## 2022-01-18 NOTE — DISCUSSION/SUMMARY
[de-identified] : 70y/o male about 5mo s/p R knee I&D and poly exchange for acute MSSA PJI, about 7mo s/p index R TKA and 1yr s/p L TKA\par - Infection appears to have cleared. We reviewed that any return of symptoms should warrant an immediate call back for re-evaluation.\par - Cont HEP\par - RTC annually with bilateral knee XRs or earlier as needed

## 2022-01-18 NOTE — HISTORY OF PRESENT ILLNESS
[de-identified] : R TKA 7/9/21\par R knee I&D and poly exchange for MSSA PJI, 8/12/21\par L TKA 1/29/21\par \par 1/18/22: Finished PO abx on 1/12/22 and denies any new or worsening symptoms since then. Ambulating without assistive device and going to the gym, mostly using uphill treadmill. Pain well controlled without medications. The dizziness appears to be getting gradually better over time. He was diagnosed with cataracts recently and the ophthalmologist felt that this is likely related to his dizziness; he is undergoing evaluation for surgical management now. No new complaints.\par \par 11/23/21: Reports ongoing headaches, dizziness, mild nausea. Has not responded to meclizine therapy. Saw ENT yesterday and has additional workup scheduled. Has mild bilateral knee pain, right worse than left, not to the point that he needs to take any pain medications. Walking an hour a day without assistive device. Feeling more confident with stairs over time. Has not had any recurrence of right knee erythema. No fevers/chills. Compliant with Keflex.

## 2022-01-18 NOTE — DISCUSSION/SUMMARY
[de-identified] : 70y/o male about 5mo s/p R knee I&D and poly exchange for acute MSSA PJI, about 7mo s/p index R TKA and 1yr s/p L TKA\par - Infection appears to have cleared. We reviewed that any return of symptoms should warrant an immediate call back for re-evaluation.\par - Cont HEP\par - RTC annually with bilateral knee XRs or earlier as needed

## 2022-01-18 NOTE — HISTORY OF PRESENT ILLNESS
[de-identified] : R TKA 7/9/21\par R knee I&D and poly exchange for MSSA PJI, 8/12/21\par L TKA 1/29/21\par \par 1/18/22: Finished PO abx on 1/12/22 and denies any new or worsening symptoms since then. Ambulating without assistive device and going to the gym, mostly using uphill treadmill. Pain well controlled without medications. The dizziness appears to be getting gradually better over time. He was diagnosed with cataracts recently and the ophthalmologist felt that this is likely related to his dizziness; he is undergoing evaluation for surgical management now. No new complaints.\par \par 11/23/21: Reports ongoing headaches, dizziness, mild nausea. Has not responded to meclizine therapy. Saw ENT yesterday and has additional workup scheduled. Has mild bilateral knee pain, right worse than left, not to the point that he needs to take any pain medications. Walking an hour a day without assistive device. Feeling more confident with stairs over time. Has not had any recurrence of right knee erythema. No fevers/chills. Compliant with Keflex.

## 2022-01-18 NOTE — PHYSICAL EXAM
[de-identified] : General appearance: well nourished and hydrated, pleasant, alert and oriented x 3, cooperative.  \par HEENT: normocephalic, EOM intact, wearing mask, external auditory canal clear.  \par Cardiovascular: no lower leg edema, no varicosities, dorsalis pedis pulses palpable and symmetric.  \par Lymphatics: no palpable lymphadenopathy, no lymphedema.  \par Neurologic: sensation is normal, no muscle weakness in upper or lower extremities, patella tendon reflexes present and symmetric.  \par Dermatologic: skin moist, warm, no rash.  \par Spine: cervical spine with normal lordosis and painless range of motion, thoracic spine with normal kyphosis and painless range of motion, lumbosacral spine with normal lordosis and painless range of motion.\par Gait: normal.  \par \par Left knee:\par - Soft tissue swelling: none\par - Ecchymosis: none\par - Erythema: none\par - Effusion: none\par - Wounds: healed midline incision\par - Alignment: normal\par - Tenderness: none\par - ROM: 0-125\par - Collateral laxity: none\par - Cruciate laxity: none\par - Popliteal angle (degrees): 40\par - Quad strength: 5/5\par \par Right knee:\par - Soft tissue swelling: mild suprapatellar\par - Ecchymosis: none\par - Erythema: none\par - Effusion: small\par - Wounds: healed midline incision\par - Alignment: normal\par - Tenderness: none\par - ROM: 0-100\par - Collateral laxity: none\par - Cruciate laxity: none\par - Popliteal angle (degrees): 40\par - Quad strength: 5/5

## 2022-01-20 ENCOUNTER — NON-APPOINTMENT (OUTPATIENT)
Age: 72
End: 2022-01-20

## 2022-02-01 ENCOUNTER — TRANSCRIPTION ENCOUNTER (OUTPATIENT)
Age: 72
End: 2022-02-01

## 2022-02-08 ENCOUNTER — TRANSCRIPTION ENCOUNTER (OUTPATIENT)
Age: 72
End: 2022-02-08

## 2022-02-17 ENCOUNTER — APPOINTMENT (OUTPATIENT)
Dept: INTERNAL MEDICINE | Facility: CLINIC | Age: 72
End: 2022-02-17

## 2022-02-23 ENCOUNTER — APPOINTMENT (OUTPATIENT)
Dept: OPHTHALMOLOGY | Facility: CLINIC | Age: 72
End: 2022-02-23
Payer: MEDICARE

## 2022-02-23 ENCOUNTER — NON-APPOINTMENT (OUTPATIENT)
Age: 72
End: 2022-02-23

## 2022-02-23 PROCEDURE — 92012 INTRM OPH EXAM EST PATIENT: CPT

## 2022-02-23 PROCEDURE — 92136 OPHTHALMIC BIOMETRY: CPT

## 2022-03-02 ENCOUNTER — APPOINTMENT (OUTPATIENT)
Dept: INTERNAL MEDICINE | Facility: CLINIC | Age: 72
End: 2022-03-02

## 2022-03-10 ENCOUNTER — APPOINTMENT (OUTPATIENT)
Dept: OTOLARYNGOLOGY | Facility: CLINIC | Age: 72
End: 2022-03-10

## 2022-03-15 ENCOUNTER — APPOINTMENT (OUTPATIENT)
Dept: INTERNAL MEDICINE | Facility: CLINIC | Age: 72
End: 2022-03-15

## 2022-03-24 ENCOUNTER — EMERGENCY (EMERGENCY)
Facility: HOSPITAL | Age: 72
LOS: 1 days | Discharge: HOME | End: 2022-03-24
Attending: EMERGENCY MEDICINE | Admitting: EMERGENCY MEDICINE
Payer: MEDICARE

## 2022-03-24 VITALS
DIASTOLIC BLOOD PRESSURE: 60 MMHG | OXYGEN SATURATION: 100 % | TEMPERATURE: 98 F | HEART RATE: 70 BPM | RESPIRATION RATE: 18 BRPM | SYSTOLIC BLOOD PRESSURE: 123 MMHG

## 2022-03-24 VITALS
TEMPERATURE: 98 F | HEART RATE: 68 BPM | HEIGHT: 73 IN | OXYGEN SATURATION: 98 % | WEIGHT: 175.05 LBS | RESPIRATION RATE: 18 BRPM | DIASTOLIC BLOOD PRESSURE: 88 MMHG | SYSTOLIC BLOOD PRESSURE: 148 MMHG

## 2022-03-24 DIAGNOSIS — Z41.9 ENCOUNTER FOR PROCEDURE FOR PURPOSES OTHER THAN REMEDYING HEALTH STATE, UNSPECIFIED: Chronic | ICD-10-CM

## 2022-03-24 LAB — TROPONIN T SERPL-MCNC: <0.01 NG/ML — SIGNIFICANT CHANGE UP (ref 0–0.01)

## 2022-03-24 PROCEDURE — 80053 COMPREHEN METABOLIC PANEL: CPT

## 2022-03-24 PROCEDURE — 36415 COLL VENOUS BLD VENIPUNCTURE: CPT

## 2022-03-24 PROCEDURE — U0005: CPT

## 2022-03-24 PROCEDURE — 99283 EMERGENCY DEPT VISIT LOW MDM: CPT

## 2022-03-24 PROCEDURE — 84484 ASSAY OF TROPONIN QUANT: CPT

## 2022-03-24 PROCEDURE — U0003: CPT

## 2022-03-24 PROCEDURE — 85025 COMPLETE CBC W/AUTO DIFF WBC: CPT

## 2022-03-24 PROCEDURE — 99284 EMERGENCY DEPT VISIT MOD MDM: CPT | Mod: CS

## 2022-03-24 NOTE — ED ADULT TRIAGE NOTE - CHIEF COMPLAINT QUOTE
x 1.5 weeks of intermittent CP. PMH fo valve replacement, alzheimer's. x 1.5 weeks of intermittent CP. PMH of a valve replacement, alzheimer's.

## 2022-03-24 NOTE — ED ADULT NURSE NOTE - OBJECTIVE STATEMENT
Patient a+o x4 to person, place, time, situation c/o intermittent cp x 2 weeks. Patient states no pain since coming to the er but has felt intermittent cp and dyspnea with exertion. Hx alzheimer for 3 years, states "depending on the day it's good or bad". Takes baby aspirin daily. Safety measures initiated.

## 2022-03-24 NOTE — ED ADULT TRIAGE NOTE - BMI (KG/M2)
23.1 Niacinamide Counseling: I recommended taking niacin or niacinamide, also know as vitamin B3, twice daily. Recent evidence suggests that taking vitamin B3 (500 mg twice daily) can reduce the risk of actinic keratoses and non-melanoma skin cancers. Side effects of vitamin B3 include flushing and headache.

## 2022-03-24 NOTE — ED ADULT NURSE REASSESSMENT NOTE - NS ED NURSE REASSESS COMMENT FT1
Pt ambulates with a steady gait. Pt home address confirmed with pt and pt's family member. Transportation arranged for patient home. Pending D/C per MD Cabral.

## 2022-03-24 NOTE — ED PROVIDER NOTE - OBJECTIVE STATEMENT
72 yom with history of alzheimers here with chest pain.  pt states that he has had intermittent non exertional non radiating chest pain x 1 week.  able to eat and drink normally.  getting worked up for abdominal pain.

## 2022-03-24 NOTE — ED ADULT TRIAGE NOTE - OTHER COMPLAINTS
Pt a&ox2 to person and place. lives alone. PT states "my children have tried to get me to have a caregiver, but I refuse. I won't have one."

## 2022-03-24 NOTE — ED PROVIDER NOTE - CLINICAL SUMMARY MEDICAL DECISION MAKING FREE TEXT BOX
72 yom with atypical chest pain.  labs unremarkable  seen by cards 6 months ago  will follow up asap with cards  SW for transport

## 2022-03-28 ENCOUNTER — APPOINTMENT (OUTPATIENT)
Dept: HEART AND VASCULAR | Facility: CLINIC | Age: 72
End: 2022-03-28

## 2022-03-28 ENCOUNTER — APPOINTMENT (OUTPATIENT)
Dept: HEART AND VASCULAR | Facility: CLINIC | Age: 72
End: 2022-03-28
Payer: MEDICARE

## 2022-03-28 VITALS
SYSTOLIC BLOOD PRESSURE: 136 MMHG | HEIGHT: 72 IN | WEIGHT: 180 LBS | OXYGEN SATURATION: 93 % | BODY MASS INDEX: 24.38 KG/M2 | TEMPERATURE: 98 F | RESPIRATION RATE: 14 BRPM | DIASTOLIC BLOOD PRESSURE: 80 MMHG | HEART RATE: 71 BPM

## 2022-03-28 VITALS
WEIGHT: 186 LBS | SYSTOLIC BLOOD PRESSURE: 136 MMHG | HEIGHT: 72 IN | DIASTOLIC BLOOD PRESSURE: 80 MMHG | BODY MASS INDEX: 25.19 KG/M2

## 2022-03-28 DIAGNOSIS — G30.9 ALZHEIMER'S DISEASE, UNSPECIFIED: ICD-10-CM

## 2022-03-28 DIAGNOSIS — E78.5 HYPERLIPIDEMIA, UNSPECIFIED: ICD-10-CM

## 2022-03-28 DIAGNOSIS — Z20.822 CONTACT WITH AND (SUSPECTED) EXPOSURE TO COVID-19: ICD-10-CM

## 2022-03-28 DIAGNOSIS — F31.30 BIPOLAR DISORDER, CURRENT EPISODE DEPRESSED, MILD OR MODERATE SEVERITY, UNSPECIFIED: ICD-10-CM

## 2022-03-28 DIAGNOSIS — Z79.82 LONG TERM (CURRENT) USE OF ASPIRIN: ICD-10-CM

## 2022-03-28 DIAGNOSIS — Z98.890 OTHER SPECIFIED POSTPROCEDURAL STATES: ICD-10-CM

## 2022-03-28 DIAGNOSIS — R07.89 OTHER CHEST PAIN: ICD-10-CM

## 2022-03-28 PROCEDURE — 99215 OFFICE O/P EST HI 40 MIN: CPT

## 2022-03-28 PROCEDURE — 93306 TTE W/DOPPLER COMPLETE: CPT

## 2022-03-28 PROCEDURE — 93000 ELECTROCARDIOGRAM COMPLETE: CPT

## 2022-03-28 NOTE — PHYSICAL EXAM

## 2022-03-28 NOTE — REASON FOR VISIT
[Symptom and Test Evaluation] : symptom and test evaluation [Structural Heart and Valve Disease] : structural heart and valve disease [Family Member] : family member

## 2022-03-28 NOTE — DISCUSSION/SUMMARY
[FreeTextEntry1] : stable exam and ecg\par cp/ sob- h/o MV repair, echo stable, repair stable, f/u nuclear stress r/o CAD

## 2022-03-31 ENCOUNTER — APPOINTMENT (OUTPATIENT)
Dept: HEART AND VASCULAR | Facility: CLINIC | Age: 72
End: 2022-03-31
Payer: MEDICARE

## 2022-03-31 DIAGNOSIS — R07.9 CHEST PAIN, UNSPECIFIED: ICD-10-CM

## 2022-03-31 DIAGNOSIS — R06.02 SHORTNESS OF BREATH: ICD-10-CM

## 2022-03-31 PROCEDURE — 78451 HT MUSCLE IMAGE SPECT SING: CPT

## 2022-03-31 PROCEDURE — A9500: CPT

## 2022-03-31 PROCEDURE — 93015 CV STRESS TEST SUPVJ I&R: CPT

## 2022-03-31 PROCEDURE — 99214 OFFICE O/P EST MOD 30 MIN: CPT | Mod: 25

## 2022-03-31 NOTE — DISCUSSION/SUMMARY
[FreeTextEntry1] : cp/sob- negative non invasive cardiac evaluation, results discussed/ reassurance given

## 2022-03-31 NOTE — PHYSICAL EXAM

## 2022-04-07 ENCOUNTER — APPOINTMENT (OUTPATIENT)
Dept: GASTROENTEROLOGY | Facility: CLINIC | Age: 72
End: 2022-04-07

## 2022-05-05 NOTE — DISCHARGE NOTE NURSING/CASE MANAGEMENT/SOCIAL WORK - HAS THE PATIENT USED TOBACCO IN THE PAST 30 DAYS?
Clinic Care Coordination Contact    Follow Up Progress Note      Assessment: SHAMAR MITTAL and Lorena  ID #[id number] contacted Yuridia's mother, Gerald Coronado. She reported that Yuridia went to the doctor, she has a respiratory infection. She was treated and doing better. Gerald Coronado stated that she has had issues with renewing her SNAP benefits, she called the [employer] and [facility] to assist her. Gerald Coronado told them that she has sent in her stubs and proof of information into them but they told her it could take up to 10 days to process the renewal. SHAMAR MITTAL informed her she can send a food resource or to call the [employer] to connect if they are in need of one. She understood but will await the SNAP renewal.    Care Gaps:  There are no preventive care reminders to display for this patient.    Goals addressed this encounter:    Goals Addressed                    This Visit's Progress       1. Psychosocial (pt-stated)   40%      Goal Statement: [place] parents would like to learn how to schedule medical transportation within the next 6 months   Date Goal set: 3/4/22  Barriers: Unfamiliar  Strengths: Motivated  Date to Achieve By: 9/2022  Patient expressed understanding of goal: Yes  Action steps to achieve this goal:  1. SHAMAR CC will set up the first few rides for Yuridia through [employer].  2. SW CC to teach [place] parents how to call [employer] for rides and set up transportation requests.  3. SW CC to follow up with family for additional needs and supports.,              Intervention/Education provided during outreach: SHAMAR MITTAL role     Outreach Frequency: monthly    Plan: SHAMAR MITTAL to follow up within one month.    Monse Marcus, GERMAN  , Care Coordination  [facility]  [facility]  [facility]  [phone]     No

## 2022-05-09 ENCOUNTER — NON-APPOINTMENT (OUTPATIENT)
Age: 72
End: 2022-05-09

## 2022-05-09 ENCOUNTER — APPOINTMENT (OUTPATIENT)
Dept: OPHTHALMOLOGY | Facility: CLINIC | Age: 72
End: 2022-05-09
Payer: MEDICARE

## 2022-05-09 PROCEDURE — 92136 OPHTHALMIC BIOMETRY: CPT | Mod: 26

## 2022-05-09 PROCEDURE — 92012 INTRM OPH EXAM EST PATIENT: CPT

## 2022-05-24 NOTE — PHYSICAL THERAPY INITIAL EVALUATION ADULT - DISCHARGE DISPOSITION, PT EVAL
[facility]    [id number] [facility] [address]    Phone:  [phone]    Fax:  [phone]       Thank You for choosing us for your health care visit. We are glad to serve you and happy to provide you with this summary of your visit. Please help us to ensure we have accurate records. If you find anything that needs to be changed, please let our staff know as soon as possible.          Your Demographic Information     Patient Name Sex Jensen Piña Male 1948       Ethnic Group Patient Race    Not of  or  Origin White      Your Visit Details     Date & Time Provider Department    3/1/2017 3:20 PM Cheyenne Vidales MD [facility]      Your Upcoming Appointment*(Max 10)     2017  1:00 PM CDT   Nurse Visit with [facility] NURSE   [facility] ([facility])    [id number] [facility] [id number] [phone]            2017  1:20 PM CDT   Medicare Well Visit with Cheyenne Vidales MD   [facility] ([facility])    [id number] [facility] [id number] [phone]              Your To Do List     Future Orders Please Complete On or Around Expires    HEPATIC FUNCTION PANEL  Mar 01, 2017 Mar 31, 2017      Conditions Discussed Today or Order-Related Diagnoses        Comments    Elevated LFTs    -  Primary       Your Vitals Were     BP Pulse Temp Resp Height Weight    118/74 80 97.8 °F (36.6 °C) (Oral) 14 5' 8\" (1.727 m) 238 lb (108 kg)    BMI Smoking Status                36.19 kg/m2 Never Smoker          Medications Prescribed or Re-Ordered Today     None      Your Current Medications Are        Disp Refills Start End    phenyTOIN (DILANTIN INFATABS) 50 MG tablet 90 tablet 0 2017     Sig - Route: Chew 3 tablets by mouth daily (at noon). - Oral    Class: Eprescribe    phenyTOIN 
(DILANTIN) 100 MG ER capsule 150 capsule 0 2/23/2017     Sig: Take 2 tabs BID.    Class: Eprescribe    ibuprofen (MOTRIN) 200 MG tablet        Sig - Route: Take 400 mg by mouth every 6 hours as needed for Pain. Indications: Mild to Moderate Pain - Oral    Class: Historical Med    hydrochlorothiazide (MICROZIDE) 12.5 MG capsule 90 capsule 3 11/30/2016     Sig - Route: Take 1 capsule by mouth daily. - Oral    Class: Eprescribe    albuterol 108 (90 BASE) MCG/ACT inhaler 1 Inhaler 0 10/21/2016     Sig - Route: Inhale 2 puffs into the lungs every 4 hours as needed for Shortness of Breath or Wheezing. - Inhalation    Class: Eprescribe    Cholecalciferol ( VITAMIN D3) 4000 UNITS CAPS        Sig - Route: Take 1 capsule by mouth daily. - Oral    Class: Historical Med    Multiple Vitamins-Minerals (MULTIVITAMIN PO)        Sig - Route: Take 1 tablet by mouth daily.  - Oral    Class: Historical Med    aspirin 81 MG tablet   12/23/2013     Sig - Route: Take 1 tablet by mouth daily. - Oral    Class: Historical Med    clopidogrel (PLAVIX) 75 MG tablet   12/23/2013     Sig - Route: Take 1 tablet by mouth daily. - Oral    Class: Historical Med    metoPROLOL (TOPROL-XL) 25 MG 24 hr tablet        Sig - Route: Take 25 mg by mouth daily. - Oral    Class: Historical Med    losartan (COZAAR) 25 MG tablet        Sig - Route: Take 25 mg by mouth daily. - Oral    Class: Historical Med    atorvastatin (LIPITOR) 80 MG tablet        Sig - Route: Take 80 mg by mouth daily. - Oral    Class: Historical Med      Allergies     Amoxicillin HIVES, RASH    Erythromycin NAUSEA    Weak and nauseated    Levofloxacin SEIZURES      Immunizations History as of 3/1/2017     Name Date    Hepatitis B Adult 6/18/2012, 5/11/2011    INFLUENZA QUADRIVALENT 11/24/2014  9:55 AM    Influenza 10/22/2013, 10/18/2013, 10/1/2012, 10/1/2010    Influenza Quadrivalent Preservative Free 10/6/2016    Pneumococcal Conjugate 13 Valent 7/29/2015    Pneumococcal Polysaccharide 
home w/ home PT
Adult 12/26/2014, 10/29/2004    Td:Adult type tetanus/diphtheria 7/15/2008, 6/4/2003, 1/1/2003, 6/12/1991    Tdap 7/15/2008      Problem List as of 3/1/2017     Gout    Hyperlipidemia    Elevated prostate specific antigen (PSA)    Vitamin D deficiency    CAD (coronary artery disease)    ED (erectile dysfunction)    Generalized convulsive epilepsy with intractable epilepsy    NORY on CPAP    Chronic coughing    Wheezing    Obesity, unspecified    Neuropathy of leg    Seizure disorder            Patient Instructions     None      
25-May-2022

## 2022-06-14 NOTE — PATIENT PROFILE ADULT - NS PRO AD ANY ON CHART
Action 4: Continue Other Instructions: Patient had a small flare to left lower leg in January but seems to be improving. Sig For Treatment 2 (If Needed): once weekly Detail Level: Zone Continue Regimen: Otezla 30 mg take 1 PO BID.\\nEnstilar foam to affected areas of body informed patient to alternate with triamcinalone 0.1% cream. No

## 2022-07-11 ENCOUNTER — APPOINTMENT (OUTPATIENT)
Dept: INTERNAL MEDICINE | Facility: CLINIC | Age: 72
End: 2022-07-11

## 2022-07-11 VITALS
DIASTOLIC BLOOD PRESSURE: 80 MMHG | SYSTOLIC BLOOD PRESSURE: 124 MMHG | HEART RATE: 86 BPM | TEMPERATURE: 97.8 F | OXYGEN SATURATION: 96 % | HEIGHT: 72 IN | BODY MASS INDEX: 25.73 KG/M2 | RESPIRATION RATE: 14 BRPM | WEIGHT: 190 LBS

## 2022-07-11 DIAGNOSIS — Z01.818 ENCOUNTER FOR OTHER PREPROCEDURAL EXAMINATION: ICD-10-CM

## 2022-07-11 PROCEDURE — 99214 OFFICE O/P EST MOD 30 MIN: CPT

## 2022-07-11 PROCEDURE — 36415 COLL VENOUS BLD VENIPUNCTURE: CPT

## 2022-07-11 PROCEDURE — 93000 ELECTROCARDIOGRAM COMPLETE: CPT | Mod: NC

## 2022-07-12 ENCOUNTER — NON-APPOINTMENT (OUTPATIENT)
Age: 72
End: 2022-07-12

## 2022-07-13 LAB
ANION GAP SERPL CALC-SCNC: 12 MMOL/L
BASOPHILS # BLD AUTO: 0.02 K/UL
BASOPHILS NFR BLD AUTO: 0.4 %
BUN SERPL-MCNC: 18 MG/DL
CALCIUM SERPL-MCNC: 9.6 MG/DL
CHLORIDE SERPL-SCNC: 105 MMOL/L
CO2 SERPL-SCNC: 24 MMOL/L
CREAT SERPL-MCNC: 1.05 MG/DL
EGFR: 76 ML/MIN/1.73M2
EOSINOPHIL # BLD AUTO: 0.11 K/UL
EOSINOPHIL NFR BLD AUTO: 2.2 %
FERRITIN SERPL-MCNC: 22 NG/ML
GLUCOSE SERPL-MCNC: 89 MG/DL
HCT VFR BLD CALC: 42.6 %
HGB BLD-MCNC: 12.8 G/DL
IMM GRANULOCYTES NFR BLD AUTO: 0.2 %
IRON SATN MFR SERPL: 9 %
IRON SERPL-MCNC: 33 UG/DL
LYMPHOCYTES # BLD AUTO: 1.31 K/UL
LYMPHOCYTES NFR BLD AUTO: 26.3 %
MAN DIFF?: NORMAL
MCHC RBC-ENTMCNC: 26.6 PG
MCHC RBC-ENTMCNC: 30 GM/DL
MCV RBC AUTO: 88.4 FL
MONOCYTES # BLD AUTO: 0.58 K/UL
MONOCYTES NFR BLD AUTO: 11.6 %
NEUTROPHILS # BLD AUTO: 2.95 K/UL
NEUTROPHILS NFR BLD AUTO: 59.3 %
PLATELET # BLD AUTO: 274 K/UL
POTASSIUM SERPL-SCNC: 4.7 MMOL/L
RBC # BLD: 4.82 M/UL
RBC # FLD: 15.6 %
SODIUM SERPL-SCNC: 140 MMOL/L
TIBC SERPL-MCNC: 361 UG/DL
UIBC SERPL-MCNC: 328 UG/DL
VIT B12 SERPL-MCNC: 710 PG/ML
WBC # FLD AUTO: 4.98 K/UL

## 2022-07-15 NOTE — ASU PATIENT PROFILE, ADULT - NSICDXPASTSURGICALHX_GEN_ALL_CORE_FT
PAST SURGICAL HISTORY:  Surgery, elective Mitral valve repair 2017     PAST SURGICAL HISTORY:  S/P colonoscopy     Surgery, elective Mitral valve repair 2017

## 2022-07-15 NOTE — ASU PATIENT PROFILE, ADULT - FALL HARM RISK - UNIVERSAL INTERVENTIONS
Bed in lowest position, wheels locked, appropriate side rails in place/Call bell, personal items and telephone in reach/Instruct patient to call for assistance before getting out of bed or chair/Non-slip footwear when patient is out of bed/[place] to call system/Physically safe environment - no spills, clutter or unnecessary equipment/Purposeful Proactive Rounding/Room/bathroom lighting operational, light cord in reach

## 2022-07-24 NOTE — ADDENDUM
[FreeTextEntry1] : 7/24/22\par Patient remains in good health and is stable to proceed with cataract surgery on 8/15/22.  EKG and labs have been reviewed again.

## 2022-07-24 NOTE — PHYSICAL EXAM
[No Edema] : there was no peripheral edema [Normal] : affect was normal and insight and judgment were intact [de-identified] : 2/6 claire

## 2022-07-24 NOTE — REVIEW OF SYSTEMS
[Vision Problems] : vision problems [Joint Pain] : joint pain [Dizziness] : dizziness [Memory Loss] : memory loss [Negative] : Respiratory [Earache] : no earache [Fatigue] : no fatigue [Hearing Loss] : no hearing loss

## 2022-07-24 NOTE — HISTORY OF PRESENT ILLNESS
[No Pertinent Pulmonary History] : no history of asthma, COPD, sleep apnea, or smoking [No Adverse Anesthesia Reaction] : no adverse anesthesia reaction in self or family member [(Patient denies any chest pain, claudication, dyspnea on exertion, orthopnea, palpitations or syncope)] : Patient denies any chest pain, claudication, dyspnea on exertion, orthopnea, palpitations or syncope [Good (7-10 METs)] : Good (7-10 METs) [Aortic Stenosis] : no aortic stenosis [Atrial Fibrillation] : no atrial fibrillation [Coronary Artery Disease] : no coronary artery disease [Recent Myocardial Infarction] : no recent myocardial infarction [Implantable Device/Pacemaker] : no implantable device/pacemaker [FreeTextEntry2] : 7/18/222 and 8/1/22 [FreeTextEntry1] : cataract b/l  [FreeTextEntry3] : Dr Madsen [FreeTextEntry4] : 69 yo M with hx of Alzheimers,  MVR in 2017, HLD, Bipolar depression, OA \par has b/l catarcts\par continues to exercise 4-5 times a week   \par

## 2022-07-29 NOTE — OPERATIVE REPORT - OPERATIVE RPOSRT DETAILS
SURGEON: Bruce Madsen MD    ASSISTANT: Ally Tan MD    PRE-OP DIAGNOSIS: Cataract, [ left ] eye    POST-OP DIAGNOSIS: Same    ANESTHESIA: MAC, topical    PROCEDURE: cataract extraction, intraocular lens implant [   left ] eye    SPECIMEN/TISSUE REMOVED: None    ESTIMATED BLOOD LOSS: < 1mL    COMPLICATIONS: None    LENS: DIB00 +20.0    PROCEDURE:    The patient was seen in the preoperative area. The risks, benefits, and alternatives to surgery were discussed. All questions were answered.  The patient was given standard preoperative drops. The patient was then brought to the operating room and prepped and draped in the usual sterile fashion for ophthalmic surgery.    A lid speculum was used to expose the eye.  A paracentesis was created with an MVR blade at 3 clock hours from the temporal limbus in the clockwise direction.  Next, 1% Preservative-free Lidocaine was instilled into the anterior chamber followed by viscoat.  A clear corneal incision was created with the aid of a crescent blade and a 2.75 mm sharped tip keratome.  A cystotome and Utrata forceps was used to create a continuous curvilinear capsulorrhexis.  Balanced salt solution was used for hydro dissection.  The nucleus was then phacoemulsified and aspirated using a divide and conquer technique.  The remaining epinucleus and cortical material was aspirated from the eye.  The capsular bag was then reformed using provisc in anticipation of the introduction of an intraocular lens implant.  The implant was injected into the capsular bag.  After centration, the remaining viscoelastic material was removed using the IA handpiece.    The temporal clear corneal and paracentesis incisions were hydrated with balanced salt solution to achieve adequate watertight closure. The wounds were checked for leaks and found to be negative.    A combination of antibiotic and steroid were applied to the surface of the eye, and the eye was shielded.    The patient tolerated the procedure well and was transferred to the recovery room in stable condition. They received standard postoperative instructions and an appointment to follow up the next day.   SURGEON: Bruce Pennington MD    ASSISTANT: Ally Tan MD    PRE-OP DIAGNOSIS: Cataract, [ left ] eye    POST-OP DIAGNOSIS: Same    ANESTHESIA: MAC, topical    PROCEDURE: cataract extraction, intraocular lens implant [   left ] eye    SPECIMEN/TISSUE REMOVED: None    ESTIMATED BLOOD LOSS: < 1mL    COMPLICATIONS: None    LENS: DIB00 +20.0 D    PROCEDURE:    The patient was seen in the preoperative area. The risks, benefits, and alternatives to surgery were discussed. All questions were answered.  The patient was given standard preoperative drops. The patient was then brought to the operating room and prepped and draped in the usual sterile fashion for ophthalmic surgery.    A lid speculum was used to expose the eye.  A paracentesis was created with an MVR blade at 3 clock hours from the temporal limbus in the clockwise direction.  Next, 1% Preservative-free Lidocaine was instilled into the anterior chamber followed by viscoat.  A clear corneal incision was created with the aid of a crescent blade and a 2.75 mm sharped tip keratome.  A cystotome and Utrata forceps was used to create a continuous curvilinear capsulorrhexis.  Balanced salt solution was used for hydro dissection.  The nucleus was then phacoemulsified and aspirated using a divide and conquer technique.  The remaining epinucleus and cortical material was aspirated from the eye.  The capsular bag was then reformed using provisc in anticipation of the introduction of an intraocular lens implant.  The implant was injected into the capsular bag.  After centration, the remaining viscoelastic material was removed using the IA handpiece.    The temporal clear corneal and paracentesis incisions were hydrated with balanced salt solution to achieve adequate watertight closure. The wounds were checked for leaks and found to be negative.    A combination of antibiotic and steroid were applied to the surface of the eye, and the eye was shielded.    The patient tolerated the procedure well and was transferred to the recovery room in stable condition. They received standard postoperative instructions and an appointment to follow up the next day.

## 2022-08-01 ENCOUNTER — APPOINTMENT (OUTPATIENT)
Dept: OPHTHALMOLOGY | Facility: AMBULATORY SURGERY CENTER | Age: 72
End: 2022-08-01

## 2022-08-01 ENCOUNTER — NON-APPOINTMENT (OUTPATIENT)
Age: 72
End: 2022-08-01

## 2022-08-01 ENCOUNTER — OUTPATIENT (OUTPATIENT)
Dept: OUTPATIENT SERVICES | Facility: HOSPITAL | Age: 72
LOS: 1 days | Discharge: HOME | End: 2022-08-01

## 2022-08-01 VITALS
HEART RATE: 60 BPM | WEIGHT: 195.33 LBS | SYSTOLIC BLOOD PRESSURE: 140 MMHG | OXYGEN SATURATION: 100 % | TEMPERATURE: 97 F | RESPIRATION RATE: 16 BRPM | DIASTOLIC BLOOD PRESSURE: 83 MMHG | HEIGHT: 74 IN

## 2022-08-01 VITALS
HEART RATE: 54 BPM | SYSTOLIC BLOOD PRESSURE: 138 MMHG | TEMPERATURE: 97 F | DIASTOLIC BLOOD PRESSURE: 841 MMHG | OXYGEN SATURATION: 99 % | RESPIRATION RATE: 16 BRPM

## 2022-08-01 DIAGNOSIS — Z98.890 OTHER SPECIFIED POSTPROCEDURAL STATES: Chronic | ICD-10-CM

## 2022-08-01 DIAGNOSIS — Z41.9 ENCOUNTER FOR PROCEDURE FOR PURPOSES OTHER THAN REMEDYING HEALTH STATE, UNSPECIFIED: Chronic | ICD-10-CM

## 2022-08-01 PROCEDURE — 66984 XCAPSL CTRC RMVL W/O ECP: CPT | Mod: LT

## 2022-08-01 DEVICE — LENS IOL TECNIS EYHANCE DIB00 20.0D
Type: IMPLANTABLE DEVICE | Site: LEFT | Status: NON-FUNCTIONAL
Removed: 2022-08-01

## 2022-08-01 RX ORDER — CYCLOPENTOLATE HYDROCHLORIDE 10 MG/ML
1 SOLUTION/ DROPS OPHTHALMIC
Refills: 0 | Status: COMPLETED | OUTPATIENT
Start: 2022-08-01 | End: 2022-08-01

## 2022-08-01 RX ORDER — OFLOXACIN 0.3 %
1 DROPS OPHTHALMIC (EYE)
Refills: 0 | Status: COMPLETED | OUTPATIENT
Start: 2022-08-01 | End: 2022-08-01

## 2022-08-01 RX ORDER — PHENYLEPHRINE HCL 2.5 %
1 DROPS OPHTHALMIC (EYE)
Refills: 0 | Status: COMPLETED | OUTPATIENT
Start: 2022-08-01 | End: 2022-08-01

## 2022-08-01 RX ORDER — TROPICAMIDE 1 %
1 DROPS OPHTHALMIC (EYE)
Refills: 0 | Status: COMPLETED | OUTPATIENT
Start: 2022-08-01 | End: 2022-08-01

## 2022-08-01 RX ADMIN — Medication 1 DROP(S): at 11:45

## 2022-08-01 RX ADMIN — Medication 1 DROP(S): at 11:50

## 2022-08-01 RX ADMIN — CYCLOPENTOLATE HYDROCHLORIDE 1 DROP(S): 10 SOLUTION/ DROPS OPHTHALMIC at 11:55

## 2022-08-01 RX ADMIN — Medication 1 DROP(S): at 11:56

## 2022-08-01 RX ADMIN — Medication 1 DROP(S): at 11:55

## 2022-08-01 RX ADMIN — CYCLOPENTOLATE HYDROCHLORIDE 1 DROP(S): 10 SOLUTION/ DROPS OPHTHALMIC at 11:45

## 2022-08-01 RX ADMIN — CYCLOPENTOLATE HYDROCHLORIDE 1 DROP(S): 10 SOLUTION/ DROPS OPHTHALMIC at 11:50

## 2022-08-02 ENCOUNTER — APPOINTMENT (OUTPATIENT)
Dept: OPHTHALMOLOGY | Facility: CLINIC | Age: 72
End: 2022-08-02

## 2022-08-02 ENCOUNTER — NON-APPOINTMENT (OUTPATIENT)
Age: 72
End: 2022-08-02

## 2022-08-02 PROCEDURE — 99024 POSTOP FOLLOW-UP VISIT: CPT

## 2022-08-08 ENCOUNTER — APPOINTMENT (OUTPATIENT)
Dept: OPHTHALMOLOGY | Facility: CLINIC | Age: 72
End: 2022-08-08

## 2022-08-08 ENCOUNTER — NON-APPOINTMENT (OUTPATIENT)
Age: 72
End: 2022-08-08

## 2022-08-08 PROCEDURE — 99024 POSTOP FOLLOW-UP VISIT: CPT

## 2022-08-12 NOTE — ASU PATIENT PROFILE, ADULT - NSICDXPASTSURGICALHX_GEN_ALL_CORE_FT
PAST SURGICAL HISTORY:  H/O cataract extraction left    S/P colonoscopy     Surgery, elective Mitral valve repair 2017

## 2022-08-12 NOTE — ASU PATIENT PROFILE, ADULT - FALL HARM RISK - UNIVERSAL INTERVENTIONS
Bed in lowest position, wheels locked, appropriate side rails in place/Call bell, personal items and telephone in reach/Instruct patient to call for assistance before getting out of bed or chair/Non-slip footwear when patient is out of bed/[place] to call system/Physically safe environment - no spills, clutter or unnecessary equipment/Purposeful Proactive Rounding/Room/bathroom lighting operational, light cord in reach

## 2022-08-13 NOTE — PHYSICAL EXAM
Problem: At Risk for Falls  Goal: # Takes action to control fall-related risks  Outcome: Outcome Met, Continue evaluating goal progress toward completion     Problem: Activity Intolerance  Goal: # Functional status is maintained or returned to baseline  Outcome: Outcome Met, Continue evaluating goal progress toward completion     Problem: Breathing Pattern Ineffective  Goal: Air exchange is effective, demonstrated by Sp02 sat of greater then or = 92% (or as ordered)  Outcome: Outcome Met, Continue evaluating goal progress toward completion  Goal: Respiratory pattern is quiet and regular without report of SOB  Outcome: Outcome Met, Continue evaluating goal progress toward completion     Problem: VTE, Risk for  Goal: # No s/s of VTE  Outcome: Outcome Met, Continue evaluating goal progress toward completion     Problem: Fluid Volume Excess  Goal: # Fluid Volume Excess Symptoms Resolved  Description: Treatment often consists of oxygen and respiratory support with diuretic therapy at doses that exceed usual dose (typically doubled).  Monitor patient response to treatment.    Acute dyspnea should resolve quickly if dose is adequate and kidney function is adequate. Dyspnea/SOB should only be observed with Activity after effective treatment. Patient should be able to lie down comfortably, without SOB.  Outcome: Outcome Not Met, Continue to Monitor      [de-identified] : General appearance: well nourished and hydrated, pleasant, alert and oriented x 3, cooperative.  \par HEENT: normocephalic, EOM intact, wearing mask, external auditory canal clear.  \par Cardiovascular: no lower leg edema, no varicosities, dorsalis pedis pulses palpable and symmetric.  \par Lymphatics: no palpable lymphadenopathy, no lymphedema.  \par Neurologic: sensation is normal, no muscle weakness in upper or lower extremities, patella tendon reflexes present and symmetric.  \par Dermatologic: skin moist, warm, no rash.  \par Spine: cervical spine with normal lordosis and painless range of motion, thoracic spine with normal kyphosis and painless range of motion, lumbosacral spine with normal lordosis and painless range of motion.\par Gait: slight left antalgia.\par \par Left knee:\par - Inspection: large effusion, negative ecchymosis and erythema.  \par - Wounds: none.  \par - Alignment: mild correctable valgus.\par - Palpation: medial and lateral joint line tenderness on palpation.  \par - ROM active: 0-100, pain with deepening flexion.\par - Ligamentous laxity: negative Lachman, negative ant. drawer test, negative post. drawer test, negative pivot shift test, stable to varus stress, increased laxity to valgus stress with a firm endpoint.  \par - Popliteal angle: 60 degrees\par - Muscle Test: 5/5 quad strength.  \par \par Right knee:\par - Inspection: trace effusion, negative ecchymosis and erythema.  \par - Wounds: none.  \par - Alignment: mild correctable varus.\par - Palpation: medial joint line tenderness on palpation.  \par - ROM active: 3-130, discomfort on extremes of motion\par - Ligamentous laxity: negative Lachman, negative ant. drawer test, negative post. drawer test, negative pivot shift test, stable to varus stress, stable to valgus stress.  \par - Popliteal angle: 60 degrees\par - Muscle Test: 5/5 quad strength. [de-identified] : AP pelvis and 4 views of the bilateral knees (weightbearing AP, weightbearing Zelaya, weightbearing lateral, and Sunrise) were obtained today and reviewed with the patient.\par \par Bilateral hips demonstrate normal alignment without arthritis (Tonnis 0). There are bilateral femoral cam lesions. There is no fracture or prior fracture deformity. There is no radiographic evidence of osteonecrosis.\par \par Rightward lumbar scoliosis is noted.\par \par Bilateral sacroiliac joints appear normal without arthrosis.\par \par The left knee demonstrates mild valgus malalignment. There is tricompartmental osteoarthritis most pronounced laterally, Kellgren-Jonathon 4. The patella sits at appropriate height and tracks centrally.\par \par The right knee demonstrates mild varus malalignment. There is tricompartmental osteoarthritis most pronounced medially, Kellgren-Jonathon 4. The patella sits at appropriate height and tracks centrally.\par

## 2022-08-15 ENCOUNTER — APPOINTMENT (OUTPATIENT)
Dept: OPHTHALMOLOGY | Facility: AMBULATORY SURGERY CENTER | Age: 72
End: 2022-08-15

## 2022-08-15 ENCOUNTER — NON-APPOINTMENT (OUTPATIENT)
Age: 72
End: 2022-08-15

## 2022-08-15 ENCOUNTER — TRANSCRIPTION ENCOUNTER (OUTPATIENT)
Age: 72
End: 2022-08-15

## 2022-08-15 ENCOUNTER — OUTPATIENT (OUTPATIENT)
Dept: OUTPATIENT SERVICES | Facility: HOSPITAL | Age: 72
LOS: 1 days | Discharge: HOME | End: 2022-08-15

## 2022-08-15 VITALS
RESPIRATION RATE: 18 BRPM | OXYGEN SATURATION: 98 % | HEART RATE: 62 BPM | DIASTOLIC BLOOD PRESSURE: 82 MMHG | SYSTOLIC BLOOD PRESSURE: 139 MMHG | TEMPERATURE: 98 F

## 2022-08-15 VITALS
TEMPERATURE: 97 F | OXYGEN SATURATION: 100 % | SYSTOLIC BLOOD PRESSURE: 143 MMHG | DIASTOLIC BLOOD PRESSURE: 88 MMHG | HEIGHT: 73 IN | RESPIRATION RATE: 16 BRPM | WEIGHT: 196.87 LBS | HEART RATE: 57 BPM

## 2022-08-15 DIAGNOSIS — Z98.49 CATARACT EXTRACTION STATUS, UNSPECIFIED EYE: Chronic | ICD-10-CM

## 2022-08-15 DIAGNOSIS — Z98.890 OTHER SPECIFIED POSTPROCEDURAL STATES: Chronic | ICD-10-CM

## 2022-08-15 DIAGNOSIS — Z41.9 ENCOUNTER FOR PROCEDURE FOR PURPOSES OTHER THAN REMEDYING HEALTH STATE, UNSPECIFIED: Chronic | ICD-10-CM

## 2022-08-15 PROCEDURE — 66984 XCAPSL CTRC RMVL W/O ECP: CPT | Mod: RT,79

## 2022-08-15 DEVICE — LENS IOL TECNIS EYHANCE DIB00 21.0D
Type: IMPLANTABLE DEVICE | Site: RIGHT | Status: NON-FUNCTIONAL
Removed: 2022-08-15

## 2022-08-15 RX ORDER — OFLOXACIN 0.3 %
1 DROPS OPHTHALMIC (EYE)
Refills: 0 | Status: COMPLETED | OUTPATIENT
Start: 2022-08-15 | End: 2022-08-15

## 2022-08-15 RX ORDER — CYCLOPENTOLATE HYDROCHLORIDE 10 MG/ML
1 SOLUTION/ DROPS OPHTHALMIC
Refills: 0 | Status: COMPLETED | OUTPATIENT
Start: 2022-08-15 | End: 2022-08-15

## 2022-08-15 RX ORDER — TROPICAMIDE 1 %
1 DROPS OPHTHALMIC (EYE)
Refills: 0 | Status: COMPLETED | OUTPATIENT
Start: 2022-08-15 | End: 2022-08-15

## 2022-08-15 RX ORDER — ACETAMINOPHEN 500 MG
650 TABLET ORAL ONCE
Refills: 0 | Status: DISCONTINUED | OUTPATIENT
Start: 2022-08-15 | End: 2022-08-15

## 2022-08-15 RX ORDER — PHENYLEPHRINE HCL 2.5 %
1 DROPS OPHTHALMIC (EYE)
Refills: 0 | Status: COMPLETED | OUTPATIENT
Start: 2022-08-15 | End: 2022-08-15

## 2022-08-15 RX ORDER — ONDANSETRON 8 MG/1
4 TABLET, FILM COATED ORAL ONCE
Refills: 0 | Status: DISCONTINUED | OUTPATIENT
Start: 2022-08-15 | End: 2022-08-15

## 2022-08-15 RX ORDER — RIVASTIGMINE 4.6 MG/24H
90.5 PATCH, EXTENDED RELEASE TRANSDERMAL
Qty: 0 | Refills: 0 | DISCHARGE

## 2022-08-15 RX ADMIN — Medication 1 DROP(S): at 09:05

## 2022-08-15 RX ADMIN — Medication 1 DROP(S): at 09:15

## 2022-08-15 RX ADMIN — Medication 1 DROP(S): at 09:10

## 2022-08-15 RX ADMIN — CYCLOPENTOLATE HYDROCHLORIDE 1 DROP(S): 10 SOLUTION/ DROPS OPHTHALMIC at 09:05

## 2022-08-15 RX ADMIN — CYCLOPENTOLATE HYDROCHLORIDE 1 DROP(S): 10 SOLUTION/ DROPS OPHTHALMIC at 09:15

## 2022-08-15 RX ADMIN — CYCLOPENTOLATE HYDROCHLORIDE 1 DROP(S): 10 SOLUTION/ DROPS OPHTHALMIC at 09:10

## 2022-08-15 NOTE — ASU DISCHARGE PLAN (ADULT/PEDIATRIC) - NS MD DC FALL RISK RISK
For information on Fall & Injury Prevention, visit: [email or web address] OR  [email or web address] OR  https://www.cdc.gov/steadi/patient.html

## 2022-08-16 ENCOUNTER — APPOINTMENT (OUTPATIENT)
Dept: OPHTHALMOLOGY | Facility: CLINIC | Age: 72
End: 2022-08-16

## 2022-08-16 ENCOUNTER — NON-APPOINTMENT (OUTPATIENT)
Age: 72
End: 2022-08-16

## 2022-08-16 PROCEDURE — 99024 POSTOP FOLLOW-UP VISIT: CPT

## 2022-08-16 NOTE — OPERATIVE REPORT - OPERATIVE RPOSRT DETAILS
SURGEON: Bruce Pennington MD    ASSISTANT: Ally Tan MD    PRE-OP DIAGNOSIS: Cataract, [ right ] eye    POST-OP DIAGNOSIS: Same    ANESTHESIA: MAC, topical    PROCEDURE: cataract extraction, intraocular lens implant [  right  ] eye    SPECIMEN/TISSUE REMOVED: None    ESTIMATED BLOOD LOSS: < 1mL    COMPLICATIONS: None    LENS: DIB00 +21.0 D    PROCEDURE:    The patient was seen in the preoperative area. The risks, benefits, and alternatives to surgery were discussed. All questions were answered.  The patient was given standard preoperative drops. The patient was then brought to the operating room and prepped and draped in the usual sterile fashion for ophthalmic surgery.    A lid speculum was used to expose the eye.  A paracentesis was created with an MVR blade at 3 clock hours from the temporal limbus in the clockwise direction.  Next, 1% Preservative-free Lidocaine was instilled into the anterior chamber followed by viscoat.  A clear corneal incision was created with the aid of a crescent blade and a 2.75 mm sharped tip keratome.  A cystotome and Utrata forceps was used to create a continuous curvilinear capsulorrhexis.  Balanced salt solution was used for hydro dissection.  The nucleus was then phacoemulsified and aspirated using a divide and conquer technique.  The remaining epinucleus and cortical material was aspirated from the eye.  The capsular bag was then reformed using provisc in anticipation of the introduction of an intraocular lens implant.  The implant was injected into the capsular bag.  After centration, the remaining viscoelastic material was removed using the IA handpiece.    The temporal clear corneal and paracentesis incisions were hydrated with balanced salt solution to achieve adequate watertight closure. The wounds were checked for leaks and found to be negative.    A combination of antibiotic and steroid were applied to the surface of the eye, and the eye was shielded.    The patient tolerated the procedure well and was transferred to the recovery room in stable condition. They received standard postoperative instructions and an appointment to follow up the next day.

## 2022-08-22 ENCOUNTER — NON-APPOINTMENT (OUTPATIENT)
Age: 72
End: 2022-08-22

## 2022-08-22 ENCOUNTER — APPOINTMENT (OUTPATIENT)
Dept: OPHTHALMOLOGY | Facility: CLINIC | Age: 72
End: 2022-08-22

## 2022-08-22 PROCEDURE — 99024 POSTOP FOLLOW-UP VISIT: CPT

## 2022-08-23 NOTE — PROVIDER CONTACT NOTE (CHANGE IN STATUS NOTIFICATION) - ASSESSMENT
Juanita calling back requesting a written consent to have the procedure. Please fax letter stating patient is cleared.     Juanita   [phone]  F: [phone]   Pt sundowning and AOx1. Pt sundowning and keeps trying to get OOB. Pt agitated and violent. Pt was reoriented multiple times. Pt denies sob, chest pain, blurry vision and dizziness.

## 2022-09-12 ENCOUNTER — NON-APPOINTMENT (OUTPATIENT)
Age: 72
End: 2022-09-12

## 2022-09-12 ENCOUNTER — TRANSCRIPTION ENCOUNTER (OUTPATIENT)
Age: 72
End: 2022-09-12

## 2022-09-12 ENCOUNTER — APPOINTMENT (OUTPATIENT)
Dept: OPHTHALMOLOGY | Facility: CLINIC | Age: 72
End: 2022-09-12

## 2022-09-12 PROCEDURE — 99024 POSTOP FOLLOW-UP VISIT: CPT

## 2022-09-15 ENCOUNTER — APPOINTMENT (OUTPATIENT)
Dept: OPHTHALMOLOGY | Facility: CLINIC | Age: 72
End: 2022-09-15

## 2022-09-15 ENCOUNTER — NON-APPOINTMENT (OUTPATIENT)
Age: 72
End: 2022-09-15

## 2022-09-15 PROCEDURE — 92014 COMPRE OPH EXAM EST PT 1/>: CPT | Mod: 24

## 2022-10-27 ENCOUNTER — NON-APPOINTMENT (OUTPATIENT)
Age: 72
End: 2022-10-27

## 2022-10-27 ENCOUNTER — APPOINTMENT (OUTPATIENT)
Dept: OPHTHALMOLOGY | Facility: CLINIC | Age: 72
End: 2022-10-27

## 2022-10-27 PROCEDURE — 92083 EXTENDED VISUAL FIELD XM: CPT

## 2022-10-27 PROCEDURE — 92014 COMPRE OPH EXAM EST PT 1/>: CPT | Mod: 24

## 2022-11-14 ENCOUNTER — NON-APPOINTMENT (OUTPATIENT)
Age: 72
End: 2022-11-14

## 2022-11-15 ENCOUNTER — TRANSCRIPTION ENCOUNTER (OUTPATIENT)
Age: 72
End: 2022-11-15

## 2022-11-16 ENCOUNTER — APPOINTMENT (OUTPATIENT)
Dept: OPHTHALMOLOGY | Facility: CLINIC | Age: 72
End: 2022-11-16

## 2022-11-16 ENCOUNTER — NON-APPOINTMENT (OUTPATIENT)
Age: 72
End: 2022-11-16

## 2022-11-16 PROCEDURE — 92014 COMPRE OPH EXAM EST PT 1/>: CPT

## 2022-12-08 ENCOUNTER — NON-APPOINTMENT (OUTPATIENT)
Age: 72
End: 2022-12-08

## 2022-12-20 ENCOUNTER — NON-APPOINTMENT (OUTPATIENT)
Age: 72
End: 2022-12-20

## 2022-12-20 ENCOUNTER — APPOINTMENT (OUTPATIENT)
Dept: OPHTHALMOLOGY | Facility: CLINIC | Age: 72
End: 2022-12-20
Payer: MEDICARE

## 2022-12-20 PROCEDURE — 95060 OPH MUCOUS MEMBRANE TESTS: CPT

## 2022-12-20 PROCEDURE — 92012 INTRM OPH EXAM EST PATIENT: CPT

## 2023-01-02 NOTE — PROGRESS NOTE ADULT - SUBJECTIVE AND OBJECTIVE BOX
Prior Authorization Approval    Authorization Effective Date: 1/2/2023  Authorization Expiration Date: 7/1/2023  Medication: Tramadol HCl  mg tablets  Approved Dose/Quantity:   Reference #: [id number]   Insurance Company: [employer] - Phone [phone] Fax [phone]  Which Pharmacy is filling the prescription (Not needed for infusion/clinic administered): [facility] [address] [place] [address]  Pharmacy Notified: Yes  Patient Notified: Yes       INTERVAL HPI/OVERNIGHT EVENTS: NUNU O/N    SUBJECTIVE: Patient seen and examined at bedside.   Pt states pain is controlled. No numbness. Does endorse dry mouth for approx 1.5mo. Has not had BM yet. Denies any fever, chest pain, dyspnea, cough, nausea, abd pain.     Called [facility] [employer]  Sertraline 125mg daily - 7/24/21 - Rx by Dr. Nayeli Woods [phone] (pt states he only takes 100mg as instructed)  Morphine sulfate 15mg BID #28 7/15/21  Oxycodone 5mg 1-2 tab q6h #50 7/15/21  Rivastigmine patch  Pilocarpine 5 TID x90d 6/25/21    **Fluoxetine 20mg sent 6/15 but not refilled      OBJECTIVE:    VITAL SIGNS:  ICU Vital Signs Last 24 Hrs  T(C): 36.4 (15 Aug 2021 16:35), Max: 37.3 (15 Aug 2021 08:34)  T(F): 97.5 (15 Aug 2021 16:35), Max: 99.1 (15 Aug 2021 08:34)  HR: 68 (15 Aug 2021 16:35) (68 - 74)  BP: 110/66 (15 Aug 2021 16:35) (110/66 - 136/79)  BP(mean): --  ABP: --  ABP(mean): --  RR: 17 (15 Aug 2021 16:35) (16 - 18)  SpO2: 100% (15 Aug 2021 16:35) (98% - 100%)        08-14 @ 07:01  -  08-15 @ 07:00  --------------------------------------------------------  IN: 1420 mL / OUT: 5270 mL / NET: -3850 mL    08-15 @ 07:01  -  08-15 @ 18:47  --------------------------------------------------------  IN: 1980 mL / OUT: 1405 mL / NET: 575 mL      CAPILLARY BLOOD GLUCOSE          PHYSICAL EXAM:  GEN: Male in NAD on RA  HEENT: NC/AT, MMM  CV: RRR, nml S1S2, no murmurs  PULM: nml effort, CTAB  ABD: Soft, non-distended, NABS, non-tender  NEURO  A/O x3, moving all extremities, Sensation intact  Brace over RLE - STEWART drain present  PSYCH: Appropriate      MEDICATIONS:  MEDICATIONS  (STANDING):  acetaminophen   Tablet .. 975 milliGRAM(s) Oral every 8 hours  aspirin enteric coated 81 milliGRAM(s) Oral two times a day  atorvastatin 20 milliGRAM(s) Oral at bedtime  BUpivacaine liposome 1.3% Injectable (no eMAR) 20 milliLiter(s) Local Injection once  ceFAZolin   IVPB 2000 milliGRAM(s) IV Intermittent every 8 hours  lactated ringers. 1000 milliLiter(s) (120 mL/Hr) IV Continuous <Continuous>  povidone iodine 5% Nasal Swab 1 Application(s) Both Nostrils once  rifAMPin 600 milliGRAM(s) Oral daily  rivastigmine patch  9.5 mG/24 Hr(s) 1 Patch Transdermal every 24 hours  sertraline 100 milliGRAM(s) Oral daily    MEDICATIONS  (PRN):  bisacodyl Suppository 10 milliGRAM(s) Rectal daily PRN Constipation  HYDROmorphone  Injectable 0.5 milliGRAM(s) IV Push every 4 hours PRN Breakthrough pain  oxyCODONE    IR 5 milliGRAM(s) Oral every 4 hours PRN Moderate Pain (4 - 6)  oxyCODONE    IR 10 milliGRAM(s) Oral every 4 hours PRN Severe Pain (7 - 10)      ALLERGIES:  Allergies    No Known Allergies    Intolerances        LABS:                        9.0    6.95  )-----------( 257      ( 15 Aug 2021 07:55 )             27.0     08-15    137  |  102  |  12  ----------------------------<  111<H>  3.7   |  25  |  0.78    Ca    8.8      15 Aug 2021 07:55            RADIOLOGY & ADDITIONAL TESTS: Reviewed.

## 2023-01-24 ENCOUNTER — APPOINTMENT (OUTPATIENT)
Dept: OPHTHALMOLOGY | Facility: CLINIC | Age: 73
End: 2023-01-24

## 2023-01-31 ENCOUNTER — APPOINTMENT (OUTPATIENT)
Dept: ORTHOPEDIC SURGERY | Facility: CLINIC | Age: 73
End: 2023-01-31

## 2023-02-21 ENCOUNTER — NON-APPOINTMENT (OUTPATIENT)
Age: 73
End: 2023-02-21

## 2023-02-21 ENCOUNTER — APPOINTMENT (OUTPATIENT)
Dept: OPHTHALMOLOGY | Facility: CLINIC | Age: 73
End: 2023-02-21
Payer: MEDICARE

## 2023-02-21 PROCEDURE — 92012 INTRM OPH EXAM EST PATIENT: CPT | Mod: 25

## 2023-02-21 PROCEDURE — 68761 CLOSE TEAR DUCT OPENING: CPT | Mod: E4,E2

## 2023-04-18 ENCOUNTER — NON-APPOINTMENT (OUTPATIENT)
Age: 73
End: 2023-04-18

## 2023-04-18 ENCOUNTER — APPOINTMENT (OUTPATIENT)
Dept: OPHTHALMOLOGY | Facility: CLINIC | Age: 73
End: 2023-04-18
Payer: MEDICARE

## 2023-04-18 PROCEDURE — 92012 INTRM OPH EXAM EST PATIENT: CPT

## 2023-05-10 ENCOUNTER — APPOINTMENT (OUTPATIENT)
Dept: OPHTHALMOLOGY | Facility: CLINIC | Age: 73
End: 2023-05-10
Payer: MEDICARE

## 2023-05-10 ENCOUNTER — NON-APPOINTMENT (OUTPATIENT)
Age: 73
End: 2023-05-10

## 2023-05-10 PROCEDURE — 92250 FUNDUS PHOTOGRAPHY W/I&R: CPT

## 2023-05-10 PROCEDURE — 92014 COMPRE OPH EXAM EST PT 1/>: CPT

## 2023-06-06 ENCOUNTER — APPOINTMENT (OUTPATIENT)
Dept: OPHTHALMOLOGY | Facility: CLINIC | Age: 73
End: 2023-06-06

## 2023-07-25 ENCOUNTER — NON-APPOINTMENT (OUTPATIENT)
Age: 73
End: 2023-07-25

## 2023-07-26 ENCOUNTER — TRANSCRIPTION ENCOUNTER (OUTPATIENT)
Age: 73
End: 2023-07-26

## 2023-07-26 ENCOUNTER — NON-APPOINTMENT (OUTPATIENT)
Age: 73
End: 2023-07-26

## 2023-07-27 ENCOUNTER — APPOINTMENT (OUTPATIENT)
Dept: INTERNAL MEDICINE | Facility: CLINIC | Age: 73
End: 2023-07-27
Payer: MEDICARE

## 2023-07-27 VITALS
TEMPERATURE: 97.1 F | WEIGHT: 190 LBS | OXYGEN SATURATION: 99 % | HEIGHT: 72 IN | RESPIRATION RATE: 14 BRPM | DIASTOLIC BLOOD PRESSURE: 80 MMHG | HEART RATE: 68 BPM | BODY MASS INDEX: 25.73 KG/M2 | SYSTOLIC BLOOD PRESSURE: 134 MMHG

## 2023-07-27 DIAGNOSIS — R01.1 CARDIAC MURMUR, UNSPECIFIED: ICD-10-CM

## 2023-07-27 DIAGNOSIS — Z00.00 ENCOUNTER FOR GENERAL ADULT MEDICAL EXAMINATION W/OUT ABNORMAL FINDINGS: ICD-10-CM

## 2023-07-27 PROCEDURE — G0439: CPT

## 2023-07-27 PROCEDURE — 36415 COLL VENOUS BLD VENIPUNCTURE: CPT

## 2023-07-27 RX ORDER — ESCITALOPRAM OXALATE 10 MG/1
10 TABLET ORAL
Qty: 90 | Refills: 2 | Status: ACTIVE | COMMUNITY
Start: 2023-07-27 | End: 1900-01-01

## 2023-07-27 RX ORDER — ATORVASTATIN CALCIUM 20 MG/1
20 TABLET, FILM COATED ORAL
Qty: 90 | Refills: 3 | Status: ACTIVE | COMMUNITY
Start: 2018-09-24 | End: 1900-01-01

## 2023-07-27 NOTE — REVIEW OF SYSTEMS
[Vision Problems] : vision problems [Joint Pain] : joint pain [Dizziness] : dizziness [Memory Loss] : memory loss [Negative] : Respiratory [Fatigue] : no fatigue [Earache] : no earache [Hearing Loss] : no hearing loss

## 2023-07-27 NOTE — PLAN
[FreeTextEntry1] : Depression \par Very important to follow up with Dr Mccall- his neurologist\par ophtho routinely\par ENT referral (cerumen impaction)\par Alzheimers - close f/up with neuro\par Depression -melva restart lexapro and monitor\par HLD- restart statin\par Pt gave permission to speak with sister Shaunna\par

## 2023-07-27 NOTE — PHYSICAL EXAM
[No Edema] : there was no peripheral edema [Normal] : affect was normal and insight and judgment were intact [de-identified] : 2/6 claire

## 2023-07-27 NOTE — HISTORY OF PRESENT ILLNESS
[FreeTextEntry1] : wellness [de-identified] : 72 yo m with alzheimers bipolar depression. HLD \par here today with his sister\par decreased hearing in left ear\par persistent viison issues\par he stopped his statin and the asa 81mg.  \par more depresssed  - has tried TMS and ketamine in the past.  Seeing neurologist.\par Psychiatrist - was seeing Dr Nayeli Woods.

## 2023-07-28 LAB
ALBUMIN SERPL ELPH-MCNC: 4.3 G/DL
ALP BLD-CCNC: 76 U/L
ALT SERPL-CCNC: 19 U/L
ANION GAP SERPL CALC-SCNC: 11 MMOL/L
AST SERPL-CCNC: 21 U/L
BILIRUB SERPL-MCNC: 0.6 MG/DL
BUN SERPL-MCNC: 18 MG/DL
CALCIUM SERPL-MCNC: 9.5 MG/DL
CHLORIDE SERPL-SCNC: 108 MMOL/L
CHOLEST SERPL-MCNC: 224 MG/DL
CO2 SERPL-SCNC: 20 MMOL/L
CREAT SERPL-MCNC: 0.94 MG/DL
EGFR: 86 ML/MIN/1.73M2
ESTIMATED AVERAGE GLUCOSE: 108 MG/DL
GLUCOSE SERPL-MCNC: 87 MG/DL
HBA1C MFR BLD HPLC: 5.4 %
HDLC SERPL-MCNC: 41 MG/DL
LDLC SERPL CALC-MCNC: 154 MG/DL
NONHDLC SERPL-MCNC: 184 MG/DL
POTASSIUM SERPL-SCNC: 4.5 MMOL/L
PROT SERPL-MCNC: 6.7 G/DL
SODIUM SERPL-SCNC: 139 MMOL/L
TRIGL SERPL-MCNC: 161 MG/DL
TSH SERPL-ACNC: 0.91 UIU/ML
VIT B12 SERPL-MCNC: 638 PG/ML

## 2023-08-03 NOTE — DIETITIAN INITIAL EVALUATION ADULT. - WEIGHT FOR BMI (LBS)
[de-identified] : All medical record entries made by "Federico Jimenez" acting as a scribe for this encounter under the direction of "Dr. Valderrama." I have reviewed the chart and agree that the record accurately reflects my personal performance of the history, physical exam, assessment and plan. I have also personally directed, reviewed and agreed with the chart.  67 M s/p L L5-S1 microdiscectomy presenting with postoperative seroma - Pain control. percocet prescribed - WBAT - Physical therapy  - Compressive dressing applied - F/u in 2 weeks
170

## 2023-08-07 NOTE — ASU PREOP CHECKLIST - WEIGHT IN KG
----- Message from Xiomy Hyatt sent at 8/7/2023  2:43 PM CDT -----  Contact: Oneida  Type:  Patient Returning Call    Who Called: Oneida  Who Left Message for Patient: Misa  Does the patient know what this is regarding?: test results  Would the patient rather a call back or a response via [facility]? call  Best Call Back Number: [phone]   Additional Information: Patient reports missing a call and request another call back.       
I spoke with the patient about this. Please see labs results for additional documentation.   
88.6

## 2023-08-10 ENCOUNTER — APPOINTMENT (OUTPATIENT)
Dept: OTOLARYNGOLOGY | Facility: CLINIC | Age: 73
End: 2023-08-10
Payer: MEDICARE

## 2023-08-10 VITALS
HEIGHT: 74 IN | DIASTOLIC BLOOD PRESSURE: 83 MMHG | OXYGEN SATURATION: 98 % | HEART RATE: 66 BPM | BODY MASS INDEX: 25.41 KG/M2 | SYSTOLIC BLOOD PRESSURE: 129 MMHG | TEMPERATURE: 97.3 F | WEIGHT: 198 LBS

## 2023-08-10 DIAGNOSIS — H61.23 IMPACTED CERUMEN, BILATERAL: ICD-10-CM

## 2023-08-10 DIAGNOSIS — H90.3 SENSORINEURAL HEARING LOSS, BILATERAL: ICD-10-CM

## 2023-08-10 PROCEDURE — 99212 OFFICE O/P EST SF 10 MIN: CPT | Mod: 25

## 2023-08-10 PROCEDURE — 69210 REMOVE IMPACTED EAR WAX UNI: CPT

## 2023-08-10 NOTE — PHYSICAL EXAM
[Normal] : tympanic membranes are normal in both ears [de-identified] : b copious amounts of cerumen removed atraumatically with suction [de-identified] : gait steady

## 2023-08-10 NOTE — HISTORY OF PRESENT ILLNESS
[de-identified] : 1.5 yr follow up visit for this 72 y/o M here with his sister. He has been diagnosed with h/o Alzheimer's disease. He has been told he has cerumen impaction b and his hearing seems decreased. Denies cotton swab use.

## 2023-08-10 NOTE — ASSESSMENT
[FreeTextEntry1] : cerumen removed hearing was clearly improved recommended  his sister said they did not have time today but would call to schedule rtc with

## 2023-09-27 ENCOUNTER — APPOINTMENT (OUTPATIENT)
Dept: INTERNAL MEDICINE | Facility: CLINIC | Age: 73
End: 2023-09-27
Payer: MEDICARE

## 2023-09-27 VITALS
HEART RATE: 69 BPM | DIASTOLIC BLOOD PRESSURE: 88 MMHG | OXYGEN SATURATION: 97 % | HEIGHT: 74 IN | RESPIRATION RATE: 14 BRPM | TEMPERATURE: 98.4 F | BODY MASS INDEX: 25.41 KG/M2 | WEIGHT: 198 LBS | SYSTOLIC BLOOD PRESSURE: 143 MMHG

## 2023-09-27 DIAGNOSIS — Z02.2 ENCOUNTER FOR EXAMINATION FOR ADMISSION TO RESIDENTIAL INSTITUTION: ICD-10-CM

## 2023-09-27 PROCEDURE — 99212 OFFICE O/P EST SF 10 MIN: CPT

## 2023-10-02 ENCOUNTER — TRANSCRIPTION ENCOUNTER (OUTPATIENT)
Age: 73
End: 2023-10-02

## 2023-10-02 LAB
M TB IFN-G BLD-IMP: NEGATIVE
QUANTIFERON TB PLUS MITOGEN MINUS NIL: 8.06 IU/ML
QUANTIFERON TB PLUS NIL: 0.01 IU/ML
QUANTIFERON TB PLUS TB1 MINUS NIL: 0 IU/ML
QUANTIFERON TB PLUS TB2 MINUS NIL: 0.01 IU/ML

## 2023-10-08 ENCOUNTER — NON-APPOINTMENT (OUTPATIENT)
Age: 73
End: 2023-10-08

## 2023-10-10 ENCOUNTER — APPOINTMENT (OUTPATIENT)
Dept: INTERNAL MEDICINE | Facility: CLINIC | Age: 73
End: 2023-10-10
Payer: MEDICARE

## 2023-10-10 VITALS
HEART RATE: 76 BPM | WEIGHT: 197 LBS | DIASTOLIC BLOOD PRESSURE: 69 MMHG | SYSTOLIC BLOOD PRESSURE: 123 MMHG | BODY MASS INDEX: 25.28 KG/M2 | TEMPERATURE: 97.6 F | HEIGHT: 74 IN | OXYGEN SATURATION: 99 %

## 2023-10-10 DIAGNOSIS — G30.9 ALZHEIMER'S DISEASE, UNSPECIFIED: ICD-10-CM

## 2023-10-10 DIAGNOSIS — F02.80 ALZHEIMER'S DISEASE, UNSPECIFIED: ICD-10-CM

## 2023-10-10 PROCEDURE — 90662 IIV NO PRSV INCREASED AG IM: CPT

## 2023-10-10 PROCEDURE — 99214 OFFICE O/P EST MOD 30 MIN: CPT | Mod: 25

## 2023-10-10 PROCEDURE — G0008: CPT

## 2024-02-22 NOTE — PHYSICAL THERAPY INITIAL EVALUATION ADULT - PERTINENT HX OF CURRENT PROBLEM, REHAB EVAL
Subjective   History of Present Illness  48-year-old female with no known past medical history presents to the emergency room with left elbow pain and lower back pain after a fall she sustained 2 weeks ago.  Patient states she has been seen numerous times after her fall, however at no visit has she had her left elbow evaluated.  She has full range of motion, however states pain is elicited with movement.  She denies any previous left elbow injuries in the past.  She also endorses low back pain which she states has recently developed.  Aggravating factors include movement.  Denies any alleviating factors.  Denies any other complaints or concerns at this time.    History provided by:  Patient   used: No        Review of Systems   Constitutional: Negative.  Negative for fever.   HENT: Negative.     Respiratory: Negative.     Cardiovascular: Negative.  Negative for chest pain.   Gastrointestinal: Negative.  Negative for abdominal pain.   Endocrine: Negative.    Genitourinary: Negative.  Negative for dysuria.   Musculoskeletal:  Positive for back pain.        (+) left elbow pain   Skin: Negative.    Neurological: Negative.    Psychiatric/Behavioral: Negative.     All other systems reviewed and are negative.      No past medical history on file.    Allergies   Allergen Reactions    Hydrocodone Shortness Of Breath    Omnicef [Cefdinir] Rash       No past surgical history on file.    No family history on file.    Social History     Socioeconomic History    Marital status: Legally            Objective   Physical Exam  Vitals and nursing note reviewed.   Constitutional:       General: She is not in acute distress.     Appearance: She is well-developed. She is not diaphoretic.   HENT:      Head: Normocephalic and atraumatic.      Right Ear: External ear normal.      Left Ear: External ear normal.      Nose: Nose normal.   Eyes:      Conjunctiva/sclera: Conjunctivae normal.      Pupils: Pupils are  equal, round, and reactive to light.   Neck:      Vascular: No JVD.      Trachea: No tracheal deviation.   Cardiovascular:      Rate and Rhythm: Normal rate and regular rhythm.      Heart sounds: Normal heart sounds. No murmur heard.  Pulmonary:      Effort: Pulmonary effort is normal. No respiratory distress.      Breath sounds: Normal breath sounds. No wheezing.   Abdominal:      General: Bowel sounds are normal.      Palpations: Abdomen is soft.      Tenderness: There is no abdominal tenderness.   Musculoskeletal:         General: No deformity. Normal range of motion.      Right elbow: Normal.      Left elbow: Normal range of motion. Tenderness present in medial epicondyle.      Cervical back: Normal, normal range of motion and neck supple.      Thoracic back: Normal.      Lumbar back: Tenderness present.   Skin:     General: Skin is warm and dry.      Coloration: Skin is not pale.      Findings: No erythema or rash.   Neurological:      Mental Status: She is alert and oriented to person, place, and time.      Cranial Nerves: No cranial nerve deficit.   Psychiatric:         Behavior: Behavior normal.         Thought Content: Thought content normal.         Procedures           ED Course  ED Course as of 02/22/24 2007   Thu Feb 22, 2024   1606 CT Lumbar Spine Without Contrast [TK]   1606 XR Elbow 3+ View Left [TK]      ED Course User Index  [TK] Brittaney Bueno PA-C                                   CT Lumbar Spine Without Contrast   Final Result   No acute process.                   This report was finalized on 2/22/2024 4:01 PM by Jacky Acosta M.D..          XR Elbow 3+ View Left   Final Result   No acute fracture.                       This report was finalized on 2/22/2024 3:51 PM by Jacky Acosta M.D..                        Medical Decision Making  48-year-old female with no known past medical history presents to the emergency room with left elbow pain and lower back pain after a fall she  sustained 2 weeks ago.  Patient states she has been seen numerous times after her fall, however at no visit has she had her left elbow evaluated.  She has full range of motion, however states pain is elicited with movement.  She denies any previous left elbow injuries in the past.  She also endorses low back pain which she states has recently developed.  Aggravating factors include movement.  Denies any alleviating factors.  Denies any other complaints or concerns at this time.      Problems Addressed:  Elbow strain, left, initial encounter: complicated acute illness or injury  Fall, subsequent encounter: complicated acute illness or injury  Low back pain, unspecified back pain laterality, unspecified chronicity, unspecified whether sciatica present: complicated acute illness or injury    Amount and/or Complexity of Data Reviewed  Radiology: ordered. Decision-making details documented in ED Course.    Risk  Prescription drug management.        Final diagnoses:   Fall, subsequent encounter   Elbow strain, left, initial encounter   Low back pain, unspecified back pain laterality, unspecified chronicity, unspecified whether sciatica present       ED Disposition  ED Disposition       ED Disposition   Discharge    Condition   Stable    Comment   --               Nikolai Parish MD  [address] [place] [address]   [facility] [address] [phone]    In 2 days           Medication List        New Prescriptions      orphenadrine 100 MG 12 hr tablet  Commonly known as: NORFLEX  Take 1 tablet by mouth 2 (Two) Times a Day As Needed for Muscle Spasms or Mild Pain.               Where to Get Your Medications        These medications were sent to [facility] [address] [place] [address] - [phone]  - [phone] [address] [address]      Phone: [phone]   orphenadrine 100 MG 12 hr tablet            Brittaney Bueno PA-C  02/22/24 2008     70M with left knee pain x chronic. Pt denies preceding trauma/injury. Pt states his pain radiates down his left lower extremity  - he takes Aleve as needed for pain control. Pt denies numbness/tingling/weakness of bilateral lower extremities and does not use an assistive ambulatory device at baseline

## 2024-03-28 ENCOUNTER — APPOINTMENT (OUTPATIENT)
Dept: GERIATRICS | Facility: CLINIC | Age: 74
End: 2024-03-28

## 2024-03-28 ENCOUNTER — APPOINTMENT (OUTPATIENT)
Dept: INTERNAL MEDICINE | Facility: CLINIC | Age: 74
End: 2024-03-28

## 2024-04-29 ENCOUNTER — APPOINTMENT (OUTPATIENT)
Dept: INTERNAL MEDICINE | Facility: CLINIC | Age: 74
End: 2024-04-29
Payer: MEDICARE

## 2024-04-29 VITALS
WEIGHT: 177 LBS | OXYGEN SATURATION: 97 % | DIASTOLIC BLOOD PRESSURE: 79 MMHG | BODY MASS INDEX: 23.98 KG/M2 | SYSTOLIC BLOOD PRESSURE: 123 MMHG | TEMPERATURE: 98 F | HEIGHT: 72 IN | HEART RATE: 69 BPM

## 2024-04-29 DIAGNOSIS — F31.9 BIPOLAR DISORDER, UNSPECIFIED: ICD-10-CM

## 2024-04-29 DIAGNOSIS — E78.5 HYPERLIPIDEMIA, UNSPECIFIED: ICD-10-CM

## 2024-04-29 PROCEDURE — 90480 ADMN SARSCOV2 VAC 1/ONLY CMP: CPT | Mod: GY

## 2024-04-29 PROCEDURE — 99214 OFFICE O/P EST MOD 30 MIN: CPT

## 2024-04-29 PROCEDURE — 91322 SARSCOV2 VAC 50 MCG/0.5ML IM: CPT

## 2024-04-29 PROCEDURE — 36415 COLL VENOUS BLD VENIPUNCTURE: CPT

## 2024-04-29 PROCEDURE — G2211 COMPLEX E/M VISIT ADD ON: CPT

## 2024-04-29 NOTE — PHYSICAL EXAM
[Normal Sclera/Conjunctiva] : normal sclera/conjunctiva [Normal Outer Ear/Nose] : the outer ears and nose were normal in appearance [No Edema] : there was no peripheral edema [Normal] : soft, non-tender, non-distended, no masses palpated, no HSM and normal bowel sounds [Speech Grossly Normal] : speech grossly normal [Normal Mood] : the mood was normal [de-identified] : L hand scalely patch

## 2024-04-29 NOTE — REVIEW OF SYSTEMS
[Negative] : Heme/Lymph [FreeTextEntry2] : Pt denies all symptoms on ROS, but is not a reliable historian

## 2024-04-29 NOTE — HISTORY OF PRESENT ILLNESS
[de-identified] : Mr. DON AN is a 74-year-old male with history of Alzheimer's, bipolar depression, L hearing loss, and HLD presenting today for follow up. He denies any healthy issues, but is not a reliable historian. His son (present at the visit) and wife (joins by phone) report he is doing okay and no concerns. He has not been taking any medications.

## 2024-04-30 LAB
ALBUMIN SERPL ELPH-MCNC: 4.4 G/DL
ALP BLD-CCNC: 83 U/L
ALT SERPL-CCNC: 18 U/L
ANION GAP SERPL CALC-SCNC: 12 MMOL/L
AST SERPL-CCNC: 23 U/L
BASOPHILS # BLD AUTO: 0.04 K/UL
BASOPHILS NFR BLD AUTO: 0.6 %
BILIRUB SERPL-MCNC: 0.4 MG/DL
BUN SERPL-MCNC: 18 MG/DL
CALCIUM SERPL-MCNC: 9.6 MG/DL
CHLORIDE SERPL-SCNC: 107 MMOL/L
CHOLEST SERPL-MCNC: 201 MG/DL
CO2 SERPL-SCNC: 22 MMOL/L
CREAT SERPL-MCNC: 1.04 MG/DL
EGFR: 76 ML/MIN/1.73M2
EOSINOPHIL # BLD AUTO: 0.2 K/UL
EOSINOPHIL NFR BLD AUTO: 3.2 %
ESTIMATED AVERAGE GLUCOSE: 114 MG/DL
GLUCOSE SERPL-MCNC: 102 MG/DL
HBA1C MFR BLD HPLC: 5.6 %
HCT VFR BLD CALC: 43.6 %
HDLC SERPL-MCNC: 50 MG/DL
HGB BLD-MCNC: 14.2 G/DL
IMM GRANULOCYTES NFR BLD AUTO: 0.2 %
LDLC SERPL CALC-MCNC: 129 MG/DL
LYMPHOCYTES # BLD AUTO: 1.16 K/UL
LYMPHOCYTES NFR BLD AUTO: 18.5 %
MAN DIFF?: NORMAL
MCHC RBC-ENTMCNC: 29.5 PG
MCHC RBC-ENTMCNC: 32.6 GM/DL
MCV RBC AUTO: 90.5 FL
MONOCYTES # BLD AUTO: 0.63 K/UL
MONOCYTES NFR BLD AUTO: 10 %
NEUTROPHILS # BLD AUTO: 4.24 K/UL
NEUTROPHILS NFR BLD AUTO: 67.5 %
NONHDLC SERPL-MCNC: 152 MG/DL
PLATELET # BLD AUTO: 293 K/UL
POTASSIUM SERPL-SCNC: 4.8 MMOL/L
PROT SERPL-MCNC: 7 G/DL
RBC # BLD: 4.82 M/UL
RBC # FLD: 14.5 %
SODIUM SERPL-SCNC: 141 MMOL/L
TRIGL SERPL-MCNC: 128 MG/DL
WBC # FLD AUTO: 6.28 K/UL

## 2024-09-26 ENCOUNTER — INPATIENT (INPATIENT)
Facility: HOSPITAL | Age: 74
LOS: 2 days | Discharge: NURSING, REHAB OR LONG-TERM CARE | DRG: 951 | End: 2024-09-29
Attending: STUDENT IN AN ORGANIZED HEALTH CARE EDUCATION/TRAINING PROGRAM | Admitting: STUDENT IN AN ORGANIZED HEALTH CARE EDUCATION/TRAINING PROGRAM
Payer: MEDICARE

## 2024-09-26 VITALS
TEMPERATURE: 98 F | HEART RATE: 65 BPM | WEIGHT: 169.98 LBS | SYSTOLIC BLOOD PRESSURE: 123 MMHG | RESPIRATION RATE: 17 BRPM | OXYGEN SATURATION: 97 % | DIASTOLIC BLOOD PRESSURE: 79 MMHG | HEIGHT: 72 IN

## 2024-09-26 DIAGNOSIS — F31.9 BIPOLAR DISORDER, UNSPECIFIED: ICD-10-CM

## 2024-09-26 DIAGNOSIS — E78.5 HYPERLIPIDEMIA, UNSPECIFIED: ICD-10-CM

## 2024-09-26 DIAGNOSIS — Z74.2 NEED FOR ASSISTANCE AT HOME AND NO OTHER HOUSEHOLD MEMBER ABLE TO RENDER CARE: ICD-10-CM

## 2024-09-26 DIAGNOSIS — H91.90 UNSPECIFIED HEARING LOSS, UNSPECIFIED EAR: ICD-10-CM

## 2024-09-26 DIAGNOSIS — Z41.9 ENCOUNTER FOR PROCEDURE FOR PURPOSES OTHER THAN REMEDYING HEALTH STATE, UNSPECIFIED: Chronic | ICD-10-CM

## 2024-09-26 DIAGNOSIS — Z75.1 PERSON AWAITING ADMISSION TO ADEQUATE FACILITY ELSEWHERE: ICD-10-CM

## 2024-09-26 DIAGNOSIS — Z96.651 PRESENCE OF RIGHT ARTIFICIAL KNEE JOINT: ICD-10-CM

## 2024-09-26 DIAGNOSIS — Z79.02 LONG TERM (CURRENT) USE OF ANTITHROMBOTICS/ANTIPLATELETS: ICD-10-CM

## 2024-09-26 DIAGNOSIS — Z79.899 OTHER LONG TERM (CURRENT) DRUG THERAPY: ICD-10-CM

## 2024-09-26 DIAGNOSIS — Z91.83 WANDERING IN DISEASES CLASSIFIED ELSEWHERE: ICD-10-CM

## 2024-09-26 DIAGNOSIS — F02.811 DEMENTIA IN OTHER DISEASES CLASSIFIED ELSEWHERE, UNSPECIFIED SEVERITY, WITH AGITATION: ICD-10-CM

## 2024-09-26 DIAGNOSIS — G30.9 ALZHEIMER'S DISEASE, UNSPECIFIED: ICD-10-CM

## 2024-09-26 DIAGNOSIS — Z53.20 PROCEDURE AND TREATMENT NOT CARRIED OUT BECAUSE OF PATIENT'S DECISION FOR UNSPECIFIED REASONS: ICD-10-CM

## 2024-09-26 DIAGNOSIS — D64.9 ANEMIA, UNSPECIFIED: ICD-10-CM

## 2024-09-26 DIAGNOSIS — Z86.19 PERSONAL HISTORY OF OTHER INFECTIOUS AND PARASITIC DISEASES: ICD-10-CM

## 2024-09-26 DIAGNOSIS — E44.0 MODERATE PROTEIN-CALORIE MALNUTRITION: ICD-10-CM

## 2024-09-26 DIAGNOSIS — Z98.42 CATARACT EXTRACTION STATUS, LEFT EYE: ICD-10-CM

## 2024-09-26 DIAGNOSIS — D50.9 IRON DEFICIENCY ANEMIA, UNSPECIFIED: ICD-10-CM

## 2024-09-26 DIAGNOSIS — Z98.890 OTHER SPECIFIED POSTPROCEDURAL STATES: Chronic | ICD-10-CM

## 2024-09-26 DIAGNOSIS — Z98.49 CATARACT EXTRACTION STATUS, UNSPECIFIED EYE: Chronic | ICD-10-CM

## 2024-09-26 DIAGNOSIS — I25.10 ATHEROSCLEROTIC HEART DISEASE OF NATIVE CORONARY ARTERY WITHOUT ANGINA PECTORIS: ICD-10-CM

## 2024-09-26 DIAGNOSIS — Z79.82 LONG TERM (CURRENT) USE OF ASPIRIN: ICD-10-CM

## 2024-09-26 DIAGNOSIS — F05 DELIRIUM DUE TO KNOWN PHYSIOLOGICAL CONDITION: ICD-10-CM

## 2024-09-26 DIAGNOSIS — Z29.9 ENCOUNTER FOR PROPHYLACTIC MEASURES, UNSPECIFIED: ICD-10-CM

## 2024-09-26 DIAGNOSIS — Z04.89 ENCOUNTER FOR EXAMINATION AND OBSERVATION FOR OTHER SPECIFIED REASONS: ICD-10-CM

## 2024-09-26 DIAGNOSIS — R62.7 ADULT FAILURE TO THRIVE: ICD-10-CM

## 2024-09-26 LAB
ALBUMIN SERPL ELPH-MCNC: 3.9 G/DL — SIGNIFICANT CHANGE UP (ref 3.3–5)
ALP SERPL-CCNC: 85 U/L — SIGNIFICANT CHANGE UP (ref 40–120)
ALT FLD-CCNC: 13 U/L — SIGNIFICANT CHANGE UP (ref 10–45)
ANION GAP SERPL CALC-SCNC: 8 MMOL/L — SIGNIFICANT CHANGE UP (ref 5–17)
AST SERPL-CCNC: 19 U/L — SIGNIFICANT CHANGE UP (ref 10–40)
BASOPHILS # BLD AUTO: 0.05 K/UL — SIGNIFICANT CHANGE UP (ref 0–0.2)
BASOPHILS NFR BLD AUTO: 0.8 % — SIGNIFICANT CHANGE UP (ref 0–2)
BILIRUB SERPL-MCNC: 0.3 MG/DL — SIGNIFICANT CHANGE UP (ref 0.2–1.2)
BUN SERPL-MCNC: 22 MG/DL — SIGNIFICANT CHANGE UP (ref 7–23)
CALCIUM SERPL-MCNC: 8.8 MG/DL — SIGNIFICANT CHANGE UP (ref 8.4–10.5)
CHLORIDE SERPL-SCNC: 110 MMOL/L — HIGH (ref 96–108)
CO2 SERPL-SCNC: 24 MMOL/L — SIGNIFICANT CHANGE UP (ref 22–31)
CREAT SERPL-MCNC: 0.81 MG/DL — SIGNIFICANT CHANGE UP (ref 0.5–1.3)
EGFR: 93 ML/MIN/1.73M2 — SIGNIFICANT CHANGE UP
EOSINOPHIL # BLD AUTO: 0.22 K/UL — SIGNIFICANT CHANGE UP (ref 0–0.5)
EOSINOPHIL NFR BLD AUTO: 3.5 % — SIGNIFICANT CHANGE UP (ref 0–6)
GLUCOSE SERPL-MCNC: 97 MG/DL — SIGNIFICANT CHANGE UP (ref 70–99)
HCT VFR BLD CALC: 38.1 % — LOW (ref 39–50)
HGB BLD-MCNC: 12.5 G/DL — LOW (ref 13–17)
IMM GRANULOCYTES NFR BLD AUTO: 0.2 % — SIGNIFICANT CHANGE UP (ref 0–0.9)
LYMPHOCYTES # BLD AUTO: 1.26 K/UL — SIGNIFICANT CHANGE UP (ref 1–3.3)
LYMPHOCYTES # BLD AUTO: 20.1 % — SIGNIFICANT CHANGE UP (ref 13–44)
MCHC RBC-ENTMCNC: 30.5 PG — SIGNIFICANT CHANGE UP (ref 27–34)
MCHC RBC-ENTMCNC: 32.8 GM/DL — SIGNIFICANT CHANGE UP (ref 32–36)
MCV RBC AUTO: 92.9 FL — SIGNIFICANT CHANGE UP (ref 80–100)
MONOCYTES # BLD AUTO: 0.55 K/UL — SIGNIFICANT CHANGE UP (ref 0–0.9)
MONOCYTES NFR BLD AUTO: 8.8 % — SIGNIFICANT CHANGE UP (ref 2–14)
NEUTROPHILS # BLD AUTO: 4.17 K/UL — SIGNIFICANT CHANGE UP (ref 1.8–7.4)
NEUTROPHILS NFR BLD AUTO: 66.6 % — SIGNIFICANT CHANGE UP (ref 43–77)
NRBC # BLD: 0 /100 WBCS — SIGNIFICANT CHANGE UP (ref 0–0)
PLATELET # BLD AUTO: 257 K/UL — SIGNIFICANT CHANGE UP (ref 150–400)
POTASSIUM SERPL-MCNC: 4.4 MMOL/L — SIGNIFICANT CHANGE UP (ref 3.5–5.3)
POTASSIUM SERPL-SCNC: 4.4 MMOL/L — SIGNIFICANT CHANGE UP (ref 3.5–5.3)
PROT SERPL-MCNC: 6.6 G/DL — SIGNIFICANT CHANGE UP (ref 6–8.3)
RBC # BLD: 4.1 M/UL — LOW (ref 4.2–5.8)
RBC # FLD: 13 % — SIGNIFICANT CHANGE UP (ref 10.3–14.5)
SODIUM SERPL-SCNC: 142 MMOL/L — SIGNIFICANT CHANGE UP (ref 135–145)
WBC # BLD: 6.26 K/UL — SIGNIFICANT CHANGE UP (ref 3.8–10.5)
WBC # FLD AUTO: 6.26 K/UL — SIGNIFICANT CHANGE UP (ref 3.8–10.5)

## 2024-09-26 PROCEDURE — 99285 EMERGENCY DEPT VISIT HI MDM: CPT

## 2024-09-26 PROCEDURE — 99223 1ST HOSP IP/OBS HIGH 75: CPT | Mod: GC

## 2024-09-26 RX ORDER — ATORVASTATIN CALCIUM 10 MG/1
20 TABLET, FILM COATED ORAL AT BEDTIME
Refills: 0 | Status: DISCONTINUED | OUTPATIENT
Start: 2024-09-26 | End: 2024-09-29

## 2024-09-26 RX ORDER — 5-HYDROXYTRYPTOPHAN (5-HTP) 100 MG
3 TABLET,DISINTEGRATING ORAL AT BEDTIME
Refills: 0 | Status: DISCONTINUED | OUTPATIENT
Start: 2024-09-26 | End: 2024-09-29

## 2024-09-26 RX ORDER — ENOXAPARIN SODIUM 150 MG/ML
40 INJECTION SUBCUTANEOUS EVERY 24 HOURS
Refills: 0 | Status: DISCONTINUED | OUTPATIENT
Start: 2024-09-27 | End: 2024-09-29

## 2024-09-26 RX ORDER — ESCITALOPRAM OXALATE 10 MG
1 TABLET ORAL
Refills: 0 | DISCHARGE

## 2024-09-26 RX ORDER — ESCITALOPRAM OXALATE 10 MG
10 TABLET ORAL DAILY
Refills: 0 | Status: DISCONTINUED | OUTPATIENT
Start: 2024-09-27 | End: 2024-09-29

## 2024-09-26 RX ORDER — ACETAMINOPHEN 325 MG
650 TABLET ORAL EVERY 6 HOURS
Refills: 0 | Status: DISCONTINUED | OUTPATIENT
Start: 2024-09-26 | End: 2024-09-29

## 2024-09-26 RX ORDER — ONDANSETRON HCL/PF 4 MG/2 ML
4 VIAL (ML) INJECTION EVERY 8 HOURS
Refills: 0 | Status: DISCONTINUED | OUTPATIENT
Start: 2024-09-26 | End: 2024-09-29

## 2024-09-26 RX ADMIN — ATORVASTATIN CALCIUM 20 MILLIGRAM(S): 10 TABLET, FILM COATED ORAL at 23:28

## 2024-09-26 NOTE — ED PROVIDER NOTE - CLINICAL SUMMARY MEDICAL DECISION MAKING FREE TEXT BOX
Pt with worsening dementia, inadequate care at home. Appears to be unsafe home environment.  Brought in by son at request of private  for hospital admission, NH placement.  Patient has no medical complaints. He has feces on himself on exam.

## 2024-09-26 NOTE — H&P ADULT - ASSESSMENT
73yo M w/ pmhx of Alzheimers (baseline AOx1), Bipolar depression, HLD, L hearing loss, and BASHIR who presents with worsening dementia and inability to take care of himself

## 2024-09-26 NOTE — H&P ADULT - PROBLEM SELECTOR PLAN 2
Presents with Hgb 12.5. Normocytic. Per son, had one episode of dark stool 2 weeks ago but none since. Son also presents paperwork noting colonoscopy done in 2015 but unclear on results. Outpatient labs show fluctuation in Hgb between 12-14 in the last 3 years. Iron studies done in the past noted BASHIR    Plan:  - f/u Iron studies  - f/u TSH  - maintain active T&S  - transfuse Hgb <7

## 2024-09-26 NOTE — H&P ADULT - HISTORY OF PRESENT ILLNESS
DON AN, 74y, Male  MRN-[id number]  Patient is a 74y old  Male who presents with a chief complaint of     HPI:      DON AN, 74y, Male  MRN-[id number]  Patient is a 74y old  Male who presents with a chief complaint of     HPI: Patient is a 75yo M w/ pmhx of Alzheimers (baseline AOx1), Bipolar depression, HLD, L hearing loss, and BASHIR who presents from home due to inability to take care of himself. Patient is accompanies by his son who states that the patient has become much more forgetful over the last few months and is found in a disheveled state whenever the son visits. Patient has been found wandering around his apartment building naked at times and been threatened with eviction. Patient is unable to feed himself or use the bathroom on his own and has relied on adult diapers. Patient's son was able to get a Medicaid SW and the SW recommended the atient be brought here for further assistence and evaluation.  Per the son, patient has not had any recent falls that he is aware of but his coordination has been worse. Denies any tremors.     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 Patient is accompanies by his son who states that the patient has become much more forgetful over the last few months and is found in a disheveled state whenever the son visits. Patient has been found wandering around his apartment building naked at times and been threatened with eviction. Patient is unable to feed himself or use the bathroom on his own and has relied on adult diapers. Patient's son was able to get a Medicaid SW and the SW recommended the atient be brought here for further assistence and evaluation.  Per the son, patient has not had any recent falls that he is aware of but his coordination has been worse. Denies any tremors.     In the ED, vitals: T 98.3, HR 65, /79, RR 17 sat 97% on RA  Labs significant for Hgb 12.5 and Chloride 110

## 2024-09-26 NOTE — ED ADULT NURSE REASSESSMENT NOTE - NS ED NURSE REASSESS COMMENT FT1
assumed pt care from XIOMARA Galvan. pt is awake,alert. not in any distress. son at the bedside. awaiting for bed assignment. vital signs stable.

## 2024-09-26 NOTE — H&P ADULT - PROBLEM SELECTOR PLAN 3
Previously on Lexapro 10mg qd but patient was noncompliant. Per son, patient having mood swings at times along with baseline confusion    Plan:  - c/w home med

## 2024-09-26 NOTE — ED ADULT TRIAGE NOTE - HEART RATE (BEATS/MIN)
"----- Message from Garima Sultana MD sent at 9/23/2019  2:24 PM CDT -----  Please call :     Both lesions basal cell carcinoma .     Mohs referral for the lesion on the forehead     40\" appointment with myself for removal of the basal cell carcinoma on the right lateral chest wall     Thank you,   Garima Sultana M.D.  " 66

## 2024-09-26 NOTE — ED PROVIDER NOTE - OBJECTIVE STATEMENT
75 yo M w PMH of dementia, brought to ED by son for c/f safety at home. Pt does not have 24h home care. Per son, pts SW requested he be admitted due to potentially unsafe home environment. Pt has had recent decline in mental status. Is currently AAO x1-2. No medical complaints. Family seeking NH placement.

## 2024-09-26 NOTE — H&P ADULT - PROBLEM SELECTOR PLAN 1
Patient with Alzheimers, baseline AOx1 to person (only first name), who has had worsening coordination and memory over the past few months with inability to take care of himself. Per son, can not feed himself or go to the bathroom on his own. Requires assistance at home vs placement at NH. Previously on Rivastigmine but dc'd due to side effects    Plan:  - f/u TSH, B12, folate  - f/u PT  - f/u SW  - fall precautions  - meals with assistance

## 2024-09-26 NOTE — ED ADULT NURSE NOTE - ALCOHOL PRE SCREEN (AUDIT - C)
Statement Selected Metatypical Bcc Histology Text: The dermis contains distinctive tumor cell masses of various shapes and sizes composed of cells with large oval or elongated nuclei with relatively little cytoplasm.  The nuclei are homogenous.  There is no pronounced variation in size or intensity of staining and no significant anaplastic appearance.  The cells at the outer aspect of the tumor masses show palisading of nuclei.  Tumor masses are surrounded by a connective tissue stroma and in some areas there is retraction artifact of the tumor nodule away from the surrounding stroma.  Squamous differentiation consistent with metatypical BCC is noted.

## 2024-09-26 NOTE — ED ADULT TRIAGE NOTE - CHIEF COMPLAINT QUOTE
Pt presents to ED with son at bedside requesting help at home. Pt has hx alzheimer's. Son states, " I had been away and came to check on him , he has a home health aid, the house isn't safe, he's been wandering, he was dirty I called APS and SW and they told us to come to the hospital."

## 2024-09-26 NOTE — ED PROVIDER NOTE - PHYSICAL EXAMINATION
CONSTITUTIONAL: Non-toxic; in no apparent distress, + legs covered in feces  HEAD: Normocephalic; atraumatic  EYES: No icterus or injection  ENMT: External appears normal  NECK: Supple; normal ROM, no visible mass, symmetric, trachea midline  CARD: Extremities warm and well perfused, normal HR, normal BP  RESP: No resp distress, symmetric chest rise  ABD: No obvious ascites or abd distension  EXT: Grossly normal ROM in all four extremities  SKIN: Warm, dry, no rash  NEURO: Gait normal, grossly symmetric face, strength intact throughout extremities.

## 2024-09-26 NOTE — H&P ADULT - NSHPLABSRESULTS_GEN_ALL_CORE
LABS:                        12.5   6.26  )-----------( 257      ( 26 Sep 2024 19:58 )             38.1     09-26    142  |  110[H]  |  22  ----------------------------<  97  4.4   |  24  |  0.81    Ca    8.8      26 Sep 2024 19:58    TPro  6.6  /  Alb  3.9  /  TBili  0.3  /  DBili  x   /  AST  19  /  ALT  13  /  AlkPhos  85  09-26    LIVER FUNCTIONS - ( 26 Sep 2024 19:58 )  Alb: 3.9 g/dL / Pro: 6.6 g/dL / ALK PHOS: 85 U/L / ALT: 13 U/L / AST: 19 U/L / GGT: x             Urinalysis Basic - ( 26 Sep 2024 19:58 )    Color: x / Appearance: x / SG: x / pH: x  Gluc: 97 mg/dL / Ketone: x  / Bili: x / Urobili: x   Blood: x / Protein: x / Nitrite: x   Leuk Esterase: x / RBC: x / WBC x   Sq Epi: x / Non Sq Epi: x / Bacteria: x      CAPILLARY BLOOD GLUCOSE              RADIOLOGY & ADDITIONAL TESTS: Reviewed.

## 2024-09-26 NOTE — ED ADULT NURSE NOTE - NSFALLHARMRISKINTERV_ED_ALL_ED
Assistance OOB with selected safe patient handling equipment if applicable/Assistance with ambulation/Communicate risk of Fall with Harm to all staff, patient, and family/Monitor gait and stability/Monitor for mental status changes and reorient to person, place, and time, as needed/Provide visual cue: red socks, yellow wristband, yellow gown, etc/Reinforce activity limits and safety measures with patient and family/Toileting schedule using arm’s reach rule for commode and bathroom/Use of alarms - bed, stretcher, chair and/or video monitoring/Bed in lowest position, wheels locked, appropriate side rails in place/Call bell, personal items and telephone in reach/Instruct patient to call for assistance before getting out of bed/chair/stretcher/Non-slip footwear applied when patient is off stretcher/[place] to call system/Physically safe environment - no spills, clutter or unnecessary equipment/Purposeful Proactive Rounding/Room/bathroom lighting operational, light cord in reach

## 2024-09-26 NOTE — H&P ADULT - TIME BILLING
Bedside exam and interview     Reviewed vitals, labs     Discussed patient's plan of care with housestaff     Documentation of encounter    Excludes teaching time and/or separately reported services.

## 2024-09-26 NOTE — H&P ADULT - NSHPPHYSICALEXAM_GEN_ALL_CORE
PHYSICAL EXAM:  Constitutional: resting comfortably in bed; NAD  HEENT: NC/AT, PER, anicteric sclera, no nasal discharge; MMM  Neck: supple; no JVD or thyromegaly  Respiratory: CTA B/L; no W/R/R  Cardiac: +S1/S2; RRR; no M/R/G  Gastrointestinal: soft, NT/ND; no rebound or guarding  Back: spine midline, no CVAT B/L  Extremities: WWP, no clubbing, cyanosis, or peripheral edema  Musculoskeletal: no joint swelling, tenderness or erythema  Vascular: 2+ radial  Dermatologic: skin warm, dry and intact; no rashes, wounds, or scars  Neurologic: AAOx3; CNII-XII grossly intact; no focal deficits  Psychiatric: affect and characteristics of appearance, verbalizations, behaviors are appropriate PHYSICAL EXAM:  Constitutional: resting comfortably in bed; NAD  HEENT: NC/AT, PER, anicteric sclera, no nasal discharge; MMM  Neck: supple; no JVD or thyromegaly  Respiratory: CTA B/L; no W/R/R  Cardiac: +S1/S2; RRR; no M/R/G  Gastrointestinal: soft, NT/ND; no rebound or guarding  Extremities: WWP, no clubbing, cyanosis, or peripheral edema  Musculoskeletal: no joint swelling, tenderness or erythema  Vascular: 2+ radial  Dermatologic: skin warm, dry and intact; BL knee scars from knee replacements  Neurologic: AAOx1; CNII-XII grossly intact; no focal deficits

## 2024-09-26 NOTE — H&P ADULT - ATTENDING COMMENTS
75yo M w/ pmhx of Alzheimers (baseline AOx1), Bipolar depression, HLD, L hearing loss, and BASHIR who presents with worsening dementia and inability to take care of himself, admitted for possible placement     #Dementia   #FTT  #Bipolar disorder   #BASHIR    Plan   -PT/SW consult  -fall/asp precautions   -c/w home meds

## 2024-09-26 NOTE — H&P ADULT - PROBLEM SELECTOR PLAN 5
F: PO  E: replete as needed, Mg >2 and Phos >3  N: regular diet w/ assistance for feeding    DVT: lovenox  FULL CODE

## 2024-09-27 ENCOUNTER — TRANSCRIPTION ENCOUNTER (OUTPATIENT)
Age: 74
End: 2024-09-27

## 2024-09-27 PROCEDURE — 99222 1ST HOSP IP/OBS MODERATE 55: CPT

## 2024-09-27 PROCEDURE — 99232 SBSQ HOSP IP/OBS MODERATE 35: CPT | Mod: GC

## 2024-09-27 PROCEDURE — 93010 ELECTROCARDIOGRAM REPORT: CPT

## 2024-09-27 RX ORDER — HALOPERIDOL LACTATE 2 MG/ML
2 CONCENTRATE, ORAL ORAL EVERY 6 HOURS
Refills: 0 | Status: DISCONTINUED | OUTPATIENT
Start: 2024-09-27 | End: 2024-09-29

## 2024-09-27 RX ORDER — OLANZAPINE 2.5 MG
5 TABLET ORAL ONCE
Refills: 0 | Status: COMPLETED | OUTPATIENT
Start: 2024-09-27 | End: 2024-09-27

## 2024-09-27 RX ORDER — HALOPERIDOL LACTATE 2 MG/ML
5 CONCENTRATE, ORAL ORAL ONCE
Refills: 0 | Status: COMPLETED | OUTPATIENT
Start: 2024-09-27 | End: 2024-09-27

## 2024-09-27 RX ADMIN — Medication 3 MILLIGRAM(S): at 23:49

## 2024-09-27 RX ADMIN — ENOXAPARIN SODIUM 40 MILLIGRAM(S): 150 INJECTION SUBCUTANEOUS at 23:03

## 2024-09-27 RX ADMIN — ENOXAPARIN SODIUM 40 MILLIGRAM(S): 150 INJECTION SUBCUTANEOUS at 00:30

## 2024-09-27 RX ADMIN — Medication 5 MILLIGRAM(S): at 04:36

## 2024-09-27 RX ADMIN — Medication 10 MILLIGRAM(S): at 13:24

## 2024-09-27 RX ADMIN — ATORVASTATIN CALCIUM 20 MILLIGRAM(S): 10 TABLET, FILM COATED ORAL at 21:47

## 2024-09-27 RX ADMIN — Medication 5 MILLIGRAM(S): at 03:00

## 2024-09-27 NOTE — DISCHARGE NOTE PROVIDER - CARE PROVIDER_API CALL
[facility] [place]  [facility]  [address]  [place] [address]  Phone: [phone]  Fax: [phone]  Follow Up Time: 1 week

## 2024-09-27 NOTE — DIETITIAN INITIAL EVALUATION ADULT - PERTINENT LABORATORY DATA
Nutritionally pertinent labs 9/27  Sodium 142 mmol/L (WNL)   Potassium 4.4 mmol/L (WNL)   BUN/Creatinine 22/0.81 (WNL)   Glucose 97 mg/dl (WNL)   Calcium 8.8 mg/dl (WNL)

## 2024-09-27 NOTE — DIETITIAN INITIAL EVALUATION ADULT - EDUCATION DIETARY MODIFICATIONS
Education not appropriate at this time in setting of pt cognitive status, RD remains available for dietary education prn./not applicable

## 2024-09-27 NOTE — DIETITIAN INITIAL EVALUATION ADULT - LAB (SPECIFY)
Monitor electrolytes, renal indices, and BMP Monitor electrolytes, renal indices, blood glucose, and BMP

## 2024-09-27 NOTE — PHYSICAL THERAPY INITIAL EVALUATION ADULT - CRITERIA FOR SKILLED THERAPEUTIC INTERVENTIONS
Problem: Adult Mental Health  Intervention: Implement and maintain protective environment  Perform 15 minute safety checks, maintain suicide precautions.  Patient seen up and walking around the unit. Patient took scheduled medications without difficulty and requested prn Ambien for difficulty sleeping. Patient reported wanted to discharge tomorrow because her daughter needs her help. Writer talked with patient about how the stress of helping her daughter is what brought her to the hospital. Patient reported feeling ready to go back there because the children will be at school tomorrow and she will start looking for a new place to live with her . Patient denies suicidal thoughts and reports she will remain safe while in the hospital. Writer will continue to monitor.       Goal: Reports decrease in thoughts of suicide  Outcome: Outcome Met, Continue evaluating goal progress toward completion  Nursing Suicide Assessment Note - Inpatient    Current assessment:    Current C-SSRS score: Negative screen= no ideation, behaviors or history      Protective Factors / Reason for Living: Social supports, Responsibility to children    Interventions:   · Maintain current plan of care.         impairments found/functional limitations in following categories/rehab potential/therapy frequency/predicted duration of therapy intervention/anticipated equipment needs at discharge/anticipated discharge recommendation

## 2024-09-27 NOTE — DIETITIAN INITIAL EVALUATION ADULT - REASON INDICATOR FOR ASSESSMENT
Nutrition consult for MST >2   Information obtained from electronic medical record   Charts reviewed, events noted

## 2024-09-27 NOTE — BH CONSULTATION LIAISON ASSESSMENT NOTE - NSBHCHARTREVIEWVS_PSY_A_CORE FT
Vital Signs Last 24 Hrs  T(C): 36.4 (27 Sep 2024 04:09), Max: 36.9 (26 Sep 2024 20:36)  T(F): 97.6 (27 Sep 2024 04:09), Max: 98.5 (26 Sep 2024 20:36)  HR: 61 (27 Sep 2024 04:09) (61 - 68)  BP: 148/80 (27 Sep 2024 04:09) (121/79 - 148/80)  BP(mean): --  RR: 18 (27 Sep 2024 04:09) (17 - 18)  SpO2: 100% (27 Sep 2024 04:09) (97% - 100%)    Parameters below as of 27 Sep 2024 04:09  Patient On (Oxygen Delivery Method): room air

## 2024-09-27 NOTE — PROGRESS NOTE ADULT - ASSESSMENT
***INCOMPLETE NOTE*** 73yo M w/ pmhx of Alzheimers (baseline AOx1), Bipolar depression, HLD, L hearing loss, and BASHIR who presents with worsening dementia and inability to take care of himself

## 2024-09-27 NOTE — DIETITIAN NUTRITION RISK NOTIFICATION - ADDITIONAL COMMENTS/DIETITIAN RECOMMENDATIONS
Per [facility] patient has had a steady weight loss - October 2023 197 pounds, April 2024- 177 pounds; current admission weight 170 pounds indicates 27lb/14% weight loss over 11 months - not clinically significant. Nutrition Focused Physical Exam significant for moderate fat wasting in the orbital and buccal area, mild muscle wasting in the shoulders, and moderate muscle wasting in the thighs, calves, clavicle and temples. Suspect prolonged inadequate intake in setting of dementia. 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

## 2024-09-27 NOTE — DIETITIAN INITIAL EVALUATION ADULT - OTHER INFO
74 year old male with a past medical history of Alzheimer's disease, bipolar depression, hyperlipidemia, left hearing loss, iron deficiency anemia, right total knee arthoplasty that developed a knee infection with methicillin-susceptible Staphylococcus aureus bacteremia, dementia, and post surgical history of cataract extraction and colonoscopy. Patient presents to [facility] with Alzheimer's disease with dementia.   Patient seen on 07UR at bedside with nurse aid. Patient disoriented and agitated at visit, consistent with baseline per nursing. Interview deferred to nurse. Nurse reports patient having breakfast of eggs, pancakes, pudding and orange juice. States that patient had 90% of his breakfast but did not have the orange juice. Reports patient is able to indicate when he is hungry and full. Patient is ordered for soft and bite sized diet. Nurse reports patient not having a bowel movement since admission. Denies nausea/vomiting/diarrhea/constipation and difficulty chewing/swallowing.   Per [facility] patient has had a steady weight loss- October 2023 197 pounds, April 2024- 177 pounds, September 2024-170 pounds, 14% weight loss over 11 months, not clinically significant. Patient has a dosing weight of 170 ponds/77.1 kg with a BMI of 23.1, ideal body weight of 178 pounds, % ideal body weight 95%. Per Nutrition Focused Physical Exam, RD observed moderate fat wasting in the orbital and buccal area, mild muscle wasting in the shoulders, and moderate muscle wasting in the thighs, calves, clavicle and temples. 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 Patient is not experiencing pain 0/10, has no edema, no noted bowel movement and a skin tear on the right inner thigh per flowsheet.   Patient is ordered for escitalopram, atorvastatin and ondansetron.   Nutritionally pertinent labs 9/27  Sodium 142 mmol/L (WNL)   Potassium 4.4 mmol/L (WNL)   BUN/Creatinine 22/0.81 (WNL)   Glucose 97 mg/dl (WNL)   Calcium 8.8 mg/dl (WNL)  74 year old male with a past medical history of Alzheimer's disease, bipolar depression, hyperlipidemia, left hearing loss, iron deficiency anemia, right total knee arthoplasty complicated by knee infection with methicillin-susceptible Staphylococcus aureus bacteremia, cataract extraction, and colonoscopy. Patient presents to [facility] [place] [facility] with worsening dementia and inability to care for self, admitted for management.     Patient seen on 07UR at bedside with nurse aid. Patient disoriented and agitated at visit, consistent with baseline per nursing. Interview deferred to nurse. Nurse reports patient completed 90% breakfast tray of eggs, pancakes, and pudding. Reports patient is able to indicate when he is hungry and full. Patient is ordered for soft and bite sized diet, pt noted with inadequate dentition. Nurse denies pt with nausea/vomiting/diarrhea, no bowel movement since admission (9/26).   [facility] had a steady weight loss - October 2023 197 pounds, April 2024- 177 pounds; current admission weight 170 pounds indicates 27lb/14% weight loss over 11 months - not clinically significant. BMI of 23.1, ideal body weight of 178 pounds, % ideal body weight 95%. Nutrition Focused Physical Exam significant for moderate fat wasting in the orbital and buccal area, mild muscle wasting in the shoulders, and moderate muscle wasting in the thighs, calves, clavicle and temples. Suspect prolonged inadequate intake in setting of dementia. 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 Patient is not experiencing pain 0/10, no edema, skin tear on the right inner thigh, Abebe score 17 per flowsheet.   Patient is ordered for escitalopram, atorvastatin and ondansetron.   Nutritionally pertinent labs 9/27  Sodium 142 mmol/L (WNL)   Potassium 4.4 mmol/L (WNL)   BUN/Creatinine 22/0.81 (WNL)   Glucose 97 mg/dl (WNL)   Calcium 8.8 mg/dl (WNL)

## 2024-09-27 NOTE — BH CONSULTATION LIAISON ASSESSMENT NOTE - CURRENT MEDICATION
MEDICATIONS  (STANDING):  atorvastatin 20 milliGRAM(s) Oral at bedtime  enoxaparin Injectable 40 milliGRAM(s) SubCutaneous every 24 hours  escitalopram 10 milliGRAM(s) Oral daily    MEDICATIONS  (PRN):  acetaminophen     Tablet .. 650 milliGRAM(s) Oral every 6 hours PRN Temp greater or equal to 38C (100.4F), Mild Pain (1 - 3)  melatonin 3 milliGRAM(s) Oral at bedtime PRN Insomnia  ondansetron Injectable 4 milliGRAM(s) IV Push every 8 hours PRN Nausea and/or Vomiting

## 2024-09-27 NOTE — BH CONSULTATION LIAISON ASSESSMENT NOTE - SUMMARY
72 y/o male with a PMHx of CAD, bipolar depression, and advanced dementia brought to the hospital due to inability to care for himself. Patient experienced a sudden deterioration of his cognitive status in May. Currently incontinent and severely impaired cognitively. Presumably of Lexapro for the last year. Reportedly not violent at home but agitated overnight. Unclear what his specific dementia diagnosis is, unlikely to be AD given his preserved ability to navigate the city and not get lost. Regardless of his specific dementia diagnosis patient functional level suggestion poor prognosis, granting a palliative evaluation for possible hospice services.      From a behavioral control standpoint, reasonable to initiate a Haldol 2mg q6h PRN with appropriate QTC monitoring. Standing Rexulti may be indicated for his particular presentation, however we will have to check availability with pharmacy.

## 2024-09-27 NOTE — PROGRESS NOTE ADULT - SUBJECTIVE AND OBJECTIVE BOX
***INCOMPLETE NOTE*** Patient is a 74y old  Male who presents with a chief complaint of DEMENTIA     (27 Sep 2024 12:56)       INTERVAL HPI/OVERNIGHT EVENTS: Overnight, patient was agitated and at times combative in the ED. Received Zyprexa 5mg IM x1 and Haldol 5mg IM x1.     SUBJECTIVE: Patient seen and examined this morning. Patient requested to be left alone and was unable to provide any ROS. Patient refused lab draws this morning.    MEDICATIONS  (STANDING):  atorvastatin 20 milliGRAM(s) Oral at bedtime  enoxaparin Injectable 40 milliGRAM(s) SubCutaneous every 24 hours  escitalopram 10 milliGRAM(s) Oral daily    MEDICATIONS  (PRN):  acetaminophen     Tablet .. 650 milliGRAM(s) Oral every 6 hours PRN Temp greater or equal to 38C (100.4F), Mild Pain (1 - 3)  haloperidol     Tablet 2 milliGRAM(s) Oral every 6 hours PRN agitation  melatonin 3 milliGRAM(s) Oral at bedtime PRN Insomnia  ondansetron Injectable 4 milliGRAM(s) IV Push every 8 hours PRN Nausea and/or Vomiting    Allergies    No Known Allergies    Intolerances      Vital Signs Last 24 Hrs  T(C): 36.4 (27 Sep 2024 13:30), Max: 36.9 (26 Sep 2024 20:36)  T(F): 97.5 (27 Sep 2024 13:30), Max: 98.5 (26 Sep 2024 20:36)  HR: 55 (27 Sep 2024 13:30) (55 - 68)  BP: 147/82 (27 Sep 2024 13:30) (121/79 - 148/80)  BP(mean): --  RR: 18 (27 Sep 2024 13:30) (17 - 18)  SpO2: 98% (27 Sep 2024 13:30) (97% - 100%)    Parameters below as of 27 Sep 2024 13:30  Patient On (Oxygen Delivery Method): room air      PHYSICAL EXAM:  Constitutional - NAD, Comfortable  HEENT - NCAT, EOMI  Chest - Breathing comfortably on room air, CTAB   Cardio - Warm and well perfused, RRR  Abdomen -  Soft, NTND  Extremities - No peripheral edema, No calf tenderness. (+)Healing abrasion on posterior Right thigh with surrounding erythema. No fluctuation, induration  Neurologic - Awake and alert. Could not assess orientation  Psychiatric - Irritable affect    LABS:                        12.5   6.26  )-----------( 257      ( 26 Sep 2024 19:58 )             38.1     26 Sep 2024 19:58    142    |  110    |  22     ----------------------------<  97     4.4     |  24     |  0.81     Ca    8.8        26 Sep 2024 19:58    TPro  6.6    /  Alb  3.9    /  TBili  0.3    /  DBili  x      /  AST  19     /  ALT  13     /  AlkPhos  85     26 Sep 2024 19:58      CAPILLARY BLOOD GLUCOSE        BLOOD CULTURE    RADIOLOGY & ADDITIONAL TESTS: Reviewed

## 2024-09-27 NOTE — BH CONSULTATION LIAISON ASSESSMENT NOTE - OTHER PAST PSYCHIATRIC HISTORY (INCLUDE DETAILS REGARDING ONSET, COURSE OF ILLNESS, INPATIENT/OUTPATIENT TREATMENT)
Per son, hx of bipolar depression, unknown if prior hospitalizations, unknown prior medication trials

## 2024-09-27 NOTE — PHYSICAL THERAPY INITIAL EVALUATION ADULT - ADDITIONAL COMMENTS
Pt is AO x1 at baseline and is a poor historian. As Care coordination note, Pt lives with ex-wife in an 2nd floor walk up apartment.

## 2024-09-27 NOTE — DISCHARGE NOTE PROVIDER - NSDCMRMEDTOKEN_GEN_ALL_CORE_FT
atorvastatin 20 mg oral tablet: 1 tab(s) orally once a day (at bedtime)  Lexapro 10 mg oral tablet: 1 tab(s) orally once a day   acetaminophen 325 mg oral tablet: 2 tab(s) orally every 6 hours As needed Temp greater or equal to 38C (100.4F), Mild Pain (1 - 3)  atorvastatin 20 mg oral tablet: 1 tab(s) orally once a day (at bedtime)  enoxaparin: 40 milligram(s) subcutaneous every 24 hours  haloperidol 2 mg oral tablet: 1 tab(s) orally every 6 hours As needed agitation  Lexapro 10 mg oral tablet: 1 tab(s) orally once a day  melatonin 3 mg oral tablet: 1 tab(s) orally once a day (at bedtime) As needed Insomnia  ondansetron 2 mg/mL injectable solution: 4 milligram(s) injectable every 8 hours as needed for  nausea

## 2024-09-27 NOTE — PHYSICAL THERAPY INITIAL EVALUATION ADULT - PERTINENT HX OF CURRENT PROBLEM, REHAB EVAL
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 Patient is accompanies by his son who states that the patient has become much more forgetful over the last few months and is found in a disheveled state whenever the son visits. Patient has been found wandering around his apartment building naked at times and been threatened with eviction. Patient is unable to feed himself or use the bathroom on his own and has relied on adult diapers. Patient's son was able to get a Medicaid SW and the SW recommended the atient be brought here for further assistence and evaluation.  Per the son, patient has not had any recent falls that he is aware of but his coordination has been worse. Denies any tremors.

## 2024-09-27 NOTE — DIETITIAN INITIAL EVALUATION ADULT - MALNUTRITION
Social and environmental kcal-protein malnutrition  Social and environmental moderate kcal-protein malnutrition

## 2024-09-27 NOTE — PATIENT PROFILE ADULT - FALL HARM RISK - HARM RISK INTERVENTIONS
Assistance with ambulation/Assistance OOB with selected safe patient handling equipment/Communicate Risk of Fall with Harm to all staff/Discuss with provider need for PT consult/Monitor gait and stability/Reinforce activity limits and safety measures with patient and family/Tailored Fall Risk Interventions/Visual Cue: Yellow wristband and red socks/Bed in lowest position, wheels locked, appropriate side rails in place/Call bell, personal items and telephone in reach/Instruct patient to call for assistance before getting out of bed or chair/Non-slip footwear when patient is out of bed/[place] to call system/Physically safe environment - no spills, clutter or unnecessary equipment/Purposeful Proactive Rounding/Room/bathroom lighting operational, light cord in reach

## 2024-09-27 NOTE — BH CONSULTATION LIAISON ASSESSMENT NOTE - RISK ASSESSMENT
Chronic risk of harm to self and others is elevated due to hx of mental illness (bipolar depression). Acute risk is not elevated given lack of SI/HI/AVH.

## 2024-09-27 NOTE — DIETITIAN INITIAL EVALUATION ADULT - OTHER CALCULATIONS
Patient has an ideal body weight of 178 pounds, 95% of ideal body weight. Since patient is within % ideal body weight, current weight was used to estimate needs. Needs adjusted for advanced age and malnutrition

## 2024-09-27 NOTE — DIETITIAN INITIAL EVALUATION ADULT - NUTRITIONGOAL OUTCOME1
Patient to consistently meet >75% nutrient needs.  Patient to consistently meet >75% nutrient needs. Reduce signs and symptoms of protein-calorie malnutrition.

## 2024-09-27 NOTE — DIETITIAN INITIAL EVALUATION ADULT - ADD RECOMMEND
1. Continue soft and bite sized diet   >> Dietary to provide magic cup to patient to promote PO intake   2. Encourage and monitor PO intake, honor preferences as medically able   3.Monitor labs, weight trends, skin integrity and GI tolerance   4. pain and bowel regimen defer to team  5. RD to remain available for future nutrition interventions and education.  1. Continue soft and bite sized diet.   >> Dietary to provide magic cup x1/day to patient to promote PO intake   >> Encourage and monitor PO intake, honor preferences as medically able. Nursing assistance with meals prn.   2. Monitor labs, weight trends, skin integrity and GI tolerance.   3. Pain and bowel regimens defer to team.  4. RD to remain available for future nutrition interventions and education.

## 2024-09-27 NOTE — BH CONSULTATION LIAISON ASSESSMENT NOTE - NSBHMSESPEECH_PSY_A_CORE
Normal volume, rate, productivity, spontaneity and articulation Non-verbal: unable to assess speech further

## 2024-09-27 NOTE — CONSULT NOTE ADULT - SUBJECTIVE AND OBJECTIVE BOX
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 Patient is accompanies by his son who states that the patient has become much more forgetful over the last few months and is found in a disheveled state whenever the son visits. Patient has been found wandering around his apartment building naked at times and been threatened with eviction. Patient is unable to feed himself or use the bathroom on his own and has relied on adult diapers. Patient's son was able to get a Medicaid SW and the SW recommended the atient be brought here for further assistence and evaluation.  Per the son, patient has not had any recent falls that he is aware of but his coordination has been worse. Denies any tremors.     In the ED, vitals: T 98.3, HR 65, /79, RR 17 sat 97% on RA  Labs significant for Hgb 12.5 and Chloride 110     (26 Sep 2024 20:39)    PAST MEDICAL & SURGICAL HISTORY:  Bipolar depression      Memory dysfunction  alzheimer      HLD (hyperlipidemia)      Surgery, elective  Mitral valve repair 2017      S/P colonoscopy      H/O cataract extraction  left        MEDICATIONS  (STANDING):  atorvastatin 20 milliGRAM(s) Oral at bedtime  enoxaparin Injectable 40 milliGRAM(s) SubCutaneous every 24 hours  escitalopram 10 milliGRAM(s) Oral daily    MEDICATIONS  (PRN):  acetaminophen     Tablet .. 650 milliGRAM(s) Oral every 6 hours PRN Temp greater or equal to 38C (100.4F), Mild Pain (1 - 3)  melatonin 3 milliGRAM(s) Oral at bedtime PRN Insomnia  ondansetron Injectable 4 milliGRAM(s) IV Push every 8 hours PRN Nausea and/or Vomiting      FAMILY HISTORY:      CBC Full  -  ( 26 Sep 2024 19:58 )  WBC Count : 6.26 K/uL  RBC Count : 4.10 M/uL  Hemoglobin : 12.5 g/dL  Hematocrit : 38.1 %  Platelet Count - Automated : 257 K/uL  Mean Cell Volume : 92.9 fl  Mean Cell Hemoglobin : 30.5 pg  Mean Cell Hemoglobin Concentration : 32.8 gm/dL  Auto Neutrophil # : 4.17 K/uL  Auto Lymphocyte # : 1.26 K/uL  Auto Monocyte # : 0.55 K/uL  Auto Eosinophil # : 0.22 K/uL  Auto Basophil # : 0.05 K/uL  Auto Neutrophil % : 66.6 %  Auto Lymphocyte % : 20.1 %  Auto Monocyte % : 8.8 %  Auto Eosinophil % : 3.5 %  Auto Basophil % : 0.8 %      09-26    142  |  110[H]  |  22  ----------------------------<  97  4.4   |  24  |  0.81    Ca    8.8      26 Sep 2024 19:58    TPro  6.6  /  Alb  3.9  /  TBili  0.3  /  DBili  x   /  AST  19  /  ALT  13  /  AlkPhos  85  09-26      Urinalysis Basic - ( 26 Sep 2024 19:58 )    Color: x / Appearance: x / SG: x / pH: x  Gluc: 97 mg/dL / Ketone: x  / Bili: x / Urobili: x   Blood: x / Protein: x / Nitrite: x   Leuk Esterase: x / RBC: x / WBC x   Sq Epi: x / Non Sq Epi: x / Bacteria: x        Radiology :             Review of Systems : per HPI         Vital Signs Last 24 Hrs  T(C): 36.4 (27 Sep 2024 04:09), Max: 36.9 (26 Sep 2024 20:36)  T(F): 97.6 (27 Sep 2024 04:09), Max: 98.5 (26 Sep 2024 20:36)  HR: 61 (27 Sep 2024 04:09) (61 - 68)  BP: 148/80 (27 Sep 2024 04:09) (121/79 - 148/80)  BP(mean): --  RR: 18 (27 Sep 2024 04:09) (17 - 18)  SpO2: 100% (27 Sep 2024 04:09) (97% - 100%)    Parameters below as of 27 Sep 2024 04:09  Patient On (Oxygen Delivery Method): room air            Physical Exam:   74 y o man lying comfortably in semi Cheng's position , awake , alert , no acute complaints     Head: normocephalic , atraumatic    Eyes: PERRLA , EOMI , no nystagmus , sclera anicteric    ENT / FACE: neg nasal discharge , uvula midline , no oropharyngeal erythema / exudate    Neck: supple , negative JVD , negative carotid bruits , no thyromegaly    Chest: CTA bilaterally     Cardiovascular: regular rate and rhythm , neg murmurs / rubs / gallops    Abdomen: soft , non distended , no tenderness to palpation in all 4 quadrants ,  normal bowel sounds     Lower Extremities: WWP , neg cyanosis /clubbing / edema     Musculoskeletal:  post TKR  midline scars     Neurologic Exam:     Alert and oriented to person     Cranial Nerves:           II:                         pupils equal , round and reactive to light , visual fields intact         III/ IV/VI:             extraocular movements intact , neg nystagmus , neg ptosis        V:                        facial sensation intact , V1-3 normal        VII:                      face symmetric , no droop , normal eye closure and smile        VIII:                     hearing intact to finger rub bilaterally        IX and X:             no hoarseness , gag intact , palate/ uvula rise symmetrically        XI:                       SCM / trapezius strength intact bilateral        XII:                      no tongue deviation    Motor Exam:        > 3+/5 x 4 extremities      Sensation:         intact to light touch x 4 extremities                            no neglect or extinction on double simultaneous testing    DTR:           biceps/brachioradialis: equal                            patella/ankle: equal          neg Babinski     Coordination:            Finger to Nose:  neg dysmetria bilaterally        Gait:  not tested         PM&R Impression: admitted for worsening dementia and inability to take care of himself     - adult FTT    - deconditioned       Recommendations / Plan:       1) Physical / Occupational therapy focusing on therapeutic exercises , equipment evaluation , bed mobility/transfer out of bed evaluation , progressive ambulation with assistive devices prn .    2) Current disposition plan recommendation:  [facility] placement

## 2024-09-27 NOTE — DISCHARGE NOTE PROVIDER - DETAILS OF MALNUTRITION DIAGNOSIS/DIAGNOSES
This patient has been assessed with a concern for Malnutrition and was treated during this hospitalization for the following Nutrition diagnosis/diagnoses:     -  09/27/2024: Moderate protein-calorie malnutrition

## 2024-09-27 NOTE — DISCHARGE NOTE PROVIDER - HOSPITAL COURSE
73yo M w/ pmhx of Alzheimers (baseline AOx1), Bipolar depression, HLD, L hearing loss, and BASHIR who presents with worsening dementia and inability to take care of himself.    Hospital course (by problem):     1. Alzheimer dementia.   ·  Plan: Patient with Alzheimers, baseline AOx1 to person (only first name), who has had worsening coordination and memory over the past few months with inability to take care of himself. Per son, can not feed himself or go to the bathroom on his own. Requires assistance at home vs placement at NH. Previously on Rivastigmine but dc'd due to side effects.  Psych consulted, recs appreciated. Evaluated by physical therapy and recommended for subacute rehab.    - f/u TSH, B12, folate - unable to be assessed as patient declined lab draws  - Delirium, fall precautions.   - Per nutrition recs, recommended for soft & bite sized diet w/ assistance with meals.  - Haldol 2mg q6h PO PRN for agitation.    2. Anemia.   ·  Plan: Presents with Hgb 12.5. Normocytic. Per son, had one episode of dark stool 2 weeks ago but none since. Son also presents paperwork noting colonoscopy done in 2015 but unclear on results. Outpatient labs show fluctuation in Hgb between 12-14 in the last 3 years. Iron studies done in the past noted BASHIR    Plan:  - f/u Iron studies, TSH - unable to be assessed as patient declined lab draws    3. Bipolar depression.   ·  Plan: Previously on Lexapro 10mg qd but patient was noncompliant. Per son, patient having mood swings at times along with baseline confusion  Psych consulted, recs appreciated. Per collateral from son, patient has been on Lexapro for most of his life.    - Continue Lexapro 10mg daily upon discharge    4. HLD (hyperlipidemia).   ·  Plan: Home med of Atorvastatin 20mg but patient was noncompliant    - Continue Atorvastatin 20mg daily upon discharge    Patient was discharged to: [facility]    New medications: None  Changes to old medications: None  Medications that were stopped: None    Items to follow up as outpatient: PCP follow up    Physical exam at the time of discharge:  Constitutional - NAD, Comfortable  HEENT - NCAT, EOMI  Chest - Breathing comfortably on room air, CTAB   Cardio - Warm and well perfused, RRR  Abdomen -  Soft, NTND  Extremities - No peripheral edema, No calf tenderness. (+)Healing abrasion on posterior Right thigh with surrounding erythema. No fluctuation, induration  Neurologic - Awake and alert. Could not assess orientation  Psychiatric - Irritable affect       73yo M w/ pmhx of Alzheimers (baseline AOx1), Bipolar depression, HLD, L hearing loss, and BASHIR who presents with worsening dementia and inability to take care of himself.    Hospital course (by problem):     1. Alzheimer dementia.   ·  Plan: Patient with Alzheimers, baseline AOx1 to person (only first name), who has had worsening coordination and memory over the past few months with inability to take care of himself. Per son, can not feed himself or go to the bathroom on his own. Requires assistance at home vs placement at NH. Previously on Rivastigmine but dc'd due to side effects.  Psych consulted, recs appreciated. Evaluated by physical therapy and recommended for subacute rehab.  TSH 0.88, Free T4 1.14, B12 490, Folate 16.4    - Delirium, fall precautions.   - Per nutrition recs, recommended for soft & bite sized diet w/ assistance with meals.  - Haldol 2mg q6h PO PRN for agitation.    2. Anemia.   ·  Plan: Presents with Hgb 12.5. Normocytic. Per son, had one episode of dark stool 2 weeks ago but none since. Son also presents paperwork noting colonoscopy done in 2015 but unclear on results. Outpatient labs show fluctuation in Hgb between 12-14 in the last 3 years. Iron studies done in the past noted BASHIR    Plan:  - CTM    3. Bipolar depression.   ·  Plan: Previously on Lexapro 10mg qd but patient was noncompliant. Per son, patient having mood swings at times along with baseline confusion  Psych consulted, recs appreciated. Per collateral from son, patient has been on Lexapro for most of his life.    - Continue Lexapro 10mg daily upon discharge    4. HLD (hyperlipidemia).   ·  Plan: Home med of Atorvastatin 20mg but patient was noncompliant    - Continue Atorvastatin 20mg daily upon discharge    Patient was discharged to: [facility]    New medications: None  Changes to old medications: None  Medications that were stopped: None    Items to follow up as outpatient: PCP follow up    Physical exam at the time of discharge:  Constitutional - NAD, Comfortable  HEENT - NCAT, EOMI  Chest - Breathing comfortably on room air, CTAB   Cardio - Warm and well perfused, RRR  Abdomen -  Soft, NTND  Extremities - No peripheral edema, No calf tenderness. (+)Healing abrasion on posterior Right thigh with surrounding erythema. No fluctuation, induration  Neurologic - Awake and alert. Could not assess orientation  Psychiatric - Irritable affect

## 2024-09-27 NOTE — DISCHARGE NOTE PROVIDER - ATTENDING DISCHARGE PHYSICAL EXAMINATION:
Attending Attestation - Patient seen and examined    Exam on day of discharge:  Appears comfortable   MMM  Normal WOB on RA, CTAB   RRR, no mrg   Abdomen soft, nontender, nondistended  Extremities warm and without edema   AO to self, no focal deficits noted     Pending labs/tests: None  Provider F/U: PCP    Patient stable for discharge to [facility].    30 min spent on DC. Discussed with housestaff.    Thank you for allowing the [facility] to care for your patient. For any questions after your discharge not answered by your primary care physician or providers you have been scheduled with for follow-up above, please call [phone] and ask to speak to the Senior House Officer

## 2024-09-27 NOTE — DISCHARGE NOTE PROVIDER - NSDCCPCAREPLAN_GEN_ALL_CORE_FT
PRINCIPAL DISCHARGE DIAGNOSIS  Diagnosis: Alzheimer dementia  Assessment and Plan of Treatment: You were admitted to [facility] for inability to care for yourself in the setting of advanced dementia. In the hospital, you were screened for active medical issues and received as-needed medications to relieve agitation. You were also seen by a psychiatrist, physical therapist, and dietician, who all provided recommendations to improve your day-to-day functioning.   After leaving the hospital, please continue to take all your medications as prescribed. Please follow up with your primary care physician.

## 2024-09-27 NOTE — CONSULT NOTE ADULT - ASSESSMENT
NP was made aware, Thanh Cast GURDEEP Whitman   I M    74 y o M w/ pmhx of Alzheimers (baseline AOx1), Bipolar depression, HLD, L hearing loss, and BASHIR who presents with worsening dementia and inability to take care of himself    Problem/Plan - 1:  ·  Problem: Alzheimer dementia.   ·  Plan: Patient with Alzheimers, baseline AOx1 to person (only first name), who has had worsening coordination and memory over the past few months with inability to take care of himself. Per son, can not feed himself or go to the bathroom on his own. Requires assistance at home vs placement at NH. Previously on Rivastigmine but dc'd due to side effects    Plan:  - f/u TSH, B12, folate  - f/u PT  - f/u SW  - fall precautions  - meals with assistance.    Problem/Plan - 2:  ·  Problem: Anemia.   ·  Plan: Presents with Hgb 12.5. Normocytic. Per son, had one episode of dark stool 2 weeks ago but none since. Son also presents paperwork noting colonoscopy done in 2015 but unclear on results. Outpatient labs show fluctuation in Hgb between 12-14 in the last 3 years. Iron studies done in the past noted BASHIR    Plan:  - f/u Iron studies  - f/u TSH  - maintain active T&S  - transfuse Hgb <7.    Problem/Plan - 3:  ·  Problem: Bipolar depression.   ·  Plan: Previously on Lexapro 10mg qd but patient was noncompliant. Per son, patient having mood swings at times along with baseline confusion    Plan:  - c/w home med.    Problem/Plan - 4:  ·  Problem: HLD (hyperlipidemia).   ·  Plan: Home med of Atorvastatin 20mg but patient was noncompliant    Plan:  - c/w home med.    Problem/Plan - 5:  ·  Problem: Prophylactic measure.   ·  Plan: F: PO  E: replete as needed, Mg >2 and Phos >3  N: regular diet w/ assistance for feeding    DVT: lovenox  FULL CODE.   GURDEEP Whitman

## 2024-09-28 LAB
ANION GAP SERPL CALC-SCNC: 9 MMOL/L — SIGNIFICANT CHANGE UP (ref 5–17)
BASOPHILS # BLD AUTO: 0.04 K/UL — SIGNIFICANT CHANGE UP (ref 0–0.2)
BASOPHILS NFR BLD AUTO: 0.7 % — SIGNIFICANT CHANGE UP (ref 0–2)
BUN SERPL-MCNC: 18 MG/DL — SIGNIFICANT CHANGE UP (ref 7–23)
CALCIUM SERPL-MCNC: 9.2 MG/DL — SIGNIFICANT CHANGE UP (ref 8.4–10.5)
CHLORIDE SERPL-SCNC: 107 MMOL/L — SIGNIFICANT CHANGE UP (ref 96–108)
CO2 SERPL-SCNC: 25 MMOL/L — SIGNIFICANT CHANGE UP (ref 22–31)
CREAT SERPL-MCNC: 0.84 MG/DL — SIGNIFICANT CHANGE UP (ref 0.5–1.3)
EGFR: 92 ML/MIN/1.73M2 — SIGNIFICANT CHANGE UP
EOSINOPHIL # BLD AUTO: 0.21 K/UL — SIGNIFICANT CHANGE UP (ref 0–0.5)
EOSINOPHIL NFR BLD AUTO: 3.5 % — SIGNIFICANT CHANGE UP (ref 0–6)
FOLATE SERPL-MCNC: 16.4 NG/ML — SIGNIFICANT CHANGE UP
GLUCOSE SERPL-MCNC: 98 MG/DL — SIGNIFICANT CHANGE UP (ref 70–99)
HCT VFR BLD CALC: 45 % — SIGNIFICANT CHANGE UP (ref 39–50)
HGB BLD-MCNC: 14.7 G/DL — SIGNIFICANT CHANGE UP (ref 13–17)
IMM GRANULOCYTES NFR BLD AUTO: 0.5 % — SIGNIFICANT CHANGE UP (ref 0–0.9)
LYMPHOCYTES # BLD AUTO: 1.17 K/UL — SIGNIFICANT CHANGE UP (ref 1–3.3)
LYMPHOCYTES # BLD AUTO: 19.5 % — SIGNIFICANT CHANGE UP (ref 13–44)
MAGNESIUM SERPL-MCNC: 2 MG/DL — SIGNIFICANT CHANGE UP (ref 1.6–2.6)
MCHC RBC-ENTMCNC: 31.4 PG — SIGNIFICANT CHANGE UP (ref 27–34)
MCHC RBC-ENTMCNC: 32.7 GM/DL — SIGNIFICANT CHANGE UP (ref 32–36)
MCV RBC AUTO: 96.2 FL — SIGNIFICANT CHANGE UP (ref 80–100)
MONOCYTES # BLD AUTO: 0.55 K/UL — SIGNIFICANT CHANGE UP (ref 0–0.9)
MONOCYTES NFR BLD AUTO: 9.2 % — SIGNIFICANT CHANGE UP (ref 2–14)
NEUTROPHILS # BLD AUTO: 3.99 K/UL — SIGNIFICANT CHANGE UP (ref 1.8–7.4)
NEUTROPHILS NFR BLD AUTO: 66.6 % — SIGNIFICANT CHANGE UP (ref 43–77)
NRBC # BLD: 0 /100 WBCS — SIGNIFICANT CHANGE UP (ref 0–0)
PHOSPHATE SERPL-MCNC: 3.6 MG/DL — SIGNIFICANT CHANGE UP (ref 2.5–4.5)
PLATELET # BLD AUTO: 259 K/UL — SIGNIFICANT CHANGE UP (ref 150–400)
POTASSIUM SERPL-MCNC: 4.4 MMOL/L — SIGNIFICANT CHANGE UP (ref 3.5–5.3)
POTASSIUM SERPL-SCNC: 4.4 MMOL/L — SIGNIFICANT CHANGE UP (ref 3.5–5.3)
RBC # BLD: 4.68 M/UL — SIGNIFICANT CHANGE UP (ref 4.2–5.8)
RBC # FLD: 13.4 % — SIGNIFICANT CHANGE UP (ref 10.3–14.5)
SODIUM SERPL-SCNC: 141 MMOL/L — SIGNIFICANT CHANGE UP (ref 135–145)
T4 FREE SERPL-MCNC: 1.14 NG/DL — SIGNIFICANT CHANGE UP (ref 0.93–1.7)
TSH SERPL-MCNC: 0.88 UIU/ML — SIGNIFICANT CHANGE UP (ref 0.27–4.2)
VIT B12 SERPL-MCNC: 490 PG/ML — SIGNIFICANT CHANGE UP (ref 232–1245)
WBC # BLD: 5.99 K/UL — SIGNIFICANT CHANGE UP (ref 3.8–10.5)
WBC # FLD AUTO: 5.99 K/UL — SIGNIFICANT CHANGE UP (ref 3.8–10.5)

## 2024-09-28 PROCEDURE — 99232 SBSQ HOSP IP/OBS MODERATE 35: CPT | Mod: GC

## 2024-09-28 RX ORDER — ONDANSETRON HCL/PF 4 MG/2 ML
4 VIAL (ML) INJECTION
Qty: 0 | Refills: 0 | DISCHARGE
Start: 2024-09-28

## 2024-09-28 RX ORDER — HALOPERIDOL LACTATE 2 MG/ML
1 CONCENTRATE, ORAL ORAL
Qty: 0 | Refills: 0 | DISCHARGE
Start: 2024-09-28

## 2024-09-28 RX ORDER — 5-HYDROXYTRYPTOPHAN (5-HTP) 100 MG
1 TABLET,DISINTEGRATING ORAL
Qty: 0 | Refills: 0 | DISCHARGE
Start: 2024-09-28

## 2024-09-28 RX ORDER — ACETAMINOPHEN 325 MG
2 TABLET ORAL
Qty: 0 | Refills: 0 | DISCHARGE
Start: 2024-09-28

## 2024-09-28 RX ORDER — ENOXAPARIN SODIUM 150 MG/ML
40 INJECTION SUBCUTANEOUS
Qty: 0 | Refills: 0 | DISCHARGE
Start: 2024-09-28

## 2024-09-28 RX ADMIN — ATORVASTATIN CALCIUM 20 MILLIGRAM(S): 10 TABLET, FILM COATED ORAL at 21:49

## 2024-09-28 RX ADMIN — Medication 10 MILLIGRAM(S): at 12:28

## 2024-09-28 RX ADMIN — ENOXAPARIN SODIUM 40 MILLIGRAM(S): 150 INJECTION SUBCUTANEOUS at 23:12

## 2024-09-28 RX ADMIN — Medication 3 MILLIGRAM(S): at 23:11

## 2024-09-28 NOTE — PROGRESS NOTE ADULT - PROBLEM SELECTOR PLAN 5
F: PO  E: replete as needed, Mg >2 and Phos >3  N: regular diet w/ assistance for feeding    DVT: lovenox  FULL CODE
F: PO  E: replete as needed, Mg >2 and Phos >3  N: regular diet w/ assistance for feeding    DVT: lovenox  FULL CODE

## 2024-09-28 NOTE — PROGRESS NOTE ADULT - PROBLEM SELECTOR PLAN 1
Patient with Alzheimers, baseline AOx1 to person (only first name), who has had worsening coordination and memory over the past few months with inability to take care of himself. Per son, can not feed himself or go to the bathroom on his own. Requires assistance at home vs placement at NH. Previously on Rivastigmine but dc'd due to side effects  TSH, B12, Folate wnl  PT recs - [facility]    Plan:  - Psych consulted, recs appreciated  - Delirium, fall precautions  - meals with assistance  - Haldol 2mg q6h PO PRN for agitation
Patient with Alzheimers, baseline AOx1 to person (only first name), who has had worsening coordination and memory over the past few months with inability to take care of himself. Per son, can not feed himself or go to the bathroom on his own. Requires assistance at home vs placement at NH. Previously on Rivastigmine but dc'd due to side effects    Plan:  - Psych consulted, recs appreciated  - f/u TSH, B12, folate  - f/u PT  - f/u SW  - Delirium, fall precautions  - meals with assistance  - Haldol 2mg q6h PO PRN for agitation

## 2024-09-28 NOTE — PROGRESS NOTE ADULT - ASSESSMENT
***INCOMPLETE NOTE*** 75yo M w/ pmhx of Alzheimers (baseline AOx1), Bipolar depression, HLD, L hearing loss, and BASHIR who presents with worsening dementia and inability to take care of himself

## 2024-09-28 NOTE — PROGRESS NOTE ADULT - SUBJECTIVE AND OBJECTIVE BOX
***INCOMPLETE NOTE*** Patient is a 74y old  Male who presents with a chief complaint of Inability to care for himself (27 Sep 2024 19:56)       INTERVAL HPI/OVERNIGHT EVENTS: No acute events overnight.    SUBJECTIVE: Patient seen and examined this morning. He reports feeling well. Patient denies fevers/chills, chest pain, shortness of breath, abdominal pain, nausea/vomiting, urinary discomfort/frequency, and diarrhea.     All other review of systems negative except otherwise noted in HPI above.    MEDICATIONS  (STANDING):  atorvastatin 20 milliGRAM(s) Oral at bedtime  enoxaparin Injectable 40 milliGRAM(s) SubCutaneous every 24 hours  escitalopram 10 milliGRAM(s) Oral daily    MEDICATIONS  (PRN):  acetaminophen     Tablet .. 650 milliGRAM(s) Oral every 6 hours PRN Temp greater or equal to 38C (100.4F), Mild Pain (1 - 3)  haloperidol     Tablet 2 milliGRAM(s) Oral every 6 hours PRN agitation  melatonin 3 milliGRAM(s) Oral at bedtime PRN Insomnia  ondansetron Injectable 4 milliGRAM(s) IV Push every 8 hours PRN Nausea and/or Vomiting    Allergies    No Known Allergies    Intolerances      Vital Signs Last 24 Hrs  T(C): 36.7 (28 Sep 2024 20:20), Max: 36.7 (28 Sep 2024 20:20)  T(F): 98 (28 Sep 2024 20:20), Max: 98 (28 Sep 2024 20:20)  HR: 66 (28 Sep 2024 20:20) (49 - 69)  BP: 134/82 (28 Sep 2024 20:20) (134/79 - 147/88)  BP(mean): 99 (28 Sep 2024 20:20) (99 - 99)  RR: 18 (28 Sep 2024 20:20) (18 - 18)  SpO2: 98% (28 Sep 2024 20:20) (94% - 100%)    Parameters below as of 28 Sep 2024 20:20  Patient On (Oxygen Delivery Method): room air      PHYSICAL EXAM:  Constitutional - NAD, Comfortable  HEENT - NCAT, EOMI  Neck - No limited ROM  Chest - Breathing comfortably on room air, CTAB   Cardio - Warm and well perfused, RRR  Abdomen -  Soft, NTND  Extremities - No peripheral edema, No calf tenderness   Neurologic - Awake and alert. Speaking fluently  Psychiatric - Mood stable, Affect WNL    LABS:                        14.7   5.99  )-----------( 259      ( 28 Sep 2024 10:00 )             45.0     28 Sep 2024 10:00    141    |  107    |  18     ----------------------------<  98     4.4     |  25     |  0.84     Ca    9.2        28 Sep 2024 10:00  Phos  3.6       28 Sep 2024 10:00  Mg     2.0       28 Sep 2024 10:00        CAPILLARY BLOOD GLUCOSE        BLOOD CULTURE    RADIOLOGY & ADDITIONAL TESTS: Reviewed

## 2024-09-28 NOTE — PROGRESS NOTE ADULT - PROBLEM SELECTOR PLAN 2
Presents with Hgb 12.5. Normocytic. Per son, had one episode of dark stool 2 weeks ago but none since. Son also presents paperwork noting colonoscopy done in 2015 but unclear on results. Outpatient labs show fluctuation in Hgb between 12-14 in the last 3 years. Iron studies done in the past noted BASHIR    Plan:  - f/u Iron studies  - f/u TSH  - maintain active T&S  - transfuse Hgb <7
Presents with Hgb 12.5. Normocytic. Per son, had one episode of dark stool 2 weeks ago but none since. Son also presents paperwork noting colonoscopy done in 2015 but unclear on results. Outpatient labs show fluctuation in Hgb between 12-14 in the last 3 years. Iron studies done in the past noted BASHIR    Plan:  - maintain active T&S  - transfuse Hgb <7

## 2024-09-28 NOTE — PROGRESS NOTE ADULT - PROBLEM SELECTOR PLAN 4
Home med of Atorvastatin 20mg but patient was noncompliant    Plan:  - c/w home med
Home med of Atorvastatin 20mg but patient was noncompliant    Plan:  - c/w home med

## 2024-09-28 NOTE — PROGRESS NOTE ADULT - PROBLEM SELECTOR PLAN 3
Previously on Lexapro 10mg qd but patient was noncompliant. Per son, patient having mood swings at times along with baseline confusion    Plan:  - Psych consulted, recs appreciated  - c/w home med
Previously on Lexapro 10mg qd but patient was noncompliant. Per son, patient having mood swings at times along with baseline confusion    Plan:  - Psych consulted, recs appreciated  - c/w home med

## 2024-09-29 VITALS
TEMPERATURE: 97 F | RESPIRATION RATE: 18 BRPM | HEART RATE: 63 BPM | OXYGEN SATURATION: 99 % | SYSTOLIC BLOOD PRESSURE: 119 MMHG | DIASTOLIC BLOOD PRESSURE: 76 MMHG

## 2024-09-29 PROCEDURE — 84100 ASSAY OF PHOSPHORUS: CPT

## 2024-09-29 PROCEDURE — 97161 PT EVAL LOW COMPLEX 20 MIN: CPT

## 2024-09-29 PROCEDURE — 83735 ASSAY OF MAGNESIUM: CPT

## 2024-09-29 PROCEDURE — 85025 COMPLETE CBC W/AUTO DIFF WBC: CPT

## 2024-09-29 PROCEDURE — 93005 ELECTROCARDIOGRAM TRACING: CPT

## 2024-09-29 PROCEDURE — 80053 COMPREHEN METABOLIC PANEL: CPT

## 2024-09-29 PROCEDURE — 82746 ASSAY OF FOLIC ACID SERUM: CPT

## 2024-09-29 PROCEDURE — 99285 EMERGENCY DEPT VISIT HI MDM: CPT

## 2024-09-29 PROCEDURE — 36415 COLL VENOUS BLD VENIPUNCTURE: CPT

## 2024-09-29 PROCEDURE — 84443 ASSAY THYROID STIM HORMONE: CPT

## 2024-09-29 PROCEDURE — 99239 HOSP IP/OBS DSCHRG MGMT >30: CPT

## 2024-09-29 PROCEDURE — 84439 ASSAY OF FREE THYROXINE: CPT

## 2024-09-29 PROCEDURE — 82607 VITAMIN B-12: CPT

## 2024-09-29 PROCEDURE — 80048 BASIC METABOLIC PNL TOTAL CA: CPT

## 2024-09-29 RX ORDER — HALOPERIDOL LACTATE 2 MG/ML
2 CONCENTRATE, ORAL ORAL ONCE
Refills: 0 | Status: COMPLETED | OUTPATIENT
Start: 2024-09-29 | End: 2024-09-29

## 2024-09-29 RX ADMIN — Medication 10 MILLIGRAM(S): at 11:22

## 2024-09-29 RX ADMIN — Medication 2 MILLIGRAM(S): at 04:23

## 2024-09-29 NOTE — PROGRESS NOTE ADULT - ASSESSMENT
{\rtf1\qampbl22054\ansi\mutwhku3425\ftnbj\uc1\deff0  {\fonttbl{\f0 \fnil Segoe UI;}{\f1 \fnil \fcharset0 Segoe UI;}{\f2 \fnil Times New Benjamin;}}  {\colortbl ;\bbe420\aghwo201\ktoe818 ;\red0\green0\blue0 ;\red0\green0\oecq069 ;\red0\green0\blue0 ;}  {\stylesheet{\f0\fs20 Normal;}{\cs1 Default Paragraph Font;}{\cs2\f0\fs16 Line Number;}{\cs3\f2\fs24\ul\cf3 Hyperlink;}}  {\*\revtbl{Unknown;}}  \vyqxlu31235\zvgben46660\bujpj1607\fahmh8326\stqtb4410\upyxp8482\svagehj159\dxfgidj393\nogrowautofit\uqmmee500\formshade\nofeaturethrottle1\dntblnsbdb\fet4\aendnotes\aftnnrlc\pgbrdrhead\pgbrdrfoot  \sectd\oavjyu55548\dtqdrm69686\guttersxn0\canmqhsv3428\vqoqyvxp5665\lqpeferj8731\qedpgqfx6545\sscpguf535\almozpy334\sbkpage\pgncont\pgndec  \plain\plain\f0\fs24\ql\plain\f0\fs24\plain\f0\fs20\qznv9817\hich\f0\dbch\f0\loch\f0\fs20\par   I M\par  \par  74 y o M w/ pmhx of Alzheimers (baseline AOx1), Bipolar depression, HLD, L hearing loss, and BASHIR who presents with worsening dementia and inability to take care of himself\par  \plain\f1\fs20\zorn2784\hich\f1\dbch\f1\loch\f1\cf2\fs20\strike\plain\f1\fs20\czdm2175\hich\f1\dbch\f1\loch\f1\cf2\fs20\plain\f0\fs20\jcgi8125\hich\f0\dbch\f0\loch\f0\fs20\par  \plain\f1\fs20\dcrh6181\hich\f1\dbch\f1\loch\f1\cf2\fs20\ul{\field{\*\fldinst HYPERLINK [id number] }{\fldrslt Nutritional Assessment:}}\plain\f0\fs20\yrvh3394\hich\f0\dbch\f0\loch\f0\fs20\ql\par  \trowd\zlwtne88\lastrow\mevjila14\trpaddfl3\wqsbiiu59\trpaddfr3\trpaddt0\trpaddft3\trpaddb0\trpaddfb3\trleft0  \clvertalt\gmymze88\clpadft3\whrgnn98\clpadfr3\clpadl0\clpadfl3\clpadb0\clpadfb3\okdub0304  \clvertalt\guhqqh62\clpadft3\ichfbl74\clpadfr3\clpadl0\clpadfl3\clpadb0\clpadfb3\ufmrd7951  \pard\intbl\ssparaaux0\s0\fi-120\li120\ql\plain\f0\fs24{\*\bkmkstart [id number]}{\*\bkmkend [id number]}\plain\f0\fs20\orls7438\hich\f0\dbch\f0\loch\f0\fs20 \'b7 Nutritional Assessment\cell  \pard\intbl\ssparaaux0\s0\li300\ri300\ql\plain\f0\fs24\plain\f0\fs20\ntxl1223\hich\f0\dbch\f0\loch\f0\fs20 This patient has been assessed with a concern for Malnutrition and has been determined to have a diagnosis/diagnoses of Moderate protein-calorie   malnutrition.\par  \par  This patient is being managed with: \par  Diet Soft and Bite Sized-\par  Entered: Sep 27 2024  9:19AM\cell  \intbl\row  \pard\ssparaaux0\s0\ql\plain\f0\fs24\plain\f0\fs20\tyzm4756\hich\f0\dbch\f0\loch\f0\fs20\par  \plain\f1\fs20\nghe5267\hich\f1\dbch\f1\loch\f1\cf2\fs20\ul{\field{\*\fldinst HYPERLINK [id number] }{\fldrslt Problem/Plan - 1:}}\plain\f0\fs20\cfad5678\hich\f0\dbch\f0\loch\f0\fs20\ql\par  \'b7  {\*\bkmkstart [id number]}{\*\bkmkend vd97989270301}Problem: {\*\bkmkstart da90130374690}{\*\bkmkend dl50057872272}Alzheimer dementia. \par  \'b7  {\*\bkmkstart yg40955263094}{\*\bkmkend mw47734450082}Plan: {\*\bkmkstart dv68716561327}{\*\bkmkend rv89187622127}Patient with Alzheimers, baseline AOx1 to person (only first name), who has had worsening coordination and memory over the past few   months with inability to take care of himself. Per son, can not feed himself or go to the bathroom on his own. Requires assistance at home vs placement at NH. Previously on Rivastigmine but dc'd due to side effects\par  TSH, B12, Folate wnl\par  PT recs - [facility]\par  \par  Plan:\par  - Psych consulted, recs appreciated\par  - Delirium, fall precautions\par  - meals with assistance\par  - Haldol 2mg q6h PO PRN for agitation.\par  \par  \plain\f1\fs20\inrf4783\hich\f1\dbch\f1\loch\f1\cf2\fs20\ul{\field{\*\fldinst HYPERLINK [id number] }{\fldrslt Problem/Plan - 2:}}\plain\f0\fs20\lyyu9659\hich\f0\dbch\f0\loch\f0\fs20\ql\par  \'b7  {\*\bkmkstart mb64155149708}{\*\bkmkend dg37919368330}Problem: {\*\bkmkstart qq95630005022}{\*\bkmkend wa55381067970}Anemia. \par  \'b7  {\*\bkmkstart wv80371255487}{\*\bkmkend bh83750178899}Plan: {\*\bkmkstart mn58952679279}{\*\bkmkend lr16585155023}Presents with Hgb 12.5. Normocytic. Per son, had one episode of dark stool 2 weeks ago but none since. Son also presents paperwork   noting colonoscopy done in 2015 but unclear on results. Outpatient labs show fluctuation in Hgb between 12-14 in the last 3 years. Iron studies done in the past noted BASHIR\par  \par  Plan:\par  - maintain active T&S\par  - transfuse Hgb <7.\par  \par  \plain\f1\fs20\nksr3416\hich\f1\dbch\f1\loch\f1\cf2\fs20\ul{\field{\*\fldinst HYPERLINK [id number] }{\fldrslt Problem/Plan - 3:}}\plain\f0\fs20\bmxc2583\hich\f0\dbch\f0\loch\f0\fs20\ql\par  \'b7  {\*\bkmkstart du72637772946}{\*\bkmkend ef51625002783}Problem: {\*\bkmkstart qe89200929508}{\*\bkmkend eg43523001218}Bipolar depression. \par  \'b7  {\*\bkmkstart cu34807871527}{\*\bkmkend qv70367776769}Plan: {\*\bkmkstart sb89816673643}{\*\bkmkend px95092409252}Previously on Lexapro 10mg qd but patient was noncompliant. Per son, patient having mood swings at times along with baseline confusion\par  \par  Plan:\par  - Psych consulted, recs appreciated\par  - c/w home med.\par  \par  \plain\f1\fs20\glee3626\hich\f1\dbch\f1\loch\f1\cf2\fs20\ul{\field{\*\fldinst HYPERLINK [id number] }{\fldrslt Problem/Plan - 4:}}\plain\f0\fs20\kknx7798\hich\f0\dbch\f0\loch\f0\fs20\ql\par  \'b7  {\*\bkmkstart vb48799434262}{\*\bkmkend pa39526989317}Problem: {\*\bkmkstart uv09746986691}{\*\bkmkend ib14234399600}HLD (hyperlipidemia). \par  \'b7  {\*\bkmkstart rj91323887464}{\*\bkmkend rc98905946841}Plan: {\*\bkmkstart cn10490481134}{\*\bkmkend qg00924848861}Home med of Atorvastatin 20mg but patient was noncompliant\par  \par  Plan:\par  - c/w home med.\par  \par  \plain\f1\fs20\dbqa9905\hich\f1\dbch\f1\loch\f1\cf2\fs20\ul{\field{\*\fldinst HYPERLINK [id number] }{\fldrslt Problem/Plan - 5:}}\plain\f0\fs20\ijoc5585\hich\f0\dbch\f0\loch\f0\fs20\ql\par  \'b7  {\*\bkmkstart ih83414183723}{\*\bkmkend cz75150881120}Problem: {\*\bkmkstart ic12493355853}{\*\bkmkend xj99718690622}Prophylactic measure. \par  \'b7  {\*\bkmkstart kh52399747747}{\*\bkmkend un79054128056}Plan: {\*\bkmkstart ql53276614404}{\*\bkmkend fm55374609962}F: PO\par  E: replete as needed, Mg >2 and Phos >3\par  N: regular diet w/ assistance for feeding\par  \par  DVT: lovenox\par  FULL CODE{\*\bkmkstart bkcommentCR}{\*\bkmkend bkcommentCR}.\par  \par  \par  \par  \par  \par  }

## 2024-09-29 NOTE — PROGRESS NOTE ADULT - NUTRITIONAL ASSESSMENT
This patient has been assessed with a concern for Malnutrition and has been determined to have a diagnosis/diagnoses of Moderate protein-calorie malnutrition.    This patient is being managed with:   Diet Soft and Bite Sized-  Entered: Sep 27 2024  9:19AM  
This patient has been assessed with a concern for Malnutrition and has been determined to have a diagnosis/diagnoses of Moderate protein-calorie malnutrition.    This patient is being managed with:   Diet Soft and Bite Sized-  Entered: Sep 27 2024  9:19AM

## 2024-09-29 NOTE — DISCHARGE NOTE NURSING/CASE MANAGEMENT/SOCIAL WORK - PATIENT PORTAL LINK FT
You can access the FollowMyHealth Patient Portal offered by [facility] by registering at the following website: [email or web address]. By joining [facility]’s FollowMyHealth portal, you will also be able to view your health information using other applications (apps) compatible with our system.

## 2024-09-29 NOTE — PROGRESS NOTE ADULT - SUBJECTIVE AND OBJECTIVE BOX
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 Patient is accompanies by his son who states that the patient has become much more forgetful over the last few months and is found in a disheveled state whenever the son visits. Patient has been found wandering around his apartment building naked at times and been threatened with eviction. Patient is unable to feed himself or use the bathroom on his own and has relied on adult diapers. Patient's son was able to get a Medicaid SW and the SW recommended the atient be brought here for further assistence and evaluation.  Per the son, patient has not had any recent falls that he is aware of but his coordination has been worse. Denies any tremors.     In the ED, vitals: T 98.3, HR 65, /79, RR 17 sat 97% on RA  Labs significant for Hgb 12.5 and Chloride 110     (26 Sep 2024 20:39)                            14.7   5.99  )-----------( 259      ( 28 Sep 2024 10:00 )             45.0       09-28    141  |  107  |  18  ----------------------------<  98  4.4   |  25  |  0.84    Ca    9.2      28 Sep 2024 10:00  Phos  3.6     09-28  Mg     2.0     09-28      Vital Signs Last 24 Hrs  T(C): 36.7 (28 Sep 2024 20:20), Max: 36.7 (28 Sep 2024 20:20)  T(F): 98 (28 Sep 2024 20:20), Max: 98 (28 Sep 2024 20:20)  HR: 53 (29 Sep 2024 06:59) (53 - 69)  BP: 111/67 (29 Sep 2024 06:59) (111/67 - 147/88)  BP(mean): 81 (29 Sep 2024 06:59) (81 - 99)  RR: 18 (29 Sep 2024 06:59) (18 - 18)  SpO2: 99% (29 Sep 2024 06:59) (94% - 99%)    Parameters below as of 29 Sep 2024 06:59  Patient On (Oxygen Delivery Method): room air        MEDICATIONS  (STANDING):  atorvastatin 20 milliGRAM(s) Oral at bedtime  enoxaparin Injectable 40 milliGRAM(s) SubCutaneous every 24 hours  escitalopram 10 milliGRAM(s) Oral daily    MEDICATIONS  (PRN):  acetaminophen     Tablet .. 650 milliGRAM(s) Oral every 6 hours PRN Temp greater or equal to 38C (100.4F), Mild Pain (1 - 3)  haloperidol     Tablet 2 milliGRAM(s) Oral every 6 hours PRN agitation  melatonin 3 milliGRAM(s) Oral at bedtime PRN Insomnia  ondansetron Injectable 4 milliGRAM(s) IV Push every 8 hours PRN Nausea and/or Vomiting      T(C): 36.7 (09-28-24 @ 20:20), Max: 36.7 (09-28-24 @ 20:20)  HR: 53 (09-29-24 @ 06:59) (53 - 69)  BP: 111/67 (09-29-24 @ 06:59) (111/67 - 147/88)  RR: 18 (09-29-24 @ 06:59) (18 - 18)  SpO2: 99% (09-29-24 @ 06:59) (94% - 99%)    Physical Exam:   74 y o man lying comfortably in semi Cheng's position , awake , alert , no acute complaints     Head: normocephalic , atraumatic    Eyes: PERRLA , EOMI , no nystagmus , sclera anicteric    ENT / FACE: neg nasal discharge , uvula midline , no oropharyngeal erythema / exudate    Neck: supple , negative JVD , negative carotid bruits , no thyromegaly    Chest: CTA bilaterally     Cardiovascular: regular rate and rhythm , neg murmurs / rubs / gallops    Abdomen: soft , non distended , no tenderness to palpation in all 4 quadrants ,  normal bowel sounds     Lower Extremities: WWP , neg cyanosis /clubbing / edema     Musculoskeletal:  post TKR  midline scars     Neurologic Exam:     Alert and oriented to person     Cranial Nerves:           II:                         pupils equal , round and reactive to light , visual fields intact         III/ IV/VI:             extraocular movements intact , neg nystagmus , neg ptosis        V:                        facial sensation intact , V1-3 normal        VII:                      face symmetric , no droop , normal eye closure and smile        VIII:                     hearing intact to finger rub bilaterally        IX and X:             no hoarseness , gag intact , palate/ uvula rise symmetrically        XI:                       SCM / trapezius strength intact bilateral        XII:                      no tongue deviation    Motor Exam:        > 3+/5 x 4 extremities      Sensation:         intact to light touch x 4 extremities                            no neglect or extinction on double simultaneous testing    DTR:           biceps/brachioradialis: equal                            patella/ankle: equal          neg Babinski     Coordination:            Finger to Nose:  neg dysmetria bilaterally      Initial Functional Status Assessment :       Previous Level of Function:     · Ambulation Skills	needs device and assist  · Transfer Skills	needs device and assist  · ADL Skills	needs device and assist  · Additional Comments	Pt is AO x1 at baseline and is a poor historian. As Care coordination note, Pt lives with ex-wife in an 2nd floor walk up apartment.    Cognitive Status Examination:   · Orientation	person  · Level of Consciousness	confused  · Follows Commands and Answers Questions	50% of the time; able to follow single-step instructions  · Personal Safety and Judgment	impaired  · Short Term Memory	impaired  · Long Term Memory	impaired    Manual Muscle Testing:   · Manual Muscle Testing Results	grossly assessed due to  at least 3/5 BL UE & LE based on antigravity mobility.    Bed Mobility: Rolling/Turning:     · Level of Upson	contact guard  · Physical Assist/Nonphysical Assist	verbal cues; 1 person assist; nonverbal cues (demo/gestures)  · Assistive Device	bed rails    Bed Mobility: Scooting/Bridging:     · Level of Upson	contact guard  · Physical Assist/Nonphysical Assist	verbal cues; nonverbal cues (demo/gestures); 1 person assist  · Assistive Device	bed rails    Bed Mobility: Sit to Supine:     · Level of Upson	contact guard  · Physical Assist/Nonphysical Assist	verbal cues; nonverbal cues (demo/gestures); 1 person assist  · Assistive Device	bed rails    Bed Mobility: Supine to Sit:     · Level of Upson	contact guard  · Physical Assist/Nonphysical Assist	verbal cues; nonverbal cues (demo/gestures); 1 person assist  · Assistive Device	bed rails    Bed Mobility Analysis:     · Bed Mobility Limitations	decreased ability to use legs for bridging/pushing  · Impairments Contributing to Impaired Bed Mobility	cognition; impaired balance; decreased strength; impaired postural control    Transfer: Sit to Stand:     · Level of Upson	contact guard  · Physical Assist/Nonphysical Assist	verbal cues; nonverbal cues (demo/gestures); 1 person assist  · Weight-Bearing Restrictions	weight-bearing as tolerated  · Assistive Device	Hand held assist    Transfer: Stand to Sit:     · Level of Upson	contact guard  · Physical Assist/Nonphysical Assist	verbal cues; nonverbal cues (demo/gestures); 1 person assist  · Weight-Bearing Restrictions	weight-bearing as tolerated  · Assistive Device	Hand held assist    Sit/Stand Transfer Safety Analysis:     · Transfer Safety Concerns Noted	losing balance; decreased weight-shifting ability  · Impairments Contributing to Impaired Transfers	impaired balance; impaired postural control; decreased strength    Gait Skills:     · Level of Upson	contact guard  · Physical Assist/Nonphysical Assist	verbal cues; nonverbal cues (demo/gestures); 1 person assist  · Weight-Bearing Restrictions	weight-bearing as tolerated  · Assistive Device	Hand held assist  · Gait Distance	20ft    Gait Analysis:     · Gait Pattern Used	2-point gait  · Gait Deviations Noted	decreased rody; decreased step length; decreased stride length; decreased weight-shifting ability  · Impairments Contributing to Gait Deviations	impaired balance; decreased flexibility; impaired postural control; decreased strength    Balance Skills Assessment:     · Sitting Balance: Static	fair minus  · Sitting Balance: Dynamic	fair minus  · Sit-to-Stand Balance	poor balance  · Standing Balance: Static	fair minus  · Standing Balance: Dynamic	fair minus  · Systems Impairment Contributing to Balance Disturbance	cognitive  · Identified Impairments Contributing to Balance Disturbance	impaired postural control    Clinical Impressions:   · Criteria for Skilled Therapeutic Interventions	impairments found; rehab potential; anticipated equipment needs at discharge; functional limitations in following categories; therapy frequency; predicted duration of therapy intervention; anticipated discharge recommendation  · Impairments Found (describe specific impairments)	aerobic capacity/endurance; arousal, attention, and cognition; gait, locomotion, and balance; muscle strength; posture  · Functional Limitations in Following Categories (describe specific limitations)	self-care; home management; community/leisure        PM&R Impression : as above    Current disposition plan recommendation :    subacute rehab placement

## 2024-09-29 NOTE — PROGRESS NOTE ADULT - TIME BILLING
Bedside exam and interview   Reviewed vitals, labs   Discussed patient's plan of care with housestaff   Documentation of encounter  Excludes teaching and separately reported services

## 2024-09-29 NOTE — DISCHARGE NOTE NURSING/CASE MANAGEMENT/SOCIAL WORK - NSDPFAC_GEN_ALL_CORE
[facility]

## 2024-09-29 NOTE — PROGRESS NOTE ADULT - ATTENDING COMMENTS
73 yo M with a PMH of Alzheimer's dementia, bipolar depression, HLD, L hearing loss, and BASHIR who presented with worsening dementia, agitation, and inability to care for himself.     VS reviewed and stable     Exam:   Appears somewhat disheveled   MMM  Normal WOB on RA  Abdomen nondistended   Extremities warm and without edema   Unable to participate with answering orientation questions, does not appear to have obvious focal deficit     Exam somewhat limited by patient agitation.    Labs reviewed, very mild anemia with hgb 12, otherwise normal.     Imaging: none     Plan:   #Alzheimer's dementia  #Delirium w/ agitation   #Bipolar depression   #Iron deficiency anemia   -Consult psychiatry for assistance with agitation iso advanced dementia   -Obtain collateral from outpatient providers regarding psych regimen (is on SSRI w/o mood stabilizer?)  -Delirium precautions   -PT evaluation, SW evaluation     Rest as per resident note.
73 yo M with a PMH of Alzheimer's dementia, bipolar depression, HLD, L hearing loss, and BASHIR who presented with worsening dementia, agitation, and inability to care for himself.     VS reviewed and stable     Exam:   Appears somewhat disheveled   MMM  Normal WOB on RA, CTAB   RRR, no mrg   Abdomen nondistended   Extremities warm and without edema   Unable to participate with answering orientation questions, does not appear to have obvious focal deficit     Labs reviewed, normal yesterday.    Imaging: none     Plan:   #Alzheimer's dementia  #Delirium w/ agitation   #Anxiety and depression   #Iron deficiency anemia   -Appreciate psychiatry assistance, recommended PRNs for agitation   -Repeat EKG to monitor qtc   -Delirium precautions     Rest as per resident note.
75 yo M with a PMH of Alzheimer's dementia, bipolar depression, HLD, L hearing loss, and BASHIR who presented with worsening dementia, agitation, and inability to care for himself.     VS reviewed and stable     Exam:   Appears somewhat disheveled   MMM  Normal WOB on RA, CTAB   RRR, no mrg  Abdomen nondistended   Extremities warm and without edema   Alert and oriented to self, pleasant, answering questions appropriately sometimes     Labs reviewed, none new this AM.    Imaging: none     Plan:   #Alzheimer's dementia  #Delirium w/ agitation   #Bipolar depression   #Iron deficiency anemia   -Appreciate psychiatry recs   -Delirium precautions   -Pending [facility] placement     Rest as per resident note.

## 2024-09-29 NOTE — PROGRESS NOTE ADULT - SUBJECTIVE AND OBJECTIVE BOX
Progress Note  INCOMPLETE NOTE    INTERVAL EVENTS:   No acute events overnight.    SUBJECTIVE:   Patient seen and examined at bedside. Condition largely unchanged from yesterday. No acute complaints at this time.    ROS:  Negative unless otherwise stated above.    PHYSICAL EXAM:  General: Alert and oriented x 3. No acute distress.   HEENT: Moist mucous membranes. Anicteric. No cervical lymphadenopathy.  Cardiovascular: Regular rate and rhythm. No murmur. Normal JVP.  Lungs: Clear to auscultation bilaterally. No accessory muscle use.  Abdomen: Soft, non-tender and non-distended. No palpable masses.  Extremities: No edema. Non-tender. Warm.  Skin: No rashes or lesions.   Neurologic: No apparent focal neurological deficits. CN II-XII grossly intact, but not individually tested.  Psychiatric: Cooperative. Appropriate mood and affect.    VITAL SIGNS:  Vital Signs Last 24 Hrs  T(C): 36.7 (28 Sep 2024 20:20), Max: 36.7 (28 Sep 2024 20:20)  T(F): 98 (28 Sep 2024 20:20), Max: 98 (28 Sep 2024 20:20)  HR: 53 (29 Sep 2024 06:59) (53 - 69)  BP: 111/67 (29 Sep 2024 06:59) (111/67 - 147/88)  BP(mean): 81 (29 Sep 2024 06:59) (81 - 99)  RR: 18 (29 Sep 2024 06:59) (18 - 18)  SpO2: 99% (29 Sep 2024 06:59) (94% - 99%)    Parameters below as of 29 Sep 2024 06:59  Patient On (Oxygen Delivery Method): room air      INPATIENT MEDICATIONS:   MEDICATIONS  (STANDING):  atorvastatin 20 milliGRAM(s) Oral at bedtime  enoxaparin Injectable 40 milliGRAM(s) SubCutaneous every 24 hours  escitalopram 10 milliGRAM(s) Oral daily    MEDICATIONS  (PRN):  acetaminophen     Tablet .. 650 milliGRAM(s) Oral every 6 hours PRN Temp greater or equal to 38C (100.4F), Mild Pain (1 - 3)  haloperidol     Tablet 2 milliGRAM(s) Oral every 6 hours PRN agitation  melatonin 3 milliGRAM(s) Oral at bedtime PRN Insomnia  ondansetron Injectable 4 milliGRAM(s) IV Push every 8 hours PRN Nausea and/or Vomiting    ALLERGIES:  Allergies    No Known Allergies    Intolerances    LABS:                       14.7   5.99  )-----------( 259      ( 28 Sep 2024 10:00 )             45.0     09-28    141  |  107  |  18  ----------------------------<  98  4.4   |  25  |  0.84    Ca    9.2      28 Sep 2024 10:00  Phos  3.6     09-28  Mg     2.0     09-28        Urinalysis Basic - ( 28 Sep 2024 10:00 )    Color: x / Appearance: x / SG: x / pH: x  Gluc: 98 mg/dL / Ketone: x  / Bili: x / Urobili: x   Blood: x / Protein: x / Nitrite: x   Leuk Esterase: x / RBC: x / WBC x   Sq Epi: x / Non Sq Epi: x / Bacteria: x      CAPILLARY BLOOD GLUCOSE        RADIOLOGY & ADDITIONAL TESTS: Reviewed.

## 2024-10-08 ENCOUNTER — TRANSCRIPTION ENCOUNTER (OUTPATIENT)
Age: 74
End: 2024-10-08

## 2024-11-11 ENCOUNTER — APPOINTMENT (OUTPATIENT)
Dept: INTERNAL MEDICINE | Facility: CLINIC | Age: 74
End: 2024-11-11

## 2024-12-27 NOTE — PROCEDURE NOTE - PICC: IMPLANT LOT NUMBER
OCCUPATIONAL THERAPY TREATMENT  Patient: Kim Markham (51 y.o. female)  Date: 12/27/2024  Primary Diagnosis: Acute right-sided congestive heart failure (HCC) [I50.811]  SVT (supraventricular tachycardia) (HCC) [I47.10]  Essential hypertension [I10]  Hypoxia [R09.02]  COPD exacerbation (HCC) [J44.1]  Procedure(s) (LRB):  TRACHEOSTOMY (N/A) 23 Days Post-Op   Precautions: Fall Risk                Recommendations for nursing mobility: Out of bed to chair for meals, Encourage HEP in prep for ADLs/mobility; see handout for details, AD and gt belt for bed to chair , Amb to bathroom with AD and gait belt, and Assist x1    In place during session: EKG/telemetry   Chart, occupational therapy assessment, plan of care, and goals were reviewed.  ASSESSMENT  Patient continues with skilled OT services and is progressing towards goals. Pt presented semi supine upon GARDUNO arrival, agreeable to session. Pt A&O x 4. Pt is supervision for bed mobility.  Pt CGA to stand and ambulate into the bathroom, and for toileting.  Pt required vcs for walker safety  Pt is SBA for anterior and posterior hygiene.  Pt stood at the sink, while standing, her L LE cramped up and she stood on one leg and supported self with unilateral UE. Pt brushed her teeth, however, seemed to be rushing to sit in down.  Pt educated in energy conservation and safety.  Pt sat in recliner and used an electric razor and shampoo cap.  Pt left  in recliner with all known needs met. Therapist spoke with nursing regarding safety concerns, pt reported getting up and going into the bathroom by herself.  Nursing reported pt has been doing this and she felt pt was safe. (See below for objective details and assist levels).     Overall pt tolerated session fair today with toileting.  Pt demonstrates poor standing endurance.  Current OT recommendations for discharge High intensity and comprehensive skilled occupational therapy in a multidisciplinary setting as patient is working  patient    Activity Tolerance:   Fair     After treatment patient left in no apparent distress:   Bed locked and returned to lowest position, Patient left in no apparent distress sitting up in chair, Call bell within reach, and Updated patient's board on functional status and mobility recommendations, and nsg updated     COMMUNICATION/EDUCATION:   The patient’s plan of care was discussed with: Physical therapist and Registered nurse    Patient Education  Education Given To: Patient  Education Provided: Plan of Care;Energy Conservation;Transfer Training  Education Provided Comments: walker management & safety  Education Method: Verbal  Barriers to Learning: Cognition  Education Outcome: Continued education needed;Verbalized understanding    Thank you for this referral.  MARGARET Sanchez  Minutes: 31     bard 4 Wolof single lumen power picc line length 43cm tip at SVC/RA junction [id number]

## 2025-03-04 NOTE — ED ADULT TRIAGE NOTE - INTERNATIONAL TRAVEL
In regards to the Event (or) MCT monitor you ordered for Gogo Pryor, a report meeting notification criteria was received from [facility] on 3/4/25 and uploaded into Epic for your review.    Elise Blanton   [facility]   No

## 2025-04-24 NOTE — ED ADULT NURSE NOTE - CADM POA CENTRAL LINE
1.Return to ER immediately if having new or worsening symptoms.    2.Follow up with PCP in 2-3 days.  If unable to follow up with your PCP, call Aurora maria at [phone].    3.Take medications as prescribed.       No

## 2025-05-24 ENCOUNTER — EMERGENCY (EMERGENCY)
Facility: HOSPITAL | Age: 75
LOS: 1 days | End: 2025-05-24
Attending: EMERGENCY MEDICINE | Admitting: EMERGENCY MEDICINE
Payer: MEDICARE

## 2025-05-24 VITALS
SYSTOLIC BLOOD PRESSURE: 104 MMHG | OXYGEN SATURATION: 96 % | HEART RATE: 84 BPM | RESPIRATION RATE: 18 BRPM | DIASTOLIC BLOOD PRESSURE: 80 MMHG

## 2025-05-24 DIAGNOSIS — R54 AGE-RELATED PHYSICAL DEBILITY: ICD-10-CM
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
## 2025-05-24 DIAGNOSIS — R45.1 RESTLESSNESS AND AGITATION: ICD-10-CM

## 2025-05-24 DIAGNOSIS — S80.211A ABRASION, RIGHT KNEE, INITIAL ENCOUNTER: ICD-10-CM

## 2025-05-24 DIAGNOSIS — F02.80 DEMENTIA IN OTHER DISEASES CLASSIFIED ELSEWHERE, UNSPECIFIED SEVERITY, WITHOUT BEHAVIORAL DISTURBANCE, PSYCHOTIC DISTURBANCE, MOOD DISTURBANCE, AND ANXIETY: ICD-10-CM

## 2025-05-24 DIAGNOSIS — Z98.49 CATARACT EXTRACTION STATUS, UNSPECIFIED EYE: Chronic | ICD-10-CM

## 2025-05-24 DIAGNOSIS — Z41.9 ENCOUNTER FOR PROCEDURE FOR PURPOSES OTHER THAN REMEDYING HEALTH STATE, UNSPECIFIED: Chronic | ICD-10-CM

## 2025-05-24 DIAGNOSIS — R07.89 OTHER CHEST PAIN: ICD-10-CM

## 2025-05-24 DIAGNOSIS — Z98.890 OTHER SPECIFIED POSTPROCEDURAL STATES: Chronic | ICD-10-CM
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
## 2025-05-24 DIAGNOSIS — R91.8 OTHER NONSPECIFIC ABNORMAL FINDING OF LUNG FIELD: ICD-10-CM

## 2025-05-24 DIAGNOSIS — D64.9 ANEMIA, UNSPECIFIED: ICD-10-CM

## 2025-05-24 DIAGNOSIS — Z91.81 HISTORY OF FALLING: ICD-10-CM

## 2025-05-24 DIAGNOSIS — G20.A1 PARKINSON'S DISEASE WITHOUT DYSKINESIA, WITHOUT MENTION OF FLUCTUATIONS: ICD-10-CM

## 2025-05-24 DIAGNOSIS — G30.9 ALZHEIMER'S DISEASE, UNSPECIFIED: ICD-10-CM

## 2025-05-24 DIAGNOSIS — Z98.890 OTHER SPECIFIED POSTPROCEDURAL STATES: ICD-10-CM

## 2025-05-24 PROCEDURE — 99285 EMERGENCY DEPT VISIT HI MDM: CPT | Mod: FS

## 2025-05-24 RX ORDER — OLANZAPINE 10 MG/1
5 TABLET ORAL ONCE
Refills: 0 | Status: COMPLETED | OUTPATIENT
Start: 2025-05-24 | End: 2025-05-24

## 2025-05-24 RX ADMIN — Medication 375 MILLIGRAM(S): at 23:28

## 2025-05-24 RX ADMIN — OLANZAPINE 5 MILLIGRAM(S): 10 TABLET ORAL at 23:27

## 2025-05-24 NOTE — ED PROVIDER NOTE - ATTENDING APP SHARED VISIT CONTRIBUTION OF CARE
i discussed the care of the pt directly with the ACP while the pt was in the ED. i have reviewed the ACP note and agree w/ the history, exam and plan of care other than as noted above.    hx parkinsons here for fall  cachectic appearing frail  video footage of the fall per ex-wife, hit chest wall on table and slid to floor  plan for imaging to r/o traumatic injury

## 2025-05-24 NOTE — ED PROVIDER NOTE - PHYSICAL EXAMINATION
CONSTITUTIONAL: elderly male in NAD, appears frail.  easily agitated.  SKIN: Normal color and turgor.    HEAD: atraumatic. + temporal wasting.  EYES: Conjunctiva clear. Anicteric sclera.  PERRL. EOMI.   ENT: Airway clear. Normal voice. MMM. No blood in mouth or signs of oral trauma.   RESPIRATORY:  Normal respiratory rate and effort. Mild diffuse chest wall tenderness, no areas of bruising, crepitus, swelling/deformity.  Lungs CTA.    CARDIOVASCULAR:  RRR S1S2  GI:  Abdomen soft, nontender.    MSK: Neck supple.  No midline neck/back tenderness, no deformity/swelling. No limb edema or muscular tenderness. No joint swelling or ROM limitation.  NEURO: Alert, answers basic questions.  Not oriented to place/date. Speech clear. No focal deficits.

## 2025-05-24 NOTE — ED ADULT NURSE NOTE - NSFALLRISKINTERV_ED_ALL_ED
Charge RN Last aware/Assistance OOB with selected safe patient handling equipment if applicable/Assistance with ambulation/Communicate fall risk and risk factors to all staff, patient, and family/Monitor gait and stability/Monitor for mental status changes and reorient to person, place, and time, as needed/Provide patient with walking aids/Provide visual cue: yellow wristband, yellow gown, etc/Reinforce activity limits and safety measures with patient and family/Toileting schedule using arm’s reach rule for commode and bathroom/Use of alarms - bed, stretcher, chair and/or video monitoring/Call bell, personal items and telephone in reach/Instruct patient to call for assistance before getting out of bed/chair/stretcher/Non-slip footwear applied when patient is off stretcher/[place] to call system/Physically safe environment - no spills, clutter or unnecessary equipment/Purposeful Proactive Rounding/Room/bathroom lighting operational, light cord in reach

## 2025-05-24 NOTE — ED PROVIDER NOTE - PATIENT PORTAL LINK FT
You can access the FollowMyHealth Patient Portal offered by [facility] by registering at the following website: [email or web address]. By joining [employer]’s FollowMyHealth portal, you will also be able to view your health information using other applications (apps) compatible with our system.

## 2025-05-24 NOTE — ED PROVIDER NOTE - NSFOLLOWUPINSTRUCTIONS_ED_ALL_ED_FT
The significance of the lung opacity on the chest CT scan is unclear: could be scarring, pneumonia, or possibly a cancerous lesion.  Give the antibiotics as prescribed (Augmentin and doxycycline).  Please have Matthew follow up with his doctor this week to determine next steps in his evaluation.

## 2025-05-24 NOTE — ED PROVIDER NOTE - PROGRESS NOTE DETAILS
d/w pt's ex-wife (Manda Jay), good historian.    Pt with Hx parkinson, alzhemimer, HLD, cardiac procedure in 2017 (unk type)  Hospitalized in the fall of 2024, has been doing well with home phys therapy recently, but a little worse this week, believes it is because the new HHAs are not encouraging him to walk.  Had a fall out of bed a day ago, and tonight she witnessed his fall at home on video monitor: says he got up from couch, lost balance and fell striking right ribs against table before sliding down to floor; did not strike head.    He takes Depakote 375 mg at bedtime and recently his olanzapine 5 mg was decreased to 2.5 mg. Pt resting comfortably.  CT head no acute abnormality.  CT chest: IMPRESSION:  No pneumothorax or hemothorax.    Right lower lobe consolidative opacity at the right lung base.   Differential includes pneumonia, scarring, and neoplasm. Recommend   comparison with prior imaging, or alternatively PET/CT or short interval   follow-up CT.    Pt without any evidence of resp distress, no hypoxia, and has had no resp complaints.  Renal function normal.  Will Rx course of abx to cover for possible pneumonia. Pt resting comfortably.  CT head no acute abnormality.  CT chest: IMPRESSION:  No pneumothorax or hemothorax.    Right lower lobe consolidative opacity at the right lung base.   Differential includes pneumonia, scarring, and neoplasm. Recommend   comparison with prior imaging, or alternatively PET/CT or short interval   follow-up CT.    Pt without any evidence of resp distress, no hypoxia, and has had no resp complaints.  Renal function normal.  It is not clear that the CT findings represent pneumonia (if it is pneumonia, CURB65 score 2); will recommend close. Will Rx course of abx to cover for possible pneumonia and recommend close follow up with his physician. Spoke with Manda again, updated on test results.  She requests Rx to [facility] [address]  Will follow up with his physician after weekend.   His HHA remains at bedside.  We will arrange transportation home.

## 2025-05-24 NOTE — ED ADULT NURSE NOTE - OBJECTIVE STATEMENT
Pt arriving via EMS, pt rpeortedly with hx of dementia and parkinsons, reportedly had GLF at home where he struck his back, no head injury, no anticoag, pt then reportedly stood, ambulated to the kitchen, broke glass, sat on the floor and began "chewing on the glass" EMS reports finding pt sitting on ground in kitchen amongst glass and making motions to mouth but denies visualizing pt eating glass at the time or appreciating abrasions to mouth. Pt with small abrasion superior to R knee, no other injuries noted, pt nonverbal at reported baseline mental status. Pt arriving via EMS, pt rpeortedly with hx of dementia and parkinsons, reportedly had GLF at home where he struck his back, no head injury, no anticoag, pt then reportedly stood, ambulated to the kitchen, broke glass, sat on the floor and began "chewing on the glass" EMS reports finding pt sitting on ground in kitchen amongst glass and making motions to mouth but denies visualizing pt eating glass at the time or appreciating abrasions to mouth. Pt with small abrasion superior to R knee, no other injuries noted, pt minimally verbal and agitated on RN exam screaming "No, I don't  want it" with attempts to dress out, noncompliant with questions at reported baseline neuro status per EMS

## 2025-05-24 NOTE — ED PROVIDER NOTE - OBJECTIVE STATEMENT
75 M Hx Bridgers BIBA from home after fall.  Pt apparently fell onto table and slid to floor.  He knocked over and broke a glass, HHA reported a small cut to his knee, says he slid himself across the floor in the glass.  She heard a crunching sound and is concerned that he may have chewed glass.  Pt agitated, oriented to name only, doesn't answer most questions. Does not admit to any complaints.  Occasionally trying to strike staff members with fist.

## 2025-05-24 NOTE — ED ADULT NURSE NOTE - PAIN RATING/NUMBER SCALE (0-10): ACTIVITY
Chart notes & recent reports faxed to [facility] for review. They will contact the patient to schedule. 0 (no pain/absence of nonverbal indicators of pain)

## 2025-05-24 NOTE — ED ADULT NURSE REASSESSMENT NOTE - NS ED NURSE REASSESS COMMENT FT1
Per EMS pt aide being sent to [facility], ex wife Manda Altamirano notified at [phone] of EMS diversion to [facility] Ms. Altamirano reports will contact aide to divert to [facility]

## 2025-05-24 NOTE — ED ADULT NURSE NOTE - NS PRO PASSIVE SMOKE EXP
-Please take zofran for nausea every 4-6 hours underneath the tongue  -Please take maalox, protonix as directed  -Please avoid caffeine, citrus based foods, smoking, alcohol marijuana,and take bland foods like milk, eat a B R  A  T diet with bread rice apple sauce toast diet  Please do not eat broccoli, or beans to avoid gas pain    -follow up with GI information given below  Might need an EGD  -Return to the ED for worsening abdominal pain, persistent nausea,vomiting,bloody diarrhea to the ED 
Unknown

## 2025-05-24 NOTE — ED ADULT TRIAGE NOTE - CHIEF COMPLAINT QUOTE
pt with hx of parkinsons got up and lost his balance and fell per HHA.    no loc.   inc of stool.   confused at baseline.   pt then got up and went to kitchen and dropped a glass and when ems arrives pt was sitting on floor with glass around him.   small superficial lac to right knee.

## 2025-05-24 NOTE — ED ADULT NURSE NOTE - NSFALLRISK_ED_ALL_ED
----- Message from Yovana Amezcua sent at 11/4/2019  4:06 PM CST -----  Contact: [phone]  Patient is requesting a call back from the nurse stated she didn't receive Celebrex via mail order.   Please call the patient upon request at phone number [phone].   No

## 2025-05-25 VITALS
SYSTOLIC BLOOD PRESSURE: 102 MMHG | RESPIRATION RATE: 18 BRPM | HEART RATE: 55 BPM | TEMPERATURE: 97 F | OXYGEN SATURATION: 99 % | DIASTOLIC BLOOD PRESSURE: 60 MMHG

## 2025-05-25 LAB
ANION GAP SERPL CALC-SCNC: 11 MMOL/L — SIGNIFICANT CHANGE UP (ref 5–17)
BASOPHILS # BLD AUTO: 0.05 K/UL — SIGNIFICANT CHANGE UP (ref 0–0.2)
BASOPHILS NFR BLD AUTO: 1 % — SIGNIFICANT CHANGE UP (ref 0–2)
BUN SERPL-MCNC: 27 MG/DL — HIGH (ref 7–23)
CALCIUM SERPL-MCNC: 9.1 MG/DL — SIGNIFICANT CHANGE UP (ref 8.4–10.5)
CHLORIDE SERPL-SCNC: 105 MMOL/L — SIGNIFICANT CHANGE UP (ref 96–108)
CO2 SERPL-SCNC: 26 MMOL/L — SIGNIFICANT CHANGE UP (ref 22–31)
CREAT SERPL-MCNC: 0.94 MG/DL — SIGNIFICANT CHANGE UP (ref 0.5–1.3)
EGFR: 85 ML/MIN/1.73M2 — SIGNIFICANT CHANGE UP
EGFR: 85 ML/MIN/1.73M2 — SIGNIFICANT CHANGE UP
EOSINOPHIL # BLD AUTO: 0.39 K/UL — SIGNIFICANT CHANGE UP (ref 0–0.5)
EOSINOPHIL NFR BLD AUTO: 7.9 % — HIGH (ref 0–6)
GLUCOSE SERPL-MCNC: 90 MG/DL — SIGNIFICANT CHANGE UP (ref 70–99)
HCT VFR BLD CALC: 37.1 % — LOW (ref 39–50)
HGB BLD-MCNC: 12.6 G/DL — LOW (ref 13–17)
IMM GRANULOCYTES NFR BLD AUTO: 0.4 % — SIGNIFICANT CHANGE UP (ref 0–0.9)
LYMPHOCYTES # BLD AUTO: 1.3 K/UL — SIGNIFICANT CHANGE UP (ref 1–3.3)
LYMPHOCYTES # BLD AUTO: 26.3 % — SIGNIFICANT CHANGE UP (ref 13–44)
MCHC RBC-ENTMCNC: 33.7 PG — SIGNIFICANT CHANGE UP (ref 27–34)
MCHC RBC-ENTMCNC: 34 G/DL — SIGNIFICANT CHANGE UP (ref 32–36)
MCV RBC AUTO: 99.2 FL — SIGNIFICANT CHANGE UP (ref 80–100)
MONOCYTES # BLD AUTO: 0.54 K/UL — SIGNIFICANT CHANGE UP (ref 0–0.9)
MONOCYTES NFR BLD AUTO: 10.9 % — SIGNIFICANT CHANGE UP (ref 2–14)
NEUTROPHILS # BLD AUTO: 2.64 K/UL — SIGNIFICANT CHANGE UP (ref 1.8–7.4)
NEUTROPHILS NFR BLD AUTO: 53.5 % — SIGNIFICANT CHANGE UP (ref 43–77)
NRBC BLD AUTO-RTO: 0 /100 WBCS — SIGNIFICANT CHANGE UP (ref 0–0)
PLATELET # BLD AUTO: 235 K/UL — SIGNIFICANT CHANGE UP (ref 150–400)
POTASSIUM SERPL-MCNC: 4.3 MMOL/L — SIGNIFICANT CHANGE UP (ref 3.5–5.3)
POTASSIUM SERPL-SCNC: 4.3 MMOL/L — SIGNIFICANT CHANGE UP (ref 3.5–5.3)
RBC # BLD: 3.74 M/UL — LOW (ref 4.2–5.8)
RBC # FLD: 13.8 % — SIGNIFICANT CHANGE UP (ref 10.3–14.5)
SODIUM SERPL-SCNC: 142 MMOL/L — SIGNIFICANT CHANGE UP (ref 135–145)
WBC # BLD: 4.94 K/UL — SIGNIFICANT CHANGE UP (ref 3.8–10.5)
WBC # FLD AUTO: 4.94 K/UL — SIGNIFICANT CHANGE UP (ref 3.8–10.5)

## 2025-05-25 PROCEDURE — 99284 EMERGENCY DEPT VISIT MOD MDM: CPT | Mod: 25

## 2025-05-25 PROCEDURE — 71250 CT THORAX DX C-: CPT | Mod: 26

## 2025-05-25 PROCEDURE — 70450 CT HEAD/BRAIN W/O DYE: CPT

## 2025-05-25 PROCEDURE — 96374 THER/PROPH/DIAG INJ IV PUSH: CPT

## 2025-05-25 PROCEDURE — 96376 TX/PRO/DX INJ SAME DRUG ADON: CPT

## 2025-05-25 PROCEDURE — 71045 X-RAY EXAM CHEST 1 VIEW: CPT

## 2025-05-25 PROCEDURE — 80048 BASIC METABOLIC PNL TOTAL CA: CPT

## 2025-05-25 PROCEDURE — 71045 X-RAY EXAM CHEST 1 VIEW: CPT | Mod: 26

## 2025-05-25 PROCEDURE — 71250 CT THORAX DX C-: CPT

## 2025-05-25 PROCEDURE — 70450 CT HEAD/BRAIN W/O DYE: CPT | Mod: 26

## 2025-05-25 PROCEDURE — 85025 COMPLETE CBC W/AUTO DIFF WBC: CPT

## 2025-05-25 PROCEDURE — 36415 COLL VENOUS BLD VENIPUNCTURE: CPT

## 2025-05-25 RX ORDER — DIAZEPAM 2 MG/1
2 TABLET ORAL ONCE
Refills: 0 | Status: DISCONTINUED | OUTPATIENT
Start: 2025-05-25 | End: 2025-05-25

## 2025-05-25 RX ORDER — AMOXICILLIN AND CLAVULANATE POTASSIUM 500; 125 MG/1; MG/1
1 TABLET, FILM COATED ORAL ONCE
Refills: 0 | Status: COMPLETED | OUTPATIENT
Start: 2025-05-25 | End: 2025-05-25

## 2025-05-25 RX ORDER — DOXYCYCLINE HYCLATE 100 MG
100 TABLET ORAL ONCE
Refills: 0 | Status: COMPLETED | OUTPATIENT
Start: 2025-05-25 | End: 2025-05-25

## 2025-05-25 RX ORDER — DOXYCYCLINE HYCLATE 100 MG
1 TABLET ORAL
Qty: 10 | Refills: 0
Start: 2025-05-25 | End: 2025-05-29

## 2025-05-25 RX ORDER — AMOXICILLIN AND CLAVULANATE POTASSIUM 500; 125 MG/1; MG/1
1 TABLET, FILM COATED ORAL
Qty: 10 | Refills: 0
Start: 2025-05-25 | End: 2025-05-29

## 2025-05-25 RX ADMIN — DIAZEPAM 2 MILLIGRAM(S): 2 TABLET ORAL at 01:55

## 2025-05-25 RX ADMIN — AMOXICILLIN AND CLAVULANATE POTASSIUM 1 TABLET(S): 500; 125 TABLET, FILM COATED ORAL at 06:38

## 2025-05-25 RX ADMIN — Medication 100 MILLIGRAM(S): at 06:38

## 2025-05-25 RX ADMIN — DIAZEPAM 2 MILLIGRAM(S): 2 TABLET ORAL at 02:50

## 2025-05-25 NOTE — ED ADULT NURSE REASSESSMENT NOTE - NS ED NURSE REASSESS COMMENT FT1
Received pt sleeping with resp even and unlabored. Pt arousable to verbal stimuli. Pt remains alert and orientated x's 1. Pt remains on a safety watch secondary to attempting to climb out of bed over the night shift. HHA at bedside.  Pt pending transport home with HHA, XI 10am.

## 2025-06-07 ENCOUNTER — EMERGENCY (EMERGENCY)
Facility: HOSPITAL | Age: 75
LOS: 1 days | End: 2025-06-07
Attending: STUDENT IN AN ORGANIZED HEALTH CARE EDUCATION/TRAINING PROGRAM | Admitting: STUDENT IN AN ORGANIZED HEALTH CARE EDUCATION/TRAINING PROGRAM
Payer: MEDICARE

## 2025-06-07 VITALS
TEMPERATURE: 97 F | HEIGHT: 72 IN | HEART RATE: 57 BPM | WEIGHT: 220.02 LBS | DIASTOLIC BLOOD PRESSURE: 75 MMHG | OXYGEN SATURATION: 96 % | RESPIRATION RATE: 18 BRPM | SYSTOLIC BLOOD PRESSURE: 117 MMHG

## 2025-06-07 DIAGNOSIS — Z41.9 ENCOUNTER FOR PROCEDURE FOR PURPOSES OTHER THAN REMEDYING HEALTH STATE, UNSPECIFIED: Chronic | ICD-10-CM

## 2025-06-07 DIAGNOSIS — Z98.890 OTHER SPECIFIED POSTPROCEDURAL STATES: Chronic | ICD-10-CM

## 2025-06-07 DIAGNOSIS — Z98.49 CATARACT EXTRACTION STATUS, UNSPECIFIED EYE: Chronic | ICD-10-CM

## 2025-06-07 PROCEDURE — 99283 EMERGENCY DEPT VISIT LOW MDM: CPT

## 2025-06-07 PROCEDURE — 82962 GLUCOSE BLOOD TEST: CPT

## 2025-06-07 NOTE — ED ADULT TRIAGE NOTE - CHIEF COMPLAINT QUOTE
BIBEMS from home accompanied by his home aide with h/o unwitnessed fall. per aide, found pt lying on the floor, pt able to stand and walked after the fall. h/o alzheimer`s dse. pt is awake,alert,ox1, moves all extremities.

## 2025-06-07 NOTE — ED ADULT NURSE REASSESSMENT NOTE - NS ED NURSE REASSESS COMMENT FT1
Pt able to stand and walk a couple steps. Aide states she is there for 12 hours at night and another aide comes in after her.

## 2025-06-07 NOTE — ED ADULT NURSE NOTE - CHIEF COMPLAINT
Potassium mildly decreased at 3 4  Replace potassium  Monitor potassium levels The patient is a 75y Male complaining of fall.

## 2025-06-07 NOTE — ED PROVIDER NOTE - CLINICAL SUMMARY MEDICAL DECISION MAKING FREE TEXT BOX
76 yo M presenting after fall from bed, rolled over, no head trauma or loc, NO complaints, exam nonfocal,ambulating without difficulty, will give return precautions, pcp fu Self Administrated

## 2025-06-07 NOTE — ED PROVIDER NOTE - NSFOLLOWUPINSTRUCTIONS_ED_ALL_ED_FT
Please take tylenol or motrin as needed. Stay well hydrated. Return for worsening symptoms, lightheadedness, chest pain, shortness of breath. Please follow up with your primary physician.

## 2025-06-07 NOTE — ED PROVIDER NOTE - PATIENT PORTAL LINK FT
You can access the FollowMyHealth Patient Portal offered by [facility] by registering at the following website: [email or web address]. By joining [employer]’s FollowMyHealth portal, you will also be able to view your health information using other applications (apps) compatible with our system.

## 2025-06-07 NOTE — ED ADULT NURSE NOTE - NSFALLHARMRISKINTERV_ED_ALL_ED
Assistance OOB with selected safe patient handling equipment if applicable/Assistance with ambulation/Communicate risk of Fall with Harm to all staff, patient, and family/Monitor gait and stability/Monitor for mental status changes and reorient to person, place, and time, as needed/Provide visual cue: red socks, yellow wristband, yellow gown, etc/Reinforce activity limits and safety measures with patient and family/Toileting schedule using arm’s reach rule for commode and bathroom/Use of alarms - bed, stretcher, chair and/or video monitoring/Bed in lowest position, wheels locked, appropriate side rails in place/Call bell, personal items and telephone in reach/Instruct patient to call for assistance before getting out of bed/chair/stretcher/Non-slip footwear applied when patient is off stretcher/[place] to call system/Physically safe environment - no spills, clutter or unnecessary equipment/Purposeful Proactive Rounding/Room/bathroom lighting operational, light cord in reach

## 2025-06-07 NOTE — ED ADULT NURSE REASSESSMENT NOTE - NS ED NURSE REASSESS COMMENT FT1
Pt appears comfortable, no signs of acute distress. Pt able to eat a sandwich with no nausea/vomiting. Pt refused Vitals. Pending transportation

## 2025-06-07 NOTE — ED PROVIDER NOTE - OBJECTIVE STATEMENT
75 M Hx Parkinsons BIBA from home after fall. Pt was asleep and rolled out of bed. No head trauma or LOC. Night aid came to patient's room and patient was on floor. Called 911 to help her put pt back in bed. Pt was able to ambulate afterwards but was a bit agitated and EMS brought patient here. Pt without ANY complaints. No ha or blurry vision, no cp or sob or abd pain, no extremity pain, no numbness, weakness, tingling. Not on AC. ROS as above.

## 2025-06-07 NOTE — ED PROVIDER NOTE - PHYSICAL EXAMINATION
general: Well appearing, in no acute distress  HEENT: Normocephalic, atraumatic, extraocular movements intact  CV: Regular rate  Pulm: No respiratory distress, no tachypnea, ctab, no wrr  Abd: Flat, no gross distension, soft, ntnd, no r/g  Ext: warm and well perfused  Back: No midline C/T/L spine ttp  Skin: No gross rashes or lesions  Neuro: Alert and oriented, moving all extremities, motor intact, sensation intact

## 2025-06-07 NOTE — ED ADULT NURSE NOTE - OBJECTIVE STATEMENT
Pt is a 75 yr old male BIBA from home for unwitnessed fall from bed at around 3AM. As per pt's aide, pt was in the living room when she heard a loud noise coming from pt's bedroom. She went into pt's room and saw that he was on the floor lying on his right side. Pt unable to get up after the fall. No LOC. When asked, pt denies any pain anywhere.  As per aide, pt was here for fall about 2 weeks ago and similarly not able to get up. Pt himself alert only to name, hx of Alzeimers, Parkinsons.
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
## 2025-06-10 DIAGNOSIS — Z04.3 ENCOUNTER FOR EXAMINATION AND OBSERVATION FOLLOWING OTHER ACCIDENT: ICD-10-CM

## 2025-07-02 ENCOUNTER — INPATIENT (INPATIENT)
Facility: HOSPITAL | Age: 75
LOS: 1 days | Discharge: HOME HEALTH | End: 2025-07-04
Attending: HOSPITALIST | Admitting: INTERNAL MEDICINE
Payer: MEDICARE

## 2025-07-02 VITALS
HEIGHT: 72 IN | HEART RATE: 71 BPM | RESPIRATION RATE: 18 BRPM | DIASTOLIC BLOOD PRESSURE: 88 MMHG | TEMPERATURE: 98 F | OXYGEN SATURATION: 97 % | SYSTOLIC BLOOD PRESSURE: 146 MMHG

## 2025-07-02 DIAGNOSIS — G93.41 METABOLIC ENCEPHALOPATHY: ICD-10-CM

## 2025-07-02 DIAGNOSIS — E87.20 ACIDOSIS, UNSPECIFIED: ICD-10-CM

## 2025-07-02 DIAGNOSIS — Z29.9 ENCOUNTER FOR PROPHYLACTIC MEASURES, UNSPECIFIED: ICD-10-CM

## 2025-07-02 DIAGNOSIS — N40.0 BENIGN PROSTATIC HYPERPLASIA WITHOUT LOWER URINARY TRACT SYMPTOMS: ICD-10-CM

## 2025-07-02 DIAGNOSIS — E78.5 HYPERLIPIDEMIA, UNSPECIFIED: ICD-10-CM

## 2025-07-02 DIAGNOSIS — N40.1 BENIGN PROSTATIC HYPERPLASIA WITH LOWER URINARY TRACT SYMPTOMS: ICD-10-CM

## 2025-07-02 DIAGNOSIS — N32.89 OTHER SPECIFIED DISORDERS OF BLADDER: ICD-10-CM

## 2025-07-02 DIAGNOSIS — D50.9 IRON DEFICIENCY ANEMIA, UNSPECIFIED: ICD-10-CM

## 2025-07-02 DIAGNOSIS — I10 ESSENTIAL (PRIMARY) HYPERTENSION: ICD-10-CM

## 2025-07-02 DIAGNOSIS — N17.8 OTHER ACUTE KIDNEY FAILURE: ICD-10-CM

## 2025-07-02 DIAGNOSIS — Z98.49 CATARACT EXTRACTION STATUS, UNSPECIFIED EYE: Chronic | ICD-10-CM

## 2025-07-02 DIAGNOSIS — G30.9 ALZHEIMER'S DISEASE, UNSPECIFIED: ICD-10-CM

## 2025-07-02 DIAGNOSIS — N39.0 URINARY TRACT INFECTION, SITE NOT SPECIFIED: ICD-10-CM

## 2025-07-02 DIAGNOSIS — Z41.9 ENCOUNTER FOR PROCEDURE FOR PURPOSES OTHER THAN REMEDYING HEALTH STATE, UNSPECIFIED: Chronic | ICD-10-CM

## 2025-07-02 DIAGNOSIS — Z98.890 OTHER SPECIFIED POSTPROCEDURAL STATES: Chronic | ICD-10-CM

## 2025-07-02 DIAGNOSIS — R31.0 GROSS HEMATURIA: ICD-10-CM

## 2025-07-02 DIAGNOSIS — F32.9 MAJOR DEPRESSIVE DISORDER, SINGLE EPISODE, UNSPECIFIED: ICD-10-CM

## 2025-07-02 DIAGNOSIS — R31.9 HEMATURIA, UNSPECIFIED: ICD-10-CM

## 2025-07-02 DIAGNOSIS — R33.9 RETENTION OF URINE, UNSPECIFIED: ICD-10-CM

## 2025-07-02 LAB
ADD ON TEST-SPECIMEN IN LAB: SIGNIFICANT CHANGE UP
ALBUMIN SERPL ELPH-MCNC: 3.2 G/DL — LOW (ref 3.3–5)
ALBUMIN SERPL ELPH-MCNC: 3.4 G/DL — SIGNIFICANT CHANGE UP (ref 3.3–5)
ALP SERPL-CCNC: 65 U/L — SIGNIFICANT CHANGE UP (ref 40–120)
ALP SERPL-CCNC: 72 U/L — SIGNIFICANT CHANGE UP (ref 40–120)
ALT FLD-CCNC: 7 U/L — LOW (ref 10–45)
ALT FLD-CCNC: 8 U/L — LOW (ref 10–45)
ANION GAP SERPL CALC-SCNC: 10 MMOL/L — SIGNIFICANT CHANGE UP (ref 5–17)
ANION GAP SERPL CALC-SCNC: 12 MMOL/L — SIGNIFICANT CHANGE UP (ref 5–17)
ANION GAP SERPL CALC-SCNC: 13 MMOL/L — SIGNIFICANT CHANGE UP (ref 5–17)
APPEARANCE UR: ABNORMAL
APTT BLD: 25.9 SEC — LOW (ref 26.1–36.8)
APTT BLD: 25.9 SEC — LOW (ref 26.1–36.8)
AST SERPL-CCNC: 19 U/L — SIGNIFICANT CHANGE UP (ref 10–40)
AST SERPL-CCNC: 21 U/L — SIGNIFICANT CHANGE UP (ref 10–40)
BASOPHILS # BLD AUTO: 0.03 K/UL — SIGNIFICANT CHANGE UP (ref 0–0.2)
BASOPHILS NFR BLD AUTO: 0.3 % — SIGNIFICANT CHANGE UP (ref 0–2)
BILIRUB DIRECT SERPL-MCNC: 0.1 MG/DL — SIGNIFICANT CHANGE UP (ref 0–0.3)
BILIRUB INDIRECT FLD-MCNC: 0.2 MG/DL — SIGNIFICANT CHANGE UP (ref 0.2–1)
BILIRUB SERPL-MCNC: 0.2 MG/DL — SIGNIFICANT CHANGE UP (ref 0.2–1.2)
BILIRUB SERPL-MCNC: 0.3 MG/DL — SIGNIFICANT CHANGE UP (ref 0.2–1.2)
BILIRUB UR-MCNC: ABNORMAL
BLD GP AB SCN SERPL QL: NEGATIVE — SIGNIFICANT CHANGE UP
BUN SERPL-MCNC: 41 MG/DL — HIGH (ref 7–23)
BUN SERPL-MCNC: 59 MG/DL — HIGH (ref 7–23)
BUN SERPL-MCNC: 66 MG/DL — HIGH (ref 7–23)
CALCIUM SERPL-MCNC: 8.7 MG/DL — SIGNIFICANT CHANGE UP (ref 8.4–10.5)
CALCIUM SERPL-MCNC: 8.8 MG/DL — SIGNIFICANT CHANGE UP (ref 8.4–10.5)
CALCIUM SERPL-MCNC: 9.1 MG/DL — SIGNIFICANT CHANGE UP (ref 8.4–10.5)
CHLORIDE SERPL-SCNC: 110 MMOL/L — HIGH (ref 96–108)
CHLORIDE SERPL-SCNC: 113 MMOL/L — HIGH (ref 96–108)
CHLORIDE SERPL-SCNC: 114 MMOL/L — HIGH (ref 96–108)
CO2 SERPL-SCNC: 19 MMOL/L — LOW (ref 22–31)
CO2 SERPL-SCNC: 20 MMOL/L — LOW (ref 22–31)
CO2 SERPL-SCNC: 22 MMOL/L — SIGNIFICANT CHANGE UP (ref 22–31)
COLOR SPEC: ABNORMAL
CREAT SERPL-MCNC: 3.63 MG/DL — HIGH (ref 0.5–1.3)
CREAT SERPL-MCNC: 7.54 MG/DL — HIGH (ref 0.5–1.3)
CREAT SERPL-MCNC: 9.68 MG/DL — HIGH (ref 0.5–1.3)
DIFF PNL FLD: ABNORMAL
EGFR: 17 ML/MIN/1.73M2 — LOW
EGFR: 17 ML/MIN/1.73M2 — LOW
EGFR: 5 ML/MIN/1.73M2 — LOW
EGFR: 5 ML/MIN/1.73M2 — LOW
EGFR: 7 ML/MIN/1.73M2 — LOW
EGFR: 7 ML/MIN/1.73M2 — LOW
EOSINOPHIL # BLD AUTO: 0.27 K/UL — SIGNIFICANT CHANGE UP (ref 0–0.5)
EOSINOPHIL NFR BLD AUTO: 2.7 % — SIGNIFICANT CHANGE UP (ref 0–6)
FLUAV AG NPH QL: SIGNIFICANT CHANGE UP
FLUBV AG NPH QL: SIGNIFICANT CHANGE UP
GLUCOSE SERPL-MCNC: 105 MG/DL — HIGH (ref 70–99)
GLUCOSE SERPL-MCNC: 112 MG/DL — HIGH (ref 70–99)
GLUCOSE SERPL-MCNC: 96 MG/DL — SIGNIFICANT CHANGE UP (ref 70–99)
GLUCOSE UR QL: 100 MG/DL
HCT VFR BLD CALC: 33.4 % — LOW (ref 39–50)
HCT VFR BLD CALC: 36.2 % — LOW (ref 39–50)
HGB BLD-MCNC: 10.8 G/DL — LOW (ref 13–17)
HGB BLD-MCNC: 11.5 G/DL — LOW (ref 13–17)
IMM GRANULOCYTES # BLD AUTO: 0.04 K/UL — SIGNIFICANT CHANGE UP (ref 0–0.07)
IMM GRANULOCYTES NFR BLD AUTO: 0.4 % — SIGNIFICANT CHANGE UP (ref 0–0.9)
INR BLD: 1.03 — SIGNIFICANT CHANGE UP (ref 0.85–1.16)
INR BLD: 1.05 — SIGNIFICANT CHANGE UP (ref 0.85–1.16)
KETONES UR QL: 15 MG/DL
LACTATE SERPL-SCNC: 0.9 MMOL/L — SIGNIFICANT CHANGE UP (ref 0.5–2)
LEUKOCYTE ESTERASE UR-ACNC: ABNORMAL
LIDOCAIN IGE QN: 16 U/L — SIGNIFICANT CHANGE UP (ref 7–60)
LYMPHOCYTES # BLD AUTO: 0.85 K/UL — LOW (ref 1–3.3)
LYMPHOCYTES NFR BLD AUTO: 8.7 % — LOW (ref 13–44)
MAGNESIUM SERPL-MCNC: 2.4 MG/DL — SIGNIFICANT CHANGE UP (ref 1.6–2.6)
MCHC RBC-ENTMCNC: 31.8 G/DL — LOW (ref 32–36)
MCHC RBC-ENTMCNC: 32.3 G/DL — SIGNIFICANT CHANGE UP (ref 32–36)
MCHC RBC-ENTMCNC: 32.4 PG — SIGNIFICANT CHANGE UP (ref 27–34)
MCHC RBC-ENTMCNC: 32.9 PG — SIGNIFICANT CHANGE UP (ref 27–34)
MCV RBC AUTO: 101.8 FL — HIGH (ref 80–100)
MCV RBC AUTO: 102 FL — HIGH (ref 80–100)
MONOCYTES # BLD AUTO: 0.99 K/UL — HIGH (ref 0–0.9)
MONOCYTES NFR BLD AUTO: 10.1 % — SIGNIFICANT CHANGE UP (ref 2–14)
NEUTROPHILS # BLD AUTO: 7.64 K/UL — HIGH (ref 1.8–7.4)
NEUTROPHILS NFR BLD AUTO: 77.8 % — HIGH (ref 43–77)
NITRITE UR-MCNC: POSITIVE
NRBC # BLD AUTO: 0 K/UL — SIGNIFICANT CHANGE UP (ref 0–0)
NRBC # BLD AUTO: 0 K/UL — SIGNIFICANT CHANGE UP (ref 0–0)
NRBC # FLD: 0 K/UL — SIGNIFICANT CHANGE UP (ref 0–0)
NRBC # FLD: 0 K/UL — SIGNIFICANT CHANGE UP (ref 0–0)
NRBC BLD AUTO-RTO: 0 /100 WBCS — SIGNIFICANT CHANGE UP (ref 0–0)
NRBC BLD AUTO-RTO: 0 /100 WBCS — SIGNIFICANT CHANGE UP (ref 0–0)
PH UR: 6 — SIGNIFICANT CHANGE UP (ref 5–8)
PHOSPHATE SERPL-MCNC: 3.5 MG/DL — SIGNIFICANT CHANGE UP (ref 2.5–4.5)
PLATELET # BLD AUTO: 238 K/UL — SIGNIFICANT CHANGE UP (ref 150–400)
PLATELET # BLD AUTO: 249 K/UL — SIGNIFICANT CHANGE UP (ref 150–400)
PMV BLD: 9.2 FL — SIGNIFICANT CHANGE UP (ref 7–13)
PMV BLD: 9.3 FL — SIGNIFICANT CHANGE UP (ref 7–13)
POTASSIUM SERPL-MCNC: 4.6 MMOL/L — SIGNIFICANT CHANGE UP (ref 3.5–5.3)
POTASSIUM SERPL-MCNC: 5.2 MMOL/L — SIGNIFICANT CHANGE UP (ref 3.5–5.3)
POTASSIUM SERPL-MCNC: 5.3 MMOL/L — SIGNIFICANT CHANGE UP (ref 3.5–5.3)
POTASSIUM SERPL-SCNC: 4.6 MMOL/L — SIGNIFICANT CHANGE UP (ref 3.5–5.3)
POTASSIUM SERPL-SCNC: 5.2 MMOL/L — SIGNIFICANT CHANGE UP (ref 3.5–5.3)
POTASSIUM SERPL-SCNC: 5.3 MMOL/L — SIGNIFICANT CHANGE UP (ref 3.5–5.3)
PROT SERPL-MCNC: 6.3 G/DL — SIGNIFICANT CHANGE UP (ref 6–8.3)
PROT SERPL-MCNC: 6.6 G/DL — SIGNIFICANT CHANGE UP (ref 6–8.3)
PROT UR-MCNC: >=300
PROTHROM AB SERPL-ACNC: 12.1 SEC — SIGNIFICANT CHANGE UP (ref 9.9–13.4)
PROTHROM AB SERPL-ACNC: 12.3 SEC — SIGNIFICANT CHANGE UP (ref 9.9–13.4)
RBC # BLD: 3.28 M/UL — LOW (ref 4.2–5.8)
RBC # BLD: 3.55 M/UL — LOW (ref 4.2–5.8)
RBC # FLD: 12.2 % — SIGNIFICANT CHANGE UP (ref 10.3–14.5)
RBC # FLD: 12.3 % — SIGNIFICANT CHANGE UP (ref 10.3–14.5)
RH IG SCN BLD-IMP: POSITIVE — SIGNIFICANT CHANGE UP
RSV RNA NPH QL NAA+NON-PROBE: SIGNIFICANT CHANGE UP
SARS-COV-2 RNA SPEC QL NAA+PROBE: SIGNIFICANT CHANGE UP
SODIUM SERPL-SCNC: 142 MMOL/L — SIGNIFICANT CHANGE UP (ref 135–145)
SODIUM SERPL-SCNC: 145 MMOL/L — SIGNIFICANT CHANGE UP (ref 135–145)
SODIUM SERPL-SCNC: 146 MMOL/L — HIGH (ref 135–145)
SOURCE RESPIRATORY: SIGNIFICANT CHANGE UP
SP GR SPEC: 1.01 — SIGNIFICANT CHANGE UP (ref 1–1.03)
UROBILINOGEN FLD QL: 2 MG/DL (ref 0.2–1)
VALPROATE SERPL-MCNC: 12.3 UG/ML — LOW (ref 50–100)
WBC # BLD: 8.52 K/UL — SIGNIFICANT CHANGE UP (ref 3.8–10.5)
WBC # BLD: 9.82 K/UL — SIGNIFICANT CHANGE UP (ref 3.8–10.5)
WBC # FLD AUTO: 8.52 K/UL — SIGNIFICANT CHANGE UP (ref 3.8–10.5)
WBC # FLD AUTO: 9.82 K/UL — SIGNIFICANT CHANGE UP (ref 3.8–10.5)

## 2025-07-02 PROCEDURE — 71045 X-RAY EXAM CHEST 1 VIEW: CPT | Mod: 26

## 2025-07-02 PROCEDURE — 0241U: CPT

## 2025-07-02 PROCEDURE — 74176 CT ABD & PELVIS W/O CONTRAST: CPT | Mod: 26

## 2025-07-02 PROCEDURE — 87040 BLOOD CULTURE FOR BACTERIA: CPT

## 2025-07-02 PROCEDURE — 99291 CRITICAL CARE FIRST HOUR: CPT

## 2025-07-02 PROCEDURE — 80164 ASSAY DIPROPYLACETIC ACD TOT: CPT

## 2025-07-02 PROCEDURE — 81001 URINALYSIS AUTO W/SCOPE: CPT

## 2025-07-02 PROCEDURE — 85610 PROTHROMBIN TIME: CPT

## 2025-07-02 PROCEDURE — 36415 COLL VENOUS BLD VENIPUNCTURE: CPT

## 2025-07-02 PROCEDURE — 85730 THROMBOPLASTIN TIME PARTIAL: CPT

## 2025-07-02 PROCEDURE — 99223 1ST HOSP IP/OBS HIGH 75: CPT

## 2025-07-02 PROCEDURE — 99223 1ST HOSP IP/OBS HIGH 75: CPT | Mod: GC

## 2025-07-02 PROCEDURE — 83735 ASSAY OF MAGNESIUM: CPT

## 2025-07-02 PROCEDURE — 70450 CT HEAD/BRAIN W/O DYE: CPT | Mod: 26

## 2025-07-02 PROCEDURE — 80076 HEPATIC FUNCTION PANEL: CPT

## 2025-07-02 PROCEDURE — 85027 COMPLETE CBC AUTOMATED: CPT

## 2025-07-02 PROCEDURE — 80048 BASIC METABOLIC PNL TOTAL CA: CPT

## 2025-07-02 PROCEDURE — 83605 ASSAY OF LACTIC ACID: CPT

## 2025-07-02 PROCEDURE — 85025 COMPLETE CBC W/AUTO DIFF WBC: CPT

## 2025-07-02 PROCEDURE — 87086 URINE CULTURE/COLONY COUNT: CPT

## 2025-07-02 PROCEDURE — 51703 INSERT BLADDER CATH COMPLEX: CPT

## 2025-07-02 PROCEDURE — 80053 COMPREHEN METABOLIC PANEL: CPT

## 2025-07-02 PROCEDURE — 84100 ASSAY OF PHOSPHORUS: CPT

## 2025-07-02 PROCEDURE — 83690 ASSAY OF LIPASE: CPT

## 2025-07-02 RX ORDER — DONEPEZIL HYDROCHLORIDE 5 MG/1
5 TABLET ORAL
Refills: 0 | Status: DISCONTINUED | OUTPATIENT
Start: 2025-07-02 | End: 2025-07-02

## 2025-07-02 RX ORDER — SODIUM CHLORIDE 9 G/1000ML
1000 INJECTION, SOLUTION INTRAVENOUS
Refills: 0 | Status: DISCONTINUED | OUTPATIENT
Start: 2025-07-02 | End: 2025-07-02

## 2025-07-02 RX ORDER — CEFTRIAXONE 500 MG/1
1000 INJECTION, POWDER, FOR SOLUTION INTRAMUSCULAR; INTRAVENOUS ONCE
Refills: 0 | Status: COMPLETED | OUTPATIENT
Start: 2025-07-02 | End: 2025-07-02

## 2025-07-02 RX ORDER — PIPERACILLIN-TAZO-DEXTROSE,ISO 3.375G/5
3.38 IV SOLUTION, PIGGYBACK PREMIX FROZEN(ML) INTRAVENOUS ONCE
Refills: 0 | Status: COMPLETED | OUTPATIENT
Start: 2025-07-02 | End: 2025-07-02

## 2025-07-02 RX ORDER — AMLODIPINE BESYLATE 10 MG/1
5 TABLET ORAL EVERY 24 HOURS
Refills: 0 | Status: DISCONTINUED | OUTPATIENT
Start: 2025-07-02 | End: 2025-07-04

## 2025-07-02 RX ORDER — AMLODIPINE BESYLATE 10 MG/1
1 TABLET ORAL
Refills: 0 | DISCHARGE

## 2025-07-02 RX ORDER — DONEPEZIL HYDROCHLORIDE 5 MG/1
1 TABLET ORAL
Refills: 0 | DISCHARGE

## 2025-07-02 RX ORDER — MIRTAZAPINE 30 MG/1
1 TABLET, FILM COATED ORAL
Refills: 0 | DISCHARGE

## 2025-07-02 RX ORDER — ESCITALOPRAM OXALATE 20 MG/1
10 TABLET ORAL
Refills: 0 | Status: DISCONTINUED | OUTPATIENT
Start: 2025-07-02 | End: 2025-07-02

## 2025-07-02 RX ORDER — SENNA 187 MG
1 TABLET ORAL
Refills: 0 | Status: DISCONTINUED | OUTPATIENT
Start: 2025-07-02 | End: 2025-07-04

## 2025-07-02 RX ORDER — TAMSULOSIN HYDROCHLORIDE 0.4 MG/1
0.4 CAPSULE ORAL
Refills: 0 | Status: DISCONTINUED | OUTPATIENT
Start: 2025-07-02 | End: 2025-07-04

## 2025-07-02 RX ORDER — MIRTAZAPINE 30 MG/1
15 TABLET, FILM COATED ORAL
Refills: 0 | Status: DISCONTINUED | OUTPATIENT
Start: 2025-07-02 | End: 2025-07-02

## 2025-07-02 RX ORDER — SODIUM CHLORIDE 9 G/1000ML
1000 INJECTION, SOLUTION INTRAVENOUS
Refills: 0 | Status: DISCONTINUED | OUTPATIENT
Start: 2025-07-02 | End: 2025-07-03

## 2025-07-02 RX ORDER — DONEPEZIL HYDROCHLORIDE 5 MG/1
5 TABLET ORAL
Refills: 0 | Status: DISCONTINUED | OUTPATIENT
Start: 2025-07-02 | End: 2025-07-04

## 2025-07-02 RX ORDER — ESCITALOPRAM OXALATE 20 MG/1
10 TABLET ORAL
Refills: 0 | Status: DISCONTINUED | OUTPATIENT
Start: 2025-07-02 | End: 2025-07-04

## 2025-07-02 RX ORDER — ACETAMINOPHEN 500 MG/5ML
1000 LIQUID (ML) ORAL ONCE
Refills: 0 | Status: COMPLETED | OUTPATIENT
Start: 2025-07-02 | End: 2025-07-02

## 2025-07-02 RX ORDER — PIPERACILLIN-TAZO-DEXTROSE,ISO 3.375G/5
3.38 IV SOLUTION, PIGGYBACK PREMIX FROZEN(ML) INTRAVENOUS EVERY 12 HOURS
Refills: 0 | Status: DISCONTINUED | OUTPATIENT
Start: 2025-07-03 | End: 2025-07-03

## 2025-07-02 RX ORDER — TAMSULOSIN HYDROCHLORIDE 0.4 MG/1
0.4 CAPSULE ORAL
Refills: 0 | Status: DISCONTINUED | OUTPATIENT
Start: 2025-07-02 | End: 2025-07-02

## 2025-07-02 RX ORDER — ESCITALOPRAM OXALATE 20 MG/1
1 TABLET ORAL
Refills: 0 | DISCHARGE

## 2025-07-02 RX ORDER — ASPIRIN 325 MG
81 TABLET ORAL DAILY
Refills: 0 | Status: DISCONTINUED | OUTPATIENT
Start: 2025-07-02 | End: 2025-07-02

## 2025-07-02 RX ORDER — AMLODIPINE BESYLATE 10 MG/1
5 TABLET ORAL
Refills: 0 | Status: DISCONTINUED | OUTPATIENT
Start: 2025-07-02 | End: 2025-07-02

## 2025-07-02 RX ORDER — POLYETHYLENE GLYCOL 3350 17 G/17G
17 POWDER, FOR SOLUTION ORAL
Refills: 0 | Status: DISCONTINUED | OUTPATIENT
Start: 2025-07-02 | End: 2025-07-04

## 2025-07-02 RX ADMIN — Medication 1000 MILLILITER(S): at 09:51

## 2025-07-02 RX ADMIN — CEFTRIAXONE 100 MILLIGRAM(S): 500 INJECTION, POWDER, FOR SOLUTION INTRAMUSCULAR; INTRAVENOUS at 12:11

## 2025-07-02 RX ADMIN — DONEPEZIL HYDROCHLORIDE 5 MILLIGRAM(S): 5 TABLET ORAL at 22:14

## 2025-07-02 RX ADMIN — Medication 25 GRAM(S): at 22:05

## 2025-07-02 RX ADMIN — Medication 200 GRAM(S): at 13:25

## 2025-07-02 RX ADMIN — Medication 400 MILLIGRAM(S): at 09:51

## 2025-07-02 RX ADMIN — SODIUM CHLORIDE 75 MILLILITER(S): 9 INJECTION, SOLUTION INTRAVENOUS at 13:24

## 2025-07-02 RX ADMIN — SODIUM CHLORIDE 100 MILLILITER(S): 9 INJECTION, SOLUTION INTRAVENOUS at 22:13

## 2025-07-02 NOTE — CONSULT NOTE ADULT - PROBLEM SELECTOR RECOMMENDATION 9
-garvin placed in ED was removed and 22fr coude 3 way garvin placed. Irrigated bladder- about 60cc blood clots noted. Irrigated till urine light pink.   -continue garvin and monitor urine color  -follow up cultures  -continue flomax  -continue IV antibx  -continue present care per medicine -garvin placed in ED was removed and 22fr coude 3 way garvin placed. Irrigated bladder- about 60cc blood clots noted. Irrigated till urine light pink.   -continue garvin and monitor urine color  -follow up cultures  -continue flomax  -continue IV antibx  -trend Cr  -continue present care per medicine -garvin placed in ED was removed and 22fr coude 3 way garvin placed. Irrigated bladder- about 60cc blood clots noted. Irrigated till urine light pink.   -continue garvin and monitor urine color  -follow up cultures  -continue flomax  -continue IV antibx  -trend Cr  -f/u urine cytology  -will need outpatient cystoscopy  -likely will need to be discharged with garvin and outpt trial void  -recommend CT urogram when Cr improves  -continue present care per medicine

## 2025-07-02 NOTE — H&P ADULT - PROBLEM SELECTOR PLAN 10
Pt with hx HTN, takes Amlodipine 20mg PO at home    - Pressures stable, hold Amlodipine until patient can tolerate PO Pt with hx HTN, takes Amlodipine 5 mg PO at home    - Pressures stable, hold Amlodipine until patient can tolerate PO and monitor pressures to avoid underperfusion  of kidneys

## 2025-07-02 NOTE — PROCEDURE NOTE - NSICDXPROBLEMMLMBOX_GEN
IPSTART~^~1~^~[id number]~^~~^~IPEND  PKSTART~^~[id number]~^~PKEND  IPSTART~^~2~^~[id number]~^~~^~IPEND  PKSTART~^~[id number]~^~PKEND  IPSTART~^~3~^~[id number]~^~~^~IPEND  PKSTART~^~[id number]~^~PKEND

## 2025-07-02 NOTE — ED PROVIDER NOTE - PHYSICAL EXAMINATION
general: Chronically ill appearing, in no acute distress  HEENT: Normocephalic, atraumatic, extraocular movements intact  CV: Regular rate  Pulm: No respiratory distress, no tachypnea  Abd: Flat, no gross distension, soft, ntnd, no r/g  : No penile bleeding or discharge  Ext: warm and well perfused  Skin: No gross rashes or lesions  Neuro: Alert and oriented to person, following basic commands, FORBES

## 2025-07-02 NOTE — CONSULT NOTE ADULT - ASSESSMENT
74 yo male with urinary retention, hematuria, BPH, and UTI. H/O recent urinary retention and UTI . 76 yo male with urinary retention, hematuria, BPH, and UTI. H/O recent urinary retention and UTI; ARF

## 2025-07-02 NOTE — H&P ADULT - PROBLEM SELECTOR PLAN 3
CT A/P reveals evidence of bladder mass, pt with urinary retention and hematuria.     - Urology consulted  - Stat labs including CBC, t/s, coags ordered CT A/P reveals evidence of bladder mass, pt with urinary retention and hematuria.     - Urology consulted  - Stat labs including CBC, t/s, coags ordered i/s/o hematuria UA shows turbid, red urine with >300 protein, blood 15, large leukocyte esterase, WBC 66, +RBC,    - Treating broadly with Zosyn 3.375mg q8, given hx of recent UTI, significant bladder retention and high risk for bacteremia in the setting of urinary stasis  -  If pt shows signs of worsening infection, broaden Zosyn dosing to include pseudomonal coverage   - F/u blood cx  - F/u urine cx

## 2025-07-02 NOTE — H&P ADULT - PROBLEM SELECTOR PLAN 2
UA shows turbid, red urine with >300 protein, blood 15, large leukocyte esterase, WBC 66, +RBC,    - Treating broadly with Zosyn 3.375mg q8, given hx of recent UTI, significant bladder retention and high risk for bacteremia in the setting of urinary stasis  - F/u blood cx  - F/u urine cx Pt with hx of BPH, recently started on Flomax 0.4mg (one week ago). Imaging shows evidence of bladder mass, Today, pt was retaining with 1200cc on BS. S/p garvin in ED, bloody urine drained in the ED.     - monitor strict I/Os q6 hours   - monitor for post-obstructive diureses, monitor electrolytes for losses and hypovolemia  - BMP 8pm  - Cont flomax 0.4mg  - nephrology consulted, F/U UA and urine studies  - urology consulted, replaced garvin  -  1000 mL 0.5 NS at 75/hr

## 2025-07-02 NOTE — CONSULT NOTE ADULT - SUBJECTIVE AND OBJECTIVE BOX
HPI:  Matthew Jay is a 76 yo M who presents with his home Aide, Joni, with concern of lethargy and altered mental status. PMH includes alzheimer's dementia, major depressive disorder, HLD, BASHIR, BPH, isolated seizure. He was recently at [facility] (d/c on 6/30) for URI and AMS, found to have urinary retention and UTI, and one time seizure. He was started on Flomax 0.4mg at that time. Today, he presents with a 3 day hx of lethargy, and was noted to have had blood in his diaper. Per aide, the patient has become progressively more lethargic since his d/c home, and initially was urinating a lot but has not really urinated over the past day and has not been eating or drinking much.      The patient is a poor historian due to AMS, but per home health Aide and the patient's wife, Manda, Matthew was dx with AD in 2019, and his baseline is forgetful but able to walk, converse, enjoy television with family, etc. He was hospitalized in November for one month, at which time he had a decline in mental status and was placed on a feeding tube, which he pulled out.  He was discharged to a nursing home but currently lives at home with 24/7 aides. Of note, he has stitches on his face which have been present for several months.     Wife, Manda: [phone], kindly appreciates updates      In the ED:  Initial vital signs: T: 99 F, HR: 67, BP: 138/82, R: 18, SpO2: 97% on RA  Labs: significant for: HgB 11.5 (12.5 on 5/25/2025), WBC 9.82, BUN 66, Cr. 9.68 (0.94 on 5/25/2025), GFR 5 (85 on 5/25/2025), RVP (-) UA: turbid, red urine with >300 protein, blood 15, large leukocyte esterase, WBC 66, +RBC,   Imaging:  CXR: evidence of small pleural effusion or atelectasis at lung bases  CT Head: no acute intracranial pathology  CT A/P: moderate b/l hydronephrosis, enlarged prostate, distended bladder with material not excluding blood clots or mass.   EKG:  NSR 77 BPM, no ST changes   Medications: 1000mg Ofirmev, 1000mL NS bolus, 1000mg ceftriaxone  (02 Jul 2025 13:53)    : Above noted. Called to evaluate patient. Sent to ER for lethargy and AMS. H/O recent UTI and urinary retention. Garvin placed in ED with about 1.4L UO. Called to evaluate hematuria now noted in garvin drainage bag. Patient poor historian 2/2 alzheimer's dementia. Noted overnight to have blood in diaper. Admitted for Metabolic encephalopathy and UTI.       PAST MEDICAL & SURGICAL HISTORY:  Bipolar depression      Memory dysfunction  alzheimer      HLD (hyperlipidemia)      Surgery, elective  Mitral valve repair 2017      S/P colonoscopy      H/O cataract extraction  left          MEDICATIONS  (STANDING):  lactated ringers. 1000 milliLiter(s) (75 mL/Hr) IV Continuous <Continuous>  piperacillin/tazobactam IVPB.- 3.375 Gram(s) IV Intermittent once    MEDICATIONS  (PRN):      Allergies    No Known Allergies    Intolerances        SOCIAL HISTORY:    FAMILY HISTORY:      Vital Signs Last 24 Hrs  T(C): 37.2 (02 Jul 2025 11:40), Max: 37.2 (02 Jul 2025 11:40)  T(F): 99 (02 Jul 2025 11:40), Max: 99 (02 Jul 2025 11:40)  HR: 67 (02 Jul 2025 11:40) (67 - 71)  BP: 138/82 (02 Jul 2025 11:40) (138/82 - 146/88)  BP(mean): --  RR: 18 (02 Jul 2025 11:40) (18 - 18)  SpO2: 97% (02 Jul 2025 11:40) (97% - 97%)    Parameters below as of 02 Jul 2025 11:40  Patient On (Oxygen Delivery Method): room air        On PE:  General: awake and responsive  Abdomen: soft, NT, ND  : FC intact, +heme  EXT: no edema    LABS:                        10.8   8.52  )-----------( 238      ( 02 Jul 2025 13:01 )             33.4     07-02    146[H]  |  114[H]  |  59[H]  ----------------------------<  105[H]  5.3   |  20[L]  |  7.54[H]    Ca    8.7      02 Jul 2025 13:01    TPro  6.3  /  Alb  3.2[L]  /  TBili  0.2  /  DBili  x   /  AST  19  /  ALT  7[L]  /  AlkPhos  65  07-02    PT/INR - ( 02 Jul 2025 13:03 )   PT: 12.3 sec;   INR: 1.05          PTT - ( 02 Jul 2025 13:03 )  PTT:25.9 sec  Urinalysis Basic - ( 02 Jul 2025 13:01 )    Color: x / Appearance: x / SG: x / pH: x  Gluc: 105 mg/dL / Ketone: x  / Bili: x / Urobili: x   Blood: x / Protein: x / Nitrite: x   Leuk Esterase: x / RBC: x / WBC x   Sq Epi: x / Non Sq Epi: x / Bacteria: x        RADIOLOGY & ADDITIONAL STUDIES:PROCEDURE DATE:  07/02/2025          INTERPRETATION:  CLINICAL INFORMATION: Altered mental status. Evaluate   kidneys.    COMPARISON: CT chest 5/25/2025    CONTRAST/COMPLICATIONS:  IV Contrast: NONE  Oral Contrast: NONE.    PROCEDURE:  CT of the Abdomen and Pelvis was performed.  Sagittal and coronal reformats were performed.    FINDINGS: Limited study without contrast.  LOWER CHEST: Calcified granuloma right lowerlobe. Peribronchial   consolidation in the right lower lobe, slightly increased since prior   study.    LIVER: 1.3 cm cyst segment 8. 1.4 cm cyst segment 5.  BILE DUCTS: Normal caliber.  GALLBLADDER: Within normal limits.  SPLEEN: Within normal limits.  PANCREAS: Within normal limits.  ADRENALS: Within normal limits.  KIDNEYS/URETERS: Moderate bilateral hydronephrosis and hydroureter. No   kidney stones.    BLADDER: Markedly distended bladder with layering high density.  REPRODUCTIVE ORGANS: Prostate is enlarged.    BOWEL: No bowel obstruction. Appendix is normal.  PERITONEUM/RETROPERITONEUM: Small ascites.  VESSELS: Atherosclerotic changes. No abdominal aortic and as an.  LYMPH NODES: No lymphadenopathy.  ABDOMINAL WALL: Within normal limits.  BONES: Scoliosis and degenerative changes.        IMPRESSION:    Moderate bilateral hydronephrosis likely due to bladder distention.    Enlarged prostate. Markedly distended bladder. High attenuation material   within the bladder, probably blood clots but cannot exclude bladder mass   without contrast.    --- End of Report ---           HPI:  Matthew Jay is a 76 yo M who presents with his home Aide, Joni, with concern of lethargy and altered mental status. PMH includes alzheimer's dementia, major depressive disorder, HLD, BASHIR, BPH, isolated seizure. He was recently at [facility] (d/c on 6/30) for URI and AMS, found to have urinary retention and UTI, and one time seizure. He was started on Flomax 0.4mg at that time. Today, he presents with a 3 day hx of lethargy, and was noted to have had blood in his diaper. Per aide, the patient has become progressively more lethargic since his d/c home, and initially was urinating a lot but has not really urinated over the past day and has not been eating or drinking much.      The patient is a poor historian due to AMS, but per home health Aide and the patient's wife, Manda, Matthew was dx with AD in 2019, and his baseline is forgetful but able to walk, converse, enjoy television with family, etc. He was hospitalized in November for one month, at which time he had a decline in mental status and was placed on a feeding tube, which he pulled out.  He was discharged to a nursing home but currently lives at home with 24/7 aides. Of note, he has stitches on his face which have been present for several months.     Wife, Manda: [phone], kindly appreciates updates      In the ED:  Initial vital signs: T: 99 F, HR: 67, BP: 138/82, R: 18, SpO2: 97% on RA  Labs: significant for: HgB 11.5 (12.5 on 5/25/2025), WBC 9.82, BUN 66, Cr. 9.68 (0.94 on 5/25/2025), GFR 5 (85 on 5/25/2025), RVP (-) UA: turbid, red urine with >300 protein, blood 15, large leukocyte esterase, WBC 66, +RBC,   Imaging:  CXR: evidence of small pleural effusion or atelectasis at lung bases  CT Head: no acute intracranial pathology  CT A/P: moderate b/l hydronephrosis, enlarged prostate, distended bladder with material not excluding blood clots or mass.   EKG:  NSR 77 BPM, no ST changes   Medications: 1000mg Ofirmev, 1000mL NS bolus, 1000mg ceftriaxone  (02 Jul 2025 13:53)    : Above noted. Called to evaluate patient. Sent to ER for lethargy and AMS. H/O recent UTI and urinary retention. Garvin placed in ED with about 1.4L UO. Called to evaluate hematuria now noted in garvin drainage bag. Patient poor historian 2/2 alzheimer's dementia. Noted overnight to have blood in diaper. Admitted for Metabolic encephalopathy and UTI.   CT a/p c/w Enlarged prostate. Markedly distended bladder. High attenuation material   within the bladder, probably blood clots but cannot exclude bladder mass   without contrast.  Cr 7.54 (b/l 0.9 5/25)      PAST MEDICAL & SURGICAL HISTORY:  Bipolar depression      Memory dysfunction  alzheimer      HLD (hyperlipidemia)      Surgery, elective  Mitral valve repair 2017      S/P colonoscopy      H/O cataract extraction  left          MEDICATIONS  (STANDING):  lactated ringers. 1000 milliLiter(s) (75 mL/Hr) IV Continuous <Continuous>  piperacillin/tazobactam IVPB.- 3.375 Gram(s) IV Intermittent once    MEDICATIONS  (PRN):      Allergies    No Known Allergies    Intolerances        SOCIAL HISTORY:    FAMILY HISTORY:      Vital Signs Last 24 Hrs  T(C): 37.2 (02 Jul 2025 11:40), Max: 37.2 (02 Jul 2025 11:40)  T(F): 99 (02 Jul 2025 11:40), Max: 99 (02 Jul 2025 11:40)  HR: 67 (02 Jul 2025 11:40) (67 - 71)  BP: 138/82 (02 Jul 2025 11:40) (138/82 - 146/88)  BP(mean): --  RR: 18 (02 Jul 2025 11:40) (18 - 18)  SpO2: 97% (02 Jul 2025 11:40) (97% - 97%)    Parameters below as of 02 Jul 2025 11:40  Patient On (Oxygen Delivery Method): room air        On PE:  General: awake and responsive  Abdomen: soft, NT, ND  : FC intact, +heme  EXT: no edema    LABS:                        10.8   8.52  )-----------( 238      ( 02 Jul 2025 13:01 )             33.4     07-02    146[H]  |  114[H]  |  59[H]  ----------------------------<  105[H]  5.3   |  20[L]  |  7.54[H]    Ca    8.7      02 Jul 2025 13:01    TPro  6.3  /  Alb  3.2[L]  /  TBili  0.2  /  DBili  x   /  AST  19  /  ALT  7[L]  /  AlkPhos  65  07-02    PT/INR - ( 02 Jul 2025 13:03 )   PT: 12.3 sec;   INR: 1.05          PTT - ( 02 Jul 2025 13:03 )  PTT:25.9 sec  Urinalysis Basic - ( 02 Jul 2025 13:01 )    Color: x / Appearance: x / SG: x / pH: x  Gluc: 105 mg/dL / Ketone: x  / Bili: x / Urobili: x   Blood: x / Protein: x / Nitrite: x   Leuk Esterase: x / RBC: x / WBC x   Sq Epi: x / Non Sq Epi: x / Bacteria: x        RADIOLOGY & ADDITIONAL STUDIES:PROCEDURE DATE:  07/02/2025          INTERPRETATION:  CLINICAL INFORMATION: Altered mental status. Evaluate   kidneys.    COMPARISON: CT chest 5/25/2025    CONTRAST/COMPLICATIONS:  IV Contrast: NONE  Oral Contrast: NONE.    PROCEDURE:  CT of the Abdomen and Pelvis was performed.  Sagittal and coronal reformats were performed.    FINDINGS: Limited study without contrast.  LOWER CHEST: Calcified granuloma right lowerlobe. Peribronchial   consolidation in the right lower lobe, slightly increased since prior   study.    LIVER: 1.3 cm cyst segment 8. 1.4 cm cyst segment 5.  BILE DUCTS: Normal caliber.  GALLBLADDER: Within normal limits.  SPLEEN: Within normal limits.  PANCREAS: Within normal limits.  ADRENALS: Within normal limits.  KIDNEYS/URETERS: Moderate bilateral hydronephrosis and hydroureter. No   kidney stones.    BLADDER: Markedly distended bladder with layering high density.  REPRODUCTIVE ORGANS: Prostate is enlarged.    BOWEL: No bowel obstruction. Appendix is normal.  PERITONEUM/RETROPERITONEUM: Small ascites.  VESSELS: Atherosclerotic changes. No abdominal aortic and as an.  LYMPH NODES: No lymphadenopathy.  ABDOMINAL WALL: Within normal limits.  BONES: Scoliosis and degenerative changes.        IMPRESSION:    Moderate bilateral hydronephrosis likely due to bladder distention.    Enlarged prostate. Markedly distended bladder. High attenuation material   within the bladder, probably blood clots but cannot exclude bladder mass   without contrast.    --- End of Report ---

## 2025-07-02 NOTE — CONSULT NOTE ADULT - SUBJECTIVE AND OBJECTIVE BOX
NEPHROLOGY SERVICE INITIAL CONSULT NOTE    HPI: 74 y/o M known to have BPH, alzheimer's dementia, MDD, seizure disorder ,  discharged recently 6/30  for UTI, hematuria  at [facility] , brought to ED today with decreased responsiveness, drowsy, anorexia and intermittent hematuria  as per by caretaker and passing less urine. On presentation at ED found to have urinary retention. Bladder Scan > 1000 L, garvin cath 1.2 L , flank flank , Creat 9.6, bun 66. CT scan showed moderate bilateral hydrophrosis, nephrology consulted  for MARINE management      Sarah    In the ED:  Initial vital signs: T: 99 F, HR: 67, BP: 138/82, R: 18, SpO2: 97% on RA  Labs: significant for: HgB 11.5 (12.5 on 5/25/2025), WBC 9.82, BUN 66, Cr. 9.68 (0.94 on 5/25/2025), GFR 5 (85 on 5/25/2025), RVP (-) UA: turbid, red urine with >300 protein, blood 15, large leukocyte esterase, WBC 66, +RBC,   Imaging:  CXR: evidence of small pleural effusion or atelectasis at lung bases  CT Head: no acute intracranial pathology  CT A/P: moderate b/l hydronephrosis, enlarged prostate, distended bladder with material not excluding blood clots or mass.   EKG:  NSR 77 BPM, no ST changes   Medications: 1000mg Ofirmev, 1000mL NS bolus, 1000mg ceftriaxone  (02 Jul 2025 13:53)        REVIEW OF SYSTEMS: Otherwise negative except as specified in HPI  PAST MEDICAL & SURGICAL HISTORY:  Bipolar depression      Memory dysfunction  alzheimer      HLD (hyperlipidemia)      Surgery, elective  Mitral valve repair 2017      S/P colonoscopy      H/O cataract extraction  left        FAMILY HISTORY:    SOCIAL HISTORY:  HOME MEDICATIONS:    Allergies    No Known Allergies    Intolerances        ACTIVE MEDICATIONS:  MEDICATIONS  (STANDING):  donepezil 5 milliGRAM(s) Oral <User Schedule>  escitalopram 10 milliGRAM(s) Oral <User Schedule>  mirtazapine 15 milliGRAM(s) Oral <User Schedule>  piperacillin/tazobactam IVPB.- 3.375 Gram(s) IV Intermittent once  sodium chloride 0.45%. 1000 milliLiter(s) (75 mL/Hr) IV Continuous <Continuous>  tamsulosin 0.4 milliGRAM(s) Oral <User Schedule>    MEDICATIONS  (PRN):        VITAL SIGNS:  Vital Signs Last 24 Hrs  T(C): 37.2 (02 Jul 2025 11:40), Max: 37.2 (02 Jul 2025 11:40)  T(F): 99 (02 Jul 2025 11:40), Max: 99 (02 Jul 2025 11:40)  HR: 67 (02 Jul 2025 11:40) (67 - 71)  BP: 138/82 (02 Jul 2025 11:40) (138/82 - 146/88)  BP(mean): --  RR: 18 (02 Jul 2025 11:40) (18 - 18)  SpO2: 97% (02 Jul 2025 11:40) (97% - 97%)    Parameters below as of 02 Jul 2025 11:40  Patient On (Oxygen Delivery Method): room air        07-02-25 @ 07:01  -  07-02-25 @ 15:34  --------------------------------------------------------  IN:  Total IN: 0 mL    OUT:    Indwelling Catheter - Urethral (mL): 1200 mL  Total OUT: 1200 mL    Total NET: -1200 mL          PE:  General: Not in acute distress, well-nourished  Chest: CTAP b/l, no use of accessory respiratory muscles  Heart: RRR, S1/S2 wnl, no MRG  Abdomen: Soft, nontender, nondistended  Extremities: No edema  Neuro:  Alert, no apparent focal deficits, answer questions appropriately      Pertinent labs & Imaging:                        10.8   8.52  )-----------( 238      ( 02 Jul 2025 13:01 )             33.4     07-02    146[H]  |  114[H]  |  59[H]  ----------------------------<  105[H]  5.3   |  20[L]  |  7.54[H]    Ca    8.7      02 Jul 2025 13:01    TPro  6.3  /  Alb  3.2[L]  /  TBili  0.2  /  DBili  x   /  AST  19  /  ALT  7[L]  /  AlkPhos  65  07-02    PT/INR - ( 02 Jul 2025 13:03 )   PT: 12.3 sec;   INR: 1.05          PTT - ( 02 Jul 2025 13:03 )  PTT:25.9 sec  Urinalysis Basic - ( 02 Jul 2025 13:01 )    Color: x / Appearance: x / SG: x / pH: x  Gluc: 105 mg/dL / Ketone: x  / Bili: x / Urobili: x   Blood: x / Protein: x / Nitrite: x   Leuk Esterase: x / RBC: x / WBC x   Sq Epi: x / Non Sq Epi: x / Bacteria: x          CAPILLARY BLOOD GLUCOSE              RADIOLOGY & ADDITIONAL TESTS: Reviewed. NEPHROLOGY SERVICE INITIAL CONSULT NOTE    HPI: 74 y/o M known to have BPH, alzheimer's dementia, MDD, seizure disorder ,  discharged recently 6/30  for UTI, hematuria  at [facility] , brought to ED today with decreased responsiveness, drowsy, anorexia and intermittent hematuria  as per by caretaker and passing less urine. On presentation at ED found to have urinary retention. Bladder Scan > 1000 L, garvin cath 1.2 L , flank flank , Creat 9.6, bun 66. CT scan showed moderate bilateral hydrophrosis, nephrology consulted  for MARINE management          In the ED:  Initial vital signs: T: 99 F, HR: 67, BP: 138/82, R: 18, SpO2: 97% on RA  Labs: significant for: HgB 11.5 (12.5 on 5/25/2025), WBC 9.82, BUN 66, Cr. 9.68 (0.94 on 5/25/2025), GFR 5 (85 on 5/25/2025), RVP (-) UA: turbid, red urine with >300 protein, blood 15, large leukocyte esterase, WBC 66, +RBC,   Imaging:  CXR: evidence of small pleural effusion or atelectasis at lung bases  CT Head: no acute intracranial pathology  CT A/P: moderate b/l hydronephrosis, enlarged prostate, distended bladder with material not excluding blood clots or mass.   EKG:  NSR 77 BPM, no ST changes   Medications: 1000mg Ofirmev, 1000mL NS bolus, 1000mg ceftriaxone  (02 Jul 2025 13:53)        REVIEW OF SYSTEMS: Otherwise negative except as specified in HPI  PAST MEDICAL & SURGICAL HISTORY:  Bipolar depression      Memory dysfunction  alzheimer      HLD (hyperlipidemia)      Surgery, elective  Mitral valve repair 2017      S/P colonoscopy      H/O cataract extraction  left        FAMILY HISTORY: Noncontributory to current admissions    SOCIAL HISTORY: no current smokign etoh ivdu  HOME MEDICATIONS:    Allergies    No Known Allergies    Intolerances        ACTIVE MEDICATIONS:  MEDICATIONS  (STANDING):  donepezil 5 milliGRAM(s) Oral <User Schedule>  escitalopram 10 milliGRAM(s) Oral <User Schedule>  mirtazapine 15 milliGRAM(s) Oral <User Schedule>  piperacillin/tazobactam IVPB.- 3.375 Gram(s) IV Intermittent once  sodium chloride 0.45%. 1000 milliLiter(s) (75 mL/Hr) IV Continuous <Continuous>  tamsulosin 0.4 milliGRAM(s) Oral <User Schedule>    MEDICATIONS  (PRN):        VITAL SIGNS:  Vital Signs Last 24 Hrs  T(C): 37.2 (02 Jul 2025 11:40), Max: 37.2 (02 Jul 2025 11:40)  T(F): 99 (02 Jul 2025 11:40), Max: 99 (02 Jul 2025 11:40)  HR: 67 (02 Jul 2025 11:40) (67 - 71)  BP: 138/82 (02 Jul 2025 11:40) (138/82 - 146/88)  BP(mean): --  RR: 18 (02 Jul 2025 11:40) (18 - 18)  SpO2: 97% (02 Jul 2025 11:40) (97% - 97%)    Parameters below as of 02 Jul 2025 11:40  Patient On (Oxygen Delivery Method): room air        07-02-25 @ 07:01  -  07-02-25 @ 15:34  --------------------------------------------------------  IN:  Total IN: 0 mL    OUT:    Indwelling Catheter - Urethral (mL): 1200 mL  Total OUT: 1200 mL    Total NET: -1200 mL          PE:  General: Not in acute distress, thin  Chest: CTAP b/l, no use of accessory respiratory muscles  Heart: RRR, S1/S2 wnl, no MRG  Abdomen: Soft, nontender, nondistended  Extremities: No edema  Neuro:  Alert, no apparent focal deficits      Pertinent labs & Imaging:                        10.8   8.52  )-----------( 238      ( 02 Jul 2025 13:01 )             33.4     07-02    146[H]  |  114[H]  |  59[H]  ----------------------------<  105[H]  5.3   |  20[L]  |  7.54[H]    Ca    8.7      02 Jul 2025 13:01    TPro  6.3  /  Alb  3.2[L]  /  TBili  0.2  /  DBili  x   /  AST  19  /  ALT  7[L]  /  AlkPhos  65  07-02    PT/INR - ( 02 Jul 2025 13:03 )   PT: 12.3 sec;   INR: 1.05          PTT - ( 02 Jul 2025 13:03 )  PTT:25.9 sec  Urinalysis Basic - ( 02 Jul 2025 13:01 )    Color: x / Appearance: x / SG: x / pH: x  Gluc: 105 mg/dL / Ketone: x  / Bili: x / Urobili: x   Blood: x / Protein: x / Nitrite: x   Leuk Esterase: x / RBC: x / WBC x   Sq Epi: x / Non Sq Epi: x / Bacteria: x          CAPILLARY BLOOD GLUCOSE              RADIOLOGY & ADDITIONAL TESTS: Reviewed.

## 2025-07-02 NOTE — ED ADULT NURSE NOTE - OBJECTIVE STATEMENT
pt. awake, responsive with minimal speech, responsive to pain and verbal stimuli, hx of dementia, a&ox0-1 at baseline with incomplete sentences for speech abilities as per HHA at beside. [facility] with +blood clots and seizure?. also found to have bacteremia ATT. Pt. now brought in today for progressing AMS and minimal responsiveness x 1 week. Denies f/c, n/v/d.

## 2025-07-02 NOTE — H&P ADULT - PROBLEM SELECTOR PLAN 4
Pt with hx of BPH, recently started on Flomax 0.4mg (one week ago). Imaging shows evidence of bladder mass, Today, pt was retaining with 1200 cc's bloody urine were drained in the ED.     - monitor strict I/Os   -Cont flomax 0.4mg  - nephrology consulted, F/U UA and urine studies  - urology consulted, replaced garvin  - monitor for post-obstructive diureses, monitor electrolytes  - LR at 75cc/hr Pt with hx of BPH, recently started on Flomax 0.4mg (one week ago). Imaging shows evidence of bladder mass, Today, pt was retaining with 1200cc on BS. S/p garvin in ED, bloody urine drained in the ED.     - monitor strict I/Os   -Cont flomax 0.4mg  - nephrology consulted, F/U UA and urine studies  - urology consulted, replaced garvin  - monitor for post-obstructive diureses, monitor electrolytes  - LR at 75cc/hr CT A/P reveals evidence of bladder mass, pt with urinary retention and hematuria.     - Urology consulted  - Stat labs including CBC, t/s, coags ordered i/s/o hematuria

## 2025-07-02 NOTE — ED PROVIDER NOTE - PROGRESS NOTE DETAILS
pt with uremia, sig mohit, bladder scan with sig urine >1400 cc urine, garvin placed, ua with leuks and nitrites and blood, ordered abx, will admit.

## 2025-07-02 NOTE — H&P ADULT - HISTORY OF PRESENT ILLNESS
Matthew Jay is a 76 yo M who presents with his home Aide, Joni, with concern of lethargy and altered mental status. PMH includes alzheimer's dementia, major depressive disorder, HLD, BASHIR, BPH, isolated seizure. He was recently at [facility] (d/c on 6/30) for URI and AMS, found to have urinary retention and UTI, and one time seizure. He was started on Flomax 0.4mg at that time. Today, he presents with a 3 day hx of lethargy, and was noted to have had blood in his diaper. Per aide, the patient has become progressively more lethargic since his d/c home, and initially was urinating a lot but has not really urinated over the past day and has not been eating or drinking much.      The patient is a poor historian due to AMS, but per home health Aide and the patient's wife, Manda, Matthew was dx with AD in 2019, and his baseline is forgetful but able to walk, converse, enjoy television with family, etc. He was hospitalized in November for one month, at which time he had a decline in mental status and was placed on a feeding tube, which he pulled out.  He was discharged to a nursing home but currently lives at home with 24/7 aides. Of note, he has stitches on his face which have been present for several months.     Wife, Manda: [phone], kindly appreciates updates      In the ED:  Initial vital signs: T: 99 F, HR: 67, BP: 138/82, R: 18, SpO2: 97% on RA  Labs: significant for: HgB 11.5 (12.5 on 5/25/2025), WBC 9.82, BUN 66, Cr. 9.68 (0.94 on 5/25/2025), GFR 5 (85 on 5/25/2025), RVP (-) UA: turbid, red urine with >300 protein, blood 15, large leukocyte esterase, WBC 66, +RBC,   Imaging:  CXR: evidence of small pleural effusion or atelectasis at lung bases  CT Head: no acute intracranial pathology  CT A/P: moderate b/l hydronephrosis, enlarged prostate, distended bladder with material not excluding blood clots or mass.   EKG:  NSR 77 BPM, no ST changes   Medications: 1000mg Ofirmev, 1000mL NS bolus, 1000mg ceftriaxone  Matthew Jay is a 76 yo M who presents with his home Aide, Joni, with concern of lethargy and altered mental status. PMH includes alzheimer's dementia, major depressive disorder, HLD, BASHIR, BPH, isolated seizure. He was recently at [facility] (d/c on 6/30) for URI and AMS, found to have urinary retention and UTI, and one time seizure. He was started on Flomax 0.4mg at that time. Today, he presents with a 3 day hx of lethargy, and was noted to have had blood in his diaper yesterday. Per aide, the patient has become progressively more lethargic since his d/c home, and initially was urinating a lot but has not really urinated over the past day and has not been eating or drinking much.      The patient is a poor historian due to AMS, but per home health Aide and the patient's wife, Manda, Matthew was dx with AD in 2019, and his baseline is forgetful but able to walk, converse, enjoy television with family, etc. He was hospitalized in November for one month, at which time he had a decline in mental status and was placed on a PEG Tube, which he pulled out.  He was discharged to a nursing home but currently lives at home with 24/7 aides. Of note, he has stitches on his face which have been present for several months.     Wife, Manda: [phone], kindly appreciates updates      In the ED:  Initial vital signs: T: 99 F, HR: 67, BP: 138/82, R: 18, SpO2: 97% on RA  Labs: significant for: HgB 11.5 (12.5 on 5/25/2025), WBC 9.82, BUN 66, Cr. 9.68 (0.94 on 5/25/2025), GFR 5 (85 on 5/25/2025), RVP (-) UA: turbid, red urine with >300 protein, blood 15, large leukocyte esterase, WBC 66, +RBC,   Imaging:  CXR: evidence of small pleural effusion or atelectasis at lung bases  CT Head: no acute intracranial pathology  CT A/P: moderate b/l hydronephrosis, enlarged prostate, distended bladder with material not excluding blood clots or mass.   EKG:  NSR 77 BPM, no ST changes   Medications: 1000mg Ofirmev, 1000mL NS bolus, 1000mg ceftriaxone  Matthew Jay is a 74 yo M who presents with his home Aide, Joni, with concern of lethargy and altered mental status. PMH includes alzheimer's dementia, major depressive disorder, HLD, BASHIR, BPH, isolated seizure. He was recently at [facility] (d/c on 6/30) for URI and AMS, found to have urinary retention and UTI, and one time seizure. He does not take depakote for seizures, but rather for AD. He was started on Flomax 0.4mg at that time. Today, he presents with a 3 day hx of lethargy, and was noted to have had blood in his diaper yesterday. Per aide, the patient has become progressively more lethargic since his d/c home, and initially was urinating a lot but has not really urinated over the past day and has not been eating or drinking much.      The patient is a poor historian due to AMS, but per home health Aide and the patient's wife, Manda, Matthew was dx with AD in 2019, and his baseline is forgetful but able to walk, converse, enjoy television with family, etc. He was hospitalized in November for one month, at which time he had a decline in mental status and was placed on a PEG Tube, which he pulled out.  He was discharged to a nursing home but currently lives at home with 24/7 aides. Of note, he has stitches on his face which have been present for several months.     Wife, Manda: [phone], kindly appreciates updates      In the ED:  Initial vital signs: T: 99 F, HR: 67, BP: 138/82, R: 18, SpO2: 97% on RA  Labs: significant for: HgB 11.5 (12.5 on 5/25/2025), WBC 9.82, BUN 66, Cr. 9.68 (0.94 on 5/25/2025), GFR 5 (85 on 5/25/2025), RVP (-) UA: turbid, red urine with >300 protein, blood 15, large leukocyte esterase, WBC 66, +RBC,   Imaging:  CXR: evidence of small pleural effusion or atelectasis at lung bases  CT Head: no acute intracranial pathology  CT A/P: moderate b/l hydronephrosis, enlarged prostate, distended bladder with material not excluding blood clots or mass.   EKG:  NSR 77 BPM, no ST changes   Medications: 1000mg Ofirmev, 1000mL NS bolus, 1000mg ceftriaxone

## 2025-07-02 NOTE — H&P ADULT - NSHPLABSRESULTS_GEN_ALL_CORE
.  LABS:                         10.8   8.52  )-----------( 238      ( 02 Jul 2025 13:01 )             33.4     07-02    146[H]  |  114[H]  |  59[H]  ----------------------------<  105[H]  5.3   |  20[L]  |  7.54[H]    Ca    8.7      02 Jul 2025 13:01    TPro  6.3  /  Alb  3.2[L]  /  TBili  0.2  /  DBili  x   /  AST  19  /  ALT  7[L]  /  AlkPhos  65  07-02    PT/INR - ( 02 Jul 2025 13:03 )   PT: 12.3 sec;   INR: 1.05          PTT - ( 02 Jul 2025 13:03 )  PTT:25.9 sec  Urinalysis Basic - ( 02 Jul 2025 13:01 )    Color: x / Appearance: x / SG: x / pH: x  Gluc: 105 mg/dL / Ketone: x  / Bili: x / Urobili: x   Blood: x / Protein: x / Nitrite: x   Leuk Esterase: x / RBC: x / WBC x   Sq Epi: x / Non Sq Epi: x / Bacteria: x        Lactate, Blood: 0.9 mmol/L (07-02 @ 09:32)      RADIOLOGY, EKG & ADDITIONAL TESTS: Reviewed.

## 2025-07-02 NOTE — PROCEDURE NOTE - NSICDXPROCEDURE_GEN_ALL_CORE_FT
PROCEDURES:  Insertion, Dominguez catheter, complex 02-Jul-2025 14:42:46  Moni Henderson  Bladder irrigation 02-Jul-2025 14:42:51  Moni Henderson

## 2025-07-02 NOTE — CONSULT NOTE ADULT - ASSESSMENT
Patient called and stated that she was able to go back to work on 12/14/2021 and would like her work letter to indicate that she could return on that date.    Please advise.    74 y/o M known to have BPH, no prior kidney disease, Baseline Scr 0.9  06/ 30,   admitted with  with hematuria, acute urinary retention with post -obstructive diuresis /MARINE  w/ Screat  9.6., nephrology consulted for MARINE management    #Post obstructive MARINE  w/ bilateral hydronephrosis  -Presenting Creat 9.6,  Urea 66  -CT abd: moderate b/l hydronephrosis  -Urine( bloody, maybe inaccurate) large, blood, +nitrite    #mild hypernatremia 2/2 post-obtructive diuresis  # GN less likely : hematuria , proteinuria    Plan  - repeat UA, for urine microscopy,  -For urine electrolytes  -UPC/UAC ratio  -Trend BMP daily  - Avoid nephrotoxic drugs  - strict I/O chart  -Keep SBP>110  -ivf  0.45% NS 1000 cc/hr

## 2025-07-02 NOTE — H&P ADULT - PROBLEM SELECTOR PLAN 11
Pt with hx of MDD, previous documentation suggests BPD, however speaking with his ex-wife, she clarifies that he has MDD. Currently takes escitalopram 10mg QD.    - Cont. home medications Pt with hx of MDD, previous documentation suggests BPD, however speaking with his ex-wife, she clarifies that he has MDD. Currently takes escitalopram 10mg QD.    - Cont. home medications when pt tolerates PO

## 2025-07-02 NOTE — CONSULT NOTE ADULT - ATTENDING COMMENTS
I agree with the resident and fellow's findings and plans as written above with the following additions/amendments:    Seen and examined at bedside, poor historian, MARNIE 2/2 obstruction with high urinary output with electrolyte loss, will require continued fluids as above, Discussed in detail with primary team

## 2025-07-02 NOTE — H&P ADULT - PROBLEM SELECTOR PLAN 6
Pt with hx of AD diagnosed in 2019, acutely worsening over the past one year with multiple hospitalizations. Was previously in a NH but currently lives at home with 24/7 aides. Baseline, pt was able to ambulate, verbalize needs, and converse, though not in full sentences. Home meds include mirtazapine 15 mg QHS, Aricept 5mg QD, Depakote, 125mg qd    - cont home medications   - Measures to prevent delirium Hx BPH, takes flomax 0.4mg    - cont. home meds

## 2025-07-02 NOTE — ED PROVIDER NOTE - CLINICAL SUMMARY MEDICAL DECISION MAKING FREE TEXT BOX
74 yo with ams, increased lethargy, AAOX1 (baseline), rectal temp 99.8, will do infectious workup, reassess

## 2025-07-02 NOTE — ED PROVIDER NOTE - OBJECTIVE STATEMENT
75 year old male with Alzheimer's (baseline AAOX1), bipolar depression, HLD, BASHIR, possible seizure disorder on depakote presenting with AMS. Per HHA, pt was at [facility] last week for hematuria, UTI, was discharged. Over last 3 days, increased lethargy. Yesterday, aid noticed blood on diaper. Pt denies any pain. Per HHA, not as communicative, more sleepy. No cough, no rhinorrhea, no abd pain, no vomiting or diarrhea. ROS as above.

## 2025-07-02 NOTE — ED ADULT TRIAGE NOTE - BIRTH SEX
Detail Level: Zone Continue Regimen: Clobetasol solution (30days on - 30days off) Render In Strict Bullet Format?: No Continue Regimen: Spironolactone Male

## 2025-07-02 NOTE — H&P ADULT - ASSESSMENT
Matthew Jay is a 74 yo M PMH AD, MDD, HLD, BASHIR, BPH recent admission for URI, AMS, UTI and one time seizure, presenting today for progressively worsening lethargy over the past three days, decreased PO intake, and hematuria. Pt is baseline AAOx1-2 but on presentation is very lethargic and unable to answer questions.  1200 cc's of bloody urine were drained with garvin. Presence of bladder mass seen on imaging. Labs reveal UTI, MARINE (Cr 9, GFR 5), uremia, NAGMA Matthew Jay is a 76 yo M PMH AD, MDD, HLD, BASHIR, BPH recent admission for URI, AMS, UTI and one time seizure, presenting today for progressively worsening lethargy over the past three days, decreased PO intake, and hematuria. Pt is baseline AAOx1-2 but on presentation is very lethargic and unable to answer questions.  1200 cc's of bloody urine were drained with garvin. Presence of bladder mass seen on imaging. Labs reveal UTI, MARINE (Cr 9, GFR 5), uremia, NAGMA. AMS likely 2/2 UTI and MARINE.  Matthew Jay is a 76 yo M PMH AD, MDD, HLD, BASHIR, BPH recent admission for URI, AMS, UTI and one time seizure, presenting today for progressively worsening lethargy over the past three days, decreased PO intake, and hematuria. Pt is baseline AAOx1-2 but on presentation is very lethargic and unable to answer questions.  1200 cc's of bloody urine were drained with garvin. Presence of bladder mass seen on imaging. Labs reveal UTI, MARINE (Cr 9, GFR 5), uremia, NAGMA. AMS likely 2/2 UTI and uremia

## 2025-07-02 NOTE — H&P ADULT - PROBLEM SELECTOR PLAN 5
Pt with Bicarb 19, Cl 110, AG 13; suggestive of NAGMA likely 2/2 MARINE     - Monitor, should resolve with improved renal function PT presenting with blood found in diaper, found to have 1200 cc's bloody urine drained today in the ED. Bladder mass found on imaging.     - F/u urine studies, UA  - Urology on board, garvin was replaced to larger size  - Pt will need outpatient workup including cystoscopy

## 2025-07-02 NOTE — H&P ADULT - PROBLEM SELECTOR PLAN 8
Hx BPH, takes flomax 0.4mg    - cont. home meds Pt with acute blood loss anemia 2/2 bladder mass, macrocytic anemia.    - send iron studies  - B12/folate  - Trend CBC, active T&S, transfuse if HgB <8

## 2025-07-02 NOTE — H&P ADULT - PROBLEM SELECTOR PLAN 9
Pt with macrocytic anemia, pt with hematuria but HgB is stable.     - send iron studies  - B12/folate Pt with hx of AD diagnosed in 2019, acutely worsening over the past one year with multiple hospitalizations. Was previously in a NH but currently lives at home with 24/7 aides. Baseline, pt was able to ambulate, verbalize needs, and converse, though not in full sentences. Home meds include mirtazapine 15 mg QHS, Aricept 5mg QD, Depakote, 125mg qd    - hold home medications until pt tolerates PO  - F/u valproic acid level  - Measures to prevent delirium Pt with hx of AD diagnosed in 2019, acutely worsening over the past one year with multiple hospitalizations. Was previously in a NH but currently lives at home with 24/7 aides. Baseline, pt was able to ambulate, verbalize needs, and converse, though not in full sentences. Home meds include mirtazapine 15 mg QHS, Aricept 5mg QD, Depakote, 125mg qd. Patient reported on Depakote for Alzeihmers and not for seiure disorder     - hold home medications until pt tolerates PO  - F/u valproic acid level  - Measures to prevent delirium  -Palliative/Geriatrics consult

## 2025-07-02 NOTE — H&P ADULT - PROBLEM SELECTOR PLAN 12
F: IVF LR at 75cc/hr  E: replete K<4, Mg<2  N: Pt not tolerating PO ***    VTE Prophylaxis: Lovenox 40mg q24h (Heparin ??)  GI: not needed  C: Full Code  D: [facility] F: IVF LR at 75cc/hr  E: replete K<4, Mg<2  N: Pt not tolerating PO ***  VTE Prophylaxis: Lovenox 40mg q24h (Heparin ??)  GI: not needed  C: Full Code  D: [facility] F: IVF 1000 mL 0.5 NS at 75/hr  E: replete K<4, Mg<2  N: SLP eval, currently NPO  VTE Prophylaxis: Hold until evidence of 24hr without bleeding, then start ppx sq Heparin  GI: not needed  C: Full Code  D: [facility]

## 2025-07-02 NOTE — ED ADULT TRIAGE NOTE - CHIEF COMPLAINT QUOTE
Pt BIBA accompanied by HHA s/p urology procedure one week PTA for worsening AMS and hematuria since Monday. Pt dementia at baseline and recent UTI.

## 2025-07-02 NOTE — ED ADULT NURSE NOTE - COVID-19 RESULT
Spoke with patient, informed her that we would need new patient paperwork completed prior to being seen as a new patient. Gave her several options for completing this, she stated she would like to come into the office and complete this paperwork before seeing him virtually. She reports she will come into the office tomorrow 08/22 to complete. She was informed of the office address and hours.    Patient informed [facility] does not provide counseling services, he only provides psych med management. Patient verbalized an understanding, sees a counselor currently.   NEGATIVE

## 2025-07-02 NOTE — PATIENT PROFILE ADULT - FALL HARM RISK - HARM RISK INTERVENTIONS

## 2025-07-02 NOTE — H&P ADULT - PROBLEM SELECTOR PLAN 7
PT presenting with blood found in diaper, found to have 1200 cc's bloody urine drained today in the ED. Bladder mass found on imaging.     - F/u urine studies, UA  - Urology on board, garvin was replaced to larger size Pt with Bicarb 19, Cl 110, AG 13; suggestive of NAGMA likely 2/2 MARINE     - Monitor, should resolve with improved renal function

## 2025-07-02 NOTE — ED PROVIDER NOTE - CRITICAL CARE ATTENDING CONTRIBUTION TO CARE
Upon my evaluation, this patient had a high probability of imminent or life-threatening deterioration due to uremia, renal failure which required my direct attention, intervention, and personal management.    I have personally provided 35 minutes of critical care time exclusive of time spent on separately billable procedures. Time includes review of laboratory data, radiology results, discussion with consultants, and monitoring for potential decompensation. Interventions were performed as documented above.

## 2025-07-02 NOTE — H&P ADULT - NSHPPHYSICALEXAM_GEN_ALL_CORE
T(C): 37.2 (07-02-25 @ 11:40), Max: 37.2 (07-02-25 @ 11:40)  HR: 67 (07-02-25 @ 11:40) (67 - 71)  BP: 138/82 (07-02-25 @ 11:40) (138/82 - 146/88)  RR: 18 (07-02-25 @ 11:40) (18 - 18)  SpO2: 97% (07-02-25 @ 11:40) (97% - 97%)    CONSTITUTIONAL: Appears stated age, poorly groomed, very thin   EYES: unable to assess as patient was sleeping  ENMT: unable to assess as patient was sleeping  RESP: No respiratory distress, no use of accessory muscles; CTA b/l, no WRR  CV: RRR, +S1S2, no MRG; no JVD; no peripheral edema  GI: Soft, NT, slightly distended, no rebound, no guarding; no palpable masses; no hepatosplenomegaly; no hernia palpated  MSK: unable to assess  SKIN: No rashes or ulcers noted; no subcutaneous nodules or induration palpable  NEURO: unable to assess  PSYCH: unable to assess

## 2025-07-03 LAB
ANION GAP SERPL CALC-SCNC: 10 MMOL/L — SIGNIFICANT CHANGE UP (ref 5–17)
ANION GAP SERPL CALC-SCNC: 9 MMOL/L — SIGNIFICANT CHANGE UP (ref 5–17)
BUN SERPL-MCNC: 24 MG/DL — HIGH (ref 7–23)
BUN SERPL-MCNC: 28 MG/DL — HIGH (ref 7–23)
CALCIUM SERPL-MCNC: 8.8 MG/DL — SIGNIFICANT CHANGE UP (ref 8.4–10.5)
CALCIUM SERPL-MCNC: 9.1 MG/DL — SIGNIFICANT CHANGE UP (ref 8.4–10.5)
CHLORIDE SERPL-SCNC: 110 MMOL/L — HIGH (ref 96–108)
CHLORIDE SERPL-SCNC: 111 MMOL/L — HIGH (ref 96–108)
CO2 SERPL-SCNC: 23 MMOL/L — SIGNIFICANT CHANGE UP (ref 22–31)
CO2 SERPL-SCNC: 24 MMOL/L — SIGNIFICANT CHANGE UP (ref 22–31)
CREAT SERPL-MCNC: 1.1 MG/DL — SIGNIFICANT CHANGE UP (ref 0.5–1.3)
CREAT SERPL-MCNC: 1.41 MG/DL — HIGH (ref 0.5–1.3)
EGFR: 52 ML/MIN/1.73M2 — LOW
EGFR: 52 ML/MIN/1.73M2 — LOW
EGFR: 70 ML/MIN/1.73M2 — SIGNIFICANT CHANGE UP
EGFR: 70 ML/MIN/1.73M2 — SIGNIFICANT CHANGE UP
FERRITIN SERPL-MCNC: 208 NG/ML — SIGNIFICANT CHANGE UP (ref 30–400)
FOLATE SERPL-MCNC: 10.8 NG/ML — SIGNIFICANT CHANGE UP
GLUCOSE SERPL-MCNC: 105 MG/DL — HIGH (ref 70–99)
GLUCOSE SERPL-MCNC: 87 MG/DL — SIGNIFICANT CHANGE UP (ref 70–99)
HCT VFR BLD CALC: 34.8 % — LOW (ref 39–50)
HGB BLD-MCNC: 10.9 G/DL — LOW (ref 13–17)
IRON SATN MFR SERPL: 17 % — SIGNIFICANT CHANGE UP (ref 16–55)
IRON SATN MFR SERPL: 31 UG/DL — LOW (ref 45–165)
MAGNESIUM SERPL-MCNC: 2 MG/DL — SIGNIFICANT CHANGE UP (ref 1.6–2.6)
MCHC RBC-ENTMCNC: 31.3 G/DL — LOW (ref 32–36)
MCHC RBC-ENTMCNC: 32.8 PG — SIGNIFICANT CHANGE UP (ref 27–34)
MCV RBC AUTO: 104.8 FL — HIGH (ref 80–100)
NRBC # BLD AUTO: 0 K/UL — SIGNIFICANT CHANGE UP (ref 0–0)
NRBC # FLD: 0 K/UL — SIGNIFICANT CHANGE UP (ref 0–0)
NRBC BLD AUTO-RTO: 0 /100 WBCS — SIGNIFICANT CHANGE UP (ref 0–0)
PHOSPHATE SERPL-MCNC: 2.9 MG/DL — SIGNIFICANT CHANGE UP (ref 2.5–4.5)
PLATELET # BLD AUTO: 255 K/UL — SIGNIFICANT CHANGE UP (ref 150–400)
PMV BLD: 10 FL — SIGNIFICANT CHANGE UP (ref 7–13)
POTASSIUM SERPL-MCNC: 4.3 MMOL/L — SIGNIFICANT CHANGE UP (ref 3.5–5.3)
POTASSIUM SERPL-MCNC: 4.7 MMOL/L — SIGNIFICANT CHANGE UP (ref 3.5–5.3)
POTASSIUM SERPL-SCNC: 4.3 MMOL/L — SIGNIFICANT CHANGE UP (ref 3.5–5.3)
POTASSIUM SERPL-SCNC: 4.7 MMOL/L — SIGNIFICANT CHANGE UP (ref 3.5–5.3)
RBC # BLD: 3.32 M/UL — LOW (ref 4.2–5.8)
RBC # FLD: 12.2 % — SIGNIFICANT CHANGE UP (ref 10.3–14.5)
SODIUM SERPL-SCNC: 143 MMOL/L — SIGNIFICANT CHANGE UP (ref 135–145)
SODIUM SERPL-SCNC: 144 MMOL/L — SIGNIFICANT CHANGE UP (ref 135–145)
TIBC SERPL-MCNC: 186 UG/DL — LOW (ref 220–430)
UIBC SERPL-MCNC: 155 UG/DL — SIGNIFICANT CHANGE UP (ref 110–370)
VIT B12 SERPL-MCNC: 539 PG/ML — SIGNIFICANT CHANGE UP (ref 232–1245)
WBC # BLD: 6.62 K/UL — SIGNIFICANT CHANGE UP (ref 3.8–10.5)
WBC # FLD AUTO: 6.62 K/UL — SIGNIFICANT CHANGE UP (ref 3.8–10.5)

## 2025-07-03 PROCEDURE — 85610 PROTHROMBIN TIME: CPT

## 2025-07-03 PROCEDURE — 82728 ASSAY OF FERRITIN: CPT

## 2025-07-03 PROCEDURE — 99223 1ST HOSP IP/OBS HIGH 75: CPT

## 2025-07-03 PROCEDURE — 99232 SBSQ HOSP IP/OBS MODERATE 35: CPT

## 2025-07-03 PROCEDURE — 80053 COMPREHEN METABOLIC PANEL: CPT

## 2025-07-03 PROCEDURE — 83540 ASSAY OF IRON: CPT

## 2025-07-03 PROCEDURE — 80164 ASSAY DIPROPYLACETIC ACD TOT: CPT

## 2025-07-03 PROCEDURE — 83690 ASSAY OF LIPASE: CPT

## 2025-07-03 PROCEDURE — 83550 IRON BINDING TEST: CPT

## 2025-07-03 PROCEDURE — 0241U: CPT

## 2025-07-03 PROCEDURE — 83735 ASSAY OF MAGNESIUM: CPT

## 2025-07-03 PROCEDURE — 80048 BASIC METABOLIC PNL TOTAL CA: CPT

## 2025-07-03 PROCEDURE — 82962 GLUCOSE BLOOD TEST: CPT

## 2025-07-03 PROCEDURE — 36415 COLL VENOUS BLD VENIPUNCTURE: CPT

## 2025-07-03 PROCEDURE — 81001 URINALYSIS AUTO W/SCOPE: CPT

## 2025-07-03 PROCEDURE — 82746 ASSAY OF FOLIC ACID SERUM: CPT

## 2025-07-03 PROCEDURE — 82607 VITAMIN B-12: CPT

## 2025-07-03 PROCEDURE — 99233 SBSQ HOSP IP/OBS HIGH 50: CPT | Mod: GC

## 2025-07-03 PROCEDURE — 85025 COMPLETE CBC W/AUTO DIFF WBC: CPT

## 2025-07-03 PROCEDURE — 87040 BLOOD CULTURE FOR BACTERIA: CPT

## 2025-07-03 PROCEDURE — 86900 BLOOD TYPING SEROLOGIC ABO: CPT

## 2025-07-03 PROCEDURE — 84100 ASSAY OF PHOSPHORUS: CPT

## 2025-07-03 PROCEDURE — 80076 HEPATIC FUNCTION PANEL: CPT

## 2025-07-03 PROCEDURE — 85027 COMPLETE CBC AUTOMATED: CPT

## 2025-07-03 PROCEDURE — 85730 THROMBOPLASTIN TIME PARTIAL: CPT

## 2025-07-03 PROCEDURE — 87086 URINE CULTURE/COLONY COUNT: CPT

## 2025-07-03 PROCEDURE — 83605 ASSAY OF LACTIC ACID: CPT

## 2025-07-03 PROCEDURE — 86901 BLOOD TYPING SEROLOGIC RH(D): CPT

## 2025-07-03 PROCEDURE — 74018 RADEX ABDOMEN 1 VIEW: CPT | Mod: 26

## 2025-07-03 PROCEDURE — 86850 RBC ANTIBODY SCREEN: CPT

## 2025-07-03 RX ORDER — DOCUSATE SODIUM 100 MG
5 CAPSULE ORAL
Refills: 0 | DISCHARGE

## 2025-07-03 RX ORDER — DEXTROSE 50 % IN WATER 50 %
50 SYRINGE (ML) INTRAVENOUS ONCE
Refills: 0 | Status: COMPLETED | OUTPATIENT
Start: 2025-07-03 | End: 2025-07-03

## 2025-07-03 RX ORDER — DEXTROSE 50 % IN WATER 50 %
12.5 SYRINGE (ML) INTRAVENOUS ONCE
Refills: 0 | Status: COMPLETED | OUTPATIENT
Start: 2025-07-03 | End: 2025-07-03

## 2025-07-03 RX ADMIN — Medication 1 TABLET(S): at 05:57

## 2025-07-03 RX ADMIN — AMLODIPINE BESYLATE 5 MILLIGRAM(S): 10 TABLET ORAL at 05:57

## 2025-07-03 RX ADMIN — TAMSULOSIN HYDROCHLORIDE 0.4 MILLIGRAM(S): 0.4 CAPSULE ORAL at 10:26

## 2025-07-03 RX ADMIN — Medication 50 GRAM(S): at 19:28

## 2025-07-03 RX ADMIN — Medication 12.5 GRAM(S): at 11:07

## 2025-07-03 RX ADMIN — Medication 25 GRAM(S): at 05:57

## 2025-07-03 RX ADMIN — DONEPEZIL HYDROCHLORIDE 5 MILLIGRAM(S): 5 TABLET ORAL at 21:37

## 2025-07-03 RX ADMIN — ESCITALOPRAM OXALATE 10 MILLIGRAM(S): 20 TABLET ORAL at 10:25

## 2025-07-03 NOTE — PROGRESS NOTE ADULT - PROBLEM SELECTOR PLAN 9
Pt with hx of AD diagnosed in 2019, acutely worsening over the past one year with multiple hospitalizations. Was previously in a NH but currently lives at home with 24/7 aides. Baseline, pt was able to ambulate, verbalize needs, and converse, though not in full sentences. Home meds include mirtazapine 15 mg QHS, Aricept 5mg QD, Depakote, 125mg qd. Patient reported on Depakote for Alzeihmers and not for seiure disorder     - hold home medications until pt tolerates PO  - F/u valproic acid level  - Measures to prevent delirium  -Palliative/Geriatrics consult

## 2025-07-03 NOTE — DIETITIAN INITIAL EVALUATION ADULT - ADD RECOMMEND
1. SLP eval to determine most appropriate PO diet texture/consistency.   -NPO prior if s/s of issues swallowing are noted or suspected.  -Should pt pass: Regular diet, ensure plus daily 220kcal/20gm prot (vs Nepro Pending Lytes), PO texture/consistency per SLP.  -If pt fails and EN is indicated please reconsult for recommendations.  2. Monitor for GOC. RD to honor at all times.  -Suggests wishes/GOC for nutrition be determined and documented.  3. Pain/GI per team. Monitor skin, was, Labs, GOC.  4. RD to remain Available.

## 2025-07-03 NOTE — PROGRESS NOTE ADULT - PROBLEM SELECTOR PLAN 5
PT presenting with blood found in diaper, found to have 1200 cc's bloody urine drained today in the ED. Bladder mass found on imaging.     - F/u urine studies, UA  - Urology on board, garvin was replaced to larger size  - Pt will need outpatient workup including cystoscopy

## 2025-07-03 NOTE — SWALLOW BEDSIDE ASSESSMENT ADULT - COMMENTS
CXR 7/2/25: Small right pleural effusion and/or focal atelectasis versus infiltrate at the lung bases. Lungs otherwise clear.   CT Head 7/2/25: No evidence of acute intracranial pathology

## 2025-07-03 NOTE — SWALLOW BEDSIDE ASSESSMENT ADULT - SLP GENERAL OBSERVATIONS
Received awake in bed, A&Ox0-1, unable to respond to simple WH- questions. Breathing RA. Caregiver not at bedside; unable to obtain information re: baseline swallowing function.

## 2025-07-03 NOTE — DIETITIAN INITIAL EVALUATION ADULT - OTHER INFO
[facility] team admitted to [facility]. Spoke with wife as pt able to provide limited information today. PTA pt with decreased appetite x3-4 days, Otherwise had been eating well - consuming meals from cooked unity which were a regular consistency. Not taking PO supplements. Wife does report a time last year when pt "was not eating" and a PEG was placed as a result. Pt however pulled out PEG. While PO intake improved overall from this time, up until admit, wife reports wt loss and difficulty regaining wt back. Noted [facility] admit 9/2024, per RD note pt weighed 170 pounds - no current admit wt, however RD obtained bedscale wt today of 152 pounds which suggests wt loss of 18 pounds / 10.5% body wt loss x10 months. +NFPE, See Below. Malnutrition note placed today. No Know Food Allergies.   Labs: lytes WNL (Na/Phos elevated prior) BUN/Cr 41/3.63.  Skin: no edema/pressure ulcers noted at this time, Abebe 12.  GI: Senna and miralax ordered.  Please see below for nutritions recommendations -- d/w team.  74 yo M, alzheimer's dementia, major depressive disorder, HLD, BASHIR, BPH, isolated seizure. He was recently at [facility] (d/c on 6/30) for URI and AMS, found to have urinary retention and UTI, and one time seizure. He now presents with a 3 day hx of lethargy, and was noted to have had blood in his diaper yesterday. Per aide, the patient has become progressively more lethargic since his d/c home, and initially was urinating a lot but has not really urinated over the past day and has not been eating or drinking much. Admitted for AMS/Metabolic encephalopathy likely 2/2 UTI, uremia; nephrology consulted for MARINE management.     Pt admitted to [facility]. Spoke with wife as pt able to provide limited information today. PTA pt with decreased appetite x3-4 days, Otherwise had been eating well - consuming meals from cooked unity which were a regular consistency (of note current diet order is puree). Not taking PO supplements. Wife does report a time last year when pt "was not eating" and a PEG was placed as a result; Pt however pulled out PEG. While PO intake improved overall from this time, up until admit, wife reports wt loss and difficulty regaining wt back. Noted [facility] admit 9/2024, per RD note pt weighed 170 pounds - no current admit wt, however RD obtained bedscale wt today of 152 pounds which suggests wt loss of 18 pounds / 10.5% body wt loss x10 months. +NFPE, See Below. Malnutrition note placed today. No Know Food Allergies.   Labs: lytes WNL (Na/Phos elevated prior) BUN/Cr 41/3.63.  Skin: no edema/pressure ulcers noted at this time, Abebe 12.  GI: WNL Except fecal incontinence. Senna and miralax ordered.  Please see below for nutritions recommendations -- d/w team.

## 2025-07-03 NOTE — PROGRESS NOTE ADULT - PROBLEM SELECTOR PLAN 10
Pt with hx HTN, takes Amlodipine 5 mg PO at home    - Pressures stable, hold Amlodipine until patient can tolerate PO and monitor pressures to avoid underperfusion  of kidneys

## 2025-07-03 NOTE — PROGRESS NOTE ADULT - ASSESSMENT
76 y/o male with background of Alzheimer's, BPH and a bladder mass identified on CT AbdoPelvis who was admitted with altered mental status, acute urinary retention with UTI, and MARINE (SCr 9.6) now with post-obstructive diuresis + hematuria following urethral catheterization. Nephrology consulted for MARINE management    #Post obstructive MARINE with bilateral hydronephrosis  -Presenting Creat 9.6, BUN 66. SCr improving (1.41<- 3.63<- 7.54<- 9.68)  -CT abd: moderate b/l hydronephrosis  - Total 4200mls out so far  - Urine (bloody) large, blood + nitrites +leukocyte esterase    #mild hypernatremia 2/2 post-obstructive diuresis  - GN less likely: hematuria , proteinuria  - For 1.5L 0.45% NaCl with 40mEq K+ over 24 hrs      Plan  - 1.5L of 0.45% NS with 40mEq K+ over 24 hours (62.5 cc/hr)  - Trend BMP daily  - Strict I/O chart  - Adjust rate of infusion to approximate 50% of hourly urine output 76 y/o male with background of Alzheimer's, BPH and a bladder mass identified on CT AbdoPelvis who was admitted with altered mental status, acute urinary retention with UTI, and MARINE (SCr 9.6) now with post-obstructive diuresis + hematuria following urethral catheterization. Nephrology consulted for MARINE management    #Post obstructive MARINE with bilateral hydronephrosis  -Presenting Creat 9.6, BUN 66. SCr improving (1.41<- 3.63<- 7.54<- 9.68)  -CT abd: moderate b/l hydronephrosis  - Total 4200mls out so far  - Urine (bloody) large, blood + nitrites +leukocyte esterase    #mild hypernatremia 2/2 post-obstructive diuresis  - GN less likely: hematuria , proteinuria  - For 1.5L 0.45% NaCl with 40mEq K+ over 24 hrs      Plan  - 1.5L of 0.45% NaCl with 40mEq K+ over 24 hours (62.5 cc/hr)  - Trend BMP daily  - Strict I/O chart  - Adjust rate of infusion to approximate 50% of hourly urine output 74 y/o male with background of Alzheimer's, BPH and a bladder mass identified on CT AbdoPelvis who was admitted with altered mental status, acute urinary retention with UTI, and MARINE (SCr 9.6) now with post-obstructive diuresis + hematuria following urethral catheterization. Nephrology consulted for MARINE management    #Post obstructive MARINE with bilateral hydronephrosis  -Presenting Creat 9.6, BUN 66. SCr improving (1.41<- 3.63<- 7.54<- 9.68)  -CT abd: moderate b/l hydronephrosis  - Total 4200mls out so far  - Urine (bloody) large, blood + nitrites +leukocyte esterase    #mild hypernatremia 2/2 post-obstructive diuresis  - GN less likely: hematuria , proteinuria  - For 1.5L 0.45% NaCl with 40mEq K+      Plan  - Commence 1.5L of 0.45% NaCl with 40mEq K+ over 24 hours (62.5 cc/hr) --> Adjust rate of infusion to approximate 50% of hourly urine output  - Trend BMP daily  - Strict I/O chart 74 y/o male with background of Alzheimer's, BPH and a bladder mass identified on CT AbdoPelvis who was admitted with altered mental status, acute urinary retention with UTI, and MARINE (SCr 9.6) now with post-obstructive diuresis + hematuria following urethral catheterization. Nephrology consulted for MARINE management    #Post obstructive MARINE with bilateral hydronephrosis  -Presenting Creat 9.6, BUN 66. SCr improving (1.41<- 3.63<- 7.54<- 9.68)  -CT abd: moderate b/l hydronephrosis  - Total 4200mls out so far  - Urine (bloody) large, blood + nitrites +leukocyte esterase    #mild hypernatremia 2/2 post-obstructive diuresis  - GN less likely: hematuria , proteinuria  - For 1.5L 0.45% NaCl with 40mEq K+      PLAN:  - Commence 1.5L of 0.45% NaCl with 40mEq K+ over 24 hours (62.5 cc/hr) --> Adjust rate of infusion to approximate 50% of hourly urine output  - Trend BMP daily  - Strict I/O chart

## 2025-07-03 NOTE — PROGRESS NOTE ADULT - PROBLEM SELECTOR PLAN 12
F: IVF 1000 mL 0.5 NS at 75/hr  E: replete K<4, Mg<2  N: SLP eval, currently NPO  VTE Prophylaxis: Hold until evidence of 24hr without bleeding, then start ppx sq Heparin  GI: not needed  C: Full Code  D: RMF

## 2025-07-03 NOTE — DIETITIAN INITIAL EVALUATION ADULT - PERTINENT LABORATORY DATA
07-03    144  |  x   |  x   ----------------------------<  87  4.7   |  23  |  x     Ca    8.8      03 Jul 2025 05:30  Phos  2.9     07-03  Mg     2.0     07-03    TPro  6.3  /  Alb  3.2[L]  /  TBili  0.2  /  DBili  x   /  AST  19  /  ALT  7[L]  /  AlkPhos  65  07-02  POCT Blood Glucose.: 78 mg/dL (07-03-25 @ 06:08)

## 2025-07-03 NOTE — SWALLOW BEDSIDE ASSESSMENT ADULT - MODE OF PRESENTATION
Problem: Occupational Therapy Goal  Goal: Occupational Therapy Goal  Goals to be met by: 1/10/18    Patient will increase functional independence with ADLs by performing:    UE Dressing with Stand-by Assistance.  LE Dressing with Stand-by Assistance.  Grooming while standing at sink with Stand-by Assistance.  Supine to sit with Stand-by Assistance.  Step transfer with Stand-by Assistance  Toilet transfer to toilet with Stand-by Assistance.  Upper extremity exercise program x15 reps per handout, with assistance as needed. Met 12/28/18   Outcome: Ongoing (interventions implemented as appropriate)  The patient tolerated UE therex using green theraband x15 reps.       unable to self-feed/fed by clinician

## 2025-07-03 NOTE — SWALLOW BEDSIDE ASSESSMENT ADULT - NS SPL SWALLOW CLINIC TRIAL FT
Adequate bolus acceptance and containment. Prolonged bolus processing, primarily due to poor attention rather than a physiological deficit. Required frequent cues for redirection. Mild diffuse residue within the oral cavity with chewable solids; reduced with a liquid wash. Laryngeal movement palpated. No clinical overt s/s of penetration/aspiration appreciated.

## 2025-07-03 NOTE — SWALLOW BEDSIDE ASSESSMENT ADULT - SWALLOW EVAL: DIAGNOSIS
Patient appears safe for a diet advancement to minced and moist solids with thin liquids. Although there were no clinical predictors of airway protection deficits, the patient is at an increased risk for aspiration and its negative sequela given poor mobility, poor dental hygiene, dependence on others for oral care and feeding, and AMS. General aspiration precautions are advised. Control risk factors for aspiration PNA via frequent oral hygiene and increasing physical mobility as possible

## 2025-07-03 NOTE — PROGRESS NOTE ADULT - PROBLEM SELECTOR PLAN 8
Pt with acute blood loss anemia 2/2 bladder mass, macrocytic anemia.    - send iron studies  - B12/folate  - Trend CBC, active T&S, transfuse if HgB <8

## 2025-07-03 NOTE — DIETITIAN INITIAL EVALUATION ADULT - PROBLEM SELECTOR PLAN 2
Pt with hx of BPH, recently started on Flomax 0.4mg (one week ago). Imaging shows evidence of bladder mass, Today, pt was retaining with 1200cc on BS. S/p garvin in ED, bloody urine drained in the ED.     - monitor strict I/Os q6 hours   - monitor for post-obstructive diureses, monitor electrolytes for losses and hypovolemia  - BMP 8pm  - Cont flomax 0.4mg  - nephrology consulted, F/U UA and urine studies  - urology consulted, replaced garvin  -  1000 mL 0.5 NS at 75/hr

## 2025-07-03 NOTE — PROGRESS NOTE ADULT - PROBLEM SELECTOR PLAN 3
UA shows turbid, red urine with >300 protein, blood 15, large leukocyte esterase, WBC 66, +RBC,    - Treating broadly with Zosyn 3.375mg q8, given hx of recent UTI, significant bladder retention and high risk for bacteremia in the setting of urinary stasis  -  If pt shows signs of worsening infection, broaden Zosyn dosing to include pseudomonal coverage   - F/u blood cx  - F/u urine cx

## 2025-07-03 NOTE — PROGRESS NOTE ADULT - SUBJECTIVE AND OBJECTIVE BOX
Patient is a 75y old  Male who presents with a chief complaint of altered mental status, urinary retention (02 Jul 2025 15:34)      HOSPITAL COURSE:   Matthew Jay is a 74 yo M PMH AD, MDD, HLD, BASHIR, BPH recent admission for URI, AMS, UTI and one time seizure, presenting today for progressively worsening lethargy over the past three days, decreased PO intake, and hematuria. Pt is baseline AAOx1-2 but on presentation is very lethargic and unable to answer questions.  1200 cc's of bloody urine were drained with garvin. Presence of bladder mass seen on imaging. Labs reveal UTI, MARINE (Cr 9, GFR 5), uremia, NAGMA. AMS likely 2/2 UTI and uremia. Patient is currently only oriented to person, smells strongly of urine.     OVERNIGHT EVENTS:  No acute overnight events.     SUBJECTIVE:   Patient only oriented to person . Chest clear to auscultation. Patient has a strong urine smell. No evidence of edema or lesions in the lower extremities.     ROS: otherwise negative      T(C): 36.8 (07-03-25 @ 05:39), Max: 37.2 (07-02-25 @ 11:40)  HR: 65 (07-03-25 @ 05:39) (55 - 71)  BP: 139/92 (07-03-25 @ 05:39) (111/73 - 146/88)  RR: 16 (07-03-25 @ 05:39) (16 - 18)  SpO2: 98% (07-03-25 @ 05:39) (96% - 98%)  Wt(kg): --Vital Signs Last 24 Hrs  T(C): 36.8 (03 Jul 2025 05:39), Max: 37.2 (02 Jul 2025 11:40)  T(F): 98.2 (03 Jul 2025 05:39), Max: 99 (02 Jul 2025 11:40)  HR: 65 (03 Jul 2025 05:39) (55 - 71)  BP: 139/92 (03 Jul 2025 05:39) (111/73 - 146/88)  BP(mean): --  RR: 16 (03 Jul 2025 05:39) (16 - 18)  SpO2: 98% (03 Jul 2025 05:39) (96% - 98%)    Parameters below as of 02 Jul 2025 21:18  Patient On (Oxygen Delivery Method): room air        PHYSICAL EXAM:  Constitutional: resting comfortably in bed; NAD  Head: NC/AT  Eyes: PERRL, EOMI, anicteric sclera  ENT: no nasal discharge; MMM  Neck: supple; no JVD or thyromegaly  Respiratory: CTA B/L; no W/R/R, no retractions  Cardiac: +S1/S2; RRR; no M/R/G  Gastrointestinal: soft, NT/ND; no rebound or guarding; +BSx4  Back: spine midline, no bony tenderness or step-offs; no CVAT B/L  Extremities: WWP, no clubbing or cyanosis; no peripheral edema. Capillary refill <2 sec  Musculoskeletal: NROM x4; no joint swelling, tenderness or erythema  Vascular: 2+ radial, DP/PT pulses B/L  Dermatologic: skin warm, dry and intact; no rashes, wounds, or scars  Lymphatic: no submandibular or cervical LAD  Neurologic: AAOx3; CNII-XII grossly intact; no focal deficits  Psychiatric: affect and characteristics of appearance, verbalizations, behaviors are appropriate    LABS:                        10.8   8.52  )-----------( 238      ( 02 Jul 2025 13:01 )             33.4     07-02    145  |  113[H]  |  41[H]  ----------------------------<  96  4.6   |  22  |  3.63[H]    Ca    8.8      02 Jul 2025 20:30  Phos  3.5     07-02  Mg     2.4     07-02    TPro  6.3  /  Alb  3.2[L]  /  TBili  0.2  /  DBili  x   /  AST  19  /  ALT  7[L]  /  AlkPhos  65  07-02      PT/INR - ( 02 Jul 2025 13:03 )   PT: 12.3 sec;   INR: 1.05          PTT - ( 02 Jul 2025 13:03 )  PTT:25.9 sec  Urinalysis Basic - ( 02 Jul 2025 20:30 )    Color: x / Appearance: x / SG: x / pH: x  Gluc: 96 mg/dL / Ketone: x  / Bili: x / Urobili: x   Blood: x / Protein: x / Nitrite: x   Leuk Esterase: x / RBC: x / WBC x   Sq Epi: x / Non Sq Epi: x / Bacteria: x      CAPILLARY BLOOD GLUCOSE      POCT Blood Glucose.: 78 mg/dL (03 Jul 2025 06:08)  POCT Blood Glucose.: 91 mg/dL (03 Jul 2025 00:26)        Urinalysis Basic - ( 02 Jul 2025 20:30 )    Color: x / Appearance: x / SG: x / pH: x  Gluc: 96 mg/dL / Ketone: x  / Bili: x / Urobili: x   Blood: x / Protein: x / Nitrite: x   Leuk Esterase: x / RBC: x / WBC x   Sq Epi: x / Non Sq Epi: x / Bacteria: x        MEDICATIONS  (STANDING):  amLODIPine   Tablet 5 milliGRAM(s) Oral every 24 hours  donepezil 5 milliGRAM(s) Oral <User Schedule>  escitalopram 10 milliGRAM(s) Oral <User Schedule>  piperacillin/tazobactam IVPB.. 3.375 Gram(s) IV Intermittent every 12 hours  polyethylene glycol 3350 17 Gram(s) Oral two times a day  senna 1 Tablet(s) Oral two times a day  sodium chloride 0.45%. 1000 milliLiter(s) (100 mL/Hr) IV Continuous <Continuous>  tamsulosin 0.4 milliGRAM(s) Oral <User Schedule>    MEDICATIONS  (PRN):      RADIOLOGY & ADDITIONAL TESTS: Reviewed

## 2025-07-03 NOTE — CONSULT NOTE ADULT - REASON FOR ADMISSION
altered mental status, urinary retention

## 2025-07-03 NOTE — DIETITIAN INITIAL EVALUATION ADULT - PROBLEM SELECTOR PLAN 1
Pt presents with 3 day hx of progressively worsening lethargy, AMS. Hx of AD, baseline AAOx1-2. On admission he is lethargic but arousal, UA shows evidence of UTI, BMP reveals uremia. Pt is protecting his airway.    - treat underlying problems including UTI and uremia  - SLP evaluation, bedside dysphagia evaluation. If remains NPO, check FS q6h   - aspiration precautions, elevate head of bed   - cont. to monitor for improvement in mental status (2) probably inadequate

## 2025-07-03 NOTE — PROGRESS NOTE ADULT - ASSESSMENT
Matthew Jay is a 74 yo M PMH AD, MDD, HLD, BASHIR, BPH recent admission for URI, AMS, UTI and one time seizure, presenting today for progressively worsening lethargy over the past three days, decreased PO intake, and hematuria. Pt is baseline AAOx1-2 but on presentation is very lethargic and unable to answer questions.  1200 cc's of bloody urine were drained with garvni. Presence of bladder mass seen on imaging. Labs reveal UTI, MARINE (Cr 9, GFR 5), uremia, NAGMA. AMS likely 2/2 UTI and uremia

## 2025-07-03 NOTE — PROGRESS NOTE ADULT - SUBJECTIVE AND OBJECTIVE BOX
NEPHROLOGY PROGRESS NOTE:    Interval history:  No overnight events. Patient seen and examined at bedside. No new complaints    Vitals:  T(F): 98.2 (25 @ 05:39), Max: 99 (25 @ 11:40)  HR: 65 (25 @ 05:39)  BP: 139/92 (25 @ 05:39)  RR: 16 (25 @ 05:39)  SpO2: 98% (25 @ 05:39)  Wt(kg): --     @ 07:  -   @ 07:00  --------------------------------------------------------  IN: 0 mL / OUT: 2800 mL / NET: -2800 mL     @ 07:  -   @ 11:33  --------------------------------------------------------  IN: 0 mL / OUT: 1350 mL / NET: -1350 mL          PE:  General: Not in acute distress   Chest: CTAP b/l, no use of accessory respiratory muscles  Heart: RRR, S1/S2 wnl, no MRG  Abdomen: Soft, nontender, nondistended   Extremities: No edema  Neuro:  Awake, No apparent focal deficits, Appears confused - known hx of Alzheimer's  Access:    Pertinent labs & Imagin-03    144  |  111[H]  |  28[H]  ----------------------------<  87  4.7   |  23  |  1.41[H]    Ca    8.8      2025 05:30  Phos  2.9       Mg     2.0         TPro  6.3  /  Alb  3.2[L]  /  TBili  0.2  /  DBili      /  AST  19  /  ALT  7[L]  /  AlkPhos  65                            10.9   6.62  )-----------( 255      ( 2025 05:30 )             34.8       Urine Studies:  Creatinine Trend: 1.41<--, 3.63<--, 7.54<--, 9.68<--  Urinalysis Basic - ( 2025 05:30 )    Color:  / Appearance:  / SG:  / pH:   Gluc: 87 mg/dL / Ketone:   / Bili:  / Urobili:    Blood:  / Protein:  / Nitrite:    Leuk Esterase:  / RBC:  / WBC    Sq Epi:  / Non Sq Epi:  / Bacteria:           MEDICATIONS  (STANDING):  amLODIPine   Tablet 5 milliGRAM(s) Oral every 24 hours  donepezil 5 milliGRAM(s) Oral <User Schedule>  escitalopram 10 milliGRAM(s) Oral <User Schedule>  polyethylene glycol 3350 17 Gram(s) Oral two times a day  senna 1 Tablet(s) Oral two times a day  sodium chloride 0.45%. 1000 milliLiter(s) (100 mL/Hr) IV Continuous <Continuous>  tamsulosin 0.4 milliGRAM(s) Oral <User Schedule>    MEDICATIONS  (PRN):   NEPHROLOGY PROGRESS NOTE:    Interval history:  No overnight events. Patient seen and examined at bedside. No new complaints    Vitals:  T(F): 98.2 (25 @ 05:39), Max: 99 (25 @ 11:40)  HR: 65 (25 @ 05:39)  BP: 139/92 (25 @ 05:39)  RR: 16 (25 @ 05:39)  SpO2: 98% (25 @ 05:39)  Wt(kg): --     @ 07:  -   @ 07:00  --------------------------------------------------------  IN: 0 mL / OUT: 2800 mL / NET: -2800 mL     @ 07:  -   @ 11:33  --------------------------------------------------------  IN: 0 mL / OUT: 1350 mL / NET: -1350 mL          PE:  General: Not in acute distress   Chest: CTAP b/l, no use of accessory respiratory muscles  Heart: RRR, S1/S2 wnl, no MRG  Abdomen: Soft, nontender, nondistended   Extremities: No edema  Neuro:  Awake, No apparent focal deficits, Appears confused - known hx of Alzheimer's  Access: none    Pertinent labs & Imagin-03    144  |  111[H]  |  28[H]  ----------------------------<  87  4.7   |  23  |  1.41[H]    Ca    8.8      2025 05:30  Phos  2.9       Mg     2.0         TPro  6.3  /  Alb  3.2[L]  /  TBili  0.2  /  DBili      /  AST  19  /  ALT  7[L]  /  AlkPhos  65                            10.9   6.62  )-----------( 255      ( 2025 05:30 )             34.8       Urine Studies:  Creatinine Trend: 1.41<--, 3.63<--, 7.54<--, 9.68<--  Urinalysis Basic - ( 2025 05:30 )    Color:  / Appearance:  / SG:  / pH:   Gluc: 87 mg/dL / Ketone:   / Bili:  / Urobili:    Blood:  / Protein:  / Nitrite:    Leuk Esterase:  / RBC:  / WBC    Sq Epi:  / Non Sq Epi:  / Bacteria:           MEDICATIONS  (STANDING):  amLODIPine   Tablet 5 milliGRAM(s) Oral every 24 hours  donepezil 5 milliGRAM(s) Oral <User Schedule>  escitalopram 10 milliGRAM(s) Oral <User Schedule>  polyethylene glycol 3350 17 Gram(s) Oral two times a day  senna 1 Tablet(s) Oral two times a day  sodium chloride 0.45%. 1000 milliLiter(s) (100 mL/Hr) IV Continuous <Continuous>  tamsulosin 0.4 milliGRAM(s) Oral <User Schedule>    MEDICATIONS  (PRN):

## 2025-07-03 NOTE — CONSULT NOTE ADULT - SUBJECTIVE AND OBJECTIVE BOX
[facility] Geriatrics and Palliative Care  Frederick Cash, Palliative Care Attending  Contact Info: Call [phone] (HEAL Line) or message on [employer] Teams    HPI:  Matthew Jay is a 74 yo M who presents with his home Aide, Joni, with concern of lethargy and altered mental status. PMH includes alzheimer's dementia, major depressive disorder, HLD, BASHIR, BPH, isolated seizure. He was recently at [facility] (d/c on 6/30) for URI and AMS, found to have urinary retention and UTI, and one time seizure. He does not take depakote for seizures, but rather for AD. He was started on Flomax 0.4mg at that time. Today, he presents with a 3 day hx of lethargy, and was noted to have had blood in his diaper yesterday. Per aide, the patient has become progressively more lethargic since his d/c home, and initially was urinating a lot but has not really urinated over the past day and has not been eating or drinking much.      The patient is a poor historian due to AMS, but per home health Aide and the patient's wife, Manda, Matthew was dx with AD in 2019, and his baseline is forgetful but able to walk, converse, enjoy television with family, etc. He was hospitalized in November for one month, at which time he had a decline in mental status and was placed on a PEG Tube, which he pulled out.  He was discharged to a nursing home but currently lives at home with 24/7 aides. Of note, he has stitches on his face which have been present for several months.     Wife, Manda: [phone], kindly appreciates updates      In the ED:  Initial vital signs: T: 99 F, HR: 67, BP: 138/82, R: 18, SpO2: 97% on RA  Labs: significant for: HgB 11.5 (12.5 on 5/25/2025), WBC 9.82, BUN 66, Cr. 9.68 (0.94 on 5/25/2025), GFR 5 (85 on 5/25/2025), RVP (-) UA: turbid, red urine with >300 protein, blood 15, large leukocyte esterase, WBC 66, +RBC,   Imaging:  CXR: evidence of small pleural effusion or atelectasis at lung bases  CT Head: no acute intracranial pathology  CT A/P: moderate b/l hydronephrosis, enlarged prostate, distended bladder with material not excluding blood clots or mass.   EKG:  NSR 77 BPM, no ST changes   Medications: 1000mg Ofirmev, 1000mL NS bolus, 1000mg ceftriaxone  (02 Jul 2025 13:53)    PERTINENT PM/SXH:   Bipolar depression    Memory dysfunction    HLD (hyperlipidemia)      Surgery, elective    S/P colonoscopy    H/O cataract extraction      FAMILY HISTORY:    ITEMS NOT CHECKED ARE NOT PRESENT    SOCIAL HISTORY:   Significant other/partner:  []  Children:  []   Substance hx:  []   Tobacco hx:  []   Alcohol hx: []   Home Opioid hx:  [] I-Stop Reference No:  - no active Rx's / see chart note  Living Situation: []Home  []Long term care  []Rehab []Other  Zoroastrian/Spiritual practice: ; Role of organized Rastafari [ ] important [ ] some [ ] unable to assess  Coping: [ ] well [ ] with difficulty [ ] poor coping [ ] unable to assess  Support system: [ ] strong [ ] adequate [ ] inadequate    ADVANCE DIRECTIVES:    []MOLST  []Living Will  DECISION MAKER(s):  [] Health Care Proxy(s)  [] Surrogate(s)  [] Guardian           Name(s)/Phone Number(s):     BASELINE (I)ADLs (prior to admission):  [place]: []Total  [] Moderate []Dependent    ALLERGIES:  No Known Allergies    MEDICATIONS  (STANDING):  amLODIPine   Tablet 5 milliGRAM(s) Oral every 24 hours  donepezil 5 milliGRAM(s) Oral <User Schedule>  escitalopram 10 milliGRAM(s) Oral <User Schedule>  polyethylene glycol 3350 17 Gram(s) Oral two times a day  senna 1 Tablet(s) Oral two times a day  sodium chloride 0.45%. 1000 milliLiter(s) (100 mL/Hr) IV Continuous <Continuous>  tamsulosin 0.4 milliGRAM(s) Oral <User Schedule>    MEDICATIONS  (PRN):    PRESENT SYMPTOMS: []Unable to obtain due to poor mentation/encephalopathy  Source if other than patient:  []Family   []Team     Pain: [ ] yes [ ] no  QOL impact -   Location -                    Aggravating Factors -  Quality -  Radiation -  Timing -  Severity (0-10 scale) -   Minimal Acceptable Level (0-10 scale) -    PAIN AD Score:  [email or web address] (press ctrl +  left click to view)    Dyspnea:                           [ ]Mild  [ ]Moderate [ ]Severe  Anxiety:                             [ ]Mild [ ]Moderate [ ]Severe  Fatigue:                             [ ]Mild [ ]Moderate [ ]Severe  Nausea:                             [ ]Mild [ ]Moderate [ ]Severe  Loss of Appetite:             [ ]Mild [ ]Moderate [ ]Severe  Constipation:                   [ ]Mild [ ]Moderate [ ]Severe    Other Symptoms:  [ ]All other review of systems negative     Palliative Performance Status Version 2:  %    [email or web address]    PHYSICAL EXAM:  GENERAL:  [ ]Alert  [ ]Oriented x   [ ]Lethargic  [ ]Cachexia  [ ]Unarousable  [ ]Verbal  [ ]Non-Verbal  Behavioral:   [ ]Anxiety  [ ]Delirium [ ]Agitation [ ]Cooperative  HEENT:  [ ]Normal   [ ]Dry mouth   [ ]ET Tube/Trach  [ ]Oral lesions  PULMONARY:   [ ]Clear []Tachypnea  []Audible excessive secretions   [ ]Rhonchi        [ ]Right [ ]Left [ ]Bilateral  [ ]Crackles        [ ]Right [ ]Left [ ]Bilateral  [ ]Wheezing     [ ]Right [ ]Left [ ]Bilateral  CARDIOVASCULAR:    [ ]Regular [ ]Irregular [ ]Tachy  [ ]Del [ ]Murmur [ ]Other  GASTROINTESTINAL:  [ ]Soft  [ ]Distended   [ ]+BS  [ ]Non tender [ ]Tender  [ ]PEG [ ]OGT/ NGT  Last BM:     GENITOURINARY:  [ ]Normal [ ] Incontinent   [ ]Oliguria/Anuria   [ ]Dominguez  MUSCULOSKELETAL:   [ ]Normal   [ ]Weakness  [ ]Bed/Wheelchair bound [ ]Edema  NEUROLOGIC:   [ ]No focal deficits  [ ]Cognitive impairment  [ ]Dysphagia [ ]Dysarthria [ ]Paresis [ ]Encephalopathic   SKIN:   [ ]Normal   [ ]Pressure ulcer(s)  [ ]Rash    CRITICAL CARE:  [ ]Shock Present  [ ]Septic [ ]Cardiogenic [ ]Neurologic [ ]Hypovolemic  [ ]Vasopressors [ ]Inotropes   [ ]Respiratory failure present [ ]Mechanical Ventilation [ ]Non-invasive ventilatory support [ ]High-Flow  [ ]Acute  [ ]Chronic [ ]Hypoxic  [ ]Hypercarbic  [ ]Other organ failure    Vital Signs Last 24 Hrs  T(C): 36.8 (03 Jul 2025 05:39), Max: 36.8 (02 Jul 2025 21:18)  T(F): 98.2 (03 Jul 2025 05:39), Max: 98.2 (02 Jul 2025 21:18)  HR: 65 (03 Jul 2025 05:39) (55 - 65)  BP: 139/92 (03 Jul 2025 05:39) (111/73 - 139/92)  BP(mean): --  RR: 16 (03 Jul 2025 05:39) (16 - 18)  SpO2: 98% (03 Jul 2025 05:39) (96% - 98%)    Parameters below as of 02 Jul 2025 21:18  Patient On (Oxygen Delivery Method): room air     I&O's Summary    02 Jul 2025 07:01  -  03 Jul 2025 07:00  --------------------------------------------------------  IN: 0 mL / OUT: 2800 mL / NET: -2800 mL    03 Jul 2025 07:01  -  03 Jul 2025 15:15  --------------------------------------------------------  IN: 0 mL / OUT: 1350 mL / NET: -1350 mL        LABS:                        10.9   6.62  )-----------( 255      ( 03 Jul 2025 05:30 )             34.8   07-03    143  |  110[H]  |  24[H]  ----------------------------<  105[H]  4.3   |  24  |  1.10    Ca    9.1      03 Jul 2025 12:00  Phos  2.9     07-03  Mg     2.0     07-03    TPro  6.3  /  Alb  3.2[L]  /  TBili  0.2  /  DBili  x   /  AST  19  /  ALT  7[L]  /  AlkPhos  65  07-02    Urinalysis Basic - ( 03 Jul 2025 12:00 )    Color: x / Appearance: x / SG: x / pH: x  Gluc: 105 mg/dL / Ketone: x  / Bili: x / Urobili: x   Blood: x / Protein: x / Nitrite: x   Leuk Esterase: x / RBC: x / WBC x   Sq Epi: x / Non Sq Epi: x / Bacteria: x      RADIOLOGY & ADDITIONAL STUDIES:      PROTEIN CALORIE MALNUTRITION PRESENT: [ ]mild [ ]moderate [ ]severe [ ]underweight [ ]morbid obesity  [ ]PPSV2 < or = to 30% [ ]significant weight loss  [ ]poor nutritional intake [ ]catabolic state [ ]anasarca     Artificial Nutrition [ ]     REFERRALS:  [x]Social Work  [ ]Case management [ ]PT/OT [ ]Chaplaincy  [ ]Hospice  [ ]Patient/Family Support    DISCUSSION OF CASE: Family - to obtain additional history and to provide emotional support; ( ) -

## 2025-07-03 NOTE — SWALLOW BEDSIDE ASSESSMENT ADULT - SLP PERTINENT HISTORY OF CURRENT PROBLEM
74 yo M PMH AD, MDD, HLD, BASHIR, BPH recent admission for URI, AMS, UTI and one time seizure, presenting today for progressively worsening lethargy over the past three days, decreased PO intake, and hematuria. Presence of bladder mass seen on imaging. Labs reveal UTI, MARINE, uremia, NAGMA. AMS likely 2/2 UTI and uremia. Hx of PEG (self-removed?).

## 2025-07-03 NOTE — PROGRESS NOTE ADULT - PROBLEM SELECTOR PLAN 7
Pt with Bicarb 19, Cl 110, AG 13; suggestive of NAGMA likely 2/2 MARINE     - Monitor, should resolve with improved renal function

## 2025-07-03 NOTE — DIETITIAN INITIAL EVALUATION ADULT - PERTINENT MEDS FT
MEDICATIONS  (STANDING):  amLODIPine   Tablet 5 milliGRAM(s) Oral every 24 hours  donepezil 5 milliGRAM(s) Oral <User Schedule>  escitalopram 10 milliGRAM(s) Oral <User Schedule>  piperacillin/tazobactam IVPB.. 3.375 Gram(s) IV Intermittent every 12 hours  polyethylene glycol 3350 17 Gram(s) Oral two times a day  senna 1 Tablet(s) Oral two times a day  sodium chloride 0.45%. 1000 milliLiter(s) (100 mL/Hr) IV Continuous <Continuous>  tamsulosin 0.4 milliGRAM(s) Oral <User Schedule>    MEDICATIONS  (PRN):

## 2025-07-03 NOTE — PROGRESS NOTE ADULT - PROBLEM SELECTOR PLAN 4
CT A/P reveals evidence of bladder mass, pt with urinary retention and hematuria.     - Urology consulted  - Stat labs including CBC, t/s, coags ordered i/s/o hematuria

## 2025-07-03 NOTE — DIETITIAN INITIAL EVALUATION ADULT - OTHER CALCULATIONS
Current body wt used for energy calculations as pt falls within % IBW  Adjusted for age, malnutrition and renal - fluids per team

## 2025-07-04 ENCOUNTER — TRANSCRIPTION ENCOUNTER (OUTPATIENT)
Age: 75
End: 2025-07-04

## 2025-07-04 VITALS
TEMPERATURE: 98 F | RESPIRATION RATE: 18 BRPM | HEART RATE: 70 BPM | DIASTOLIC BLOOD PRESSURE: 61 MMHG | OXYGEN SATURATION: 94 % | SYSTOLIC BLOOD PRESSURE: 94 MMHG

## 2025-07-04 LAB
ANION GAP SERPL CALC-SCNC: 13 MMOL/L — SIGNIFICANT CHANGE UP (ref 5–17)
BUN SERPL-MCNC: 14 MG/DL — SIGNIFICANT CHANGE UP (ref 7–23)
CALCIUM SERPL-MCNC: 9.3 MG/DL — SIGNIFICANT CHANGE UP (ref 8.4–10.5)
CHLORIDE SERPL-SCNC: 104 MMOL/L — SIGNIFICANT CHANGE UP (ref 96–108)
CO2 SERPL-SCNC: 23 MMOL/L — SIGNIFICANT CHANGE UP (ref 22–31)
CREAT SERPL-MCNC: 0.79 MG/DL — SIGNIFICANT CHANGE UP (ref 0.5–1.3)
EGFR: 93 ML/MIN/1.73M2 — SIGNIFICANT CHANGE UP
EGFR: 93 ML/MIN/1.73M2 — SIGNIFICANT CHANGE UP
GLUCOSE SERPL-MCNC: 92 MG/DL — SIGNIFICANT CHANGE UP (ref 70–99)
POTASSIUM SERPL-MCNC: 4.5 MMOL/L — SIGNIFICANT CHANGE UP (ref 3.5–5.3)
POTASSIUM SERPL-SCNC: 4.5 MMOL/L — SIGNIFICANT CHANGE UP (ref 3.5–5.3)
SODIUM SERPL-SCNC: 140 MMOL/L — SIGNIFICANT CHANGE UP (ref 135–145)

## 2025-07-04 PROCEDURE — 83690 ASSAY OF LIPASE: CPT

## 2025-07-04 PROCEDURE — 87086 URINE CULTURE/COLONY COUNT: CPT

## 2025-07-04 PROCEDURE — 82607 VITAMIN B-12: CPT

## 2025-07-04 PROCEDURE — 80053 COMPREHEN METABOLIC PANEL: CPT

## 2025-07-04 PROCEDURE — 86901 BLOOD TYPING SEROLOGIC RH(D): CPT

## 2025-07-04 PROCEDURE — 83550 IRON BINDING TEST: CPT

## 2025-07-04 PROCEDURE — 83735 ASSAY OF MAGNESIUM: CPT

## 2025-07-04 PROCEDURE — 87040 BLOOD CULTURE FOR BACTERIA: CPT

## 2025-07-04 PROCEDURE — 82746 ASSAY OF FOLIC ACID SERUM: CPT

## 2025-07-04 PROCEDURE — 99291 CRITICAL CARE FIRST HOUR: CPT

## 2025-07-04 PROCEDURE — 86850 RBC ANTIBODY SCREEN: CPT

## 2025-07-04 PROCEDURE — 80164 ASSAY DIPROPYLACETIC ACD TOT: CPT

## 2025-07-04 PROCEDURE — 96374 THER/PROPH/DIAG INJ IV PUSH: CPT

## 2025-07-04 PROCEDURE — 74018 RADEX ABDOMEN 1 VIEW: CPT

## 2025-07-04 PROCEDURE — 82962 GLUCOSE BLOOD TEST: CPT

## 2025-07-04 PROCEDURE — 74178 CT ABD&PLV WO CNTR FLWD CNTR: CPT | Mod: 26

## 2025-07-04 PROCEDURE — 74176 CT ABD & PELVIS W/O CONTRAST: CPT

## 2025-07-04 PROCEDURE — 85730 THROMBOPLASTIN TIME PARTIAL: CPT

## 2025-07-04 PROCEDURE — 81001 URINALYSIS AUTO W/SCOPE: CPT

## 2025-07-04 PROCEDURE — 74178 CT ABD&PLV WO CNTR FLWD CNTR: CPT

## 2025-07-04 PROCEDURE — 85025 COMPLETE CBC W/AUTO DIFF WBC: CPT

## 2025-07-04 PROCEDURE — 85610 PROTHROMBIN TIME: CPT

## 2025-07-04 PROCEDURE — 99239 HOSP IP/OBS DSCHRG MGMT >30: CPT

## 2025-07-04 PROCEDURE — 71045 X-RAY EXAM CHEST 1 VIEW: CPT

## 2025-07-04 PROCEDURE — 84100 ASSAY OF PHOSPHORUS: CPT

## 2025-07-04 PROCEDURE — 36415 COLL VENOUS BLD VENIPUNCTURE: CPT

## 2025-07-04 PROCEDURE — 85027 COMPLETE CBC AUTOMATED: CPT

## 2025-07-04 PROCEDURE — 82728 ASSAY OF FERRITIN: CPT

## 2025-07-04 PROCEDURE — 70450 CT HEAD/BRAIN W/O DYE: CPT

## 2025-07-04 PROCEDURE — 80076 HEPATIC FUNCTION PANEL: CPT

## 2025-07-04 PROCEDURE — 80048 BASIC METABOLIC PNL TOTAL CA: CPT

## 2025-07-04 PROCEDURE — 92610 EVALUATE SWALLOWING FUNCTION: CPT

## 2025-07-04 PROCEDURE — 87637 SARSCOV2&INF A&B&RSV AMP PRB: CPT

## 2025-07-04 PROCEDURE — 83605 ASSAY OF LACTIC ACID: CPT

## 2025-07-04 PROCEDURE — 83540 ASSAY OF IRON: CPT

## 2025-07-04 PROCEDURE — 0241U: CPT

## 2025-07-04 PROCEDURE — 86900 BLOOD TYPING SEROLOGIC ABO: CPT

## 2025-07-04 RX ADMIN — TAMSULOSIN HYDROCHLORIDE 0.4 MILLIGRAM(S): 0.4 CAPSULE ORAL at 08:43

## 2025-07-04 RX ADMIN — Medication 1 TABLET(S): at 17:06

## 2025-07-04 RX ADMIN — ESCITALOPRAM OXALATE 10 MILLIGRAM(S): 20 TABLET ORAL at 08:43

## 2025-07-04 RX ADMIN — POLYETHYLENE GLYCOL 3350 17 GRAM(S): 17 POWDER, FOR SOLUTION ORAL at 06:32

## 2025-07-04 RX ADMIN — AMLODIPINE BESYLATE 5 MILLIGRAM(S): 10 TABLET ORAL at 06:32

## 2025-07-04 RX ADMIN — POLYETHYLENE GLYCOL 3350 17 GRAM(S): 17 POWDER, FOR SOLUTION ORAL at 17:06

## 2025-07-04 RX ADMIN — Medication 1 TABLET(S): at 06:32

## 2025-07-04 NOTE — DISCHARGE NOTE PROVIDER - NSDCMRMEDTOKEN_GEN_ALL_CORE_FT
acetaminophen 325 mg oral tablet: 2 tab(s) orally every 6 hours As needed Temp greater or equal to 38C (100.4F), Mild Pain (1 - 3)  Aricept 5 mg oral tablet: 1 tab(s) orally once a day (at bedtime)  aspirin 81 mg oral tablet, chewable: 1 tab(s) chewed once a day  atorvastatin 20 mg oral tablet: 1 tab(s) orally once a day (at bedtime)  Depakote Sprinkles 125 mg oral delayed release capsule: 1 cap(s) orally 2 times a day  docusate sodium 10 mg/mL oral liquid: 5 milliliter(s) orally 2 times a day 9am / 5pm  escitalopram 10 mg oral tablet: 1 tab(s) orally once a day  haloperidol 2 mg oral tablet: 1 tab(s) orally every 6 hours As needed agitation  melatonin 3 mg oral tablet: 1 tab(s) orally once a day (at bedtime) As needed Insomnia  mirtazapine 15 mg oral tablet: 1 tab(s) orally once a day (at bedtime)  Norvasc 5 mg oral tablet: 1 tab(s) orally once a day  ondansetron 2 mg/mL injectable solution: 4 milligram(s) injectable every 8 hours as needed for  nausea  tamsulosin 0.4 mg oral capsule: 1 cap(s) orally once a day   acetaminophen 325 mg oral tablet: 2 tab(s) orally every 6 hours As needed Temp greater or equal to 38C (100.4F), Mild Pain (1 - 3)  Aricept 5 mg oral tablet: 1 tab(s) orally once a day (at bedtime)  aspirin 81 mg oral tablet, chewable: 1 tab(s) chewed once a day  atorvastatin 20 mg oral tablet: 1 tab(s) orally once a day (at bedtime)  Depakote Sprinkles 125 mg oral delayed release capsule: 1 cap(s) orally 2 times a day  docusate sodium 10 mg/mL oral liquid: 5 milliliter(s) orally 2 times a day 9am / 5pm  escitalopram 10 mg oral tablet: 1 tab(s) orally once a day  mirtazapine 15 mg oral tablet: 1 tab(s) orally once a day (at bedtime)  Norvasc 5 mg oral tablet: 1 tab(s) orally once a day  ondansetron 2 mg/mL injectable solution: 4 milligram(s) injectable every 8 hours as needed for  nausea  tamsulosin 0.4 mg oral capsule: 1 cap(s) orally once a day

## 2025-07-04 NOTE — PROGRESS NOTE ADULT - PROBLEM SELECTOR PLAN 4
CT A/P reveals evidence of bladder mass, pt with urinary retention and hematuria.     - Urology consulted  - Stat labs including CBC, t/s, coags ordered i/s/o hematuria UA shows turbid, red urine with >300 protein, blood 15, large leukocyte esterase, WBC 66, +RBC,    - Treating broadly with Zosyn 3.375mg q8, given hx of recent UTI, significant bladder retention and high risk for bacteremia in the setting of urinary stasis  -  If pt shows signs of worsening infection, broaden Zosyn dosing to include pseudomonal coverage   - F/u blood cx  - F/u urine cx

## 2025-07-04 NOTE — PROGRESS NOTE ADULT - ATTENDING COMMENTS
I agree with the fellow's findings and plans as written above with the following additions/amendments:    Seen and examined at bedside, urine output high and bloody still, continue fluids as above, poor po intake. Further recs as above, discussed with primary team
Patient was seen and examined at bedside on 7/4/2025 at 830 am. Patient reports that he has no complaints. Denies SOB, CP, general pain. ROS is otherwise negative although somewhat limited given patient's known baseline mental status. Vitals, labwork and pertinent imaging reviewed. Exam - NAD, Alert, PERRLA, EOMI, MMM, supple neck, abd - NTND, + BS, back - midline, ext - wwp, psych - calm affect, skin - no rash, patient appears cachectic and malnourished,  - + FC    Plan:  -Cr has now normalized  -Repeat BMP  -AXR - non obstructive bowel pattern  -f/u UOP  -Check CT Urogram as possible bladder mass although suspect this is due to layering of blood  -Urology consult, pt will go home with FC  -Patient is medically ready for d/c with close outpatient follow up, plan of care and return precautions discussed in detail
Patient was seen and examined at bedside on 7/3/2025 at 230 pm. Patient reports that he has no complaints. Denies SOB, CP, general pain. ROS is otherwise negative although somewhat limited given patient's known baseline mental status. Vitals, labwork and pertinent imaging reviewed. Exam - NAD, Alert, PERRLA, EOMI, MMM, supple neck, abd - NTND, + BS, back - midline, ext - wwp, L AVF, psych - calm affect, skin - no rash, patient appears cachectic and malnourished,  - + FC    Plan:  -Cr has now normalized  -Stop IV abx  -AXR  -UOP has decreased  -Check CT Urogram as possible bladder mass although suspect this is due to layering of blood  -Urology consult, pt will go home with FC  -Patient is medically ready for d/c with close outpatient follow up, plan of care and return precautions discussed in detail

## 2025-07-04 NOTE — PROGRESS NOTE ADULT - PROBLEM SELECTOR PLAN 10
Pt with hx HTN, takes Amlodipine 5 mg PO at home    - Pressures stable, hold Amlodipine until patient can tolerate PO and monitor pressures to avoid underperfusion  of kidneys Pt with hx of AD diagnosed in 2019, acutely worsening over the past one year with multiple hospitalizations. Was previously in a NH but currently lives at home with 24/7 aides. Baseline, pt was able to ambulate, verbalize needs, and converse, though not in full sentences. Home meds include mirtazapine 15 mg QHS, Aricept 5mg QD, Depakote, 125mg qd. Patient reported on Depakote for Alzeihmers and not for seiure disorder     - hold home medications until pt tolerates PO  - F/u valproic acid level  - Measures to prevent delirium  -Palliative/Geriatrics consult

## 2025-07-04 NOTE — PROGRESS NOTE ADULT - SUBJECTIVE AND OBJECTIVE BOX
NEPHROLOGY PROGRESS NOTE:    Interval history:  No overnight events. Patient seen and examined at bedside. No active complaints    Vitals:  T(F): 97.3 (25 @ 11:44), Max: 98.6 (25 @ 06:11)  HR: 72 (25 @ 11:44)  BP: 113/60 (25 @ 11:44)  RR: 18 (25 @ 11:44)  SpO2: 96% (25 @ 11:44)  Wt(kg): --     @ 07:  -   @ 07:00  --------------------------------------------------------  IN: 0 mL / OUT: 2800 mL / NET: -2800 mL     @ 07:  -   @ 07:00  --------------------------------------------------------  IN: 0 mL / OUT: 3800 mL / NET: -3800 mL          PE:  General: Not in acute distress, somnolent  Chest: CTAP b/l, no use of accessory respiratory muscles  Heart: RRR, S1/S2 wnl, no MRG  Abdomen: Soft, nontender, Dominguez +  Extremities: No edema  Neuro:  Alert, no apparent focal deficits,       Pertinent labs & Imagin-03    143  |  110[H]  |  24[H]  ----------------------------<  105[H]  4.3   |  24  |  1.10    Ca    9.1      2025 12:00  Phos  2.9       Mg     2.0         TPro  6.3  /  Alb  3.2[L]  /  TBili  0.2  /  DBili      /  AST  19  /  ALT  7[L]  /  AlkPhos  65                            10.9   6.62  )-----------( 255      ( 2025 05:30 )             34.8       Urine Studies:  Creatinine Trend: 1.10<--, 1.41<--, 3.63<--, 7.54<--, 9.68<--  Urinalysis Basic - ( 2025 12:00 )    Color:  / Appearance:  / SG:  / pH:   Gluc: 105 mg/dL / Ketone:   / Bili:  / Urobili:    Blood:  / Protein:  / Nitrite:    Leuk Esterase:  / RBC:  / WBC    Sq Epi:  / Non Sq Epi:  / Bacteria:           MEDICATIONS  (STANDING):  amLODIPine   Tablet 5 milliGRAM(s) Oral every 24 hours  donepezil 5 milliGRAM(s) Oral <User Schedule>  escitalopram 10 milliGRAM(s) Oral <User Schedule>  polyethylene glycol 3350 17 Gram(s) Oral two times a day  senna 1 Tablet(s) Oral two times a day  tamsulosin 0.4 milliGRAM(s) Oral <User Schedule>    MEDICATIONS  (PRN):

## 2025-07-04 NOTE — PROGRESS NOTE ADULT - PROBLEM SELECTOR PLAN 8
Pt with acute blood loss anemia 2/2 bladder mass, macrocytic anemia.    - send iron studies  - B12/folate  - Trend CBC, active T&S, transfuse if HgB <8 Pt with Bicarb 19, Cl 110, AG 13; suggestive of NAGMA likely 2/2 MARINE     - Monitor, should resolve with improved renal function

## 2025-07-04 NOTE — PROGRESS NOTE ADULT - NUTRITIONAL ASSESSMENT
This patient has been assessed with a concern for Malnutrition and has been determined to have a diagnosis/diagnoses of Severe protein-calorie malnutrition.    This patient is being managed with:   Diet Minced and Moist-  Entered: Jul  3 2025  5:22PM

## 2025-07-04 NOTE — PROGRESS NOTE ADULT - PROBLEM SELECTOR PLAN 2
Pt with hx of BPH, recently started on Flomax 0.4mg (one week ago). Imaging shows evidence of bladder mass, Today, pt was retaining with 1200cc on BS. S/p garvin in ED, bloody urine drained in the ED.     - monitor strict I/Os q6 hours   - monitor for post-obstructive diureses, monitor electrolytes for losses and hypovolemia  - BMP 8pm  - Cont flomax 0.4mg  - nephrology consulted, F/U UA and urine studies  - urology consulted, replaced garvin  -  1000 mL 0.5 NS at 75/hr
Pt with hx of BPH, recently started on Flomax 0.4mg (one week ago). Imaging shows evidence of bladder mass, Today, pt was retaining with 1200cc on BS. S/p garvin in ED, bloody urine drained in the ED.     - monitor strict I/Os q6 hours   - monitor for post-obstructive diureses, monitor electrolytes for losses and hypovolemia  - BMP 8pm  - Cont flomax 0.4mg  - nephrology consulted, F/U UA and urine studies  - urology consulted, replaced garvin  -  1000 mL 0.5 NS at 75/hr

## 2025-07-04 NOTE — PROGRESS NOTE ADULT - SUBJECTIVE AND OBJECTIVE BOX
Patient is a 75y old  Male who presents with a chief complaint of altered mental status, urinary retention (03 Jul 2025 15:14)      HOSPITAL COURSE:     OVERNIGHT EVENTS:    SUBJECTIVE:     ROS: otherwise negative      T(C): 36.6 (07-03-25 @ 21:16), Max: 36.6 (07-03-25 @ 21:16)  HR: 86 (07-03-25 @ 21:16) (61 - 86)  BP: 138/89 (07-03-25 @ 21:16) (123/77 - 138/89)  RR: 18 (07-03-25 @ 21:16) (17 - 18)  SpO2: 99% (07-03-25 @ 21:16) (92% - 99%)  Wt(kg): --Vital Signs Last 24 Hrs  T(C): 36.6 (03 Jul 2025 21:16), Max: 36.6 (03 Jul 2025 21:16)  T(F): 97.9 (03 Jul 2025 21:16), Max: 97.9 (03 Jul 2025 21:16)  HR: 86 (03 Jul 2025 21:16) (61 - 86)  BP: 138/89 (03 Jul 2025 21:16) (123/77 - 138/89)  BP(mean): --  RR: 18 (03 Jul 2025 21:16) (17 - 18)  SpO2: 99% (03 Jul 2025 21:16) (92% - 99%)    Parameters below as of 03 Jul 2025 21:16  Patient On (Oxygen Delivery Method): room air        PHYSICAL EXAM:  Constitutional: resting comfortably in bed; NAD  Head: NC/AT  Eyes: PERRL, EOMI, anicteric sclera  ENT: no nasal discharge; MMM  Neck: supple; no JVD or thyromegaly  Respiratory: CTA B/L; no W/R/R, no retractions  Cardiac: +S1/S2; RRR; no M/R/G  Gastrointestinal: soft, NT/ND; no rebound or guarding; +BSx4  Back: spine midline, no bony tenderness or step-offs; no CVAT B/L  Extremities: WWP, no clubbing or cyanosis; no peripheral edema. Capillary refill <2 sec  Musculoskeletal: NROM x4; no joint swelling, tenderness or erythema  Vascular: 2+ radial, DP/PT pulses B/L  Dermatologic: skin warm, dry and intact; no rashes, wounds, or scars  Lymphatic: no submandibular or cervical LAD  Neurologic: AAOx3; CNII-XII grossly intact; no focal deficits  Psychiatric: affect and characteristics of appearance, verbalizations, behaviors are appropriate    LABS:                        10.9   6.62  )-----------( 255      ( 03 Jul 2025 05:30 )             34.8     07-03    143  |  110[H]  |  24[H]  ----------------------------<  105[H]  4.3   |  24  |  1.10    Ca    9.1      03 Jul 2025 12:00  Phos  2.9     07-03  Mg     2.0     07-03    TPro  6.3  /  Alb  3.2[L]  /  TBili  0.2  /  DBili  x   /  AST  19  /  ALT  7[L]  /  AlkPhos  65  07-02    Iron 31, TIBC 186, %Sat 17, Ferritin 208/ 07-03-25 @ 05:30    PT/INR - ( 02 Jul 2025 13:03 )   PT: 12.3 sec;   INR: 1.05          PTT - ( 02 Jul 2025 13:03 )  PTT:25.9 sec  Urinalysis Basic - ( 03 Jul 2025 12:00 )    Color: x / Appearance: x / SG: x / pH: x  Gluc: 105 mg/dL / Ketone: x  / Bili: x / Urobili: x   Blood: x / Protein: x / Nitrite: x   Leuk Esterase: x / RBC: x / WBC x   Sq Epi: x / Non Sq Epi: x / Bacteria: x      CAPILLARY BLOOD GLUCOSE      POCT Blood Glucose.: 80 mg/dL (04 Jul 2025 00:02)  POCT Blood Glucose.: 75 mg/dL (03 Jul 2025 18:28)  POCT Blood Glucose.: 84 mg/dL (03 Jul 2025 10:48)  POCT Blood Glucose.: 78 mg/dL (03 Jul 2025 06:08)        Urinalysis Basic - ( 03 Jul 2025 12:00 )    Color: x / Appearance: x / SG: x / pH: x  Gluc: 105 mg/dL / Ketone: x  / Bili: x / Urobili: x   Blood: x / Protein: x / Nitrite: x   Leuk Esterase: x / RBC: x / WBC x   Sq Epi: x / Non Sq Epi: x / Bacteria: x        MEDICATIONS  (STANDING):  amLODIPine   Tablet 5 milliGRAM(s) Oral every 24 hours  donepezil 5 milliGRAM(s) Oral <User Schedule>  escitalopram 10 milliGRAM(s) Oral <User Schedule>  polyethylene glycol 3350 17 Gram(s) Oral two times a day  senna 1 Tablet(s) Oral two times a day  tamsulosin 0.4 milliGRAM(s) Oral <User Schedule>    MEDICATIONS  (PRN):      RADIOLOGY & ADDITIONAL TESTS: Reviewed   Patient is a 75y old  Male who presents with a chief complaint of altered mental status, urinary retention (03 Jul 2025 15:14)      HOSPITAL COURSE:   Patient continues to drain bloody urine from Doming[place] CT urogram shows no evidence of hydronephrosis, kidney function has returned back to normal levels. x-ray of the abdomen showed no evidence of obstructive bowel pattern.     OVERNIGHT EVENTS:  No acute overnight events    SUBJECTIVE:   Patient unable to give a subjective evaluation of his status due to mental status. Overall patient looks to be in no acute distress, slight increase in confusion subjectively when compared to yesterday, patient less responsive to his name.    ROS: otherwise negative      T(C): 36.6 (07-03-25 @ 21:16), Max: 36.6 (07-03-25 @ 21:16)  HR: 86 (07-03-25 @ 21:16) (61 - 86)  BP: 138/89 (07-03-25 @ 21:16) (123/77 - 138/89)  RR: 18 (07-03-25 @ 21:16) (17 - 18)  SpO2: 99% (07-03-25 @ 21:16) (92% - 99%)  Wt(kg): --Vital Signs Last 24 Hrs  T(C): 36.6 (03 Jul 2025 21:16), Max: 36.6 (03 Jul 2025 21:16)  T(F): 97.9 (03 Jul 2025 21:16), Max: 97.9 (03 Jul 2025 21:16)  HR: 86 (03 Jul 2025 21:16) (61 - 86)  BP: 138/89 (03 Jul 2025 21:16) (123/77 - 138/89)  BP(mean): --  RR: 18 (03 Jul 2025 21:16) (17 - 18)  SpO2: 99% (03 Jul 2025 21:16) (92% - 99%)    Parameters below as of 03 Jul 2025 21:16  Patient On (Oxygen Delivery Method): room air        PHYSICAL EXAM:  Constitutional: resting comfortably in bed; NAD  Head: NC/AT  Eyes: PERRL, EOMI, anicteric sclera  ENT: no nasal discharge; MMM  Neck: supple; no JVD or thyromegaly  Respiratory: CTA B/L; no W/R/R, no retractions  Cardiac: +S1/S2; RRR; no M/R/G  Gastrointestinal: soft, NT/ND; no rebound or guarding; +BSx4  Back: spine midline, no bony tenderness or step-offs; no CVAT B/L  Extremities: WWP, no clubbing or cyanosis; no peripheral edema. Capillary refill <2 sec  Musculoskeletal: NROM x4; no joint swelling, tenderness or erythema  Vascular: 2+ radial, DP/PT pulses B/L  Dermatologic: skin warm, dry and intact; no rashes, wounds, or scars  Lymphatic: no submandibular or cervical LAD  Neurologic: AAOx3; CNII-XII grossly intact; no focal deficits  Psychiatric: affect and characteristics of appearance, verbalizations, behaviors are appropriate    LABS:                        10.9   6.62  )-----------( 255      ( 03 Jul 2025 05:30 )             34.8     07-03    143  |  110[H]  |  24[H]  ----------------------------<  105[H]  4.3   |  24  |  1.10    Ca    9.1      03 Jul 2025 12:00  Phos  2.9     07-03  Mg     2.0     07-03    TPro  6.3  /  Alb  3.2[L]  /  TBili  0.2  /  DBili  x   /  AST  19  /  ALT  7[L]  /  AlkPhos  65  07-02    Iron 31, TIBC 186, %Sat 17, Ferritin 208/ 07-03-25 @ 05:30    PT/INR - ( 02 Jul 2025 13:03 )   PT: 12.3 sec;   INR: 1.05          PTT - ( 02 Jul 2025 13:03 )  PTT:25.9 sec  Urinalysis Basic - ( 03 Jul 2025 12:00 )    Color: x / Appearance: x / SG: x / pH: x  Gluc: 105 mg/dL / Ketone: x  / Bili: x / Urobili: x   Blood: x / Protein: x / Nitrite: x   Leuk Esterase: x / RBC: x / WBC x   Sq Epi: x / Non Sq Epi: x / Bacteria: x      CAPILLARY BLOOD GLUCOSE      POCT Blood Glucose.: 80 mg/dL (04 Jul 2025 00:02)  POCT Blood Glucose.: 75 mg/dL (03 Jul 2025 18:28)  POCT Blood Glucose.: 84 mg/dL (03 Jul 2025 10:48)  POCT Blood Glucose.: 78 mg/dL (03 Jul 2025 06:08)        Urinalysis Basic - ( 03 Jul 2025 12:00 )    Color: x / Appearance: x / SG: x / pH: x  Gluc: 105 mg/dL / Ketone: x  / Bili: x / Urobili: x   Blood: x / Protein: x / Nitrite: x   Leuk Esterase: x / RBC: x / WBC x   Sq Epi: x / Non Sq Epi: x / Bacteria: x        MEDICATIONS  (STANDING):  amLODIPine   Tablet 5 milliGRAM(s) Oral every 24 hours  donepezil 5 milliGRAM(s) Oral <User Schedule>  escitalopram 10 milliGRAM(s) Oral <User Schedule>  polyethylene glycol 3350 17 Gram(s) Oral two times a day  senna 1 Tablet(s) Oral two times a day  tamsulosin 0.4 milliGRAM(s) Oral <User Schedule>    MEDICATIONS  (PRN):      RADIOLOGY & ADDITIONAL TESTS: Reviewed

## 2025-07-04 NOTE — PROGRESS NOTE ADULT - PROBLEM SELECTOR PLAN 7
Pt with Bicarb 19, Cl 110, AG 13; suggestive of NAGMA likely 2/2 MARINE     - Monitor, should resolve with improved renal function Hx BPH, takes flomax 0.4mg    - cont. home meds

## 2025-07-04 NOTE — PROGRESS NOTE ADULT - ASSESSMENT
75-year-old M with PMH of Alzheimer dementia, BPH admitted with altered mental status and acute urinary retention with associated UTI and MARINE.  Nephrology following for MARINE and postobstructive diuresis.    1– Postobstructive diuresis:  Patient was on sodium chloride 0.45% at a rate of 100 mL/h, stopped on 7/3 at 5 PM  Most recent labs (7/3): SNa 143, phosphorus 2.9, potassium 4.7  Last 24 hours UOP: 3.8 L  Postobstructive diuresis should be abating after 48 hours  – BMP today  – Replace potassium phosphorus and magnesium as needed  – If hypernatremic, calculate free water deficit and administer sodium chloride 0.45% infusion, rate should be sufficient to cover free water deficit  – Strict I's/O monitoring    2– Obstructive MARINE: Now resolved  Serum creatinine improved from 9.6 (7/2) to 1.1 (7/3) after after Dominguez placement    Thank you for consulting Nephrology.    Rhett Gonzáles MD  PGY-5 Nephrology Fellow

## 2025-07-04 NOTE — PROGRESS NOTE ADULT - PROBLEM SELECTOR PLAN 11
Pt with hx of MDD, previous documentation suggests BPD, however speaking with his ex-wife, she clarifies that he has MDD. Currently takes escitalopram 10mg QD.    - Cont. home medications when pt tolerates PO
Pt with hx of MDD, previous documentation suggests BPD, however speaking with his ex-wife, she clarifies that he has MDD. Currently takes escitalopram 10mg QD.    - Cont. home medications when pt tolerates PO

## 2025-07-04 NOTE — PROGRESS NOTE ADULT - PROBLEM SELECTOR PLAN 6
Hx BPH, takes flomax 0.4mg    - cont. home meds PT presenting with blood found in diaper, found to have 1200 cc's bloody urine drained today in the ED. Bladder mass found on imaging.     - F/u urine studies, UA  - Urology on board, garvin was replaced to larger size  - Pt will need outpatient workup including cystoscopy

## 2025-07-04 NOTE — DISCHARGE NOTE NURSING/CASE MANAGEMENT/SOCIAL WORK - FINANCIAL ASSISTANCE
[facility] provides services at a reduced cost to those who are determined to be eligible through [facility]’s financial assistance program. Information regarding [facility]’s financial assistance program can be found by going to [email or web address] or by calling [phone].

## 2025-07-04 NOTE — PROGRESS NOTE ADULT - PROBLEM SELECTOR PLAN 12
F: IVF 1000 mL 0.5 NS at 75/hr  E: replete K<4, Mg<2  N: SLP eval, currently NPO  VTE Prophylaxis: Hold until evidence of 24hr without bleeding, then start ppx sq Heparin  GI: not needed  C: Full Code  D: RMF Pt with hx HTN, takes Amlodipine 5 mg PO at home    - Pressures stable, hold Amlodipine until patient can tolerate PO and monitor pressures to avoid underperfusion  of kidneys

## 2025-07-04 NOTE — DISCHARGE NOTE NURSING/CASE MANAGEMENT/SOCIAL WORK - NSTRANSFERBELONGINGSDISPO_GEN_A_NUR
Follow-up with your primary care doctor for repeat evaluation. Return for any worsening symptoms or other concerns.   Continue using lidocaine ointments at home.   Follow-up with the surgery team for discussion of assessing treatment for hemorrhoids.    given to family

## 2025-07-04 NOTE — PROGRESS NOTE ADULT - PROBLEM SELECTOR PLAN 3
UA shows turbid, red urine with >300 protein, blood 15, large leukocyte esterase, WBC 66, +RBC,    - Treating broadly with Zosyn 3.375mg q8, given hx of recent UTI, significant bladder retention and high risk for bacteremia in the setting of urinary stasis  -  If pt shows signs of worsening infection, broaden Zosyn dosing to include pseudomonal coverage   - F/u blood cx  - F/u urine cx Pt with hx of BPH, recently started on Flomax 0.4mg (one week ago). Imaging shows evidence of bladder mass, Today, pt was retaining with 1200cc on BS. S/p garvin in ED, bloody urine drained in the ED.     - monitor strict I/Os q6 hours   - monitor for post-obstructive diureses, monitor electrolytes for losses and hypovolemia  - BMP 8pm  - Cont flomax 0.4mg  - nephrology consulted, F/U UA and urine studies  - urology consulted, replaced garvin  -  1000 mL 0.5 NS at 75/hr

## 2025-07-04 NOTE — PROGRESS NOTE ADULT - REASON FOR ADMISSION
Altered mental status, UTI, Urinary Retention
altered mental status, urinary retention

## 2025-07-04 NOTE — DISCHARGE NOTE PROVIDER - NSDCCPCAREPLAN_GEN_ALL_CORE_FT
PRINCIPAL DISCHARGE DIAGNOSIS  Diagnosis: AMS (altered mental status)  Assessment and Plan of Treatment: Patient has a history of Alzhaimers. His mental status got worse because we was unable to urinate, the garvin catheter allowed him to urinate which improved his mental status. We also treated a urinary tract infection     PRINCIPAL DISCHARGE DIAGNOSIS  Diagnosis: Metabolic encephalopathy  Assessment and Plan of Treatment: Patient has a history of Alzhaimers. His mental status got worse because we was unable to urinate, the garvin catheter allowed him to urinate which improved his mental status. We also treated a urinary tract infection      SECONDARY DISCHARGE DIAGNOSES  Diagnosis: ATN (acute tubular necrosis)  Assessment and Plan of Treatment:     Diagnosis: Urinary retention  Assessment and Plan of Treatment:     Diagnosis: Post-renal acute kidney injury  Assessment and Plan of Treatment:     Diagnosis: Major depression  Assessment and Plan of Treatment:     Diagnosis: Metabolic encephalopathy  Assessment and Plan of Treatment: Patient has a history of Alzhaimers. His mental status got worse because we was unable to urinate, the garvin catheter allowed him to urinate which improved his mental status. We also treated a urinary tract infection     PRINCIPAL DISCHARGE DIAGNOSIS  Diagnosis: Metabolic encephalopathy  Assessment and Plan of Treatment: Patient has a history of Alzhaimers. His mental status got worse because we was unable to urinate, the garvin catheter allowed him to urinate which improved his mental status. We also treated a urinary tract infection      SECONDARY DISCHARGE DIAGNOSES  Diagnosis: Severe protein-calorie malnutrition  Assessment and Plan of Treatment:     Diagnosis: Metabolic encephalopathy  Assessment and Plan of Treatment: Patient has a history of Alzhaimers. His mental status got worse because we was unable to urinate, the garvin catheter allowed him to urinate which improved his mental status. We also treated a urinary tract infection    Diagnosis: Urinary retention  Assessment and Plan of Treatment:     Diagnosis: Post-renal acute kidney injury  Assessment and Plan of Treatment:     Diagnosis: Major depression  Assessment and Plan of Treatment:     Diagnosis: ATN (acute tubular necrosis)  Assessment and Plan of Treatment:

## 2025-07-04 NOTE — PROGRESS NOTE ADULT - PROBLEM SELECTOR PLAN 9
Pt with hx of AD diagnosed in 2019, acutely worsening over the past one year with multiple hospitalizations. Was previously in a NH but currently lives at home with 24/7 aides. Baseline, pt was able to ambulate, verbalize needs, and converse, though not in full sentences. Home meds include mirtazapine 15 mg QHS, Aricept 5mg QD, Depakote, 125mg qd. Patient reported on Depakote for Alzeihmers and not for seiure disorder     - hold home medications until pt tolerates PO  - F/u valproic acid level  - Measures to prevent delirium  -Palliative/Geriatrics consult Pt with acute blood loss anemia 2/2 bladder mass, macrocytic anemia.    - send iron studies  - B12/folate  - Trend CBC, active T&S, transfuse if HgB <8

## 2025-07-04 NOTE — DISCHARGE NOTE NURSING/CASE MANAGEMENT/SOCIAL WORK - PATIENT PORTAL LINK FT
You can access the FollowMyHealth Patient Portal offered by [facility] by registering at the following website: [email or web address]. By joining [facility]’s FollowMyHealth portal, you will also be able to view your health information using other applications (apps) compatible with our system.

## 2025-07-04 NOTE — DISCHARGE NOTE PROVIDER - ATTENDING DISCHARGE PHYSICAL EXAMINATION:
Patient was seen and examined at bedside on 7/4/2025 at 830 am. Patient reports that he has no complaints. Denies SOB, CP, general pain. ROS is otherwise negative although somewhat limited given patient's known baseline mental status. Vitals, labwork and pertinent imaging reviewed. Exam - NAD, Alert, PERRLA, EOMI, MMM, supple neck, chest - CTA b/l, CV - rrr, s1s2, no m/r/g, abd - NTND, + BS, back - midline, ext - wwp, psych - calm affect, skin - no rash, patient appears cachectic and malnourished,  - + FC    Plan:  -Repeat BMP from 7/4 with baseline Cr. and normal electrolytes  -AXR - non obstructive bowel pattern  -f/u UOP  -CT Urogram without evidence of bladder mass, suspect this was due to layering of blood; did capture incidental seen 4.1 cm rounded masslike opacity at the right lung base. May represent rounded atelectasis, inflammation or neoplasm. Recommend dedicated imaging of the chest with IV contrast for further evaluation. - this finding and recommendations were discussed with the patient's wife who demonstrated understanding  -Urology consult, pt will go home with FC  -Patient is medically ready for d/c with close outpatient follow up, plan of care and return precautions discussed in detail with patient's wife who demonstrated understanding

## 2025-07-04 NOTE — PROGRESS NOTE ADULT - PROBLEM SELECTOR PLAN 1
Pt presents with 3 day hx of progressively worsening lethargy, AMS. Hx of AD, baseline AAOx1-2. On admission he is lethargic but arousal, UA shows evidence of UTI, BMP reveals uremia. Pt is protecting his airway.    - treat underlying problems including UTI and uremia  - SLP evaluation, bedside dysphagia evaluation. If remains NPO, check FS q6h   - aspiration precautions, elevate head of bed   - cont. to monitor for improvement in mental status
Pt presents with 3 day hx of progressively worsening lethargy, AMS. Hx of AD, baseline AAOx1-2. On admission he is lethargic but arousal, UA shows evidence of UTI, BMP reveals uremia. Pt is protecting his airway.    - treat underlying problems including UTI and uremia  - SLP evaluation, bedside dysphagia evaluation. If remains NPO, check FS q6h   - aspiration precautions, elevate head of bed   - cont. to monitor for improvement in mental status

## 2025-07-04 NOTE — DISCHARGE NOTE PROVIDER - HOSPITAL COURSE
#Discharge: do not delete    Patient is a 74yo M with a PMH of AD, MDD, HLD, BASHIR, BPH and recent admission for URI. Was found to have severe urinary obstruction which was relieved by placement of a Garvin. Received a CT urogram to rule out malignancy and look for signs of obstruction, CT showed no sign of malignancy in the bladder, most likely cause BPH. Patient also had a UTI during the hospitalization which was treated.     Hospital course (by problem):     #Metabolic encephalopathy.   ·  Plan: Pt presents with 3 day hx of progressively worsening lethargy, AMS. Hx of AD, baseline AAOx1-2. On admission he is lethargic but arousal, UA shows evidence of UTI, BMP reveals uremia. Pt is protecting his airway.    - treat underlying problems including UTI and uremia  - SLP evaluation, bedside dysphagia evaluation. If remains NPO, check FS q6h   - aspiration precautions, elevate head of bed   - cont. to monitor for improvement in mental status.     #Post-renal acute kidney injury.   ·  Plan: Pt with hx of BPH, recently started on Flomax 0.4mg (one week ago). Imaging shows evidence of bladder mass, Today, pt was retaining with 1200cc on BS. S/p garvin in ED, bloody urine drained in the ED.     - monitor strict I/Os q6 hours   - monitor for post-obstructive diureses, monitor electrolytes for losses and hypovolemia  - BMP 8pm  - Cont flomax 0.4mg  - nephrology consulted, F/U UA and urine studies  - urology consulted, replaced garvin  -  1000 mL 0.5 NS at 75/hr.    #Urinary tract infection.   ·  Plan: UA shows turbid, red urine with >300 protein, blood 15, large leukocyte esterase, WBC 66, +RBC,    - Treating broadly with Zosyn 3.375mg q8, given hx of recent UTI, significant bladder retention and high risk for bacteremia in the setting of urinary stasis  -  If pt shows signs of worsening infection, broaden Zosyn dosing to include pseudomonal coverage   - F/u blood cx  - F/u urine cx.    #Bladder mass.   ·  Plan: CT A/P reveals evidence of bladder mass, pt with urinary retention and hematuria.     - Urology consulted  - Stat labs including CBC, t/s, coags ordered i/s/o hematuria.     #Gross hematuria.   ·  Plan: PT presenting with blood found in diaper, found to have 1200 cc's bloody urine drained today in the ED. Bladder mass found on imaging.     - F/u urine studies, UA  - Urology on board, garvin was replaced to larger size  - Pt will need outpatient workup including cystoscopy.    #Enlarged prostate.   ·  Plan: Hx BPH, takes flomax 0.4mg    - cont. home meds.     #Normal anion gap metabolic acidosis.   ·  Plan: Pt with Bicarb 19, Cl 110, AG 13; suggestive of NAGMA likely 2/2 MARINE     - Monitor, should resolve with improved renal function.     #Anemia, iron deficiency.   ·  Plan: Pt with acute blood loss anemia 2/2 bladder mass, macrocytic anemia.    - send iron studies  - B12/folate  - Trend CBC, active T&S, transfuse if HgB <8.        Patient was discharged to: Home    New medications: None   Changes to old medications: None   Medications that were stopped: None    Items to follow up as outpatient:    Physical exam at the time of discharge:    Constitutional: resting comfortably in bed; NAD  Head: NC/AT  Eyes: PERRL, EOMI, anicteric sclera  ENT: no nasal discharge; MMM  Neck: supple; no JVD or thyromegaly  Respiratory: CTA B/L; no W/R/R, no retractions  Cardiac: +S1/S2; RRR; no M/R/G  Gastrointestinal: abdomen soft, NT/ND; no rebound or guarding; +BSx4  Back: spine midline, no bony tenderness or step-offs; no CVAT B/L  Extremities: WWP, no clubbing or cyanosis; no peripheral edema  Musculoskeletal: NROM x4; no joint swelling, tenderness or erythema  Vascular: 2+ radial, DP/PT pulses B/L  Dermatologic: skin warm, dry and intact; no rashes, wounds, or scars  Lymphatic: no submandibular or cervical LAD  Neurologic: AAOx0-1; CNII-XII grossly intact; no focal deficits  Psychiatric: Patient suffers from AD and is not very well oriented. Smells of urine. Patient reacts to his name but does not appear to follow the conversation.    #Discharge: do not delete    Patient is a 76yo M with a PMH of AD, MDD, HLD, BASHIR, BPH and recent admission for URI. Was found to have severe urinary obstruction which was relieved by placement of a Garvin. Received a CT urogram to rule out malignancy and look for signs of obstruction, CT showed no sign of malignancy in the bladder, most likely cause BPH. Patient also had a UTI during the hospitalization which was treated.     Hospital course (by problem):     #Metabolic encephalopathy.   ·  Plan: Pt presents with 3 day hx of progressively worsening lethargy, AMS. Hx of AD, baseline AAOx1-2. On admission he is lethargic but arousal, UA shows evidence of UTI, BMP reveals uremia. Pt is protecting his airway.    - treat underlying problems including UTI and uremia  - SLP evaluation, bedside dysphagia evaluation. If remains NPO, check FS q6h   - aspiration precautions, elevate head of bed   - cont. to monitor for improvement in mental status.     #Post-renal acute kidney injury  #Renal failure  #ATN (acute tubular necrosis)  ·  Plan: Pt with hx of BPH, recently started on Flomax 0.4mg (one week ago). Imaging shows evidence of bladder mass, Today, pt was retaining with 1200cc on BS. S/p garvin in ED, bloody urine drained in the ED.     - monitor strict I/Os q6 hours   - monitor for post-obstructive diureses, monitor electrolytes for losses and hypovolemia  - BMP 8pm  - Cont flomax 0.4mg  - nephrology consulted, F/U UA and urine studies  - urology consulted, replaced garvin  -  1000 mL 0.5 NS at 75/hr.    #Urinary tract infection.   ·  Plan: UA shows turbid, red urine with >300 protein, blood 15, large leukocyte esterase, WBC 66, +RBC,    - Treating broadly with Zosyn 3.375mg q8, given hx of recent UTI, significant bladder retention and high risk for bacteremia in the setting of urinary stasis  -  If pt shows signs of worsening infection, broaden Zosyn dosing to include pseudomonal coverage   - F/u blood cx  - F/u urine cx.    #Bladder mass.   ·  Plan: CT A/P reveals evidence of bladder mass, pt with urinary retention and hematuria.     - Urology consulted  - Stat labs including CBC, t/s, coags ordered i/s/o hematuria.     #Gross hematuria.   ·  Plan: PT presenting with blood found in diaper, found to have 1200 cc's bloody urine drained today in the ED. Bladder mass found on imaging.     - F/u urine studies, UA  - Urology on board, garvin was replaced to larger size  - Pt will need outpatient workup including cystoscopy.    #Enlarged prostate.   ·  Plan: Hx BPH, takes flomax 0.4mg    - cont. home meds.     #Normal anion gap metabolic acidosis.   ·  Plan: Pt with Bicarb 19, Cl 110, AG 13; suggestive of NAGMA likely 2/2 MARINE     - Monitor, should resolve with improved renal function.     #Anemia, iron deficiency.   ·  Plan: Pt with acute blood loss anemia 2/2 bladder mass, macrocytic anemia.    - send iron studies  - B12/folate  - Trend CBC, active T&S, transfuse if HgB <8.        Patient was discharged to: Home    New medications: None   Changes to old medications: None   Medications that were stopped: None    Items to follow up as outpatient:    Physical exam at the time of discharge:    Constitutional: resting comfortably in bed; NAD  Head: NC/AT  Eyes: PERRL, EOMI, anicteric sclera  ENT: no nasal discharge; MMM  Neck: supple; no JVD or thyromegaly  Respiratory: CTA B/L; no W/R/R, no retractions  Cardiac: +S1/S2; RRR; no M/R/G  Gastrointestinal: abdomen soft, NT/ND; no rebound or guarding; +BSx4  Back: spine midline, no bony tenderness or step-offs; no CVAT B/L  Extremities: WWP, no clubbing or cyanosis; no peripheral edema  Musculoskeletal: NROM x4; no joint swelling, tenderness or erythema  Vascular: 2+ radial, DP/PT pulses B/L  Dermatologic: skin warm, dry and intact; no rashes, wounds, or scars  Lymphatic: no submandibular or cervical LAD  Neurologic: AAOx0-1; CNII-XII grossly intact; no focal deficits  Psychiatric: Patient suffers from AD and is not very well oriented. Smells of urine. Patient reacts to his name but does not appear to follow the conversation.    #Discharge: do not delete    Patient is a 76yo M with a PMH of AD, MDD, HLD, BASHIR, BPH and recent admission for URI. Was found to have severe urinary obstruction which was relieved by placement of a Garvin. Received a CT urogram to rule out malignancy and look for signs of obstruction, CT showed no sign of malignancy in the bladder, most likely cause BPH. Patient also had a UTI during the hospitalization which was treated.     Hospital course (by problem):     #Metabolic encephalopathy.   ·  Plan: Pt presents with 3 day hx of progressively worsening lethargy, AMS. Hx of AD, baseline AAOx1-2. On admission he is lethargic but arousal, UA shows evidence of UTI, BMP reveals uremia. Pt is protecting his airway.    - treat underlying problems including UTI and uremia  - SLP evaluation, bedside dysphagia evaluation. If remains NPO, check FS q6h   - aspiration precautions, elevate head of bed   - cont. to monitor for improvement in mental status.     #Post-renal acute kidney injury  #Renal failure  #ATN (acute tubular necrosis)  ·  Plan: Pt with hx of BPH, recently started on Flomax 0.4mg (one week ago). Imaging shows evidence of bladder mass, Today, pt was retaining with 1200cc on BS. S/p garvin in ED, bloody urine drained in the ED.     - monitor strict I/Os q6 hours   - monitor for post-obstructive diureses, monitor electrolytes for losses and hypovolemia  - BMP 8pm  - Cont flomax 0.4mg  - nephrology consulted, F/U UA and urine studies  - urology consulted, replaced garvin  -  1000 mL 0.5 NS at 75/hr.    #Urinary tract infection.   ·  Plan: UA shows turbid, red urine with >300 protein, blood 15, large leukocyte esterase, WBC 66, +RBC,    - Treating broadly with Zosyn 3.375mg q8, given hx of recent UTI, significant bladder retention and high risk for bacteremia in the setting of urinary stasis  -  If pt shows signs of worsening infection, broaden Zosyn dosing to include pseudomonal coverage   - F/u blood cx  - F/u urine cx.    #Bladder mass.   ·  Plan: CT A/P reveals evidence of bladder mass, pt with urinary retention and hematuria.     - Urology consulted  - Stat labs including CBC, t/s, coags ordered i/s/o hematuria.     #Gross hematuria.   ·  Plan: PT presenting with blood found in diaper, found to have 1200 cc's bloody urine drained today in the ED. Bladder mass found on imaging.     - F/u urine studies, UA  - Urology on board, garvin was replaced to larger size  - Pt will need outpatient workup including cystoscopy.    #Enlarged prostate.   ·  Plan: Hx BPH, takes flomax 0.4mg    - cont. home meds.     #Normal anion gap metabolic acidosis.   ·  Plan: Pt with Bicarb 19, Cl 110, AG 13; suggestive of NAGMA likely 2/2 MARINE     - Monitor, should resolve with improved renal function.     #Severe Protein Calorie Malnutrition     #Anemia, iron deficiency.   ·  Plan: Pt with acute blood loss anemia 2/2 bladder mass, macrocytic anemia.    - send iron studies  - B12/folate  - Trend CBC, active T&S, transfuse if HgB <8.        Patient was discharged to: Home    New medications: None   Changes to old medications: None   Medications that were stopped: None    Items to follow up as outpatient:    Physical exam at the time of discharge:    Constitutional: resting comfortably in bed; NAD  Head: NC/AT  Eyes: PERRL, EOMI, anicteric sclera  ENT: no nasal discharge; MMM  Neck: supple; no JVD or thyromegaly  Respiratory: CTA B/L; no W/R/R, no retractions  Cardiac: +S1/S2; RRR; no M/R/G  Gastrointestinal: abdomen soft, NT/ND; no rebound or guarding; +BSx4  Back: spine midline, no bony tenderness or step-offs; no CVAT B/L  Extremities: WWP, no clubbing or cyanosis; no peripheral edema  Musculoskeletal: NROM x4; no joint swelling, tenderness or erythema  Vascular: 2+ radial, DP/PT pulses B/L  Dermatologic: skin warm, dry and intact; no rashes, wounds, or scars  Lymphatic: no submandibular or cervical LAD  Neurologic: AAOx0-1; CNII-XII grossly intact; no focal deficits  Psychiatric: Patient suffers from AD and is not very well oriented. Smells of urine. Patient reacts to his name but does not appear to follow the conversation.

## 2025-07-04 NOTE — PROGRESS NOTE ADULT - PROBLEM SELECTOR PLAN 5
PT presenting with blood found in diaper, found to have 1200 cc's bloody urine drained today in the ED. Bladder mass found on imaging.     - F/u urine studies, UA  - Urology on board, garvin was replaced to larger size  - Pt will need outpatient workup including cystoscopy CT A/P reveals evidence of bladder mass, pt with urinary retention and hematuria.     - Urology consulted  - Stat labs including CBC, t/s, coags ordered i/s/o hematuria

## 2025-07-04 NOTE — PROGRESS NOTE ADULT - ASSESSMENT
Matthew Jay is a 74 yo M PMH AD, MDD, HLD, BASHIR, BPH recent admission for URI, AMS, UTI and one time seizure, presenting today for progressively worsening lethargy over the past three days, decreased PO intake, and hematuria. Pt is baseline AAOx1-2 but on presentation is very lethargic and unable to answer questions.  1200 cc's of bloody urine were drained with garvin. Presence of bladder mass seen on imaging. Labs reveal UTI, MARINE (Cr 9, GFR 5), uremia, NAGMA. AMS likely 2/2 UTI and uremia Patient continues to drain bloody urine from Dominguez CT urogram shows no evidence of hydronephrosis, kidney function has returned back to normal levels. x-ray of the abdomen showed no evidence of obstructive bowel pattern.

## 2025-07-05 DIAGNOSIS — E43 UNSPECIFIED SEVERE PROTEIN-CALORIE MALNUTRITION: ICD-10-CM

## 2025-07-05 DIAGNOSIS — N17.0 ACUTE KIDNEY FAILURE WITH TUBULAR NECROSIS: ICD-10-CM

## 2025-07-07 LAB
CULTURE RESULTS: SIGNIFICANT CHANGE UP
CULTURE RESULTS: SIGNIFICANT CHANGE UP
SPECIMEN SOURCE: SIGNIFICANT CHANGE UP
SPECIMEN SOURCE: SIGNIFICANT CHANGE UP

## 2025-07-09 ENCOUNTER — APPOINTMENT (OUTPATIENT)
Dept: UROLOGY | Facility: CLINIC | Age: 75
End: 2025-07-09

## 2025-07-10 ENCOUNTER — EMERGENCY (EMERGENCY)
Facility: HOSPITAL | Age: 75
LOS: 1 days | End: 2025-07-10
Attending: EMERGENCY MEDICINE | Admitting: EMERGENCY MEDICINE
Payer: MEDICARE

## 2025-07-10 ENCOUNTER — NON-APPOINTMENT (OUTPATIENT)
Age: 75
End: 2025-07-10

## 2025-07-10 VITALS
DIASTOLIC BLOOD PRESSURE: 84 MMHG | RESPIRATION RATE: 16 BRPM | HEART RATE: 65 BPM | OXYGEN SATURATION: 97 % | SYSTOLIC BLOOD PRESSURE: 130 MMHG | TEMPERATURE: 98 F

## 2025-07-10 VITALS
WEIGHT: 149.91 LBS | HEART RATE: 58 BPM | DIASTOLIC BLOOD PRESSURE: 60 MMHG | HEIGHT: 72 IN | OXYGEN SATURATION: 98 % | RESPIRATION RATE: 18 BRPM | SYSTOLIC BLOOD PRESSURE: 95 MMHG | TEMPERATURE: 98 F

## 2025-07-10 DIAGNOSIS — Z98.49 CATARACT EXTRACTION STATUS, UNSPECIFIED EYE: Chronic | ICD-10-CM

## 2025-07-10 DIAGNOSIS — G93.41 METABOLIC ENCEPHALOPATHY: ICD-10-CM

## 2025-07-10 DIAGNOSIS — N17.0 ACUTE KIDNEY FAILURE WITH TUBULAR NECROSIS: ICD-10-CM

## 2025-07-10 DIAGNOSIS — R33.9 RETENTION OF URINE, UNSPECIFIED: ICD-10-CM

## 2025-07-10 DIAGNOSIS — E78.5 HYPERLIPIDEMIA, UNSPECIFIED: ICD-10-CM

## 2025-07-10 DIAGNOSIS — F02.80 DEMENTIA IN OTHER DISEASES CLASSIFIED ELSEWHERE, UNSPECIFIED SEVERITY, WITHOUT BEHAVIORAL DISTURBANCE, PSYCHOTIC DISTURBANCE, MOOD DISTURBANCE, AND ANXIETY: ICD-10-CM

## 2025-07-10 DIAGNOSIS — N40.1 BENIGN PROSTATIC HYPERPLASIA WITH LOWER URINARY TRACT SYMPTOMS: ICD-10-CM

## 2025-07-10 DIAGNOSIS — E43 UNSPECIFIED SEVERE PROTEIN-CALORIE MALNUTRITION: ICD-10-CM

## 2025-07-10 DIAGNOSIS — G30.9 ALZHEIMER'S DISEASE, UNSPECIFIED: ICD-10-CM

## 2025-07-10 DIAGNOSIS — Z51.5 ENCOUNTER FOR PALLIATIVE CARE: ICD-10-CM

## 2025-07-10 DIAGNOSIS — R53.81 OTHER MALAISE: ICD-10-CM

## 2025-07-10 DIAGNOSIS — N39.0 URINARY TRACT INFECTION, SITE NOT SPECIFIED: ICD-10-CM

## 2025-07-10 DIAGNOSIS — D50.9 IRON DEFICIENCY ANEMIA, UNSPECIFIED: ICD-10-CM

## 2025-07-10 DIAGNOSIS — E87.0 HYPEROSMOLALITY AND HYPERNATREMIA: ICD-10-CM

## 2025-07-10 DIAGNOSIS — F32.9 MAJOR DEPRESSIVE DISORDER, SINGLE EPISODE, UNSPECIFIED: ICD-10-CM

## 2025-07-10 DIAGNOSIS — D62 ACUTE POSTHEMORRHAGIC ANEMIA: ICD-10-CM

## 2025-07-10 DIAGNOSIS — N32.9 BLADDER DISORDER, UNSPECIFIED: ICD-10-CM

## 2025-07-10 DIAGNOSIS — E87.20 ACIDOSIS, UNSPECIFIED: ICD-10-CM

## 2025-07-10 DIAGNOSIS — Z41.9 ENCOUNTER FOR PROCEDURE FOR PURPOSES OTHER THAN REMEDYING HEALTH STATE, UNSPECIFIED: Chronic | ICD-10-CM

## 2025-07-10 DIAGNOSIS — R91.8 OTHER NONSPECIFIC ABNORMAL FINDING OF LUNG FIELD: ICD-10-CM

## 2025-07-10 DIAGNOSIS — Z79.2 LONG TERM (CURRENT) USE OF ANTIBIOTICS: ICD-10-CM

## 2025-07-10 DIAGNOSIS — R64 CACHEXIA: ICD-10-CM

## 2025-07-10 DIAGNOSIS — N13.30 UNSPECIFIED HYDRONEPHROSIS: ICD-10-CM

## 2025-07-10 DIAGNOSIS — N40.0 BENIGN PROSTATIC HYPERPLASIA WITHOUT LOWER URINARY TRACT SYMPTOMS: ICD-10-CM

## 2025-07-10 DIAGNOSIS — N99.0 POSTPROCEDURAL (ACUTE) (CHRONIC) KIDNEY FAILURE: ICD-10-CM

## 2025-07-10 LAB
ANION GAP SERPL CALC-SCNC: 13 MMOL/L — SIGNIFICANT CHANGE UP (ref 5–17)
APPEARANCE UR: ABNORMAL
BASOPHILS # BLD AUTO: 0.04 K/UL — SIGNIFICANT CHANGE UP (ref 0–0.2)
BASOPHILS NFR BLD AUTO: 0.6 % — SIGNIFICANT CHANGE UP (ref 0–2)
BILIRUB UR-MCNC: NEGATIVE — SIGNIFICANT CHANGE UP
BUN SERPL-MCNC: 19 MG/DL — SIGNIFICANT CHANGE UP (ref 7–23)
CALCIUM SERPL-MCNC: 9.6 MG/DL — SIGNIFICANT CHANGE UP (ref 8.4–10.5)
CHLORIDE SERPL-SCNC: 101 MMOL/L — SIGNIFICANT CHANGE UP (ref 96–108)
CO2 SERPL-SCNC: 23 MMOL/L — SIGNIFICANT CHANGE UP (ref 22–31)
COLOR SPEC: ABNORMAL
CREAT SERPL-MCNC: 0.76 MG/DL — SIGNIFICANT CHANGE UP (ref 0.5–1.3)
DIFF PNL FLD: ABNORMAL
EGFR: 94 ML/MIN/1.73M2 — SIGNIFICANT CHANGE UP
EGFR: 94 ML/MIN/1.73M2 — SIGNIFICANT CHANGE UP
EOSINOPHIL # BLD AUTO: 0.35 K/UL — SIGNIFICANT CHANGE UP (ref 0–0.5)
EOSINOPHIL NFR BLD AUTO: 4.9 % — SIGNIFICANT CHANGE UP (ref 0–6)
GLUCOSE SERPL-MCNC: 90 MG/DL — SIGNIFICANT CHANGE UP (ref 70–99)
GLUCOSE UR QL: NEGATIVE MG/DL — SIGNIFICANT CHANGE UP
HCT VFR BLD CALC: 37.3 % — LOW (ref 39–50)
HGB BLD-MCNC: 11.9 G/DL — LOW (ref 13–17)
IMM GRANULOCYTES # BLD AUTO: 0.04 K/UL — SIGNIFICANT CHANGE UP (ref 0–0.07)
IMM GRANULOCYTES NFR BLD AUTO: 0.6 % — SIGNIFICANT CHANGE UP (ref 0–0.9)
KETONES UR QL: ABNORMAL MG/DL
LEUKOCYTE ESTERASE UR-ACNC: ABNORMAL
LYMPHOCYTES # BLD AUTO: 1.23 K/UL — SIGNIFICANT CHANGE UP (ref 1–3.3)
LYMPHOCYTES NFR BLD AUTO: 17.3 % — SIGNIFICANT CHANGE UP (ref 13–44)
MCHC RBC-ENTMCNC: 31.5 PG — SIGNIFICANT CHANGE UP (ref 27–34)
MCHC RBC-ENTMCNC: 31.9 G/DL — LOW (ref 32–36)
MCV RBC AUTO: 98.7 FL — SIGNIFICANT CHANGE UP (ref 80–100)
MONOCYTES # BLD AUTO: 0.72 K/UL — SIGNIFICANT CHANGE UP (ref 0–0.9)
MONOCYTES NFR BLD AUTO: 10.1 % — SIGNIFICANT CHANGE UP (ref 2–14)
NEUTROPHILS # BLD AUTO: 4.73 K/UL — SIGNIFICANT CHANGE UP (ref 1.8–7.4)
NEUTROPHILS NFR BLD AUTO: 66.5 % — SIGNIFICANT CHANGE UP (ref 43–77)
NITRITE UR-MCNC: NEGATIVE — SIGNIFICANT CHANGE UP
NRBC # BLD AUTO: 0 K/UL — SIGNIFICANT CHANGE UP (ref 0–0)
NRBC # FLD: 0 K/UL — SIGNIFICANT CHANGE UP (ref 0–0)
NRBC BLD AUTO-RTO: 0 /100 WBCS — SIGNIFICANT CHANGE UP (ref 0–0)
PH UR: 5.5 — SIGNIFICANT CHANGE UP (ref 5–8)
PLATELET # BLD AUTO: 506 K/UL — HIGH (ref 150–400)
PMV BLD: 8.7 FL — SIGNIFICANT CHANGE UP (ref 7–13)
POTASSIUM SERPL-MCNC: 4.3 MMOL/L — SIGNIFICANT CHANGE UP (ref 3.5–5.3)
POTASSIUM SERPL-SCNC: 4.3 MMOL/L — SIGNIFICANT CHANGE UP (ref 3.5–5.3)
PROT UR-MCNC: 100 MG/DL
RBC # BLD: 3.78 M/UL — LOW (ref 4.2–5.8)
RBC # FLD: 11.6 % — SIGNIFICANT CHANGE UP (ref 10.3–14.5)
SODIUM SERPL-SCNC: 137 MMOL/L — SIGNIFICANT CHANGE UP (ref 135–145)
SP GR SPEC: 1.03 — SIGNIFICANT CHANGE UP (ref 1–1.03)
UROBILINOGEN FLD QL: 1 MG/DL — SIGNIFICANT CHANGE UP (ref 0.2–1)
WBC # BLD: 7.11 K/UL — SIGNIFICANT CHANGE UP (ref 3.8–10.5)
WBC # FLD AUTO: 7.11 K/UL — SIGNIFICANT CHANGE UP (ref 3.8–10.5)

## 2025-07-10 PROCEDURE — 85025 COMPLETE CBC W/AUTO DIFF WBC: CPT

## 2025-07-10 PROCEDURE — 80048 BASIC METABOLIC PNL TOTAL CA: CPT

## 2025-07-10 PROCEDURE — 36415 COLL VENOUS BLD VENIPUNCTURE: CPT

## 2025-07-10 PROCEDURE — 99284 EMERGENCY DEPT VISIT MOD MDM: CPT

## 2025-07-10 PROCEDURE — 99284 EMERGENCY DEPT VISIT MOD MDM: CPT | Mod: 25

## 2025-07-10 PROCEDURE — 51702 INSERT TEMP BLADDER CATH: CPT

## 2025-07-10 PROCEDURE — 81001 URINALYSIS AUTO W/SCOPE: CPT

## 2025-07-10 RX ADMIN — Medication 1000 MILLILITER(S): at 18:32

## 2025-07-10 NOTE — ED PROVIDER NOTE - OBJECTIVE STATEMENT
Hx obtained from prior charts and form HCP Manda Altamirano over the phone ([phone]).  75M PMH AD, MDD, HLD, BASHIR, BPH p/w request for garvin removal. Pt was admitted ~1w ago for urinary retention and had garvin placed. Was supposed to f/u w/  yesterday but pt lives in 2nd floor walk up and was unable to go and so instead, his HCP had arranged for general CP to come to house yesterday for removal. However, appt was cancelled and the GP wouldn't be able to come to house until Monday. HCP called PCP who reportedly advised going to ED for removal. Pt unable to provide any info.   Pt ahs 24hr aide.   No fevers, vomiting. Pt acting like usual self.

## 2025-07-10 NOTE — CHART NOTE - NSCHARTNOTEFT_GEN_A_CORE
Irrigated garvin, dark fruit punch at start, no clots present, cleared up at end. Reconnected    -Irrigate PRN  -garvin with adequate output, continue I/Os monitoring  -Can order bladder scans PRN if concern for clot obstruction
Previous not entered in error, wrong encounter.  Note is for visit on 7/10/25.
Met with patient and aide, discussed with provider. Pt confused, unable to provide information.  Spoke with caregiver, ex wife Manda, by phone.  Pt lives alone in 2nd floor walkup, is able to walk in apartment but does not go out.  Pt has 2/12h shifts HHA from [facility], has visiting doctor about once a month from same, Dr Arnold.  Pt's PMD, Dr Donald Loomis, has not seen him in some time .  Pt came to ED to have Dominguez removed.  Failed TOV, Dominguez replaced.  Pt will be discharged by ambulance with HHA.  Spoke with Manda Altamirano  who is agreement with plan.

## 2025-07-10 NOTE — CHART NOTE - NSCHARTNOTEFT_GEN_A_CORE
Met with patient and aide, discussed with provider. Pt confused, unable to provide information.  Spoke with caregiver, ex wife Manda, by phone.  Pt lives alone in 2nd floor walkup, is able to walk in apartment but does not go out.  Pt has 2/12h shifts HHA from [facility], has visiting doctor about once a month from same, Dr Arnold.  Pt's PMD, Dr Donald Loomis, has not seen him in some time .  Pt came to ED to have Dominguez removed.  Failed TOV, Dominguez replaced.  Pt will be discharged by ambulance with HHA.  Spoke with Manda Altamirano  who is agreement with plan.

## 2025-07-10 NOTE — ED ADULT TRIAGE NOTE - CHIEF COMPLAINT QUOTE
Patient sent to ED by wife via EMS requesting garvin removal. Per aide, patient had garvin placed recently for UTI/BPH and is due for removal today. Patient AMS @ baseline. NAD.

## 2025-07-10 NOTE — ED PROVIDER NOTE - PATIENT PORTAL LINK FT
You can access the FollowMyHealth Patient Portal offered by [facility] by registering at the following website: [email or web address]. By joining [facility]’s FollowMyHealth portal, you will also be able to view your health information using other applications (apps) compatible with our system.

## 2025-07-10 NOTE — ED PROVIDER NOTE - NSFOLLOWUPINSTRUCTIONS_ED_ALL_ED_FT
Can take tylenol every 6hrs as needed for pain.    Stay well hydrated.    Return for fevers, persistent vomit, uncontrolled pain, worsening breathing, worsening lightheaded, unable to urinate, spreading redness.    Follow up with primary doctor within 1-2 days.     Follow up with urologist as soon as possible to assess for garvin catheter removal (prolonged use can cause infections and other complications). Can call [phone] to schedule appointment.     What is urinary retention?   Urinary retention is a condition that develops when your bladder does not empty completely when you urinate.    What causes urinary retention?     An enlarged prostate  Blockages, such as a stone, growth, or narrowing of your urethra  A weak bladder muscle  Nerve damage from diabetes, stroke, or spinal cord injury  Bladder diverticula, which are pockets of urine that form in your bladder and do not empty  Certain medicines, such as narcotics, antihistamines, or antidepressants    What are the signs and symptoms of urinary retention?     Frequent urination, or the urge to urinate right after you finish  An urge to urinate, but your urine does not come out or dribbles out slowly and weakly  Frequent urine leaks that happen during the day or while you sleep  Pain or pressure when you urinate  Pain or stiffness in your abdomen, lower back, hips, or upper thighs  Blood in your urine    How is urinary retention diagnosed? Your healthcare provider will ask about your health history and the medicines you take. He will press or tap on your lower abdomen. You may need any of the following tests:   A digital rectal exam is when healthcare providers carefully feel the size of your prostate.  A post void residual test will show how much urine is left in your bladder after you urinate. You will be asked to urinate and then healthcare providers will use a small ultrasound machine to check how much urine is left in your bladder.  Blood or urine tests may show infection or prostate specific antigen (PSA) levels. PSA may be elevated in prostate cancer.  An ultrasound uses sound waves to show pictures on a monitor. An ultrasound may be done to show bladder stones, infection, or other problems.  A CT scan, or CAT scan, is a type of x-ray that is taken of your prostate, kidneys, and bladder. The pictures may show what is causing your urinary retention. You may be given a dye before the pictures are taken to help healthcare providers see the pictures better. Tell the healthcare provider if you have ever had an allergic reaction to contrast dye.    How is urinary retention treated?     A Garvin catheter is a tube put into your bladder to drain urine into a bag. Keep the bag below your waist. This will prevent urine from flowing back into your bladder and causing an infection or other problems. Also, keep the tube free of kinks so the urine will drain properly. Do not pull on the catheter. This can cause pain and bleeding, and may cause the catheter to come out.     Medicines can help decrease the size of your prostate, fight infection, and help you urinate more easily.  Surgery may be needed to treat the condition that is causing your urinary retention.     When should I contact my healthcare provider?     You have a fever.  You have pain when you urinate.  You have blood in your urine.  You have problems with your catheter.  You have questions or concerns about your condition or care.    When should I seek immediate care or call 911?   You have severe abdominal pain.  You are breathing faster than usual.  Your heartbeat is faster than usual.  Your face, hands, feet, or ankles are swollen.

## 2025-07-10 NOTE — ED PROVIDER NOTE - PHYSICAL EXAMINATION
Dominguez appears in place.  slightly dark yellow urine in bag  A and O to self, at mental status baseline.

## 2025-07-10 NOTE — ED PROVIDER NOTE - CLINICAL SUMMARY MEDICAL DECISION MAKING FREE TEXT BOX
75M PMH AD, MDD, HLD, BASHIR, BPH p/w request for garvin removal. Pt was admitted ~1w ago for urinary retention and had garvin placed. Was supposed to f/u w/  yesterday but pt lives in 2nd floor walk up and was unable to go and so instead, his HCP had arranged for general CP to come to house yesterday for removal. However, appt was cancelled and the GP wouldn't be able to come to house until Monday. HCP called PCP who reportedly advised going to ED for removal. Pt unable to provide any info.   Pt has 24hr aide.   No fevers, vomiting. Pt acting like usual self.  Vitals wnl, exam as above.   ddx: Visit for garvin removal.   D/w HCP that typically we don't remove garvin's in ED, but given difficulty in outpt f/u and infeciton risk, we can do it today. Discussed possibility of needing replacement if fails TOV.   Garvin removed by RN, encouraging PO hydration, will reassess.

## 2025-07-10 NOTE — ED ADULT NURSE NOTE - TEMPLATE
Continued Stay SW/CM Assessment/Plan of Care Note       Active Substitute Decision Maker (SDM)    There are no active Substitute Decision Maker (SDM) on file.           Progress note:  Discharged delayed due to SN[facility] not submitting Auth.      SW req liaison to start auth on 12/17 via phone but liaison reported she would check into it and never notified SW on status. SW also brought up insurance auth today AM and liaison reported will use medicaid but now stating patient medicaid do not have [facility] benefits for Room and Board  SW escalated to leadership.     Update: SW faxed referral to [facility] for review. Transport placed on will call for 12/20. Still pending insurance auth for [facility], will also req SNFs to see if medicaid is active for [place]TC [place] [facility] [place]    Update: Medicaid verified, patient do not have SNF benefits and insurance auth will need be obtained under primary.        See SW/CM flowsheets for other objective data.    Disposition Recommendations:  Preliminary discharge destination: Planned Discharge Destination: Rehabilitation/Skilled Care  SW/CM recommendation for discharge: SNF    Destination Pharmacy: [facility] [place], [place] - [address] [place] [address] Pharmacy confirmed with facility, Preferred pharmacy updated    Discharge Plan/Needs:     Continued Care and Services - Admitted Since 12/2/2024       Destination  Coordination complete.      Service Provider Request Status Selected Services Address Phone Fax    [facility] - [facility]  Selected Skilled Nursing [id number], [facility] [place] [address] [phone]                      Devices/ Equipment that need to be arranged for discharge: None at this time   Accepted   Pending insurance authorization   Others:    Anticipated date of DME availability: n/a    Prior To Hospitalization:    Living Situation: Supervised living, Other (comments) ([facility]) and residing at Long term care facility    .  Support Systems:  Family members   Home Devices/Equipment: None            Mobility Assist Devices: None   Type of Service Prior to Hospitalization: Other (comment), Medicare LTC services (LTC; per family )               Patient/Family discharge goal (s):        Resources provided:           Prior Function                Current Function  Last Filed Values       None            Therapy Recommendations for Discharge:   PT:      Last Filed Values       None          OT:       Last Filed Values       None          SLP:    Last Filed Values         Value Time User    SLP Discharge Needs  therapy 5 or more times per week 12/17/2024  4:42 PM Kymberly Hogan, SLP            Mobility Equipment Recommended for Discharge:        Barriers to Discharge  Identified Barriers to Discharge/Transition Planning: Consult Pending/Clearance Facility                 General

## 2025-07-10 NOTE — ED PROVIDER NOTE - PROGRESS NOTE DETAILS
[place]: has not urinated yet. On bedside US, there is urine in bladder. Bladder however is very irregularly shaped and unable to calculate volume  - does not visually look very distended. Will check basic labs, give IVF, reassess for need for garvin reinsertion.  Updated aide at bedside. [place]: Hgb 11.9, plt 506, other labs grossly wnl. Pt still has not urinated, RN measured bladder volume at 200ccs. Will replace garvin for recurrent retention.  RN replaced garvin, UA w/ small leuk esterase, large blood, unlikely UTI.    confirmed w/ US, garvin appears in place.   aide at bedside.   Discussed importance of outpt follow up and return precautions. Clinically no indication for further emergent ED workup or hospitalization at this time. Stable for dc, outpt f/u.

## 2025-07-10 NOTE — ED ADULT NURSE NOTE - NS ED NOTE ABUSE RESPONSE YN
Hgba1c is up to 9.2%. this is much higher but may be due to the use of prednisone by the rheumatologist.     Let's have you vary the times of blood sugar testing:    3 days a week, you will test blood sugars, test before and 2 hours after a meal, do breakfast one day, lunch the 2nd day and dinner on the 3rd day. Do this for 2-3 weeks and send sugars to me for review.It can be mailed or dropped off or sent through the patient portal.     No change in medications for now, we do have room to increase your medicines if needed.     Follow up in 3 months with blood work.   
Yes

## 2025-07-10 NOTE — ED ADULT NURSE NOTE - OBJECTIVE STATEMENT
Pt is a 75 YOM who presents for garvin removal. MD at bedside, spoke with wife. Pt bedbound, not really following commands, minimally verbal, aide at bedside states this is pt baseline. Garvin in place - 22 fr with 30 cc balloon draining red  urine.

## 2025-07-14 ENCOUNTER — APPOINTMENT (OUTPATIENT)
Dept: UROLOGY | Facility: CLINIC | Age: 75
End: 2025-07-14

## 2025-07-16 ENCOUNTER — APPOINTMENT (OUTPATIENT)
Dept: UROLOGY | Facility: CLINIC | Age: 75
End: 2025-07-16

## 2025-07-16 ENCOUNTER — INPATIENT (INPATIENT)
Facility: HOSPITAL | Age: 75
LOS: 11 days | Discharge: HOME HEALTH | DRG: 665 | End: 2025-07-28
Attending: UROLOGY | Admitting: STUDENT IN AN ORGANIZED HEALTH CARE EDUCATION/TRAINING PROGRAM
Payer: MEDICARE

## 2025-07-16 VITALS
HEIGHT: 72 IN | HEART RATE: 119 BPM | SYSTOLIC BLOOD PRESSURE: 124 MMHG | OXYGEN SATURATION: 95 % | DIASTOLIC BLOOD PRESSURE: 87 MMHG | TEMPERATURE: 99 F | RESPIRATION RATE: 18 BRPM

## 2025-07-16 DIAGNOSIS — R65.11 SYSTEMIC INFLAMMATORY RESPONSE SYNDROME (SIRS) OF NON-INFECTIOUS ORIGIN WITH ACUTE ORGAN DYSFUNCTION: ICD-10-CM

## 2025-07-16 DIAGNOSIS — F32.9 MAJOR DEPRESSIVE DISORDER, SINGLE EPISODE, UNSPECIFIED: ICD-10-CM

## 2025-07-16 DIAGNOSIS — D64.9 ANEMIA, UNSPECIFIED: ICD-10-CM

## 2025-07-16 DIAGNOSIS — R31.9 HEMATURIA, UNSPECIFIED: ICD-10-CM

## 2025-07-16 DIAGNOSIS — Z98.890 OTHER SPECIFIED POSTPROCEDURAL STATES: Chronic | ICD-10-CM

## 2025-07-16 DIAGNOSIS — Z98.49 CATARACT EXTRACTION STATUS, UNSPECIFIED EYE: Chronic | ICD-10-CM

## 2025-07-16 DIAGNOSIS — R10.2 PELVIC AND PERINEAL PAIN: ICD-10-CM

## 2025-07-16 DIAGNOSIS — Z29.9 ENCOUNTER FOR PROPHYLACTIC MEASURES, UNSPECIFIED: ICD-10-CM

## 2025-07-16 DIAGNOSIS — N17.8 OTHER ACUTE KIDNEY FAILURE: ICD-10-CM

## 2025-07-16 DIAGNOSIS — D72.829 ELEVATED WHITE BLOOD CELL COUNT, UNSPECIFIED: ICD-10-CM

## 2025-07-16 DIAGNOSIS — G30.9 ALZHEIMER'S DISEASE, UNSPECIFIED: ICD-10-CM

## 2025-07-16 DIAGNOSIS — N39.0 URINARY TRACT INFECTION, SITE NOT SPECIFIED: ICD-10-CM

## 2025-07-16 DIAGNOSIS — Z41.9 ENCOUNTER FOR PROCEDURE FOR PURPOSES OTHER THAN REMEDYING HEALTH STATE, UNSPECIFIED: Chronic | ICD-10-CM

## 2025-07-16 DIAGNOSIS — R33.8 OTHER RETENTION OF URINE: ICD-10-CM

## 2025-07-16 LAB
ANION GAP SERPL CALC-SCNC: 12 MMOL/L — SIGNIFICANT CHANGE UP (ref 5–17)
ANION GAP SERPL CALC-SCNC: 18 MMOL/L — HIGH (ref 5–17)
APPEARANCE UR: ABNORMAL
APTT BLD: 27.8 SEC — SIGNIFICANT CHANGE UP (ref 26.1–36.8)
BASOPHILS # BLD AUTO: 0.06 K/UL — SIGNIFICANT CHANGE UP (ref 0–0.2)
BASOPHILS NFR BLD AUTO: 0.4 % — SIGNIFICANT CHANGE UP (ref 0–2)
BILIRUB UR-MCNC: ABNORMAL
BLD GP AB SCN SERPL QL: NEGATIVE — SIGNIFICANT CHANGE UP
BUN SERPL-MCNC: 26 MG/DL — HIGH (ref 7–23)
BUN SERPL-MCNC: 29 MG/DL — HIGH (ref 7–23)
CALCIUM SERPL-MCNC: 8.6 MG/DL — SIGNIFICANT CHANGE UP (ref 8.4–10.5)
CALCIUM SERPL-MCNC: 9 MG/DL — SIGNIFICANT CHANGE UP (ref 8.4–10.5)
CHLORIDE SERPL-SCNC: 108 MMOL/L — SIGNIFICANT CHANGE UP (ref 96–108)
CHLORIDE SERPL-SCNC: 110 MMOL/L — HIGH (ref 96–108)
CO2 SERPL-SCNC: 18 MMOL/L — LOW (ref 22–31)
CO2 SERPL-SCNC: 22 MMOL/L — SIGNIFICANT CHANGE UP (ref 22–31)
COLOR SPEC: ABNORMAL
CREAT SERPL-MCNC: 1.1 MG/DL — SIGNIFICANT CHANGE UP (ref 0.5–1.3)
CREAT SERPL-MCNC: 1.19 MG/DL — SIGNIFICANT CHANGE UP (ref 0.5–1.3)
DIFF PNL FLD: ABNORMAL
EGFR: 64 ML/MIN/1.73M2 — SIGNIFICANT CHANGE UP
EGFR: 64 ML/MIN/1.73M2 — SIGNIFICANT CHANGE UP
EGFR: 70 ML/MIN/1.73M2 — SIGNIFICANT CHANGE UP
EGFR: 70 ML/MIN/1.73M2 — SIGNIFICANT CHANGE UP
EOSINOPHIL # BLD AUTO: 0.06 K/UL — SIGNIFICANT CHANGE UP (ref 0–0.5)
EOSINOPHIL NFR BLD AUTO: 0.4 % — SIGNIFICANT CHANGE UP (ref 0–6)
GLUCOSE SERPL-MCNC: 110 MG/DL — HIGH (ref 70–99)
GLUCOSE SERPL-MCNC: 199 MG/DL — HIGH (ref 70–99)
GLUCOSE UR QL: 100 MG/DL
HCT VFR BLD CALC: 29.3 % — LOW (ref 39–50)
HCT VFR BLD CALC: 30.6 % — LOW (ref 39–50)
HCT VFR BLD CALC: 35.2 % — LOW (ref 39–50)
HGB BLD-MCNC: 11.5 G/DL — LOW (ref 13–17)
HGB BLD-MCNC: 9.4 G/DL — LOW (ref 13–17)
HGB BLD-MCNC: 9.9 G/DL — LOW (ref 13–17)
IMM GRANULOCYTES # BLD AUTO: 0.1 K/UL — HIGH (ref 0–0.07)
IMM GRANULOCYTES NFR BLD AUTO: 0.6 % — SIGNIFICANT CHANGE UP (ref 0–0.9)
INR BLD: 1.03 — SIGNIFICANT CHANGE UP (ref 0.85–1.16)
KETONES UR QL: 40 MG/DL
LACTATE SERPL-SCNC: 1.8 MMOL/L — SIGNIFICANT CHANGE UP (ref 0.5–2)
LACTATE SERPL-SCNC: 3.5 MMOL/L — HIGH (ref 0.5–2)
LEUKOCYTE ESTERASE UR-ACNC: ABNORMAL
LYMPHOCYTES # BLD AUTO: 1.09 K/UL — SIGNIFICANT CHANGE UP (ref 1–3.3)
LYMPHOCYTES NFR BLD AUTO: 6.7 % — LOW (ref 13–44)
MCHC RBC-ENTMCNC: 31.4 PG — SIGNIFICANT CHANGE UP (ref 27–34)
MCHC RBC-ENTMCNC: 31.5 PG — SIGNIFICANT CHANGE UP (ref 27–34)
MCHC RBC-ENTMCNC: 31.6 PG — SIGNIFICANT CHANGE UP (ref 27–34)
MCHC RBC-ENTMCNC: 32.1 G/DL — SIGNIFICANT CHANGE UP (ref 32–36)
MCHC RBC-ENTMCNC: 32.4 G/DL — SIGNIFICANT CHANGE UP (ref 32–36)
MCHC RBC-ENTMCNC: 32.7 G/DL — SIGNIFICANT CHANGE UP (ref 32–36)
MCV RBC AUTO: 96.2 FL — SIGNIFICANT CHANGE UP (ref 80–100)
MCV RBC AUTO: 97.5 FL — SIGNIFICANT CHANGE UP (ref 80–100)
MCV RBC AUTO: 98.7 FL — SIGNIFICANT CHANGE UP (ref 80–100)
MONOCYTES # BLD AUTO: 0.86 K/UL — SIGNIFICANT CHANGE UP (ref 0–0.9)
MONOCYTES NFR BLD AUTO: 5.3 % — SIGNIFICANT CHANGE UP (ref 2–14)
NEUTROPHILS # BLD AUTO: 14.03 K/UL — HIGH (ref 1.8–7.4)
NEUTROPHILS NFR BLD AUTO: 86.6 % — HIGH (ref 43–77)
NITRITE UR-MCNC: POSITIVE
NRBC # BLD AUTO: 0 K/UL — SIGNIFICANT CHANGE UP (ref 0–0)
NRBC # FLD: 0 K/UL — SIGNIFICANT CHANGE UP (ref 0–0)
NRBC BLD AUTO-RTO: 0 /100 WBCS — SIGNIFICANT CHANGE UP (ref 0–0)
PH UR: 8.5 (ref 5–8)
PLATELET # BLD AUTO: 356 K/UL — SIGNIFICANT CHANGE UP (ref 150–400)
PLATELET # BLD AUTO: 394 K/UL — SIGNIFICANT CHANGE UP (ref 150–400)
PLATELET # BLD AUTO: 507 K/UL — HIGH (ref 150–400)
PMV BLD: 8.8 FL — SIGNIFICANT CHANGE UP (ref 7–13)
PMV BLD: 8.9 FL — SIGNIFICANT CHANGE UP (ref 7–13)
PMV BLD: 9 FL — SIGNIFICANT CHANGE UP (ref 7–13)
POTASSIUM SERPL-MCNC: 3.9 MMOL/L — SIGNIFICANT CHANGE UP (ref 3.5–5.3)
POTASSIUM SERPL-MCNC: 4.9 MMOL/L — SIGNIFICANT CHANGE UP (ref 3.5–5.3)
POTASSIUM SERPL-SCNC: 3.9 MMOL/L — SIGNIFICANT CHANGE UP (ref 3.5–5.3)
POTASSIUM SERPL-SCNC: 4.9 MMOL/L — SIGNIFICANT CHANGE UP (ref 3.5–5.3)
PROT UR-MCNC: >=300 MG/DL
PROTHROM AB SERPL-ACNC: 12 SEC — SIGNIFICANT CHANGE UP (ref 9.9–13.4)
RBC # BLD: 2.97 M/UL — LOW (ref 4.2–5.8)
RBC # BLD: 3.14 M/UL — LOW (ref 4.2–5.8)
RBC # BLD: 3.66 M/UL — LOW (ref 4.2–5.8)
RBC # FLD: 12.1 % — SIGNIFICANT CHANGE UP (ref 10.3–14.5)
RBC # FLD: 12.4 % — SIGNIFICANT CHANGE UP (ref 10.3–14.5)
RBC # FLD: 12.4 % — SIGNIFICANT CHANGE UP (ref 10.3–14.5)
RH IG SCN BLD-IMP: POSITIVE — SIGNIFICANT CHANGE UP
SODIUM SERPL-SCNC: 144 MMOL/L — SIGNIFICANT CHANGE UP (ref 135–145)
SODIUM SERPL-SCNC: 144 MMOL/L — SIGNIFICANT CHANGE UP (ref 135–145)
SP GR SPEC: 1.01 — SIGNIFICANT CHANGE UP (ref 1–1.03)
UROBILINOGEN FLD QL: 4 MG/DL (ref 0.2–1)
WBC # BLD: 14.18 K/UL — HIGH (ref 3.8–10.5)
WBC # BLD: 16.2 K/UL — HIGH (ref 3.8–10.5)
WBC # BLD: 18.75 K/UL — HIGH (ref 3.8–10.5)
WBC # FLD AUTO: 14.18 K/UL — HIGH (ref 3.8–10.5)
WBC # FLD AUTO: 16.2 K/UL — HIGH (ref 3.8–10.5)
WBC # FLD AUTO: 18.75 K/UL — HIGH (ref 3.8–10.5)

## 2025-07-16 PROCEDURE — 83605 ASSAY OF LACTIC ACID: CPT

## 2025-07-16 PROCEDURE — 36415 COLL VENOUS BLD VENIPUNCTURE: CPT

## 2025-07-16 PROCEDURE — 87040 BLOOD CULTURE FOR BACTERIA: CPT

## 2025-07-16 PROCEDURE — 85610 PROTHROMBIN TIME: CPT

## 2025-07-16 PROCEDURE — 71045 X-RAY EXAM CHEST 1 VIEW: CPT | Mod: 26

## 2025-07-16 PROCEDURE — 99291 CRITICAL CARE FIRST HOUR: CPT

## 2025-07-16 PROCEDURE — 81001 URINALYSIS AUTO W/SCOPE: CPT

## 2025-07-16 PROCEDURE — 85025 COMPLETE CBC W/AUTO DIFF WBC: CPT

## 2025-07-16 PROCEDURE — 85730 THROMBOPLASTIN TIME PARTIAL: CPT

## 2025-07-16 PROCEDURE — 99223 1ST HOSP IP/OBS HIGH 75: CPT | Mod: GC

## 2025-07-16 PROCEDURE — 80048 BASIC METABOLIC PNL TOTAL CA: CPT

## 2025-07-16 PROCEDURE — 99233 SBSQ HOSP IP/OBS HIGH 50: CPT

## 2025-07-16 PROCEDURE — 51703 INSERT BLADDER CATH COMPLEX: CPT

## 2025-07-16 PROCEDURE — 85027 COMPLETE CBC AUTOMATED: CPT

## 2025-07-16 PROCEDURE — 93010 ELECTROCARDIOGRAM REPORT: CPT

## 2025-07-16 PROCEDURE — 87086 URINE CULTURE/COLONY COUNT: CPT

## 2025-07-16 RX ORDER — MELATONIN 5 MG
3 TABLET ORAL AT BEDTIME
Refills: 0 | Status: DISCONTINUED | OUTPATIENT
Start: 2025-07-16 | End: 2025-07-17

## 2025-07-16 RX ORDER — ACETAMINOPHEN 500 MG/5ML
650 LIQUID (ML) ORAL EVERY 6 HOURS
Refills: 0 | Status: DISCONTINUED | OUTPATIENT
Start: 2025-07-16 | End: 2025-07-18

## 2025-07-16 RX ORDER — MIRABEGRON 50 MG/1
25 TABLET, FILM COATED, EXTENDED RELEASE ORAL ONCE
Refills: 0 | Status: COMPLETED | OUTPATIENT
Start: 2025-07-16 | End: 2025-07-16

## 2025-07-16 RX ORDER — CEFTRIAXONE 500 MG/1
1000 INJECTION, POWDER, FOR SOLUTION INTRAMUSCULAR; INTRAVENOUS ONCE
Refills: 0 | Status: COMPLETED | OUTPATIENT
Start: 2025-07-16 | End: 2025-07-16

## 2025-07-16 RX ADMIN — Medication 1000 MILLILITER(S): at 14:43

## 2025-07-16 RX ADMIN — Medication 1000 MILLILITER(S): at 13:12

## 2025-07-16 RX ADMIN — Medication 250 MILLILITER(S): at 14:43

## 2025-07-16 RX ADMIN — CEFTRIAXONE 100 MILLIGRAM(S): 500 INJECTION, POWDER, FOR SOLUTION INTRAMUSCULAR; INTRAVENOUS at 13:20

## 2025-07-17 DIAGNOSIS — D62 ACUTE POSTHEMORRHAGIC ANEMIA: ICD-10-CM

## 2025-07-17 DIAGNOSIS — A41.9 SEPSIS, UNSPECIFIED ORGANISM: ICD-10-CM

## 2025-07-17 DIAGNOSIS — B34.1 ENTEROVIRUS INFECTION, UNSPECIFIED: ICD-10-CM

## 2025-07-17 LAB
ALBUMIN SERPL ELPH-MCNC: 2.8 G/DL — LOW (ref 3.3–5)
ALP SERPL-CCNC: 49 U/L — SIGNIFICANT CHANGE UP (ref 40–120)
ALT FLD-CCNC: 6 U/L — LOW (ref 10–45)
ANION GAP SERPL CALC-SCNC: 10 MMOL/L — SIGNIFICANT CHANGE UP (ref 5–17)
ANION GAP SERPL CALC-SCNC: 6 MMOL/L — SIGNIFICANT CHANGE UP (ref 5–17)
APTT BLD: 27.3 SEC — SIGNIFICANT CHANGE UP (ref 26.1–36.8)
AST SERPL-CCNC: 11 U/L — SIGNIFICANT CHANGE UP (ref 10–40)
BASOPHILS # BLD AUTO: 0.03 K/UL — SIGNIFICANT CHANGE UP (ref 0–0.2)
BASOPHILS # BLD AUTO: 0.04 K/UL — SIGNIFICANT CHANGE UP (ref 0–0.2)
BASOPHILS # BLD AUTO: 0.05 K/UL — SIGNIFICANT CHANGE UP (ref 0–0.2)
BASOPHILS NFR BLD AUTO: 0.3 % — SIGNIFICANT CHANGE UP (ref 0–2)
BASOPHILS NFR BLD AUTO: 0.5 % — SIGNIFICANT CHANGE UP (ref 0–2)
BASOPHILS NFR BLD AUTO: 0.5 % — SIGNIFICANT CHANGE UP (ref 0–2)
BILIRUB SERPL-MCNC: <0.2 MG/DL — SIGNIFICANT CHANGE UP (ref 0.2–1.2)
BLD GP AB SCN SERPL QL: NEGATIVE — SIGNIFICANT CHANGE UP
BUN SERPL-MCNC: 23 MG/DL — SIGNIFICANT CHANGE UP (ref 7–23)
BUN SERPL-MCNC: 28 MG/DL — HIGH (ref 7–23)
CALCIUM SERPL-MCNC: 8.2 MG/DL — LOW (ref 8.4–10.5)
CALCIUM SERPL-MCNC: 8.2 MG/DL — LOW (ref 8.4–10.5)
CHLORIDE SERPL-SCNC: 112 MMOL/L — HIGH (ref 96–108)
CHLORIDE SERPL-SCNC: 113 MMOL/L — HIGH (ref 96–108)
CO2 SERPL-SCNC: 21 MMOL/L — LOW (ref 22–31)
CO2 SERPL-SCNC: 24 MMOL/L — SIGNIFICANT CHANGE UP (ref 22–31)
CREAT SERPL-MCNC: 0.84 MG/DL — SIGNIFICANT CHANGE UP (ref 0.5–1.3)
CREAT SERPL-MCNC: 1.01 MG/DL — SIGNIFICANT CHANGE UP (ref 0.5–1.3)
CULTURE RESULTS: SIGNIFICANT CHANGE UP
EGFR: 78 ML/MIN/1.73M2 — SIGNIFICANT CHANGE UP
EGFR: 78 ML/MIN/1.73M2 — SIGNIFICANT CHANGE UP
EGFR: 92 ML/MIN/1.73M2 — SIGNIFICANT CHANGE UP
EGFR: 92 ML/MIN/1.73M2 — SIGNIFICANT CHANGE UP
EOSINOPHIL # BLD AUTO: 0.08 K/UL — SIGNIFICANT CHANGE UP (ref 0–0.5)
EOSINOPHIL # BLD AUTO: 0.1 K/UL — SIGNIFICANT CHANGE UP (ref 0–0.5)
EOSINOPHIL # BLD AUTO: 0.49 K/UL — SIGNIFICANT CHANGE UP (ref 0–0.5)
EOSINOPHIL NFR BLD AUTO: 0.7 % — SIGNIFICANT CHANGE UP (ref 0–6)
EOSINOPHIL NFR BLD AUTO: 1.1 % — SIGNIFICANT CHANGE UP (ref 0–6)
EOSINOPHIL NFR BLD AUTO: 5.2 % — SIGNIFICANT CHANGE UP (ref 0–6)
GLUCOSE SERPL-MCNC: 108 MG/DL — HIGH (ref 70–99)
GLUCOSE SERPL-MCNC: 109 MG/DL — HIGH (ref 70–99)
HCT VFR BLD CALC: 20.9 % — CRITICAL LOW (ref 39–50)
HCT VFR BLD CALC: 21.1 % — LOW (ref 39–50)
HCT VFR BLD CALC: 22.2 % — LOW (ref 39–50)
HGB BLD-MCNC: 6.6 G/DL — CRITICAL LOW (ref 13–17)
HGB BLD-MCNC: 6.7 G/DL — CRITICAL LOW (ref 13–17)
HGB BLD-MCNC: 7 G/DL — CRITICAL LOW (ref 13–17)
IMM GRANULOCYTES # BLD AUTO: 0.04 K/UL — SIGNIFICANT CHANGE UP (ref 0–0.07)
IMM GRANULOCYTES # BLD AUTO: 0.05 K/UL — SIGNIFICANT CHANGE UP (ref 0–0.07)
IMM GRANULOCYTES # BLD AUTO: 0.05 K/UL — SIGNIFICANT CHANGE UP (ref 0–0.07)
IMM GRANULOCYTES NFR BLD AUTO: 0.4 % — SIGNIFICANT CHANGE UP (ref 0–0.9)
IMM GRANULOCYTES NFR BLD AUTO: 0.5 % — SIGNIFICANT CHANGE UP (ref 0–0.9)
IMM GRANULOCYTES NFR BLD AUTO: 0.5 % — SIGNIFICANT CHANGE UP (ref 0–0.9)
INR BLD: 1.11 — SIGNIFICANT CHANGE UP (ref 0.85–1.16)
LACTATE SERPL-SCNC: 1.9 MMOL/L — SIGNIFICANT CHANGE UP (ref 0.5–2)
LYMPHOCYTES # BLD AUTO: 0.76 K/UL — LOW (ref 1–3.3)
LYMPHOCYTES # BLD AUTO: 0.91 K/UL — LOW (ref 1–3.3)
LYMPHOCYTES # BLD AUTO: 1.52 K/UL — SIGNIFICANT CHANGE UP (ref 1–3.3)
LYMPHOCYTES NFR BLD AUTO: 16.1 % — SIGNIFICANT CHANGE UP (ref 13–44)
LYMPHOCYTES NFR BLD AUTO: 8.1 % — LOW (ref 13–44)
LYMPHOCYTES NFR BLD AUTO: 8.7 % — LOW (ref 13–44)
MAGNESIUM SERPL-MCNC: 1.8 MG/DL — SIGNIFICANT CHANGE UP (ref 1.6–2.6)
MAGNESIUM SERPL-MCNC: 1.8 MG/DL — SIGNIFICANT CHANGE UP (ref 1.6–2.6)
MCHC RBC-ENTMCNC: 31 PG — SIGNIFICANT CHANGE UP (ref 27–34)
MCHC RBC-ENTMCNC: 31.3 G/DL — LOW (ref 32–36)
MCHC RBC-ENTMCNC: 31.5 G/DL — LOW (ref 32–36)
MCHC RBC-ENTMCNC: 31.5 PG — SIGNIFICANT CHANGE UP (ref 27–34)
MCHC RBC-ENTMCNC: 31.9 PG — SIGNIFICANT CHANGE UP (ref 27–34)
MCHC RBC-ENTMCNC: 32.1 G/DL — SIGNIFICANT CHANGE UP (ref 32–36)
MCV RBC AUTO: 100 FL — SIGNIFICANT CHANGE UP (ref 80–100)
MCV RBC AUTO: 99.1 FL — SIGNIFICANT CHANGE UP (ref 80–100)
MCV RBC AUTO: 99.5 FL — SIGNIFICANT CHANGE UP (ref 80–100)
MONOCYTES # BLD AUTO: 0.47 K/UL — SIGNIFICANT CHANGE UP (ref 0–0.9)
MONOCYTES # BLD AUTO: 0.54 K/UL — SIGNIFICANT CHANGE UP (ref 0–0.9)
MONOCYTES # BLD AUTO: 0.55 K/UL — SIGNIFICANT CHANGE UP (ref 0–0.9)
MONOCYTES NFR BLD AUTO: 4.9 % — SIGNIFICANT CHANGE UP (ref 2–14)
MONOCYTES NFR BLD AUTO: 5.4 % — SIGNIFICANT CHANGE UP (ref 2–14)
MONOCYTES NFR BLD AUTO: 5.7 % — SIGNIFICANT CHANGE UP (ref 2–14)
NEUTROPHILS # BLD AUTO: 6.81 K/UL — SIGNIFICANT CHANGE UP (ref 1.8–7.4)
NEUTROPHILS # BLD AUTO: 7.31 K/UL — SIGNIFICANT CHANGE UP (ref 1.8–7.4)
NEUTROPHILS # BLD AUTO: 9.56 K/UL — HIGH (ref 1.8–7.4)
NEUTROPHILS NFR BLD AUTO: 72 % — SIGNIFICANT CHANGE UP (ref 43–77)
NEUTROPHILS NFR BLD AUTO: 83.8 % — HIGH (ref 43–77)
NEUTROPHILS NFR BLD AUTO: 85.6 % — HIGH (ref 43–77)
NRBC # BLD AUTO: 0 K/UL — SIGNIFICANT CHANGE UP (ref 0–0)
NRBC # FLD: 0 K/UL — SIGNIFICANT CHANGE UP (ref 0–0)
NRBC BLD AUTO-RTO: 0 /100 WBCS — SIGNIFICANT CHANGE UP (ref 0–0)
PHOSPHATE SERPL-MCNC: 3.1 MG/DL — SIGNIFICANT CHANGE UP (ref 2.5–4.5)
PHOSPHATE SERPL-MCNC: 3.3 MG/DL — SIGNIFICANT CHANGE UP (ref 2.5–4.5)
PHOSPHATE SERPL-MCNC: 3.3 MG/DL — SIGNIFICANT CHANGE UP (ref 2.5–4.5)
PLATELET # BLD AUTO: 225 K/UL — SIGNIFICANT CHANGE UP (ref 150–400)
PLATELET # BLD AUTO: 292 K/UL — SIGNIFICANT CHANGE UP (ref 150–400)
PLATELET # BLD AUTO: 297 K/UL — SIGNIFICANT CHANGE UP (ref 150–400)
PMV BLD: 8.9 FL — SIGNIFICANT CHANGE UP (ref 7–13)
PMV BLD: 9.1 FL — SIGNIFICANT CHANGE UP (ref 7–13)
PMV BLD: 9.2 FL — SIGNIFICANT CHANGE UP (ref 7–13)
POTASSIUM SERPL-MCNC: 3.6 MMOL/L — SIGNIFICANT CHANGE UP (ref 3.5–5.3)
POTASSIUM SERPL-MCNC: 4.1 MMOL/L — SIGNIFICANT CHANGE UP (ref 3.5–5.3)
POTASSIUM SERPL-SCNC: 3.6 MMOL/L — SIGNIFICANT CHANGE UP (ref 3.5–5.3)
POTASSIUM SERPL-SCNC: 4.1 MMOL/L — SIGNIFICANT CHANGE UP (ref 3.5–5.3)
PROT SERPL-MCNC: 5.3 G/DL — LOW (ref 6–8.3)
PROTHROM AB SERPL-ACNC: 12.7 SEC — SIGNIFICANT CHANGE UP (ref 9.9–13.4)
RAPID RVP RESULT: DETECTED
RBC # BLD: 2.1 M/UL — LOW (ref 4.2–5.8)
RBC # BLD: 2.13 M/UL — LOW (ref 4.2–5.8)
RBC # BLD: 2.22 M/UL — LOW (ref 4.2–5.8)
RBC # FLD: 12.7 % — SIGNIFICANT CHANGE UP (ref 10.3–14.5)
RBC # FLD: 12.9 % — SIGNIFICANT CHANGE UP (ref 10.3–14.5)
RBC # FLD: 13.9 % — SIGNIFICANT CHANGE UP (ref 10.3–14.5)
RH IG SCN BLD-IMP: POSITIVE — SIGNIFICANT CHANGE UP
RV+EV RNA SPEC QL NAA+PROBE: DETECTED
SARS-COV-2 RNA SPEC QL NAA+PROBE: SIGNIFICANT CHANGE UP
SODIUM SERPL-SCNC: 143 MMOL/L — SIGNIFICANT CHANGE UP (ref 135–145)
SODIUM SERPL-SCNC: 143 MMOL/L — SIGNIFICANT CHANGE UP (ref 135–145)
SPECIMEN SOURCE: SIGNIFICANT CHANGE UP
WBC # BLD: 11.18 K/UL — HIGH (ref 3.8–10.5)
WBC # BLD: 8.72 K/UL — SIGNIFICANT CHANGE UP (ref 3.8–10.5)
WBC # BLD: 9.46 K/UL — SIGNIFICANT CHANGE UP (ref 3.8–10.5)
WBC # FLD AUTO: 11.18 K/UL — HIGH (ref 3.8–10.5)
WBC # FLD AUTO: 8.72 K/UL — SIGNIFICANT CHANGE UP (ref 3.8–10.5)
WBC # FLD AUTO: 9.46 K/UL — SIGNIFICANT CHANGE UP (ref 3.8–10.5)

## 2025-07-17 PROCEDURE — 85730 THROMBOPLASTIN TIME PARTIAL: CPT

## 2025-07-17 PROCEDURE — 99233 SBSQ HOSP IP/OBS HIGH 50: CPT | Mod: GC

## 2025-07-17 PROCEDURE — 99233 SBSQ HOSP IP/OBS HIGH 50: CPT

## 2025-07-17 PROCEDURE — 84100 ASSAY OF PHOSPHORUS: CPT

## 2025-07-17 PROCEDURE — 36415 COLL VENOUS BLD VENIPUNCTURE: CPT

## 2025-07-17 PROCEDURE — 80053 COMPREHEN METABOLIC PANEL: CPT

## 2025-07-17 PROCEDURE — 83605 ASSAY OF LACTIC ACID: CPT

## 2025-07-17 PROCEDURE — 81001 URINALYSIS AUTO W/SCOPE: CPT

## 2025-07-17 PROCEDURE — 0225U NFCT DS DNA&RNA 21 SARSCOV2: CPT

## 2025-07-17 PROCEDURE — 87040 BLOOD CULTURE FOR BACTERIA: CPT

## 2025-07-17 PROCEDURE — 85025 COMPLETE CBC W/AUTO DIFF WBC: CPT

## 2025-07-17 PROCEDURE — 80048 BASIC METABOLIC PNL TOTAL CA: CPT

## 2025-07-17 PROCEDURE — 86850 RBC ANTIBODY SCREEN: CPT

## 2025-07-17 PROCEDURE — 86901 BLOOD TYPING SEROLOGIC RH(D): CPT

## 2025-07-17 PROCEDURE — 93005 ELECTROCARDIOGRAM TRACING: CPT

## 2025-07-17 PROCEDURE — 85027 COMPLETE CBC AUTOMATED: CPT

## 2025-07-17 PROCEDURE — 85610 PROTHROMBIN TIME: CPT

## 2025-07-17 PROCEDURE — 83735 ASSAY OF MAGNESIUM: CPT

## 2025-07-17 PROCEDURE — 87086 URINE CULTURE/COLONY COUNT: CPT

## 2025-07-17 PROCEDURE — 86900 BLOOD TYPING SEROLOGIC ABO: CPT

## 2025-07-17 RX ORDER — DONEPEZIL HYDROCHLORIDE 5 MG/1
5 TABLET ORAL AT BEDTIME
Refills: 0 | Status: DISCONTINUED | OUTPATIENT
Start: 2025-07-17 | End: 2025-07-18

## 2025-07-17 RX ORDER — TAMSULOSIN HYDROCHLORIDE 0.4 MG/1
0.4 CAPSULE ORAL AT BEDTIME
Refills: 0 | Status: DISCONTINUED | OUTPATIENT
Start: 2025-07-17 | End: 2025-07-18

## 2025-07-17 RX ORDER — MAGNESIUM SULFATE 500 MG/ML
2 SYRINGE (ML) INJECTION ONCE
Refills: 0 | Status: COMPLETED | OUTPATIENT
Start: 2025-07-17 | End: 2025-07-17

## 2025-07-17 RX ORDER — ATORVASTATIN CALCIUM 80 MG/1
20 TABLET, FILM COATED ORAL AT BEDTIME
Refills: 0 | Status: DISCONTINUED | OUTPATIENT
Start: 2025-07-17 | End: 2025-07-18

## 2025-07-17 RX ORDER — CEFTRIAXONE 500 MG/1
2000 INJECTION, POWDER, FOR SOLUTION INTRAMUSCULAR; INTRAVENOUS EVERY 24 HOURS
Refills: 0 | Status: DISCONTINUED | OUTPATIENT
Start: 2025-07-17 | End: 2025-07-17

## 2025-07-17 RX ORDER — MIRTAZAPINE 30 MG/1
15 TABLET, FILM COATED ORAL AT BEDTIME
Refills: 0 | Status: DISCONTINUED | OUTPATIENT
Start: 2025-07-17 | End: 2025-07-18

## 2025-07-17 RX ORDER — ESCITALOPRAM OXALATE 20 MG/1
10 TABLET ORAL DAILY
Refills: 0 | Status: DISCONTINUED | OUTPATIENT
Start: 2025-07-17 | End: 2025-07-18

## 2025-07-17 RX ADMIN — Medication 150 MILLILITER(S): at 09:37

## 2025-07-17 RX ADMIN — Medication 40 MILLIEQUIVALENT(S): at 19:18

## 2025-07-17 RX ADMIN — MIRABEGRON 25 MILLIGRAM(S): 50 TABLET, FILM COATED, EXTENDED RELEASE ORAL at 00:43

## 2025-07-17 RX ADMIN — DONEPEZIL HYDROCHLORIDE 5 MILLIGRAM(S): 5 TABLET ORAL at 23:21

## 2025-07-17 RX ADMIN — Medication 25 GRAM(S): at 19:17

## 2025-07-17 RX ADMIN — TAMSULOSIN HYDROCHLORIDE 0.4 MILLIGRAM(S): 0.4 CAPSULE ORAL at 23:21

## 2025-07-17 RX ADMIN — Medication 80 MILLILITER(S): at 00:55

## 2025-07-17 RX ADMIN — ESCITALOPRAM OXALATE 10 MILLIGRAM(S): 20 TABLET ORAL at 12:27

## 2025-07-17 RX ADMIN — MIRTAZAPINE 15 MILLIGRAM(S): 30 TABLET, FILM COATED ORAL at 23:22

## 2025-07-17 RX ADMIN — ATORVASTATIN CALCIUM 20 MILLIGRAM(S): 80 TABLET, FILM COATED ORAL at 23:21

## 2025-07-17 RX ADMIN — CEFTRIAXONE 100 MILLIGRAM(S): 500 INJECTION, POWDER, FOR SOLUTION INTRAMUSCULAR; INTRAVENOUS at 09:38

## 2025-07-18 LAB
ADD ON TEST-SPECIMEN IN LAB: SIGNIFICANT CHANGE UP
ANION GAP SERPL CALC-SCNC: 11 MMOL/L — SIGNIFICANT CHANGE UP (ref 5–17)
APTT BLD: 28.5 SEC — SIGNIFICANT CHANGE UP (ref 26.1–36.8)
BASE EXCESS BLDA CALC-SCNC: -2.1 MMOL/L — LOW (ref -2–3)
BUN SERPL-MCNC: 13 MG/DL — SIGNIFICANT CHANGE UP (ref 7–23)
CALCIUM SERPL-MCNC: 9.3 MG/DL — SIGNIFICANT CHANGE UP (ref 8.4–10.5)
CHLORIDE SERPL-SCNC: 112 MMOL/L — HIGH (ref 96–108)
CO2 BLDA-SCNC: 22 MMOL/L — SIGNIFICANT CHANGE UP (ref 19–24)
CO2 SERPL-SCNC: 20 MMOL/L — LOW (ref 22–31)
CREAT SERPL-MCNC: 0.66 MG/DL — SIGNIFICANT CHANGE UP (ref 0.5–1.3)
EGFR: 98 ML/MIN/1.73M2 — SIGNIFICANT CHANGE UP
EGFR: 98 ML/MIN/1.73M2 — SIGNIFICANT CHANGE UP
GAS PNL BLDA: SIGNIFICANT CHANGE UP
GAS PNL BLDA: SIGNIFICANT CHANGE UP
GLUCOSE SERPL-MCNC: 157 MG/DL — HIGH (ref 70–99)
HCO3 BLDA-SCNC: 21 MMOL/L — SIGNIFICANT CHANGE UP (ref 21–28)
HCT VFR BLD CALC: 23.3 % — LOW (ref 39–50)
HCT VFR BLD CALC: 31 % — LOW (ref 39–50)
HGB BLD-MCNC: 10.4 G/DL — LOW (ref 13–17)
HGB BLD-MCNC: 7.6 G/DL — LOW (ref 13–17)
INR BLD: 0.92 — SIGNIFICANT CHANGE UP (ref 0.85–1.16)
LACTATE SERPL-SCNC: 1.4 MMOL/L — SIGNIFICANT CHANGE UP (ref 0.5–2)
MAGNESIUM SERPL-MCNC: 1.7 MG/DL — SIGNIFICANT CHANGE UP (ref 1.6–2.6)
MCHC RBC-ENTMCNC: 30.7 PG — SIGNIFICANT CHANGE UP (ref 27–34)
MCHC RBC-ENTMCNC: 31.8 PG — SIGNIFICANT CHANGE UP (ref 27–34)
MCHC RBC-ENTMCNC: 32.6 G/DL — SIGNIFICANT CHANGE UP (ref 32–36)
MCHC RBC-ENTMCNC: 33.5 G/DL — SIGNIFICANT CHANGE UP (ref 32–36)
MCV RBC AUTO: 91.4 FL — SIGNIFICANT CHANGE UP (ref 80–100)
MCV RBC AUTO: 97.5 FL — SIGNIFICANT CHANGE UP (ref 80–100)
NRBC # BLD AUTO: 0 K/UL — SIGNIFICANT CHANGE UP (ref 0–0)
NRBC # BLD AUTO: 0 K/UL — SIGNIFICANT CHANGE UP (ref 0–0)
NRBC # FLD: 0 K/UL — SIGNIFICANT CHANGE UP (ref 0–0)
NRBC # FLD: 0 K/UL — SIGNIFICANT CHANGE UP (ref 0–0)
NRBC BLD AUTO-RTO: 0 /100 WBCS — SIGNIFICANT CHANGE UP (ref 0–0)
NRBC BLD AUTO-RTO: 0 /100 WBCS — SIGNIFICANT CHANGE UP (ref 0–0)
PCO2 BLDA: 30 MMHG — LOW (ref 35–48)
PH BLDA: 7.45 — SIGNIFICANT CHANGE UP (ref 7.35–7.45)
PHOSPHATE SERPL-MCNC: 4.6 MG/DL — HIGH (ref 2.5–4.5)
PLATELET # BLD AUTO: 184 K/UL — SIGNIFICANT CHANGE UP (ref 150–400)
PLATELET # BLD AUTO: 195 K/UL — SIGNIFICANT CHANGE UP (ref 150–400)
PMV BLD: 9 FL — SIGNIFICANT CHANGE UP (ref 7–13)
PMV BLD: 9.1 FL — SIGNIFICANT CHANGE UP (ref 7–13)
PO2 BLDA: 207 MMHG — HIGH (ref 83–108)
POTASSIUM SERPL-MCNC: 4.1 MMOL/L — SIGNIFICANT CHANGE UP (ref 3.5–5.3)
POTASSIUM SERPL-SCNC: 4.1 MMOL/L — SIGNIFICANT CHANGE UP (ref 3.5–5.3)
PROTHROM AB SERPL-ACNC: 10.8 SEC — SIGNIFICANT CHANGE UP (ref 9.9–13.4)
RBC # BLD: 2.39 M/UL — LOW (ref 4.2–5.8)
RBC # BLD: 3.39 M/UL — LOW (ref 4.2–5.8)
RBC # FLD: 14.2 % — SIGNIFICANT CHANGE UP (ref 10.3–14.5)
RBC # FLD: 15.9 % — HIGH (ref 10.3–14.5)
SAO2 % BLDA: 99.4 % — HIGH (ref 94–98)
SODIUM SERPL-SCNC: 143 MMOL/L — SIGNIFICANT CHANGE UP (ref 135–145)
WBC # BLD: 7.18 K/UL — SIGNIFICANT CHANGE UP (ref 3.8–10.5)
WBC # BLD: 8.39 K/UL — SIGNIFICANT CHANGE UP (ref 3.8–10.5)
WBC # FLD AUTO: 7.18 K/UL — SIGNIFICANT CHANGE UP (ref 3.8–10.5)
WBC # FLD AUTO: 8.39 K/UL — SIGNIFICANT CHANGE UP (ref 3.8–10.5)

## 2025-07-18 PROCEDURE — 86900 BLOOD TYPING SEROLOGIC ABO: CPT

## 2025-07-18 PROCEDURE — 37244 VASC EMBOLIZE/OCCLUDE BLEED: CPT

## 2025-07-18 PROCEDURE — 80048 BASIC METABOLIC PNL TOTAL CA: CPT

## 2025-07-18 PROCEDURE — 82803 BLOOD GASES ANY COMBINATION: CPT

## 2025-07-18 PROCEDURE — 51700 IRRIGATION OF BLADDER: CPT

## 2025-07-18 PROCEDURE — 87086 URINE CULTURE/COLONY COUNT: CPT

## 2025-07-18 PROCEDURE — 99232 SBSQ HOSP IP/OBS MODERATE 35: CPT

## 2025-07-18 PROCEDURE — 85384 FIBRINOGEN ACTIVITY: CPT

## 2025-07-18 PROCEDURE — 82947 ASSAY GLUCOSE BLOOD QUANT: CPT

## 2025-07-18 PROCEDURE — 81001 URINALYSIS AUTO W/SCOPE: CPT

## 2025-07-18 PROCEDURE — 82330 ASSAY OF CALCIUM: CPT

## 2025-07-18 PROCEDURE — 85730 THROMBOPLASTIN TIME PARTIAL: CPT

## 2025-07-18 PROCEDURE — 85027 COMPLETE CBC AUTOMATED: CPT

## 2025-07-18 PROCEDURE — 0225U NFCT DS DNA&RNA 21 SARSCOV2: CPT

## 2025-07-18 PROCEDURE — 84100 ASSAY OF PHOSPHORUS: CPT

## 2025-07-18 PROCEDURE — 84295 ASSAY OF SERUM SODIUM: CPT

## 2025-07-18 PROCEDURE — 71045 X-RAY EXAM CHEST 1 VIEW: CPT | Mod: 26,76

## 2025-07-18 PROCEDURE — 85025 COMPLETE CBC W/AUTO DIFF WBC: CPT

## 2025-07-18 PROCEDURE — 85610 PROTHROMBIN TIME: CPT

## 2025-07-18 PROCEDURE — 36247 INS CATH ABD/L-EXT ART 3RD: CPT | Mod: 59,LT

## 2025-07-18 PROCEDURE — 83735 ASSAY OF MAGNESIUM: CPT

## 2025-07-18 PROCEDURE — 86901 BLOOD TYPING SEROLOGIC RH(D): CPT

## 2025-07-18 PROCEDURE — 99223 1ST HOSP IP/OBS HIGH 75: CPT

## 2025-07-18 PROCEDURE — 80053 COMPREHEN METABOLIC PANEL: CPT

## 2025-07-18 PROCEDURE — 86923 COMPATIBILITY TEST ELECTRIC: CPT

## 2025-07-18 PROCEDURE — 83605 ASSAY OF LACTIC ACID: CPT

## 2025-07-18 PROCEDURE — 94002 VENT MGMT INPAT INIT DAY: CPT

## 2025-07-18 PROCEDURE — 82805 BLOOD GASES W/O2 SATURATION: CPT

## 2025-07-18 PROCEDURE — 93005 ELECTROCARDIOGRAM TRACING: CPT

## 2025-07-18 PROCEDURE — 76937 US GUIDE VASCULAR ACCESS: CPT | Mod: 26

## 2025-07-18 PROCEDURE — 36415 COLL VENOUS BLD VENIPUNCTURE: CPT

## 2025-07-18 PROCEDURE — 84132 ASSAY OF SERUM POTASSIUM: CPT

## 2025-07-18 PROCEDURE — 71045 X-RAY EXAM CHEST 1 VIEW: CPT

## 2025-07-18 PROCEDURE — 87040 BLOOD CULTURE FOR BACTERIA: CPT

## 2025-07-18 PROCEDURE — 86850 RBC ANTIBODY SCREEN: CPT

## 2025-07-18 RX ORDER — PROPOFOL 10 MG/ML
5 INJECTION, EMULSION INTRAVENOUS
Qty: 1000 | Refills: 0 | Status: DISCONTINUED | OUTPATIENT
Start: 2025-07-18 | End: 2025-07-19

## 2025-07-18 RX ORDER — DEXTROSE MONOHYDRATE 100 G/1000ML
1000 INJECTION, SOLUTION INTRAVENOUS
Refills: 0 | Status: DISCONTINUED | OUTPATIENT
Start: 2025-07-18 | End: 2025-07-18

## 2025-07-18 RX ORDER — FENTANYL CITRATE-0.9 % NACL/PF 100MCG/2ML
0.5 SYRINGE (ML) INTRAVENOUS
Qty: 2500 | Refills: 0 | Status: DISCONTINUED | OUTPATIENT
Start: 2025-07-18 | End: 2025-07-19

## 2025-07-18 RX ORDER — MIRABEGRON 50 MG/1
25 TABLET, FILM COATED, EXTENDED RELEASE ORAL ONCE
Refills: 0 | Status: DISCONTINUED | OUTPATIENT
Start: 2025-07-18 | End: 2025-07-19

## 2025-07-18 RX ORDER — SODIUM CHLORIDE 9 G/1000ML
1000 INJECTION, SOLUTION INTRAVENOUS
Refills: 0 | Status: DISCONTINUED | OUTPATIENT
Start: 2025-07-18 | End: 2025-07-19

## 2025-07-18 RX ORDER — DEXTROSE 50 % IN WATER 50 %
25 SYRINGE (ML) INTRAVENOUS ONCE
Refills: 0 | Status: DISCONTINUED | OUTPATIENT
Start: 2025-07-18 | End: 2025-07-18

## 2025-07-18 RX ORDER — MAGNESIUM SULFATE 500 MG/ML
1 SYRINGE (ML) INJECTION ONCE
Refills: 0 | Status: COMPLETED | OUTPATIENT
Start: 2025-07-18 | End: 2025-07-18

## 2025-07-18 RX ORDER — CALCIUM GLUCONATE 20 MG/ML
2 INJECTION, SOLUTION INTRAVENOUS ONCE
Refills: 0 | Status: COMPLETED | OUTPATIENT
Start: 2025-07-18 | End: 2025-07-18

## 2025-07-18 RX ORDER — INSULIN LISPRO 100 U/ML
INJECTION, SOLUTION INTRAVENOUS; SUBCUTANEOUS EVERY 6 HOURS
Refills: 0 | Status: DISCONTINUED | OUTPATIENT
Start: 2025-07-18 | End: 2025-07-22

## 2025-07-18 RX ORDER — CEFTRIAXONE 500 MG/1
2000 INJECTION, POWDER, FOR SOLUTION INTRAMUSCULAR; INTRAVENOUS EVERY 24 HOURS
Refills: 0 | Status: COMPLETED | OUTPATIENT
Start: 2025-07-18 | End: 2025-07-20

## 2025-07-18 RX ORDER — NOREPINEPHRINE BITARTRATE 8 MG
0.05 SOLUTION INTRAVENOUS
Qty: 8 | Refills: 0 | Status: DISCONTINUED | OUTPATIENT
Start: 2025-07-18 | End: 2025-07-18

## 2025-07-18 RX ADMIN — CALCIUM GLUCONATE 200 GRAM(S): 20 INJECTION, SOLUTION INTRAVENOUS at 20:16

## 2025-07-18 RX ADMIN — Medication 40 MILLIGRAM(S): at 21:28

## 2025-07-18 RX ADMIN — PROPOFOL 2.01 MICROGRAM(S)/KG/MIN: 10 INJECTION, EMULSION INTRAVENOUS at 20:16

## 2025-07-18 RX ADMIN — ESCITALOPRAM OXALATE 10 MILLIGRAM(S): 20 TABLET ORAL at 11:00

## 2025-07-18 RX ADMIN — Medication 100 GRAM(S): at 22:00

## 2025-07-18 RX ADMIN — SODIUM CHLORIDE 100 MILLILITER(S): 9 INJECTION, SOLUTION INTRAVENOUS at 22:01

## 2025-07-18 RX ADMIN — NOREPINEPHRINE BITARTRATE 6.27 MICROGRAM(S)/KG/MIN: 8 SOLUTION at 20:17

## 2025-07-18 RX ADMIN — Medication 3.35 MICROGRAM(S)/KG/HR: at 20:18

## 2025-07-18 RX ADMIN — CEFTRIAXONE 100 MILLIGRAM(S): 500 INJECTION, POWDER, FOR SOLUTION INTRAMUSCULAR; INTRAVENOUS at 21:28

## 2025-07-19 DIAGNOSIS — Z71.89 OTHER SPECIFIED COUNSELING: ICD-10-CM

## 2025-07-19 DIAGNOSIS — Z51.5 ENCOUNTER FOR PALLIATIVE CARE: ICD-10-CM

## 2025-07-19 DIAGNOSIS — F03.911 UNSPECIFIED DEMENTIA, UNSPECIFIED SEVERITY, WITH AGITATION: ICD-10-CM

## 2025-07-19 DIAGNOSIS — N32.89 OTHER SPECIFIED DISORDERS OF BLADDER: ICD-10-CM

## 2025-07-19 DIAGNOSIS — Z91.89 OTHER SPECIFIED PERSONAL RISK FACTORS, NOT ELSEWHERE CLASSIFIED: ICD-10-CM

## 2025-07-19 LAB
A1C WITH ESTIMATED AVERAGE GLUCOSE RESULT: 5 % — SIGNIFICANT CHANGE UP (ref 4–5.6)
ANION GAP SERPL CALC-SCNC: 11 MMOL/L — SIGNIFICANT CHANGE UP (ref 5–17)
ANION GAP SERPL CALC-SCNC: 7 MMOL/L — SIGNIFICANT CHANGE UP (ref 5–17)
APTT BLD: 27.5 SEC — SIGNIFICANT CHANGE UP (ref 26.1–36.8)
BASOPHILS # BLD AUTO: 0.02 K/UL — SIGNIFICANT CHANGE UP (ref 0–0.2)
BASOPHILS NFR BLD AUTO: 0.3 % — SIGNIFICANT CHANGE UP (ref 0–2)
BUN SERPL-MCNC: 10 MG/DL — SIGNIFICANT CHANGE UP (ref 7–23)
BUN SERPL-MCNC: 12 MG/DL — SIGNIFICANT CHANGE UP (ref 7–23)
CALCIUM SERPL-MCNC: 8.2 MG/DL — LOW (ref 8.4–10.5)
CALCIUM SERPL-MCNC: 8.8 MG/DL — SIGNIFICANT CHANGE UP (ref 8.4–10.5)
CHLORIDE SERPL-SCNC: 108 MMOL/L — SIGNIFICANT CHANGE UP (ref 96–108)
CHLORIDE SERPL-SCNC: 109 MMOL/L — HIGH (ref 96–108)
CO2 SERPL-SCNC: 21 MMOL/L — LOW (ref 22–31)
CO2 SERPL-SCNC: 23 MMOL/L — SIGNIFICANT CHANGE UP (ref 22–31)
CREAT SERPL-MCNC: 0.64 MG/DL — SIGNIFICANT CHANGE UP (ref 0.5–1.3)
CREAT SERPL-MCNC: 0.64 MG/DL — SIGNIFICANT CHANGE UP (ref 0.5–1.3)
EGFR: 99 ML/MIN/1.73M2 — SIGNIFICANT CHANGE UP
EOSINOPHIL # BLD AUTO: 0.07 K/UL — SIGNIFICANT CHANGE UP (ref 0–0.5)
EOSINOPHIL NFR BLD AUTO: 1.1 % — SIGNIFICANT CHANGE UP (ref 0–6)
ESTIMATED AVERAGE GLUCOSE: 97 MG/DL — SIGNIFICANT CHANGE UP (ref 68–114)
GLUCOSE SERPL-MCNC: 109 MG/DL — HIGH (ref 70–99)
GLUCOSE SERPL-MCNC: 109 MG/DL — HIGH (ref 70–99)
HCT VFR BLD CALC: 27.6 % — LOW (ref 39–50)
HCT VFR BLD CALC: 28.3 % — LOW (ref 39–50)
HCT VFR BLD CALC: 28.6 % — LOW (ref 39–50)
HGB BLD-MCNC: 9.3 G/DL — LOW (ref 13–17)
HGB BLD-MCNC: 9.4 G/DL — LOW (ref 13–17)
HGB BLD-MCNC: 9.5 G/DL — LOW (ref 13–17)
IMM GRANULOCYTES # BLD AUTO: 0.03 K/UL — SIGNIFICANT CHANGE UP (ref 0–0.07)
IMM GRANULOCYTES NFR BLD AUTO: 0.5 % — SIGNIFICANT CHANGE UP (ref 0–0.9)
INR BLD: 0.94 — SIGNIFICANT CHANGE UP (ref 0.85–1.16)
LYMPHOCYTES # BLD AUTO: 0.8 K/UL — LOW (ref 1–3.3)
LYMPHOCYTES NFR BLD AUTO: 13.1 % — SIGNIFICANT CHANGE UP (ref 13–44)
MAGNESIUM SERPL-MCNC: 1.8 MG/DL — SIGNIFICANT CHANGE UP (ref 1.6–2.6)
MCHC RBC-ENTMCNC: 30.1 PG — SIGNIFICANT CHANGE UP (ref 27–34)
MCHC RBC-ENTMCNC: 30.4 PG — SIGNIFICANT CHANGE UP (ref 27–34)
MCHC RBC-ENTMCNC: 30.6 PG — SIGNIFICANT CHANGE UP (ref 27–34)
MCHC RBC-ENTMCNC: 32.9 G/DL — SIGNIFICANT CHANGE UP (ref 32–36)
MCHC RBC-ENTMCNC: 33.2 G/DL — SIGNIFICANT CHANGE UP (ref 32–36)
MCHC RBC-ENTMCNC: 34.1 G/DL — SIGNIFICANT CHANGE UP (ref 32–36)
MCV RBC AUTO: 89.9 FL — SIGNIFICANT CHANGE UP (ref 80–100)
MCV RBC AUTO: 91.4 FL — SIGNIFICANT CHANGE UP (ref 80–100)
MCV RBC AUTO: 91.6 FL — SIGNIFICANT CHANGE UP (ref 80–100)
MONOCYTES # BLD AUTO: 0.37 K/UL — SIGNIFICANT CHANGE UP (ref 0–0.9)
MONOCYTES NFR BLD AUTO: 6.1 % — SIGNIFICANT CHANGE UP (ref 2–14)
NEUTROPHILS # BLD AUTO: 4.82 K/UL — SIGNIFICANT CHANGE UP (ref 1.8–7.4)
NEUTROPHILS NFR BLD AUTO: 78.9 % — HIGH (ref 43–77)
NRBC # BLD AUTO: 0 K/UL — SIGNIFICANT CHANGE UP (ref 0–0)
NRBC # FLD: 0 K/UL — SIGNIFICANT CHANGE UP (ref 0–0)
NRBC BLD AUTO-RTO: 0 /100 WBCS — SIGNIFICANT CHANGE UP (ref 0–0)
PHOSPHATE SERPL-MCNC: 4.2 MG/DL — SIGNIFICANT CHANGE UP (ref 2.5–4.5)
PLATELET # BLD AUTO: 150 K/UL — SIGNIFICANT CHANGE UP (ref 150–400)
PLATELET # BLD AUTO: 167 K/UL — SIGNIFICANT CHANGE UP (ref 150–400)
PLATELET # BLD AUTO: 171 K/UL — SIGNIFICANT CHANGE UP (ref 150–400)
PMV BLD: 9.1 FL — SIGNIFICANT CHANGE UP (ref 7–13)
PMV BLD: 9.5 FL — SIGNIFICANT CHANGE UP (ref 7–13)
PMV BLD: 9.6 FL — SIGNIFICANT CHANGE UP (ref 7–13)
POTASSIUM SERPL-MCNC: 3.7 MMOL/L — SIGNIFICANT CHANGE UP (ref 3.5–5.3)
POTASSIUM SERPL-MCNC: 3.8 MMOL/L — SIGNIFICANT CHANGE UP (ref 3.5–5.3)
POTASSIUM SERPL-SCNC: 3.7 MMOL/L — SIGNIFICANT CHANGE UP (ref 3.5–5.3)
POTASSIUM SERPL-SCNC: 3.8 MMOL/L — SIGNIFICANT CHANGE UP (ref 3.5–5.3)
PROTHROM AB SERPL-ACNC: 11 SEC — SIGNIFICANT CHANGE UP (ref 9.9–13.4)
RBC # BLD: 3.07 M/UL — LOW (ref 4.2–5.8)
RBC # BLD: 3.09 M/UL — LOW (ref 4.2–5.8)
RBC # BLD: 3.13 M/UL — LOW (ref 4.2–5.8)
RBC # FLD: 16.1 % — HIGH (ref 10.3–14.5)
SODIUM SERPL-SCNC: 139 MMOL/L — SIGNIFICANT CHANGE UP (ref 135–145)
SODIUM SERPL-SCNC: 140 MMOL/L — SIGNIFICANT CHANGE UP (ref 135–145)
WBC # BLD: 10.47 K/UL — SIGNIFICANT CHANGE UP (ref 3.8–10.5)
WBC # BLD: 6.11 K/UL — SIGNIFICANT CHANGE UP (ref 3.8–10.5)
WBC # BLD: 9.58 K/UL — SIGNIFICANT CHANGE UP (ref 3.8–10.5)
WBC # FLD AUTO: 10.47 K/UL — SIGNIFICANT CHANGE UP (ref 3.8–10.5)
WBC # FLD AUTO: 6.11 K/UL — SIGNIFICANT CHANGE UP (ref 3.8–10.5)
WBC # FLD AUTO: 9.58 K/UL — SIGNIFICANT CHANGE UP (ref 3.8–10.5)

## 2025-07-19 PROCEDURE — 99291 CRITICAL CARE FIRST HOUR: CPT

## 2025-07-19 PROCEDURE — 82330 ASSAY OF CALCIUM: CPT

## 2025-07-19 PROCEDURE — 86850 RBC ANTIBODY SCREEN: CPT

## 2025-07-19 PROCEDURE — 71045 X-RAY EXAM CHEST 1 VIEW: CPT

## 2025-07-19 PROCEDURE — 86900 BLOOD TYPING SEROLOGIC ABO: CPT

## 2025-07-19 PROCEDURE — 84132 ASSAY OF SERUM POTASSIUM: CPT

## 2025-07-19 PROCEDURE — 83605 ASSAY OF LACTIC ACID: CPT

## 2025-07-19 PROCEDURE — 86923 COMPATIBILITY TEST ELECTRIC: CPT

## 2025-07-19 PROCEDURE — 0225U NFCT DS DNA&RNA 21 SARSCOV2: CPT

## 2025-07-19 PROCEDURE — 84295 ASSAY OF SERUM SODIUM: CPT

## 2025-07-19 PROCEDURE — 93005 ELECTROCARDIOGRAM TRACING: CPT

## 2025-07-19 PROCEDURE — 87086 URINE CULTURE/COLONY COUNT: CPT

## 2025-07-19 PROCEDURE — 85610 PROTHROMBIN TIME: CPT

## 2025-07-19 PROCEDURE — 93010 ELECTROCARDIOGRAM REPORT: CPT

## 2025-07-19 PROCEDURE — 71045 X-RAY EXAM CHEST 1 VIEW: CPT | Mod: 26,77

## 2025-07-19 PROCEDURE — 36415 COLL VENOUS BLD VENIPUNCTURE: CPT

## 2025-07-19 PROCEDURE — 82947 ASSAY GLUCOSE BLOOD QUANT: CPT

## 2025-07-19 PROCEDURE — 94002 VENT MGMT INPAT INIT DAY: CPT

## 2025-07-19 PROCEDURE — 83036 HEMOGLOBIN GLYCOSYLATED A1C: CPT

## 2025-07-19 PROCEDURE — 87040 BLOOD CULTURE FOR BACTERIA: CPT

## 2025-07-19 PROCEDURE — 85025 COMPLETE CBC W/AUTO DIFF WBC: CPT

## 2025-07-19 PROCEDURE — 84100 ASSAY OF PHOSPHORUS: CPT

## 2025-07-19 PROCEDURE — 80053 COMPREHEN METABOLIC PANEL: CPT

## 2025-07-19 PROCEDURE — 85730 THROMBOPLASTIN TIME PARTIAL: CPT

## 2025-07-19 PROCEDURE — 99232 SBSQ HOSP IP/OBS MODERATE 35: CPT

## 2025-07-19 PROCEDURE — 85027 COMPLETE CBC AUTOMATED: CPT

## 2025-07-19 PROCEDURE — 80048 BASIC METABOLIC PNL TOTAL CA: CPT

## 2025-07-19 PROCEDURE — 82803 BLOOD GASES ANY COMBINATION: CPT

## 2025-07-19 PROCEDURE — 85384 FIBRINOGEN ACTIVITY: CPT

## 2025-07-19 PROCEDURE — 82805 BLOOD GASES W/O2 SATURATION: CPT

## 2025-07-19 PROCEDURE — 81001 URINALYSIS AUTO W/SCOPE: CPT

## 2025-07-19 PROCEDURE — 86901 BLOOD TYPING SEROLOGIC RH(D): CPT

## 2025-07-19 PROCEDURE — 82962 GLUCOSE BLOOD TEST: CPT

## 2025-07-19 PROCEDURE — 71045 X-RAY EXAM CHEST 1 VIEW: CPT | Mod: 26,76

## 2025-07-19 PROCEDURE — 83735 ASSAY OF MAGNESIUM: CPT

## 2025-07-19 RX ORDER — MAGNESIUM SULFATE 500 MG/ML
1 SYRINGE (ML) INJECTION ONCE
Refills: 0 | Status: COMPLETED | OUTPATIENT
Start: 2025-07-19 | End: 2025-07-19

## 2025-07-19 RX ORDER — MIRABEGRON 50 MG/1
25 TABLET, FILM COATED, EXTENDED RELEASE ORAL DAILY
Refills: 0 | Status: DISCONTINUED | OUTPATIENT
Start: 2025-07-19 | End: 2025-07-22

## 2025-07-19 RX ORDER — DEXMEDETOMIDINE HYDROCHLORIDE IN SODIUM CHLORIDE 4 UG/ML
0.5 INJECTION INTRAVENOUS
Qty: 400 | Refills: 0 | Status: DISCONTINUED | OUTPATIENT
Start: 2025-07-19 | End: 2025-07-19

## 2025-07-19 RX ORDER — MIRABEGRON 50 MG/1
25 TABLET, FILM COATED, EXTENDED RELEASE ORAL DAILY
Refills: 0 | Status: DISCONTINUED | OUTPATIENT
Start: 2025-07-19 | End: 2025-07-19

## 2025-07-19 RX ORDER — DIPHENHYDRAMINE HCL 12.5MG/5ML
50 ELIXIR ORAL ONCE
Refills: 0 | Status: DISCONTINUED | OUTPATIENT
Start: 2025-07-19 | End: 2025-07-19

## 2025-07-19 RX ORDER — SODIUM CHLORIDE 9 G/1000ML
1000 INJECTION, SOLUTION INTRAVENOUS
Refills: 0 | Status: DISCONTINUED | OUTPATIENT
Start: 2025-07-19 | End: 2025-07-20

## 2025-07-19 RX ORDER — DEXMEDETOMIDINE HYDROCHLORIDE IN SODIUM CHLORIDE 4 UG/ML
0.5 INJECTION INTRAVENOUS
Qty: 400 | Refills: 0 | Status: DISCONTINUED | OUTPATIENT
Start: 2025-07-19 | End: 2025-07-20

## 2025-07-19 RX ADMIN — Medication 15 MILLILITER(S): at 06:41

## 2025-07-19 RX ADMIN — PROPOFOL 2.01 MICROGRAM(S)/KG/MIN: 10 INJECTION, EMULSION INTRAVENOUS at 04:59

## 2025-07-19 RX ADMIN — Medication 1 APPLICATION(S): at 06:41

## 2025-07-19 RX ADMIN — SODIUM CHLORIDE 75 MILLILITER(S): 9 INJECTION, SOLUTION INTRAVENOUS at 16:28

## 2025-07-19 RX ADMIN — Medication 100 GRAM(S): at 06:41

## 2025-07-19 RX ADMIN — Medication 100 MILLIEQUIVALENT(S): at 18:29

## 2025-07-19 RX ADMIN — Medication 100 MILLIEQUIVALENT(S): at 20:31

## 2025-07-19 RX ADMIN — Medication 100 MILLIEQUIVALENT(S): at 06:41

## 2025-07-19 RX ADMIN — CEFTRIAXONE 100 MILLIGRAM(S): 500 INJECTION, POWDER, FOR SOLUTION INTRAMUSCULAR; INTRAVENOUS at 21:45

## 2025-07-19 RX ADMIN — Medication 40 MILLIGRAM(S): at 11:54

## 2025-07-19 RX ADMIN — DEXMEDETOMIDINE HYDROCHLORIDE IN SODIUM CHLORIDE 8.36 MICROGRAM(S)/KG/HR: 4 INJECTION INTRAVENOUS at 14:00

## 2025-07-19 RX ADMIN — Medication 100 MILLIEQUIVALENT(S): at 10:16

## 2025-07-20 LAB
ANION GAP SERPL CALC-SCNC: 10 MMOL/L — SIGNIFICANT CHANGE UP (ref 5–17)
ANION GAP SERPL CALC-SCNC: 10 MMOL/L — SIGNIFICANT CHANGE UP (ref 5–17)
BUN SERPL-MCNC: 6 MG/DL — LOW (ref 7–23)
BUN SERPL-MCNC: 7 MG/DL — SIGNIFICANT CHANGE UP (ref 7–23)
CALCIUM SERPL-MCNC: 8.2 MG/DL — LOW (ref 8.4–10.5)
CALCIUM SERPL-MCNC: 8.3 MG/DL — LOW (ref 8.4–10.5)
CHLORIDE SERPL-SCNC: 104 MMOL/L — SIGNIFICANT CHANGE UP (ref 96–108)
CHLORIDE SERPL-SCNC: 104 MMOL/L — SIGNIFICANT CHANGE UP (ref 96–108)
CO2 SERPL-SCNC: 23 MMOL/L — SIGNIFICANT CHANGE UP (ref 22–31)
CO2 SERPL-SCNC: 24 MMOL/L — SIGNIFICANT CHANGE UP (ref 22–31)
CREAT SERPL-MCNC: 0.61 MG/DL — SIGNIFICANT CHANGE UP (ref 0.5–1.3)
CREAT SERPL-MCNC: 0.67 MG/DL — SIGNIFICANT CHANGE UP (ref 0.5–1.3)
EGFR: 101 ML/MIN/1.73M2 — SIGNIFICANT CHANGE UP
EGFR: 101 ML/MIN/1.73M2 — SIGNIFICANT CHANGE UP
EGFR: 98 ML/MIN/1.73M2 — SIGNIFICANT CHANGE UP
EGFR: 98 ML/MIN/1.73M2 — SIGNIFICANT CHANGE UP
GLUCOSE SERPL-MCNC: 109 MG/DL — HIGH (ref 70–99)
GLUCOSE SERPL-MCNC: 133 MG/DL — HIGH (ref 70–99)
HCT VFR BLD CALC: 28.5 % — LOW (ref 39–50)
HCT VFR BLD CALC: 28.7 % — LOW (ref 39–50)
HGB BLD-MCNC: 9.7 G/DL — LOW (ref 13–17)
HGB BLD-MCNC: 9.8 G/DL — LOW (ref 13–17)
MAGNESIUM SERPL-MCNC: 1.5 MG/DL — LOW (ref 1.6–2.6)
MAGNESIUM SERPL-MCNC: 2 MG/DL — SIGNIFICANT CHANGE UP (ref 1.6–2.6)
MCHC RBC-ENTMCNC: 30.5 PG — SIGNIFICANT CHANGE UP (ref 27–34)
MCHC RBC-ENTMCNC: 30.6 PG — SIGNIFICANT CHANGE UP (ref 27–34)
MCHC RBC-ENTMCNC: 34 G/DL — SIGNIFICANT CHANGE UP (ref 32–36)
MCHC RBC-ENTMCNC: 34.1 G/DL — SIGNIFICANT CHANGE UP (ref 32–36)
MCV RBC AUTO: 89.6 FL — SIGNIFICANT CHANGE UP (ref 80–100)
MCV RBC AUTO: 89.7 FL — SIGNIFICANT CHANGE UP (ref 80–100)
NRBC # BLD AUTO: 0 K/UL — SIGNIFICANT CHANGE UP (ref 0–0)
NRBC # BLD AUTO: 0 K/UL — SIGNIFICANT CHANGE UP (ref 0–0)
NRBC # FLD: 0 K/UL — SIGNIFICANT CHANGE UP (ref 0–0)
NRBC # FLD: 0 K/UL — SIGNIFICANT CHANGE UP (ref 0–0)
NRBC BLD AUTO-RTO: 0 /100 WBCS — SIGNIFICANT CHANGE UP (ref 0–0)
NRBC BLD AUTO-RTO: 0 /100 WBCS — SIGNIFICANT CHANGE UP (ref 0–0)
PHOSPHATE SERPL-MCNC: 2.6 MG/DL — SIGNIFICANT CHANGE UP (ref 2.5–4.5)
PHOSPHATE SERPL-MCNC: 2.9 MG/DL — SIGNIFICANT CHANGE UP (ref 2.5–4.5)
PLATELET # BLD AUTO: 185 K/UL — SIGNIFICANT CHANGE UP (ref 150–400)
PLATELET # BLD AUTO: 186 K/UL — SIGNIFICANT CHANGE UP (ref 150–400)
PMV BLD: 9.3 FL — SIGNIFICANT CHANGE UP (ref 7–13)
PMV BLD: 9.4 FL — SIGNIFICANT CHANGE UP (ref 7–13)
POTASSIUM SERPL-MCNC: 3.3 MMOL/L — LOW (ref 3.5–5.3)
POTASSIUM SERPL-MCNC: 3.5 MMOL/L — SIGNIFICANT CHANGE UP (ref 3.5–5.3)
POTASSIUM SERPL-SCNC: 3.3 MMOL/L — LOW (ref 3.5–5.3)
POTASSIUM SERPL-SCNC: 3.5 MMOL/L — SIGNIFICANT CHANGE UP (ref 3.5–5.3)
RBC # BLD: 3.18 M/UL — LOW (ref 4.2–5.8)
RBC # BLD: 3.2 M/UL — LOW (ref 4.2–5.8)
RBC # FLD: 15 % — HIGH (ref 10.3–14.5)
RBC # FLD: 15.3 % — HIGH (ref 10.3–14.5)
SODIUM SERPL-SCNC: 137 MMOL/L — SIGNIFICANT CHANGE UP (ref 135–145)
SODIUM SERPL-SCNC: 138 MMOL/L — SIGNIFICANT CHANGE UP (ref 135–145)
WBC # BLD: 10.03 K/UL — SIGNIFICANT CHANGE UP (ref 3.8–10.5)
WBC # BLD: 9.89 K/UL — SIGNIFICANT CHANGE UP (ref 3.8–10.5)
WBC # FLD AUTO: 10.03 K/UL — SIGNIFICANT CHANGE UP (ref 3.8–10.5)
WBC # FLD AUTO: 9.89 K/UL — SIGNIFICANT CHANGE UP (ref 3.8–10.5)

## 2025-07-20 PROCEDURE — 84100 ASSAY OF PHOSPHORUS: CPT

## 2025-07-20 PROCEDURE — 82962 GLUCOSE BLOOD TEST: CPT

## 2025-07-20 PROCEDURE — 84295 ASSAY OF SERUM SODIUM: CPT

## 2025-07-20 PROCEDURE — 85610 PROTHROMBIN TIME: CPT

## 2025-07-20 PROCEDURE — 82947 ASSAY GLUCOSE BLOOD QUANT: CPT

## 2025-07-20 PROCEDURE — 82805 BLOOD GASES W/O2 SATURATION: CPT

## 2025-07-20 PROCEDURE — 83735 ASSAY OF MAGNESIUM: CPT

## 2025-07-20 PROCEDURE — 36415 COLL VENOUS BLD VENIPUNCTURE: CPT

## 2025-07-20 PROCEDURE — 85384 FIBRINOGEN ACTIVITY: CPT

## 2025-07-20 PROCEDURE — 71045 X-RAY EXAM CHEST 1 VIEW: CPT

## 2025-07-20 PROCEDURE — 80053 COMPREHEN METABOLIC PANEL: CPT

## 2025-07-20 PROCEDURE — 82803 BLOOD GASES ANY COMBINATION: CPT

## 2025-07-20 PROCEDURE — 83036 HEMOGLOBIN GLYCOSYLATED A1C: CPT

## 2025-07-20 PROCEDURE — 0225U NFCT DS DNA&RNA 21 SARSCOV2: CPT

## 2025-07-20 PROCEDURE — 94002 VENT MGMT INPAT INIT DAY: CPT

## 2025-07-20 PROCEDURE — 83605 ASSAY OF LACTIC ACID: CPT

## 2025-07-20 PROCEDURE — 87040 BLOOD CULTURE FOR BACTERIA: CPT

## 2025-07-20 PROCEDURE — 85730 THROMBOPLASTIN TIME PARTIAL: CPT

## 2025-07-20 PROCEDURE — 86923 COMPATIBILITY TEST ELECTRIC: CPT

## 2025-07-20 PROCEDURE — 86901 BLOOD TYPING SEROLOGIC RH(D): CPT

## 2025-07-20 PROCEDURE — 99232 SBSQ HOSP IP/OBS MODERATE 35: CPT

## 2025-07-20 PROCEDURE — 85027 COMPLETE CBC AUTOMATED: CPT

## 2025-07-20 PROCEDURE — 86900 BLOOD TYPING SEROLOGIC ABO: CPT

## 2025-07-20 PROCEDURE — 87086 URINE CULTURE/COLONY COUNT: CPT

## 2025-07-20 PROCEDURE — 86850 RBC ANTIBODY SCREEN: CPT

## 2025-07-20 PROCEDURE — 81001 URINALYSIS AUTO W/SCOPE: CPT

## 2025-07-20 PROCEDURE — 93005 ELECTROCARDIOGRAM TRACING: CPT

## 2025-07-20 PROCEDURE — 82330 ASSAY OF CALCIUM: CPT

## 2025-07-20 PROCEDURE — 85025 COMPLETE CBC W/AUTO DIFF WBC: CPT

## 2025-07-20 PROCEDURE — 84132 ASSAY OF SERUM POTASSIUM: CPT

## 2025-07-20 PROCEDURE — 80048 BASIC METABOLIC PNL TOTAL CA: CPT

## 2025-07-20 RX ORDER — HEPARIN SODIUM 1000 [USP'U]/ML
5000 INJECTION INTRAVENOUS; SUBCUTANEOUS EVERY 8 HOURS
Refills: 0 | Status: DISCONTINUED | OUTPATIENT
Start: 2025-07-20 | End: 2025-07-22

## 2025-07-20 RX ORDER — MAGNESIUM SULFATE 500 MG/ML
2 SYRINGE (ML) INJECTION ONCE
Refills: 0 | Status: COMPLETED | OUTPATIENT
Start: 2025-07-20 | End: 2025-07-20

## 2025-07-20 RX ORDER — AMLODIPINE BESYLATE 10 MG/1
5 TABLET ORAL EVERY 24 HOURS
Refills: 0 | Status: DISCONTINUED | OUTPATIENT
Start: 2025-07-20 | End: 2025-07-21

## 2025-07-20 RX ORDER — MAGNESIUM SULFATE 500 MG/ML
1 SYRINGE (ML) INJECTION ONCE
Refills: 0 | Status: COMPLETED | OUTPATIENT
Start: 2025-07-20 | End: 2025-07-20

## 2025-07-20 RX ORDER — SOD PHOS DI, MONO/K PHOS MONO 250 MG
1 TABLET ORAL ONCE
Refills: 0 | Status: COMPLETED | OUTPATIENT
Start: 2025-07-20 | End: 2025-07-20

## 2025-07-20 RX ADMIN — Medication 100 MILLIEQUIVALENT(S): at 09:58

## 2025-07-20 RX ADMIN — Medication 40 MILLIEQUIVALENT(S): at 17:34

## 2025-07-20 RX ADMIN — Medication 1 PACKET(S): at 17:33

## 2025-07-20 RX ADMIN — AMLODIPINE BESYLATE 5 MILLIGRAM(S): 10 TABLET ORAL at 17:33

## 2025-07-20 RX ADMIN — Medication 25 GRAM(S): at 06:45

## 2025-07-20 RX ADMIN — SODIUM CHLORIDE 75 MILLILITER(S): 9 INJECTION, SOLUTION INTRAVENOUS at 06:44

## 2025-07-20 RX ADMIN — Medication 1 APPLICATION(S): at 06:59

## 2025-07-20 RX ADMIN — HEPARIN SODIUM 5000 UNIT(S): 1000 INJECTION INTRAVENOUS; SUBCUTANEOUS at 19:02

## 2025-07-20 RX ADMIN — Medication 100 MILLIEQUIVALENT(S): at 09:08

## 2025-07-20 RX ADMIN — Medication 100 GRAM(S): at 14:39

## 2025-07-20 RX ADMIN — CEFTRIAXONE 100 MILLIGRAM(S): 500 INJECTION, POWDER, FOR SOLUTION INTRAMUSCULAR; INTRAVENOUS at 21:59

## 2025-07-21 LAB
ANION GAP SERPL CALC-SCNC: 7 MMOL/L — SIGNIFICANT CHANGE UP (ref 5–17)
BUN SERPL-MCNC: 6 MG/DL — LOW (ref 7–23)
CALCIUM SERPL-MCNC: 8.2 MG/DL — LOW (ref 8.4–10.5)
CHLORIDE SERPL-SCNC: 104 MMOL/L — SIGNIFICANT CHANGE UP (ref 96–108)
CO2 SERPL-SCNC: 24 MMOL/L — SIGNIFICANT CHANGE UP (ref 22–31)
CREAT SERPL-MCNC: 0.69 MG/DL — SIGNIFICANT CHANGE UP (ref 0.5–1.3)
CULTURE RESULTS: SIGNIFICANT CHANGE UP
CULTURE RESULTS: SIGNIFICANT CHANGE UP
EGFR: 97 ML/MIN/1.73M2 — SIGNIFICANT CHANGE UP
EGFR: 97 ML/MIN/1.73M2 — SIGNIFICANT CHANGE UP
GLUCOSE SERPL-MCNC: 101 MG/DL — HIGH (ref 70–99)
HCT VFR BLD CALC: 27.3 % — LOW (ref 39–50)
HCT VFR BLD CALC: 31 % — LOW (ref 39–50)
HGB BLD-MCNC: 10.2 G/DL — LOW (ref 13–17)
HGB BLD-MCNC: 9.3 G/DL — LOW (ref 13–17)
LACTATE BLDV-MCNC: 1.2 MMOL/L — SIGNIFICANT CHANGE UP (ref 0.5–2)
MAGNESIUM SERPL-MCNC: 1.8 MG/DL — SIGNIFICANT CHANGE UP (ref 1.6–2.6)
MCHC RBC-ENTMCNC: 30.9 PG — SIGNIFICANT CHANGE UP (ref 27–34)
MCHC RBC-ENTMCNC: 31.1 PG — SIGNIFICANT CHANGE UP (ref 27–34)
MCHC RBC-ENTMCNC: 32.9 G/DL — SIGNIFICANT CHANGE UP (ref 32–36)
MCHC RBC-ENTMCNC: 34.1 G/DL — SIGNIFICANT CHANGE UP (ref 32–36)
MCV RBC AUTO: 91.3 FL — SIGNIFICANT CHANGE UP (ref 80–100)
MCV RBC AUTO: 93.9 FL — SIGNIFICANT CHANGE UP (ref 80–100)
NRBC # BLD AUTO: 0 K/UL — SIGNIFICANT CHANGE UP (ref 0–0)
NRBC # BLD AUTO: 0 K/UL — SIGNIFICANT CHANGE UP (ref 0–0)
NRBC # FLD: 0 K/UL — SIGNIFICANT CHANGE UP (ref 0–0)
NRBC # FLD: 0 K/UL — SIGNIFICANT CHANGE UP (ref 0–0)
NRBC BLD AUTO-RTO: 0 /100 WBCS — SIGNIFICANT CHANGE UP (ref 0–0)
NRBC BLD AUTO-RTO: 0 /100 WBCS — SIGNIFICANT CHANGE UP (ref 0–0)
PHOSPHATE SERPL-MCNC: 2.7 MG/DL — SIGNIFICANT CHANGE UP (ref 2.5–4.5)
PLATELET # BLD AUTO: 170 K/UL — SIGNIFICANT CHANGE UP (ref 150–400)
PLATELET # BLD AUTO: 190 K/UL — SIGNIFICANT CHANGE UP (ref 150–400)
PMV BLD: 10.8 FL — SIGNIFICANT CHANGE UP (ref 7–13)
PMV BLD: 9.4 FL — SIGNIFICANT CHANGE UP (ref 7–13)
POTASSIUM SERPL-MCNC: 3.6 MMOL/L — SIGNIFICANT CHANGE UP (ref 3.5–5.3)
POTASSIUM SERPL-SCNC: 3.6 MMOL/L — SIGNIFICANT CHANGE UP (ref 3.5–5.3)
RBC # BLD: 2.99 M/UL — LOW (ref 4.2–5.8)
RBC # BLD: 3.3 M/UL — LOW (ref 4.2–5.8)
RBC # FLD: 15.1 % — HIGH (ref 10.3–14.5)
RBC # FLD: 15.1 % — HIGH (ref 10.3–14.5)
SODIUM SERPL-SCNC: 135 MMOL/L — SIGNIFICANT CHANGE UP (ref 135–145)
SPECIMEN SOURCE: SIGNIFICANT CHANGE UP
SPECIMEN SOURCE: SIGNIFICANT CHANGE UP
WBC # BLD: 12.39 K/UL — HIGH (ref 3.8–10.5)
WBC # BLD: 14.53 K/UL — HIGH (ref 3.8–10.5)
WBC # FLD AUTO: 12.39 K/UL — HIGH (ref 3.8–10.5)
WBC # FLD AUTO: 14.53 K/UL — HIGH (ref 3.8–10.5)

## 2025-07-21 PROCEDURE — 86923 COMPATIBILITY TEST ELECTRIC: CPT

## 2025-07-21 PROCEDURE — 86901 BLOOD TYPING SEROLOGIC RH(D): CPT

## 2025-07-21 PROCEDURE — 94002 VENT MGMT INPAT INIT DAY: CPT

## 2025-07-21 PROCEDURE — 83605 ASSAY OF LACTIC ACID: CPT

## 2025-07-21 PROCEDURE — P9073: CPT

## 2025-07-21 PROCEDURE — P9016: CPT

## 2025-07-21 PROCEDURE — 82947 ASSAY GLUCOSE BLOOD QUANT: CPT

## 2025-07-21 PROCEDURE — 85730 THROMBOPLASTIN TIME PARTIAL: CPT

## 2025-07-21 PROCEDURE — 87040 BLOOD CULTURE FOR BACTERIA: CPT

## 2025-07-21 PROCEDURE — 84100 ASSAY OF PHOSPHORUS: CPT

## 2025-07-21 PROCEDURE — 82803 BLOOD GASES ANY COMBINATION: CPT

## 2025-07-21 PROCEDURE — 82330 ASSAY OF CALCIUM: CPT

## 2025-07-21 PROCEDURE — 93005 ELECTROCARDIOGRAM TRACING: CPT

## 2025-07-21 PROCEDURE — 80048 BASIC METABOLIC PNL TOTAL CA: CPT

## 2025-07-21 PROCEDURE — 99233 SBSQ HOSP IP/OBS HIGH 50: CPT | Mod: GC

## 2025-07-21 PROCEDURE — 84132 ASSAY OF SERUM POTASSIUM: CPT

## 2025-07-21 PROCEDURE — P9059: CPT

## 2025-07-21 PROCEDURE — 97165 OT EVAL LOW COMPLEX 30 MIN: CPT

## 2025-07-21 PROCEDURE — 36430 TRANSFUSION BLD/BLD COMPNT: CPT

## 2025-07-21 PROCEDURE — 81001 URINALYSIS AUTO W/SCOPE: CPT

## 2025-07-21 PROCEDURE — 86850 RBC ANTIBODY SCREEN: CPT

## 2025-07-21 PROCEDURE — 83735 ASSAY OF MAGNESIUM: CPT

## 2025-07-21 PROCEDURE — 99497 ADVNCD CARE PLAN 30 MIN: CPT | Mod: 25

## 2025-07-21 PROCEDURE — 82805 BLOOD GASES W/O2 SATURATION: CPT

## 2025-07-21 PROCEDURE — 97162 PT EVAL MOD COMPLEX 30 MIN: CPT

## 2025-07-21 PROCEDURE — 99233 SBSQ HOSP IP/OBS HIGH 50: CPT

## 2025-07-21 PROCEDURE — 80053 COMPREHEN METABOLIC PANEL: CPT

## 2025-07-21 PROCEDURE — 85025 COMPLETE CBC W/AUTO DIFF WBC: CPT

## 2025-07-21 PROCEDURE — 84295 ASSAY OF SERUM SODIUM: CPT

## 2025-07-21 PROCEDURE — 0225U NFCT DS DNA&RNA 21 SARSCOV2: CPT

## 2025-07-21 PROCEDURE — 36415 COLL VENOUS BLD VENIPUNCTURE: CPT

## 2025-07-21 PROCEDURE — 85384 FIBRINOGEN ACTIVITY: CPT

## 2025-07-21 PROCEDURE — 83036 HEMOGLOBIN GLYCOSYLATED A1C: CPT

## 2025-07-21 PROCEDURE — 87086 URINE CULTURE/COLONY COUNT: CPT

## 2025-07-21 PROCEDURE — 82962 GLUCOSE BLOOD TEST: CPT

## 2025-07-21 PROCEDURE — 92610 EVALUATE SWALLOWING FUNCTION: CPT

## 2025-07-21 PROCEDURE — 86900 BLOOD TYPING SEROLOGIC ABO: CPT

## 2025-07-21 PROCEDURE — 85027 COMPLETE CBC AUTOMATED: CPT

## 2025-07-21 PROCEDURE — 71045 X-RAY EXAM CHEST 1 VIEW: CPT

## 2025-07-21 PROCEDURE — 85610 PROTHROMBIN TIME: CPT

## 2025-07-21 RX ORDER — ESCITALOPRAM OXALATE 20 MG/1
10 TABLET ORAL DAILY
Refills: 0 | Status: DISCONTINUED | OUTPATIENT
Start: 2025-07-21 | End: 2025-07-22

## 2025-07-21 RX ORDER — ACETAMINOPHEN 500 MG/5ML
650 LIQUID (ML) ORAL EVERY 6 HOURS
Refills: 0 | Status: DISCONTINUED | OUTPATIENT
Start: 2025-07-21 | End: 2025-07-22

## 2025-07-21 RX ORDER — MIRTAZAPINE 30 MG/1
15 TABLET, FILM COATED ORAL AT BEDTIME
Refills: 0 | Status: DISCONTINUED | OUTPATIENT
Start: 2025-07-21 | End: 2025-07-22

## 2025-07-21 RX ORDER — ATORVASTATIN CALCIUM 80 MG/1
20 TABLET, FILM COATED ORAL AT BEDTIME
Refills: 0 | Status: DISCONTINUED | OUTPATIENT
Start: 2025-07-21 | End: 2025-07-22

## 2025-07-21 RX ORDER — DONEPEZIL HYDROCHLORIDE 5 MG/1
5 TABLET ORAL AT BEDTIME
Refills: 0 | Status: DISCONTINUED | OUTPATIENT
Start: 2025-07-21 | End: 2025-07-22

## 2025-07-21 RX ADMIN — HEPARIN SODIUM 5000 UNIT(S): 1000 INJECTION INTRAVENOUS; SUBCUTANEOUS at 13:12

## 2025-07-21 RX ADMIN — Medication 1 APPLICATION(S): at 05:36

## 2025-07-21 RX ADMIN — Medication 125 MILLIGRAM(S): at 17:21

## 2025-07-21 RX ADMIN — ATORVASTATIN CALCIUM 20 MILLIGRAM(S): 80 TABLET, FILM COATED ORAL at 22:19

## 2025-07-21 RX ADMIN — Medication 650 MILLIGRAM(S): at 22:19

## 2025-07-21 RX ADMIN — MIRABEGRON 25 MILLIGRAM(S): 50 TABLET, FILM COATED, EXTENDED RELEASE ORAL at 13:24

## 2025-07-21 RX ADMIN — ESCITALOPRAM OXALATE 10 MILLIGRAM(S): 20 TABLET ORAL at 17:20

## 2025-07-21 RX ADMIN — HEPARIN SODIUM 5000 UNIT(S): 1000 INJECTION INTRAVENOUS; SUBCUTANEOUS at 05:37

## 2025-07-21 RX ADMIN — DONEPEZIL HYDROCHLORIDE 5 MILLIGRAM(S): 5 TABLET ORAL at 22:19

## 2025-07-21 RX ADMIN — MIRTAZAPINE 15 MILLIGRAM(S): 30 TABLET, FILM COATED ORAL at 22:19

## 2025-07-21 RX ADMIN — HEPARIN SODIUM 5000 UNIT(S): 1000 INJECTION INTRAVENOUS; SUBCUTANEOUS at 22:20

## 2025-07-22 ENCOUNTER — RESULT REVIEW (OUTPATIENT)
Age: 75
End: 2025-07-22

## 2025-07-22 DIAGNOSIS — Z91.89 OTHER SPECIFIED PERSONAL RISK FACTORS, NOT ELSEWHERE CLASSIFIED: ICD-10-CM

## 2025-07-22 LAB
ADD ON TEST-SPECIMEN IN LAB: SIGNIFICANT CHANGE UP
ANION GAP SERPL CALC-SCNC: 8 MMOL/L — SIGNIFICANT CHANGE UP (ref 5–17)
ANION GAP SERPL CALC-SCNC: 9 MMOL/L — SIGNIFICANT CHANGE UP (ref 5–17)
APPEARANCE UR: ABNORMAL
APTT BLD: 29.6 SEC — SIGNIFICANT CHANGE UP (ref 26.1–36.8)
BILIRUB UR-MCNC: ABNORMAL
BLD GP AB SCN SERPL QL: NEGATIVE — SIGNIFICANT CHANGE UP
BUN SERPL-MCNC: 10 MG/DL — SIGNIFICANT CHANGE UP (ref 7–23)
BUN SERPL-MCNC: 12 MG/DL — SIGNIFICANT CHANGE UP (ref 7–23)
BUN SERPL-MCNC: 9 MG/DL — SIGNIFICANT CHANGE UP (ref 7–23)
CALCIUM SERPL-MCNC: 8 MG/DL — LOW (ref 8.4–10.5)
CALCIUM SERPL-MCNC: 8.1 MG/DL — LOW (ref 8.4–10.5)
CALCIUM SERPL-MCNC: SIGNIFICANT CHANGE UP (ref 8.4–10.5)
CHLORIDE SERPL-SCNC: 102 MMOL/L — SIGNIFICANT CHANGE UP (ref 96–108)
CHLORIDE SERPL-SCNC: 105 MMOL/L — SIGNIFICANT CHANGE UP (ref 96–108)
CHLORIDE SERPL-SCNC: 106 MMOL/L — SIGNIFICANT CHANGE UP (ref 96–108)
CO2 SERPL-SCNC: 21 MMOL/L — LOW (ref 22–31)
CO2 SERPL-SCNC: 23 MMOL/L — SIGNIFICANT CHANGE UP (ref 22–31)
CO2 SERPL-SCNC: SIGNIFICANT CHANGE UP (ref 22–31)
COLOR SPEC: ABNORMAL
CREAT SERPL-MCNC: 0.61 MG/DL — SIGNIFICANT CHANGE UP (ref 0.5–1.3)
CREAT SERPL-MCNC: 0.76 MG/DL — SIGNIFICANT CHANGE UP (ref 0.5–1.3)
CREAT SERPL-MCNC: 0.81 MG/DL — SIGNIFICANT CHANGE UP (ref 0.5–1.3)
CRP SERPL-MCNC: 160.9 MG/L — HIGH (ref 0–4)
CYSTATIN C SERPL-MCNC: 0.78 MG/L — LOW (ref 0.82–1.52)
DIFF PNL FLD: ABNORMAL
EGFR: 101 ML/MIN/1.73M2 — SIGNIFICANT CHANGE UP
EGFR: 101 ML/MIN/1.73M2 — SIGNIFICANT CHANGE UP
EGFR: 93 ML/MIN/1.73M2 — SIGNIFICANT CHANGE UP
EGFR: 93 ML/MIN/1.73M2 — SIGNIFICANT CHANGE UP
EGFR: 94 ML/MIN/1.73M2 — SIGNIFICANT CHANGE UP
EGFR: 94 ML/MIN/1.73M2 — SIGNIFICANT CHANGE UP
GFR/BSA.PRED SERPLBLD CYS-BASED-ARV: 100 ML/MIN/1.73M2 — SIGNIFICANT CHANGE UP
GLUCOSE SERPL-MCNC: 109 MG/DL — HIGH (ref 70–99)
GLUCOSE SERPL-MCNC: 159 MG/DL — HIGH (ref 70–99)
GLUCOSE SERPL-MCNC: 99 MG/DL — SIGNIFICANT CHANGE UP (ref 70–99)
GLUCOSE UR QL: 100 MG/DL
HCT VFR BLD CALC: 25.7 % — LOW (ref 39–50)
HGB BLD-MCNC: 8.5 G/DL — LOW (ref 13–17)
INR BLD: 1.09 — SIGNIFICANT CHANGE UP (ref 0.85–1.16)
KETONES UR QL: 40 MG/DL
LEUKOCYTE ESTERASE UR-ACNC: ABNORMAL
MAGNESIUM SERPL-MCNC: 2 MG/DL — SIGNIFICANT CHANGE UP (ref 1.6–2.6)
MCHC RBC-ENTMCNC: 30.8 PG — SIGNIFICANT CHANGE UP (ref 27–34)
MCHC RBC-ENTMCNC: 33.1 G/DL — SIGNIFICANT CHANGE UP (ref 32–36)
MCV RBC AUTO: 93.1 FL — SIGNIFICANT CHANGE UP (ref 80–100)
MRSA PCR RESULT.: POSITIVE
NITRITE UR-MCNC: POSITIVE
NRBC # BLD AUTO: 0 K/UL — SIGNIFICANT CHANGE UP (ref 0–0)
NRBC # FLD: 0 K/UL — SIGNIFICANT CHANGE UP (ref 0–0)
NRBC BLD AUTO-RTO: 0 /100 WBCS — SIGNIFICANT CHANGE UP (ref 0–0)
PH UR: 8.5 (ref 5–8)
PHOSPHATE SERPL-MCNC: 3.3 MG/DL — SIGNIFICANT CHANGE UP (ref 2.5–4.5)
PLATELET # BLD AUTO: 170 K/UL — SIGNIFICANT CHANGE UP (ref 150–400)
PMV BLD: 9.5 FL — SIGNIFICANT CHANGE UP (ref 7–13)
POTASSIUM SERPL-MCNC: 3.4 MMOL/L — LOW (ref 3.5–5.3)
POTASSIUM SERPL-MCNC: 4.3 MMOL/L — SIGNIFICANT CHANGE UP (ref 3.5–5.3)
POTASSIUM SERPL-MCNC: SIGNIFICANT CHANGE UP (ref 3.5–5.3)
POTASSIUM SERPL-SCNC: 3.4 MMOL/L — LOW (ref 3.5–5.3)
POTASSIUM SERPL-SCNC: 4.3 MMOL/L — SIGNIFICANT CHANGE UP (ref 3.5–5.3)
POTASSIUM SERPL-SCNC: SIGNIFICANT CHANGE UP (ref 3.5–5.3)
PROT UR-MCNC: >=300 MG/DL
PROTHROM AB SERPL-ACNC: 12.7 SEC — SIGNIFICANT CHANGE UP (ref 9.9–13.4)
RAPID RVP RESULT: DETECTED
RBC # BLD: 2.76 M/UL — LOW (ref 4.2–5.8)
RBC # FLD: 15.1 % — HIGH (ref 10.3–14.5)
RH IG SCN BLD-IMP: POSITIVE — SIGNIFICANT CHANGE UP
RV+EV RNA SPEC QL NAA+PROBE: DETECTED
S AUREUS DNA NOSE QL NAA+PROBE: POSITIVE
SARS-COV-2 RNA SPEC QL NAA+PROBE: SIGNIFICANT CHANGE UP
SODIUM SERPL-SCNC: 133 MMOL/L — LOW (ref 135–145)
SODIUM SERPL-SCNC: 136 MMOL/L — SIGNIFICANT CHANGE UP (ref 135–145)
SODIUM SERPL-SCNC: SIGNIFICANT CHANGE UP (ref 135–145)
SP GR SPEC: 1.01 — SIGNIFICANT CHANGE UP (ref 1–1.03)
UROBILINOGEN FLD QL: 4 MG/DL (ref 0.2–1)
WBC # BLD: 11.78 K/UL — HIGH (ref 3.8–10.5)
WBC # FLD AUTO: 11.78 K/UL — HIGH (ref 3.8–10.5)

## 2025-07-22 PROCEDURE — 0225U NFCT DS DNA&RNA 21 SARSCOV2: CPT

## 2025-07-22 PROCEDURE — 86140 C-REACTIVE PROTEIN: CPT

## 2025-07-22 PROCEDURE — 85730 THROMBOPLASTIN TIME PARTIAL: CPT

## 2025-07-22 PROCEDURE — 93306 TTE W/DOPPLER COMPLETE: CPT | Mod: 26

## 2025-07-22 PROCEDURE — P9073: CPT

## 2025-07-22 PROCEDURE — 82947 ASSAY GLUCOSE BLOOD QUANT: CPT

## 2025-07-22 PROCEDURE — 84295 ASSAY OF SERUM SODIUM: CPT

## 2025-07-22 PROCEDURE — C1894: CPT

## 2025-07-22 PROCEDURE — 82805 BLOOD GASES W/O2 SATURATION: CPT

## 2025-07-22 PROCEDURE — 84100 ASSAY OF PHOSPHORUS: CPT

## 2025-07-22 PROCEDURE — P9016: CPT

## 2025-07-22 PROCEDURE — 99233 SBSQ HOSP IP/OBS HIGH 50: CPT

## 2025-07-22 PROCEDURE — 97530 THERAPEUTIC ACTIVITIES: CPT

## 2025-07-22 PROCEDURE — 86850 RBC ANTIBODY SCREEN: CPT

## 2025-07-22 PROCEDURE — 85007 BL SMEAR W/DIFF WBC COUNT: CPT

## 2025-07-22 PROCEDURE — 71045 X-RAY EXAM CHEST 1 VIEW: CPT

## 2025-07-22 PROCEDURE — 94002 VENT MGMT INPAT INIT DAY: CPT

## 2025-07-22 PROCEDURE — 97162 PT EVAL MOD COMPLEX 30 MIN: CPT

## 2025-07-22 PROCEDURE — 71045 X-RAY EXAM CHEST 1 VIEW: CPT | Mod: 26

## 2025-07-22 PROCEDURE — 85025 COMPLETE CBC W/AUTO DIFF WBC: CPT

## 2025-07-22 PROCEDURE — 99497 ADVNCD CARE PLAN 30 MIN: CPT

## 2025-07-22 PROCEDURE — 36415 COLL VENOUS BLD VENIPUNCTURE: CPT

## 2025-07-22 PROCEDURE — C1889: CPT

## 2025-07-22 PROCEDURE — 83605 ASSAY OF LACTIC ACID: CPT

## 2025-07-22 PROCEDURE — 87640 STAPH A DNA AMP PROBE: CPT

## 2025-07-22 PROCEDURE — 86923 COMPATIBILITY TEST ELECTRIC: CPT

## 2025-07-22 PROCEDURE — 85384 FIBRINOGEN ACTIVITY: CPT

## 2025-07-22 PROCEDURE — 81001 URINALYSIS AUTO W/SCOPE: CPT

## 2025-07-22 PROCEDURE — 82803 BLOOD GASES ANY COMBINATION: CPT

## 2025-07-22 PROCEDURE — C1887: CPT

## 2025-07-22 PROCEDURE — 85610 PROTHROMBIN TIME: CPT

## 2025-07-22 PROCEDURE — 87040 BLOOD CULTURE FOR BACTERIA: CPT

## 2025-07-22 PROCEDURE — P9059: CPT

## 2025-07-22 PROCEDURE — 86901 BLOOD TYPING SEROLOGIC RH(D): CPT

## 2025-07-22 PROCEDURE — 82330 ASSAY OF CALCIUM: CPT

## 2025-07-22 PROCEDURE — C1769: CPT

## 2025-07-22 PROCEDURE — 83735 ASSAY OF MAGNESIUM: CPT

## 2025-07-22 PROCEDURE — 93005 ELECTROCARDIOGRAM TRACING: CPT

## 2025-07-22 PROCEDURE — 97165 OT EVAL LOW COMPLEX 30 MIN: CPT

## 2025-07-22 PROCEDURE — 87641 MR-STAPH DNA AMP PROBE: CPT

## 2025-07-22 PROCEDURE — 84132 ASSAY OF SERUM POTASSIUM: CPT

## 2025-07-22 PROCEDURE — 80048 BASIC METABOLIC PNL TOTAL CA: CPT

## 2025-07-22 PROCEDURE — 80053 COMPREHEN METABOLIC PANEL: CPT

## 2025-07-22 PROCEDURE — C1751: CPT

## 2025-07-22 PROCEDURE — 82962 GLUCOSE BLOOD TEST: CPT

## 2025-07-22 PROCEDURE — 83036 HEMOGLOBIN GLYCOSYLATED A1C: CPT

## 2025-07-22 PROCEDURE — 82610 CYSTATIN C: CPT

## 2025-07-22 PROCEDURE — 87086 URINE CULTURE/COLONY COUNT: CPT

## 2025-07-22 PROCEDURE — 85027 COMPLETE CBC AUTOMATED: CPT

## 2025-07-22 PROCEDURE — 86900 BLOOD TYPING SEROLOGIC ABO: CPT

## 2025-07-22 PROCEDURE — 92610 EVALUATE SWALLOWING FUNCTION: CPT

## 2025-07-22 PROCEDURE — 36430 TRANSFUSION BLD/BLD COMPNT: CPT

## 2025-07-22 PROCEDURE — 84145 PROCALCITONIN (PCT): CPT

## 2025-07-22 RX ORDER — SODIUM CHLORIDE 9 G/1000ML
1000 INJECTION, SOLUTION INTRAVENOUS
Refills: 0 | Status: DISCONTINUED | OUTPATIENT
Start: 2025-07-22 | End: 2025-07-23

## 2025-07-22 RX ORDER — SODIUM CHLORIDE 9 G/1000ML
500 INJECTION, SOLUTION INTRAVENOUS
Refills: 0 | Status: DISCONTINUED | OUTPATIENT
Start: 2025-07-22 | End: 2025-07-22

## 2025-07-22 RX ORDER — SODIUM CHLORIDE 9 G/1000ML
1000 INJECTION, SOLUTION INTRAVENOUS
Refills: 0 | Status: DISCONTINUED | OUTPATIENT
Start: 2025-07-22 | End: 2025-07-22

## 2025-07-22 RX ORDER — DEXTROSE 50 % IN WATER 50 %
25 SYRINGE (ML) INTRAVENOUS ONCE
Refills: 0 | Status: DISCONTINUED | OUTPATIENT
Start: 2025-07-22 | End: 2025-07-23

## 2025-07-22 RX ORDER — VANCOMYCIN HCL IN 5 % DEXTROSE 1.5G/250ML
1000 PLASTIC BAG, INJECTION (ML) INTRAVENOUS EVERY 12 HOURS
Refills: 0 | Status: DISCONTINUED | OUTPATIENT
Start: 2025-07-22 | End: 2025-07-23

## 2025-07-22 RX ORDER — PIPERACILLIN-TAZO-DEXTROSE,ISO 3.375G/5
3.38 IV SOLUTION, PIGGYBACK PREMIX FROZEN(ML) INTRAVENOUS ONCE
Refills: 0 | Status: COMPLETED | OUTPATIENT
Start: 2025-07-22 | End: 2025-07-22

## 2025-07-22 RX ORDER — VANCOMYCIN HCL IN 5 % DEXTROSE 1.5G/250ML
1000 PLASTIC BAG, INJECTION (ML) INTRAVENOUS EVERY 12 HOURS
Refills: 0 | Status: DISCONTINUED | OUTPATIENT
Start: 2025-07-22 | End: 2025-07-22

## 2025-07-22 RX ORDER — SODIUM CHLORIDE 9 G/1000ML
1000 INJECTION, SOLUTION INTRAVENOUS ONCE
Refills: 0 | Status: COMPLETED | OUTPATIENT
Start: 2025-07-22 | End: 2025-07-22

## 2025-07-22 RX ORDER — INSULIN LISPRO 100 U/ML
INJECTION, SOLUTION INTRAVENOUS; SUBCUTANEOUS EVERY 6 HOURS
Refills: 0 | Status: DISCONTINUED | OUTPATIENT
Start: 2025-07-22 | End: 2025-07-23

## 2025-07-22 RX ORDER — MAGNESIUM SULFATE 500 MG/ML
1 SYRINGE (ML) INJECTION ONCE
Refills: 0 | Status: COMPLETED | OUTPATIENT
Start: 2025-07-22 | End: 2025-07-22

## 2025-07-22 RX ORDER — PIPERACILLIN-TAZO-DEXTROSE,ISO 3.375G/5
3.38 IV SOLUTION, PIGGYBACK PREMIX FROZEN(ML) INTRAVENOUS ONCE
Refills: 0 | Status: DISCONTINUED | OUTPATIENT
Start: 2025-07-22 | End: 2025-07-22

## 2025-07-22 RX ORDER — ACETAMINOPHEN 500 MG/5ML
1000 LIQUID (ML) ORAL EVERY 6 HOURS
Refills: 0 | Status: DISCONTINUED | OUTPATIENT
Start: 2025-07-22 | End: 2025-07-23

## 2025-07-22 RX ORDER — POTASSIUM PHOSPHATE, MONOBASIC POTASSIUM PHOSPHATE, DIBASIC INJECTION, 236; 224 MG/ML; MG/ML
15 SOLUTION, CONCENTRATE INTRAVENOUS ONCE
Refills: 0 | Status: COMPLETED | OUTPATIENT
Start: 2025-07-22 | End: 2025-07-22

## 2025-07-22 RX ORDER — GLUCAGON 3 MG/1
1 POWDER NASAL ONCE
Refills: 0 | Status: DISCONTINUED | OUTPATIENT
Start: 2025-07-22 | End: 2025-07-23

## 2025-07-22 RX ORDER — PIPERACILLIN-TAZO-DEXTROSE,ISO 3.375G/5
4.5 IV SOLUTION, PIGGYBACK PREMIX FROZEN(ML) INTRAVENOUS EVERY 8 HOURS
Refills: 0 | Status: DISCONTINUED | OUTPATIENT
Start: 2025-07-22 | End: 2025-07-23

## 2025-07-22 RX ORDER — CEFTRIAXONE 500 MG/1
2000 INJECTION, POWDER, FOR SOLUTION INTRAMUSCULAR; INTRAVENOUS ONCE
Refills: 0 | Status: DISCONTINUED | OUTPATIENT
Start: 2025-07-22 | End: 2025-07-22

## 2025-07-22 RX ORDER — PIPERACILLIN-TAZO-DEXTROSE,ISO 3.375G/5
3.38 IV SOLUTION, PIGGYBACK PREMIX FROZEN(ML) INTRAVENOUS EVERY 8 HOURS
Refills: 0 | Status: DISCONTINUED | OUTPATIENT
Start: 2025-07-22 | End: 2025-07-22

## 2025-07-22 RX ORDER — DEXTROSE 50 % IN WATER 50 %
15 SYRINGE (ML) INTRAVENOUS ONCE
Refills: 0 | Status: DISCONTINUED | OUTPATIENT
Start: 2025-07-22 | End: 2025-07-23

## 2025-07-22 RX ORDER — CEFTRIAXONE 500 MG/1
INJECTION, POWDER, FOR SOLUTION INTRAMUSCULAR; INTRAVENOUS
Refills: 0 | Status: DISCONTINUED | OUTPATIENT
Start: 2025-07-22 | End: 2025-07-22

## 2025-07-22 RX ADMIN — SODIUM CHLORIDE 100 MILLILITER(S): 9 INJECTION, SOLUTION INTRAVENOUS at 10:19

## 2025-07-22 RX ADMIN — POTASSIUM PHOSPHATE, MONOBASIC POTASSIUM PHOSPHATE, DIBASIC INJECTION, 62.5 MILLIMOLE(S): 236; 224 SOLUTION, CONCENTRATE INTRAVENOUS at 03:59

## 2025-07-22 RX ADMIN — Medication 100 MILLIEQUIVALENT(S): at 05:23

## 2025-07-22 RX ADMIN — Medication 200 GRAM(S): at 01:33

## 2025-07-22 RX ADMIN — Medication 25 GRAM(S): at 14:52

## 2025-07-22 RX ADMIN — Medication 250 MILLIGRAM(S): at 17:17

## 2025-07-22 RX ADMIN — Medication 250 MILLIGRAM(S): at 04:19

## 2025-07-22 RX ADMIN — Medication 100 GRAM(S): at 02:29

## 2025-07-22 RX ADMIN — Medication 1 APPLICATION(S): at 06:28

## 2025-07-22 RX ADMIN — Medication 100 MILLIEQUIVALENT(S): at 03:59

## 2025-07-22 RX ADMIN — Medication 25 GRAM(S): at 23:11

## 2025-07-22 RX ADMIN — SODIUM CHLORIDE 1000 MILLILITER(S): 9 INJECTION, SOLUTION INTRAVENOUS at 01:48

## 2025-07-22 RX ADMIN — Medication 100 MILLIEQUIVALENT(S): at 06:28

## 2025-07-22 RX ADMIN — SODIUM CHLORIDE 100 MILLILITER(S): 9 INJECTION, SOLUTION INTRAVENOUS at 02:16

## 2025-07-22 RX ADMIN — SODIUM CHLORIDE 1000 MILLILITER(S): 9 INJECTION, SOLUTION INTRAVENOUS at 02:30

## 2025-07-22 RX ADMIN — SODIUM CHLORIDE 2000 MILLILITER(S): 9 INJECTION, SOLUTION INTRAVENOUS at 11:17

## 2025-07-22 RX ADMIN — Medication 100 MILLIEQUIVALENT(S): at 01:12

## 2025-07-22 RX ADMIN — Medication 650 MILLIGRAM(S): at 00:56

## 2025-07-22 RX ADMIN — Medication 25 GRAM(S): at 07:33

## 2025-07-23 ENCOUNTER — TRANSCRIPTION ENCOUNTER (OUTPATIENT)
Age: 75
End: 2025-07-23

## 2025-07-23 DIAGNOSIS — R31.0 GROSS HEMATURIA: ICD-10-CM

## 2025-07-23 LAB
ANION GAP SERPL CALC-SCNC: 6 MMOL/L — SIGNIFICANT CHANGE UP (ref 5–17)
ANION GAP SERPL CALC-SCNC: 8 MMOL/L — SIGNIFICANT CHANGE UP (ref 5–17)
APTT BLD: 27.3 SEC — SIGNIFICANT CHANGE UP (ref 26.1–36.8)
BUN SERPL-MCNC: 4 MG/DL — LOW (ref 7–23)
BUN SERPL-MCNC: 5 MG/DL — LOW (ref 7–23)
CALCIUM SERPL-MCNC: 8 MG/DL — LOW (ref 8.4–10.5)
CALCIUM SERPL-MCNC: 8.2 MG/DL — LOW (ref 8.4–10.5)
CHLORIDE SERPL-SCNC: 104 MMOL/L — SIGNIFICANT CHANGE UP (ref 96–108)
CHLORIDE SERPL-SCNC: 106 MMOL/L — SIGNIFICANT CHANGE UP (ref 96–108)
CO2 SERPL-SCNC: 22 MMOL/L — SIGNIFICANT CHANGE UP (ref 22–31)
CO2 SERPL-SCNC: 26 MMOL/L — SIGNIFICANT CHANGE UP (ref 22–31)
CREAT SERPL-MCNC: 0.71 MG/DL — SIGNIFICANT CHANGE UP (ref 0.5–1.3)
CREAT SERPL-MCNC: 0.73 MG/DL — SIGNIFICANT CHANGE UP (ref 0.5–1.3)
EGFR: 95 ML/MIN/1.73M2 — SIGNIFICANT CHANGE UP
EGFR: 95 ML/MIN/1.73M2 — SIGNIFICANT CHANGE UP
EGFR: 96 ML/MIN/1.73M2 — SIGNIFICANT CHANGE UP
EGFR: 96 ML/MIN/1.73M2 — SIGNIFICANT CHANGE UP
GLUCOSE SERPL-MCNC: 125 MG/DL — HIGH (ref 70–99)
GLUCOSE SERPL-MCNC: 99 MG/DL — SIGNIFICANT CHANGE UP (ref 70–99)
HCT VFR BLD CALC: 26 % — LOW (ref 39–50)
HCT VFR BLD CALC: 28 % — LOW (ref 39–50)
HGB BLD-MCNC: 8.6 G/DL — LOW (ref 13–17)
HGB BLD-MCNC: 8.9 G/DL — LOW (ref 13–17)
INR BLD: 1.05 — SIGNIFICANT CHANGE UP (ref 0.85–1.16)
MAGNESIUM SERPL-MCNC: 1.8 MG/DL — SIGNIFICANT CHANGE UP (ref 1.6–2.6)
MAGNESIUM SERPL-MCNC: 1.8 MG/DL — SIGNIFICANT CHANGE UP (ref 1.6–2.6)
MCHC RBC-ENTMCNC: 30.4 PG — SIGNIFICANT CHANGE UP (ref 27–34)
MCHC RBC-ENTMCNC: 30.8 PG — SIGNIFICANT CHANGE UP (ref 27–34)
MCHC RBC-ENTMCNC: 31.8 G/DL — LOW (ref 32–36)
MCHC RBC-ENTMCNC: 33.1 G/DL — SIGNIFICANT CHANGE UP (ref 32–36)
MCV RBC AUTO: 93.2 FL — SIGNIFICANT CHANGE UP (ref 80–100)
MCV RBC AUTO: 95.6 FL — SIGNIFICANT CHANGE UP (ref 80–100)
NRBC # BLD AUTO: 0 K/UL — SIGNIFICANT CHANGE UP (ref 0–0)
NRBC # BLD AUTO: 0 K/UL — SIGNIFICANT CHANGE UP (ref 0–0)
NRBC # FLD: 0 K/UL — SIGNIFICANT CHANGE UP (ref 0–0)
NRBC # FLD: 0 K/UL — SIGNIFICANT CHANGE UP (ref 0–0)
NRBC BLD AUTO-RTO: 0 /100 WBCS — SIGNIFICANT CHANGE UP (ref 0–0)
NRBC BLD AUTO-RTO: 0 /100 WBCS — SIGNIFICANT CHANGE UP (ref 0–0)
PHOSPHATE SERPL-MCNC: 2.9 MG/DL — SIGNIFICANT CHANGE UP (ref 2.5–4.5)
PHOSPHATE SERPL-MCNC: 3 MG/DL — SIGNIFICANT CHANGE UP (ref 2.5–4.5)
PLATELET # BLD AUTO: 200 K/UL — SIGNIFICANT CHANGE UP (ref 150–400)
PLATELET # BLD AUTO: 211 K/UL — SIGNIFICANT CHANGE UP (ref 150–400)
PMV BLD: 9 FL — SIGNIFICANT CHANGE UP (ref 7–13)
PMV BLD: 9.6 FL — SIGNIFICANT CHANGE UP (ref 7–13)
POTASSIUM SERPL-MCNC: 3.8 MMOL/L — SIGNIFICANT CHANGE UP (ref 3.5–5.3)
POTASSIUM SERPL-MCNC: SIGNIFICANT CHANGE UP (ref 3.5–5.3)
POTASSIUM SERPL-SCNC: 3.8 MMOL/L — SIGNIFICANT CHANGE UP (ref 3.5–5.3)
POTASSIUM SERPL-SCNC: SIGNIFICANT CHANGE UP (ref 3.5–5.3)
PROTHROM AB SERPL-ACNC: 12.3 SEC — SIGNIFICANT CHANGE UP (ref 9.9–13.4)
RBC # BLD: 2.79 M/UL — LOW (ref 4.2–5.8)
RBC # BLD: 2.93 M/UL — LOW (ref 4.2–5.8)
RBC # FLD: 14.7 % — HIGH (ref 10.3–14.5)
RBC # FLD: 14.7 % — HIGH (ref 10.3–14.5)
SODIUM SERPL-SCNC: 136 MMOL/L — SIGNIFICANT CHANGE UP (ref 135–145)
SODIUM SERPL-SCNC: 136 MMOL/L — SIGNIFICANT CHANGE UP (ref 135–145)
VANCOMYCIN TROUGH SERPL-MCNC: 7.6 UG/ML — LOW (ref 10–20)
WBC # BLD: 5.97 K/UL — SIGNIFICANT CHANGE UP (ref 3.8–10.5)
WBC # BLD: 8.66 K/UL — SIGNIFICANT CHANGE UP (ref 3.8–10.5)
WBC # FLD AUTO: 5.97 K/UL — SIGNIFICANT CHANGE UP (ref 3.8–10.5)
WBC # FLD AUTO: 8.66 K/UL — SIGNIFICANT CHANGE UP (ref 3.8–10.5)

## 2025-07-23 PROCEDURE — 36430 TRANSFUSION BLD/BLD COMPNT: CPT

## 2025-07-23 PROCEDURE — 87640 STAPH A DNA AMP PROBE: CPT

## 2025-07-23 PROCEDURE — P9059: CPT

## 2025-07-23 PROCEDURE — P9073: CPT

## 2025-07-23 PROCEDURE — 81001 URINALYSIS AUTO W/SCOPE: CPT

## 2025-07-23 PROCEDURE — 83605 ASSAY OF LACTIC ACID: CPT

## 2025-07-23 PROCEDURE — C1894: CPT

## 2025-07-23 PROCEDURE — 86923 COMPATIBILITY TEST ELECTRIC: CPT

## 2025-07-23 PROCEDURE — 93005 ELECTROCARDIOGRAM TRACING: CPT

## 2025-07-23 PROCEDURE — 86140 C-REACTIVE PROTEIN: CPT

## 2025-07-23 PROCEDURE — 80048 BASIC METABOLIC PNL TOTAL CA: CPT

## 2025-07-23 PROCEDURE — 36247 INS CATH ABD/L-EXT ART 3RD: CPT | Mod: XS

## 2025-07-23 PROCEDURE — 85610 PROTHROMBIN TIME: CPT

## 2025-07-23 PROCEDURE — P9016: CPT

## 2025-07-23 PROCEDURE — 97530 THERAPEUTIC ACTIVITIES: CPT

## 2025-07-23 PROCEDURE — 80053 COMPREHEN METABOLIC PANEL: CPT

## 2025-07-23 PROCEDURE — 52001 CYSTO W/IRRG&EVAC MLT CLOTS: CPT | Mod: 59

## 2025-07-23 PROCEDURE — C1751: CPT

## 2025-07-23 PROCEDURE — 83735 ASSAY OF MAGNESIUM: CPT

## 2025-07-23 PROCEDURE — 84295 ASSAY OF SERUM SODIUM: CPT

## 2025-07-23 PROCEDURE — 92610 EVALUATE SWALLOWING FUNCTION: CPT

## 2025-07-23 PROCEDURE — 87040 BLOOD CULTURE FOR BACTERIA: CPT

## 2025-07-23 PROCEDURE — 82962 GLUCOSE BLOOD TEST: CPT

## 2025-07-23 PROCEDURE — 82805 BLOOD GASES W/O2 SATURATION: CPT

## 2025-07-23 PROCEDURE — 84132 ASSAY OF SERUM POTASSIUM: CPT

## 2025-07-23 PROCEDURE — 82330 ASSAY OF CALCIUM: CPT

## 2025-07-23 PROCEDURE — C1887: CPT

## 2025-07-23 PROCEDURE — 36415 COLL VENOUS BLD VENIPUNCTURE: CPT

## 2025-07-23 PROCEDURE — 99232 SBSQ HOSP IP/OBS MODERATE 35: CPT | Mod: GC

## 2025-07-23 PROCEDURE — 85027 COMPLETE CBC AUTOMATED: CPT

## 2025-07-23 PROCEDURE — 93306 TTE W/DOPPLER COMPLETE: CPT

## 2025-07-23 PROCEDURE — 85025 COMPLETE CBC W/AUTO DIFF WBC: CPT

## 2025-07-23 PROCEDURE — 86850 RBC ANTIBODY SCREEN: CPT

## 2025-07-23 PROCEDURE — 94002 VENT MGMT INPAT INIT DAY: CPT

## 2025-07-23 PROCEDURE — 71045 X-RAY EXAM CHEST 1 VIEW: CPT

## 2025-07-23 PROCEDURE — 84145 PROCALCITONIN (PCT): CPT

## 2025-07-23 PROCEDURE — 99233 SBSQ HOSP IP/OBS HIGH 50: CPT

## 2025-07-23 PROCEDURE — 97165 OT EVAL LOW COMPLEX 30 MIN: CPT

## 2025-07-23 PROCEDURE — 83036 HEMOGLOBIN GLYCOSYLATED A1C: CPT

## 2025-07-23 PROCEDURE — 82803 BLOOD GASES ANY COMBINATION: CPT

## 2025-07-23 PROCEDURE — 85384 FIBRINOGEN ACTIVITY: CPT

## 2025-07-23 PROCEDURE — 80202 ASSAY OF VANCOMYCIN: CPT

## 2025-07-23 PROCEDURE — 0225U NFCT DS DNA&RNA 21 SARSCOV2: CPT

## 2025-07-23 PROCEDURE — 52214 CYSTOSCOPY AND TREATMENT: CPT

## 2025-07-23 PROCEDURE — C1889: CPT

## 2025-07-23 PROCEDURE — 86900 BLOOD TYPING SEROLOGIC ABO: CPT

## 2025-07-23 PROCEDURE — 84100 ASSAY OF PHOSPHORUS: CPT

## 2025-07-23 PROCEDURE — 37244 VASC EMBOLIZE/OCCLUDE BLEED: CPT

## 2025-07-23 PROCEDURE — C1769: CPT

## 2025-07-23 PROCEDURE — 82610 CYSTATIN C: CPT

## 2025-07-23 PROCEDURE — 85730 THROMBOPLASTIN TIME PARTIAL: CPT

## 2025-07-23 PROCEDURE — 85007 BL SMEAR W/DIFF WBC COUNT: CPT

## 2025-07-23 PROCEDURE — 76937 US GUIDE VASCULAR ACCESS: CPT

## 2025-07-23 PROCEDURE — 82947 ASSAY GLUCOSE BLOOD QUANT: CPT

## 2025-07-23 PROCEDURE — 87641 MR-STAPH DNA AMP PROBE: CPT

## 2025-07-23 PROCEDURE — 86901 BLOOD TYPING SEROLOGIC RH(D): CPT

## 2025-07-23 PROCEDURE — 87086 URINE CULTURE/COLONY COUNT: CPT

## 2025-07-23 PROCEDURE — 71045 X-RAY EXAM CHEST 1 VIEW: CPT | Mod: 26

## 2025-07-23 PROCEDURE — 97162 PT EVAL MOD COMPLEX 30 MIN: CPT

## 2025-07-23 RX ORDER — SODIUM CHLORIDE 9 G/1000ML
500 INJECTION, SOLUTION INTRAVENOUS ONCE
Refills: 0 | Status: COMPLETED | OUTPATIENT
Start: 2025-07-23 | End: 2025-07-23

## 2025-07-23 RX ORDER — INSULIN LISPRO 100 U/ML
INJECTION, SOLUTION INTRAVENOUS; SUBCUTANEOUS EVERY 6 HOURS
Refills: 0 | Status: DISCONTINUED | OUTPATIENT
Start: 2025-07-23 | End: 2025-07-23

## 2025-07-23 RX ORDER — ESCITALOPRAM OXALATE 20 MG/1
10 TABLET ORAL DAILY
Refills: 0 | Status: DISCONTINUED | OUTPATIENT
Start: 2025-07-23 | End: 2025-07-28

## 2025-07-23 RX ORDER — VANCOMYCIN HCL IN 5 % DEXTROSE 1.5G/250ML
1250 PLASTIC BAG, INJECTION (ML) INTRAVENOUS EVERY 12 HOURS
Refills: 0 | Status: DISCONTINUED | OUTPATIENT
Start: 2025-07-23 | End: 2025-07-24

## 2025-07-23 RX ORDER — VANCOMYCIN HCL IN 5 % DEXTROSE 1.5G/250ML
1000 PLASTIC BAG, INJECTION (ML) INTRAVENOUS EVERY 12 HOURS
Refills: 0 | Status: DISCONTINUED | OUTPATIENT
Start: 2025-07-23 | End: 2025-07-23

## 2025-07-23 RX ORDER — SODIUM CHLORIDE 9 G/1000ML
1000 INJECTION, SOLUTION INTRAVENOUS
Refills: 0 | Status: DISCONTINUED | OUTPATIENT
Start: 2025-07-23 | End: 2025-07-27

## 2025-07-23 RX ORDER — DONEPEZIL HYDROCHLORIDE 5 MG/1
5 TABLET ORAL AT BEDTIME
Refills: 0 | Status: DISCONTINUED | OUTPATIENT
Start: 2025-07-23 | End: 2025-07-28

## 2025-07-23 RX ORDER — HEPARIN SODIUM 1000 [USP'U]/ML
5000 INJECTION INTRAVENOUS; SUBCUTANEOUS EVERY 8 HOURS
Refills: 0 | Status: DISCONTINUED | OUTPATIENT
Start: 2025-07-23 | End: 2025-07-24

## 2025-07-23 RX ORDER — INSULIN LISPRO 100 U/ML
INJECTION, SOLUTION INTRAVENOUS; SUBCUTANEOUS
Refills: 0 | Status: DISCONTINUED | OUTPATIENT
Start: 2025-07-23 | End: 2025-07-27

## 2025-07-23 RX ORDER — PIPERACILLIN-TAZO-DEXTROSE,ISO 3.375G/5
4.5 IV SOLUTION, PIGGYBACK PREMIX FROZEN(ML) INTRAVENOUS EVERY 8 HOURS
Refills: 0 | Status: DISCONTINUED | OUTPATIENT
Start: 2025-07-23 | End: 2025-07-24

## 2025-07-23 RX ORDER — MIRTAZAPINE 30 MG/1
15 TABLET, FILM COATED ORAL AT BEDTIME
Refills: 0 | Status: DISCONTINUED | OUTPATIENT
Start: 2025-07-23 | End: 2025-07-28

## 2025-07-23 RX ADMIN — MIRTAZAPINE 15 MILLIGRAM(S): 30 TABLET, FILM COATED ORAL at 22:07

## 2025-07-23 RX ADMIN — HEPARIN SODIUM 5000 UNIT(S): 1000 INJECTION INTRAVENOUS; SUBCUTANEOUS at 22:07

## 2025-07-23 RX ADMIN — Medication 166.67 MILLIGRAM(S): at 20:02

## 2025-07-23 RX ADMIN — Medication 25 GRAM(S): at 08:24

## 2025-07-23 RX ADMIN — Medication 1 APPLICATION(S): at 06:18

## 2025-07-23 RX ADMIN — ESCITALOPRAM OXALATE 10 MILLIGRAM(S): 20 TABLET ORAL at 23:00

## 2025-07-23 RX ADMIN — DONEPEZIL HYDROCHLORIDE 5 MILLIGRAM(S): 5 TABLET ORAL at 22:07

## 2025-07-23 RX ADMIN — SODIUM CHLORIDE 500 MILLILITER(S): 9 INJECTION, SOLUTION INTRAVENOUS at 12:10

## 2025-07-23 RX ADMIN — Medication 51.25 MILLIGRAM(S): at 19:53

## 2025-07-23 RX ADMIN — SODIUM CHLORIDE 100 MILLILITER(S): 9 INJECTION, SOLUTION INTRAVENOUS at 17:43

## 2025-07-23 RX ADMIN — Medication 25 GRAM(S): at 16:50

## 2025-07-23 RX ADMIN — Medication 250 MILLIGRAM(S): at 06:30

## 2025-07-24 LAB
ANION GAP SERPL CALC-SCNC: 10 MMOL/L — SIGNIFICANT CHANGE UP (ref 5–17)
BUN SERPL-MCNC: 5 MG/DL — LOW (ref 7–23)
CALCIUM SERPL-MCNC: 8.1 MG/DL — LOW (ref 8.4–10.5)
CHLORIDE SERPL-SCNC: 104 MMOL/L — SIGNIFICANT CHANGE UP (ref 96–108)
CO2 SERPL-SCNC: 23 MMOL/L — SIGNIFICANT CHANGE UP (ref 22–31)
CREAT SERPL-MCNC: 0.67 MG/DL — SIGNIFICANT CHANGE UP (ref 0.5–1.3)
EGFR: 98 ML/MIN/1.73M2 — SIGNIFICANT CHANGE UP
EGFR: 98 ML/MIN/1.73M2 — SIGNIFICANT CHANGE UP
GLUCOSE SERPL-MCNC: 254 MG/DL — HIGH (ref 70–99)
HCT VFR BLD CALC: 25.2 % — LOW (ref 39–50)
HGB BLD-MCNC: 8.3 G/DL — LOW (ref 13–17)
MAGNESIUM SERPL-MCNC: 1.9 MG/DL — SIGNIFICANT CHANGE UP (ref 1.6–2.6)
MCHC RBC-ENTMCNC: 30.5 PG — SIGNIFICANT CHANGE UP (ref 27–34)
MCHC RBC-ENTMCNC: 32.9 G/DL — SIGNIFICANT CHANGE UP (ref 32–36)
MCV RBC AUTO: 92.6 FL — SIGNIFICANT CHANGE UP (ref 80–100)
NRBC # BLD AUTO: 0 K/UL — SIGNIFICANT CHANGE UP (ref 0–0)
NRBC # FLD: 0 K/UL — SIGNIFICANT CHANGE UP (ref 0–0)
NRBC BLD AUTO-RTO: 0 /100 WBCS — SIGNIFICANT CHANGE UP (ref 0–0)
PHOSPHATE SERPL-MCNC: 3.2 MG/DL — SIGNIFICANT CHANGE UP (ref 2.5–4.5)
PLATELET # BLD AUTO: 225 K/UL — SIGNIFICANT CHANGE UP (ref 150–400)
PMV BLD: 9.1 FL — SIGNIFICANT CHANGE UP (ref 7–13)
POTASSIUM SERPL-MCNC: 4.2 MMOL/L — SIGNIFICANT CHANGE UP (ref 3.5–5.3)
POTASSIUM SERPL-SCNC: 4.2 MMOL/L — SIGNIFICANT CHANGE UP (ref 3.5–5.3)
RBC # BLD: 2.72 M/UL — LOW (ref 4.2–5.8)
RBC # FLD: 14.8 % — HIGH (ref 10.3–14.5)
SODIUM SERPL-SCNC: 137 MMOL/L — SIGNIFICANT CHANGE UP (ref 135–145)
WBC # BLD: 5.12 K/UL — SIGNIFICANT CHANGE UP (ref 3.8–10.5)
WBC # FLD AUTO: 5.12 K/UL — SIGNIFICANT CHANGE UP (ref 3.8–10.5)

## 2025-07-24 PROCEDURE — 82947 ASSAY GLUCOSE BLOOD QUANT: CPT

## 2025-07-24 PROCEDURE — 84100 ASSAY OF PHOSPHORUS: CPT

## 2025-07-24 PROCEDURE — 87640 STAPH A DNA AMP PROBE: CPT

## 2025-07-24 PROCEDURE — 86923 COMPATIBILITY TEST ELECTRIC: CPT

## 2025-07-24 PROCEDURE — P9016: CPT

## 2025-07-24 PROCEDURE — C1894: CPT

## 2025-07-24 PROCEDURE — 84132 ASSAY OF SERUM POTASSIUM: CPT

## 2025-07-24 PROCEDURE — 82805 BLOOD GASES W/O2 SATURATION: CPT

## 2025-07-24 PROCEDURE — 97162 PT EVAL MOD COMPLEX 30 MIN: CPT

## 2025-07-24 PROCEDURE — 80048 BASIC METABOLIC PNL TOTAL CA: CPT

## 2025-07-24 PROCEDURE — 85730 THROMBOPLASTIN TIME PARTIAL: CPT

## 2025-07-24 PROCEDURE — 97530 THERAPEUTIC ACTIVITIES: CPT

## 2025-07-24 PROCEDURE — 85025 COMPLETE CBC W/AUTO DIFF WBC: CPT

## 2025-07-24 PROCEDURE — 83605 ASSAY OF LACTIC ACID: CPT

## 2025-07-24 PROCEDURE — 81001 URINALYSIS AUTO W/SCOPE: CPT

## 2025-07-24 PROCEDURE — 85007 BL SMEAR W/DIFF WBC COUNT: CPT

## 2025-07-24 PROCEDURE — 94002 VENT MGMT INPAT INIT DAY: CPT

## 2025-07-24 PROCEDURE — 92610 EVALUATE SWALLOWING FUNCTION: CPT

## 2025-07-24 PROCEDURE — 87641 MR-STAPH DNA AMP PROBE: CPT

## 2025-07-24 PROCEDURE — 36415 COLL VENOUS BLD VENIPUNCTURE: CPT

## 2025-07-24 PROCEDURE — 84295 ASSAY OF SERUM SODIUM: CPT

## 2025-07-24 PROCEDURE — 80053 COMPREHEN METABOLIC PANEL: CPT

## 2025-07-24 PROCEDURE — 87086 URINE CULTURE/COLONY COUNT: CPT

## 2025-07-24 PROCEDURE — C9399: CPT

## 2025-07-24 PROCEDURE — 84145 PROCALCITONIN (PCT): CPT

## 2025-07-24 PROCEDURE — 93306 TTE W/DOPPLER COMPLETE: CPT

## 2025-07-24 PROCEDURE — 0225U NFCT DS DNA&RNA 21 SARSCOV2: CPT

## 2025-07-24 PROCEDURE — 85610 PROTHROMBIN TIME: CPT

## 2025-07-24 PROCEDURE — 71045 X-RAY EXAM CHEST 1 VIEW: CPT

## 2025-07-24 PROCEDURE — 99233 SBSQ HOSP IP/OBS HIGH 50: CPT | Mod: GC

## 2025-07-24 PROCEDURE — P9073: CPT

## 2025-07-24 PROCEDURE — 37244 VASC EMBOLIZE/OCCLUDE BLEED: CPT

## 2025-07-24 PROCEDURE — 86140 C-REACTIVE PROTEIN: CPT

## 2025-07-24 PROCEDURE — 93005 ELECTROCARDIOGRAM TRACING: CPT

## 2025-07-24 PROCEDURE — 86850 RBC ANTIBODY SCREEN: CPT

## 2025-07-24 PROCEDURE — 85384 FIBRINOGEN ACTIVITY: CPT

## 2025-07-24 PROCEDURE — 76937 US GUIDE VASCULAR ACCESS: CPT | Mod: 26

## 2025-07-24 PROCEDURE — 82962 GLUCOSE BLOOD TEST: CPT

## 2025-07-24 PROCEDURE — C1887: CPT

## 2025-07-24 PROCEDURE — 85027 COMPLETE CBC AUTOMATED: CPT

## 2025-07-24 PROCEDURE — C1769: CPT

## 2025-07-24 PROCEDURE — 86901 BLOOD TYPING SEROLOGIC RH(D): CPT

## 2025-07-24 PROCEDURE — 82803 BLOOD GASES ANY COMBINATION: CPT

## 2025-07-24 PROCEDURE — C1751: CPT

## 2025-07-24 PROCEDURE — 97165 OT EVAL LOW COMPLEX 30 MIN: CPT

## 2025-07-24 PROCEDURE — P9059: CPT

## 2025-07-24 PROCEDURE — 83036 HEMOGLOBIN GLYCOSYLATED A1C: CPT

## 2025-07-24 PROCEDURE — 82610 CYSTATIN C: CPT

## 2025-07-24 PROCEDURE — 87040 BLOOD CULTURE FOR BACTERIA: CPT

## 2025-07-24 PROCEDURE — 76937 US GUIDE VASCULAR ACCESS: CPT

## 2025-07-24 PROCEDURE — 36247 INS CATH ABD/L-EXT ART 3RD: CPT

## 2025-07-24 PROCEDURE — 36430 TRANSFUSION BLD/BLD COMPNT: CPT

## 2025-07-24 PROCEDURE — C1889: CPT

## 2025-07-24 PROCEDURE — 80202 ASSAY OF VANCOMYCIN: CPT

## 2025-07-24 PROCEDURE — 83735 ASSAY OF MAGNESIUM: CPT

## 2025-07-24 PROCEDURE — 82330 ASSAY OF CALCIUM: CPT

## 2025-07-24 PROCEDURE — 86900 BLOOD TYPING SEROLOGIC ABO: CPT

## 2025-07-24 RX ORDER — ACETAMINOPHEN 500 MG/5ML
1000 LIQUID (ML) ORAL ONCE
Refills: 0 | Status: COMPLETED | OUTPATIENT
Start: 2025-07-24 | End: 2025-07-24

## 2025-07-24 RX ORDER — MAGNESIUM SULFATE 500 MG/ML
1 SYRINGE (ML) INJECTION ONCE
Refills: 0 | Status: COMPLETED | OUTPATIENT
Start: 2025-07-24 | End: 2025-07-24

## 2025-07-24 RX ORDER — OLANZAPINE 10 MG/1
2.5 TABLET ORAL ONCE
Refills: 0 | Status: DISCONTINUED | OUTPATIENT
Start: 2025-07-24 | End: 2025-07-24

## 2025-07-24 RX ORDER — OLANZAPINE 10 MG/1
5 TABLET ORAL AT BEDTIME
Refills: 0 | Status: DISCONTINUED | OUTPATIENT
Start: 2025-07-24 | End: 2025-07-28

## 2025-07-24 RX ORDER — OLANZAPINE 10 MG/1
5 TABLET ORAL ONCE
Refills: 0 | Status: COMPLETED | OUTPATIENT
Start: 2025-07-24 | End: 2025-07-24

## 2025-07-24 RX ORDER — APIXABAN 5 MG/1
2.5 TABLET, FILM COATED ORAL EVERY 12 HOURS
Refills: 0 | Status: DISCONTINUED | OUTPATIENT
Start: 2025-07-24 | End: 2025-07-28

## 2025-07-24 RX ORDER — OLANZAPINE 10 MG/1
2.5 TABLET ORAL ONCE
Refills: 0 | Status: COMPLETED | OUTPATIENT
Start: 2025-07-24 | End: 2025-07-24

## 2025-07-24 RX ADMIN — OLANZAPINE 5 MILLIGRAM(S): 10 TABLET ORAL at 08:49

## 2025-07-24 RX ADMIN — SODIUM CHLORIDE 100 MILLILITER(S): 9 INJECTION, SOLUTION INTRAVENOUS at 22:01

## 2025-07-24 RX ADMIN — Medication 51.25 MILLIGRAM(S): at 05:15

## 2025-07-24 RX ADMIN — Medication 100 GRAM(S): at 08:53

## 2025-07-24 RX ADMIN — MIRTAZAPINE 15 MILLIGRAM(S): 30 TABLET, FILM COATED ORAL at 21:55

## 2025-07-24 RX ADMIN — ESCITALOPRAM OXALATE 10 MILLIGRAM(S): 20 TABLET ORAL at 12:00

## 2025-07-24 RX ADMIN — SODIUM CHLORIDE 100 MILLILITER(S): 9 INJECTION, SOLUTION INTRAVENOUS at 05:15

## 2025-07-24 RX ADMIN — Medication 166.67 MILLIGRAM(S): at 05:15

## 2025-07-24 RX ADMIN — DONEPEZIL HYDROCHLORIDE 5 MILLIGRAM(S): 5 TABLET ORAL at 21:55

## 2025-07-24 RX ADMIN — Medication 25 GRAM(S): at 00:51

## 2025-07-24 RX ADMIN — Medication 51.25 MILLIGRAM(S): at 18:41

## 2025-07-24 RX ADMIN — OLANZAPINE 5 MILLIGRAM(S): 10 TABLET ORAL at 21:56

## 2025-07-24 RX ADMIN — Medication 1 APPLICATION(S): at 05:15

## 2025-07-24 RX ADMIN — Medication 400 MILLIGRAM(S): at 05:41

## 2025-07-24 RX ADMIN — Medication 1000 MILLIGRAM(S): at 06:00

## 2025-07-24 RX ADMIN — Medication 25 GRAM(S): at 08:53

## 2025-07-24 RX ADMIN — APIXABAN 2.5 MILLIGRAM(S): 5 TABLET, FILM COATED ORAL at 18:47

## 2025-07-25 ENCOUNTER — TRANSCRIPTION ENCOUNTER (OUTPATIENT)
Age: 75
End: 2025-07-25

## 2025-07-25 PROCEDURE — 84145 PROCALCITONIN (PCT): CPT

## 2025-07-25 PROCEDURE — 97165 OT EVAL LOW COMPLEX 30 MIN: CPT

## 2025-07-25 PROCEDURE — 80053 COMPREHEN METABOLIC PANEL: CPT

## 2025-07-25 PROCEDURE — 86901 BLOOD TYPING SEROLOGIC RH(D): CPT

## 2025-07-25 PROCEDURE — 84132 ASSAY OF SERUM POTASSIUM: CPT

## 2025-07-25 PROCEDURE — C1894: CPT

## 2025-07-25 PROCEDURE — 71045 X-RAY EXAM CHEST 1 VIEW: CPT

## 2025-07-25 PROCEDURE — C1889: CPT

## 2025-07-25 PROCEDURE — 82803 BLOOD GASES ANY COMBINATION: CPT

## 2025-07-25 PROCEDURE — 99233 SBSQ HOSP IP/OBS HIGH 50: CPT | Mod: GC

## 2025-07-25 PROCEDURE — P9073: CPT

## 2025-07-25 PROCEDURE — 76937 US GUIDE VASCULAR ACCESS: CPT

## 2025-07-25 PROCEDURE — 82947 ASSAY GLUCOSE BLOOD QUANT: CPT

## 2025-07-25 PROCEDURE — 80048 BASIC METABOLIC PNL TOTAL CA: CPT

## 2025-07-25 PROCEDURE — 87641 MR-STAPH DNA AMP PROBE: CPT

## 2025-07-25 PROCEDURE — P9059: CPT

## 2025-07-25 PROCEDURE — 97162 PT EVAL MOD COMPLEX 30 MIN: CPT

## 2025-07-25 PROCEDURE — 81001 URINALYSIS AUTO W/SCOPE: CPT

## 2025-07-25 PROCEDURE — 85610 PROTHROMBIN TIME: CPT

## 2025-07-25 PROCEDURE — 87640 STAPH A DNA AMP PROBE: CPT

## 2025-07-25 PROCEDURE — 86850 RBC ANTIBODY SCREEN: CPT

## 2025-07-25 PROCEDURE — P9016: CPT

## 2025-07-25 PROCEDURE — 83735 ASSAY OF MAGNESIUM: CPT

## 2025-07-25 PROCEDURE — C9399: CPT

## 2025-07-25 PROCEDURE — 80202 ASSAY OF VANCOMYCIN: CPT

## 2025-07-25 PROCEDURE — 83605 ASSAY OF LACTIC ACID: CPT

## 2025-07-25 PROCEDURE — 36430 TRANSFUSION BLD/BLD COMPNT: CPT

## 2025-07-25 PROCEDURE — 86140 C-REACTIVE PROTEIN: CPT

## 2025-07-25 PROCEDURE — 97530 THERAPEUTIC ACTIVITIES: CPT

## 2025-07-25 PROCEDURE — 86900 BLOOD TYPING SEROLOGIC ABO: CPT

## 2025-07-25 PROCEDURE — 84295 ASSAY OF SERUM SODIUM: CPT

## 2025-07-25 PROCEDURE — 87040 BLOOD CULTURE FOR BACTERIA: CPT

## 2025-07-25 PROCEDURE — 82805 BLOOD GASES W/O2 SATURATION: CPT

## 2025-07-25 PROCEDURE — 36247 INS CATH ABD/L-EXT ART 3RD: CPT | Mod: XS

## 2025-07-25 PROCEDURE — 82330 ASSAY OF CALCIUM: CPT

## 2025-07-25 PROCEDURE — 85384 FIBRINOGEN ACTIVITY: CPT

## 2025-07-25 PROCEDURE — 37244 VASC EMBOLIZE/OCCLUDE BLEED: CPT

## 2025-07-25 PROCEDURE — 93306 TTE W/DOPPLER COMPLETE: CPT

## 2025-07-25 PROCEDURE — 85025 COMPLETE CBC W/AUTO DIFF WBC: CPT

## 2025-07-25 PROCEDURE — C1769: CPT

## 2025-07-25 PROCEDURE — 86923 COMPATIBILITY TEST ELECTRIC: CPT

## 2025-07-25 PROCEDURE — 85730 THROMBOPLASTIN TIME PARTIAL: CPT

## 2025-07-25 PROCEDURE — 93005 ELECTROCARDIOGRAM TRACING: CPT

## 2025-07-25 PROCEDURE — 85027 COMPLETE CBC AUTOMATED: CPT

## 2025-07-25 PROCEDURE — 0225U NFCT DS DNA&RNA 21 SARSCOV2: CPT

## 2025-07-25 PROCEDURE — 87086 URINE CULTURE/COLONY COUNT: CPT

## 2025-07-25 PROCEDURE — 82962 GLUCOSE BLOOD TEST: CPT

## 2025-07-25 PROCEDURE — 83036 HEMOGLOBIN GLYCOSYLATED A1C: CPT

## 2025-07-25 PROCEDURE — C1751: CPT

## 2025-07-25 PROCEDURE — 36415 COLL VENOUS BLD VENIPUNCTURE: CPT

## 2025-07-25 PROCEDURE — C1887: CPT

## 2025-07-25 PROCEDURE — 84100 ASSAY OF PHOSPHORUS: CPT

## 2025-07-25 PROCEDURE — 82610 CYSTATIN C: CPT

## 2025-07-25 PROCEDURE — 94002 VENT MGMT INPAT INIT DAY: CPT

## 2025-07-25 PROCEDURE — 92610 EVALUATE SWALLOWING FUNCTION: CPT

## 2025-07-25 PROCEDURE — 85007 BL SMEAR W/DIFF WBC COUNT: CPT

## 2025-07-25 RX ADMIN — Medication 1 APPLICATION(S): at 06:26

## 2025-07-25 RX ADMIN — APIXABAN 2.5 MILLIGRAM(S): 5 TABLET, FILM COATED ORAL at 06:34

## 2025-07-25 RX ADMIN — OLANZAPINE 5 MILLIGRAM(S): 10 TABLET ORAL at 21:33

## 2025-07-25 RX ADMIN — DONEPEZIL HYDROCHLORIDE 5 MILLIGRAM(S): 5 TABLET ORAL at 21:33

## 2025-07-25 RX ADMIN — Medication 51.25 MILLIGRAM(S): at 17:55

## 2025-07-25 RX ADMIN — APIXABAN 2.5 MILLIGRAM(S): 5 TABLET, FILM COATED ORAL at 17:39

## 2025-07-25 RX ADMIN — SODIUM CHLORIDE 100 MILLILITER(S): 9 INJECTION, SOLUTION INTRAVENOUS at 21:33

## 2025-07-25 RX ADMIN — Medication 51.25 MILLIGRAM(S): at 06:34

## 2025-07-25 RX ADMIN — MIRTAZAPINE 15 MILLIGRAM(S): 30 TABLET, FILM COATED ORAL at 21:33

## 2025-07-25 RX ADMIN — ESCITALOPRAM OXALATE 10 MILLIGRAM(S): 20 TABLET ORAL at 12:28

## 2025-07-26 ENCOUNTER — TRANSCRIPTION ENCOUNTER (OUTPATIENT)
Age: 75
End: 2025-07-26

## 2025-07-26 LAB
ANION GAP SERPL CALC-SCNC: 11 MMOL/L — SIGNIFICANT CHANGE UP (ref 5–17)
BUN SERPL-MCNC: 7 MG/DL — SIGNIFICANT CHANGE UP (ref 7–23)
CALCIUM SERPL-MCNC: 8.7 MG/DL — SIGNIFICANT CHANGE UP (ref 8.4–10.5)
CHLORIDE SERPL-SCNC: 108 MMOL/L — SIGNIFICANT CHANGE UP (ref 96–108)
CO2 SERPL-SCNC: 24 MMOL/L — SIGNIFICANT CHANGE UP (ref 22–31)
CREAT SERPL-MCNC: 0.72 MG/DL — SIGNIFICANT CHANGE UP (ref 0.5–1.3)
EGFR: 96 ML/MIN/1.73M2 — SIGNIFICANT CHANGE UP
EGFR: 96 ML/MIN/1.73M2 — SIGNIFICANT CHANGE UP
GLUCOSE SERPL-MCNC: 90 MG/DL — SIGNIFICANT CHANGE UP (ref 70–99)
HCT VFR BLD CALC: 25.8 % — LOW (ref 39–50)
HGB BLD-MCNC: 8.3 G/DL — LOW (ref 13–17)
MAGNESIUM SERPL-MCNC: 1.9 MG/DL — SIGNIFICANT CHANGE UP (ref 1.6–2.6)
MCHC RBC-ENTMCNC: 30.1 PG — SIGNIFICANT CHANGE UP (ref 27–34)
MCHC RBC-ENTMCNC: 32.2 G/DL — SIGNIFICANT CHANGE UP (ref 32–36)
MCV RBC AUTO: 93.5 FL — SIGNIFICANT CHANGE UP (ref 80–100)
NRBC # BLD AUTO: 0 K/UL — SIGNIFICANT CHANGE UP (ref 0–0)
NRBC # FLD: 0 K/UL — SIGNIFICANT CHANGE UP (ref 0–0)
NRBC BLD AUTO-RTO: 0 /100 WBCS — SIGNIFICANT CHANGE UP (ref 0–0)
PHOSPHATE SERPL-MCNC: 3.3 MG/DL — SIGNIFICANT CHANGE UP (ref 2.5–4.5)
PLATELET # BLD AUTO: 284 K/UL — SIGNIFICANT CHANGE UP (ref 150–400)
PMV BLD: 8.8 FL — SIGNIFICANT CHANGE UP (ref 7–13)
POTASSIUM SERPL-MCNC: 3.9 MMOL/L — SIGNIFICANT CHANGE UP (ref 3.5–5.3)
POTASSIUM SERPL-SCNC: 3.9 MMOL/L — SIGNIFICANT CHANGE UP (ref 3.5–5.3)
RBC # BLD: 2.76 M/UL — LOW (ref 4.2–5.8)
RBC # FLD: 14.6 % — HIGH (ref 10.3–14.5)
SODIUM SERPL-SCNC: 143 MMOL/L — SIGNIFICANT CHANGE UP (ref 135–145)
WBC # BLD: 4.76 K/UL — SIGNIFICANT CHANGE UP (ref 3.8–10.5)
WBC # FLD AUTO: 4.76 K/UL — SIGNIFICANT CHANGE UP (ref 3.8–10.5)

## 2025-07-26 PROCEDURE — 99233 SBSQ HOSP IP/OBS HIGH 50: CPT | Mod: GC

## 2025-07-26 RX ADMIN — OLANZAPINE 5 MILLIGRAM(S): 10 TABLET ORAL at 22:58

## 2025-07-26 RX ADMIN — MIRTAZAPINE 15 MILLIGRAM(S): 30 TABLET, FILM COATED ORAL at 22:58

## 2025-07-26 RX ADMIN — DONEPEZIL HYDROCHLORIDE 5 MILLIGRAM(S): 5 TABLET ORAL at 22:58

## 2025-07-26 RX ADMIN — APIXABAN 2.5 MILLIGRAM(S): 5 TABLET, FILM COATED ORAL at 18:31

## 2025-07-26 RX ADMIN — Medication 51.25 MILLIGRAM(S): at 18:31

## 2025-07-26 RX ADMIN — ESCITALOPRAM OXALATE 10 MILLIGRAM(S): 20 TABLET ORAL at 12:52

## 2025-07-26 RX ADMIN — Medication 1 APPLICATION(S): at 06:25

## 2025-07-26 RX ADMIN — APIXABAN 2.5 MILLIGRAM(S): 5 TABLET, FILM COATED ORAL at 06:25

## 2025-07-26 RX ADMIN — Medication 51.25 MILLIGRAM(S): at 06:24

## 2025-07-27 LAB
ANION GAP SERPL CALC-SCNC: 12 MMOL/L — SIGNIFICANT CHANGE UP (ref 5–17)
BUN SERPL-MCNC: 15 MG/DL — SIGNIFICANT CHANGE UP (ref 7–23)
CALCIUM SERPL-MCNC: 8.4 MG/DL — SIGNIFICANT CHANGE UP (ref 8.4–10.5)
CHLORIDE SERPL-SCNC: 106 MMOL/L — SIGNIFICANT CHANGE UP (ref 96–108)
CO2 SERPL-SCNC: 22 MMOL/L — SIGNIFICANT CHANGE UP (ref 22–31)
CREAT SERPL-MCNC: 0.78 MG/DL — SIGNIFICANT CHANGE UP (ref 0.5–1.3)
CULTURE RESULTS: SIGNIFICANT CHANGE UP
CULTURE RESULTS: SIGNIFICANT CHANGE UP
EGFR: 94 ML/MIN/1.73M2 — SIGNIFICANT CHANGE UP
EGFR: 94 ML/MIN/1.73M2 — SIGNIFICANT CHANGE UP
GLUCOSE SERPL-MCNC: 117 MG/DL — HIGH (ref 70–99)
HCT VFR BLD CALC: 25.4 % — LOW (ref 39–50)
HGB BLD-MCNC: 8.2 G/DL — LOW (ref 13–17)
MAGNESIUM SERPL-MCNC: 1.8 MG/DL — SIGNIFICANT CHANGE UP (ref 1.6–2.6)
MCHC RBC-ENTMCNC: 30.1 PG — SIGNIFICANT CHANGE UP (ref 27–34)
MCHC RBC-ENTMCNC: 32.3 G/DL — SIGNIFICANT CHANGE UP (ref 32–36)
MCV RBC AUTO: 93.4 FL — SIGNIFICANT CHANGE UP (ref 80–100)
NRBC # BLD AUTO: 0 K/UL — SIGNIFICANT CHANGE UP (ref 0–0)
NRBC # FLD: 0 K/UL — SIGNIFICANT CHANGE UP (ref 0–0)
NRBC BLD AUTO-RTO: 0 /100 WBCS — SIGNIFICANT CHANGE UP (ref 0–0)
PHOSPHATE SERPL-MCNC: 3.4 MG/DL — SIGNIFICANT CHANGE UP (ref 2.5–4.5)
PLATELET # BLD AUTO: 304 K/UL — SIGNIFICANT CHANGE UP (ref 150–400)
PMV BLD: 8.8 FL — SIGNIFICANT CHANGE UP (ref 7–13)
POTASSIUM SERPL-MCNC: 3.8 MMOL/L — SIGNIFICANT CHANGE UP (ref 3.5–5.3)
POTASSIUM SERPL-SCNC: 3.8 MMOL/L — SIGNIFICANT CHANGE UP (ref 3.5–5.3)
RBC # BLD: 2.72 M/UL — LOW (ref 4.2–5.8)
RBC # FLD: 14.6 % — HIGH (ref 10.3–14.5)
SODIUM SERPL-SCNC: 140 MMOL/L — SIGNIFICANT CHANGE UP (ref 135–145)
SPECIMEN SOURCE: SIGNIFICANT CHANGE UP
SPECIMEN SOURCE: SIGNIFICANT CHANGE UP
WBC # BLD: 7.32 K/UL — SIGNIFICANT CHANGE UP (ref 3.8–10.5)
WBC # FLD AUTO: 7.32 K/UL — SIGNIFICANT CHANGE UP (ref 3.8–10.5)

## 2025-07-27 PROCEDURE — 86900 BLOOD TYPING SEROLOGIC ABO: CPT

## 2025-07-27 PROCEDURE — 86923 COMPATIBILITY TEST ELECTRIC: CPT

## 2025-07-27 PROCEDURE — 83735 ASSAY OF MAGNESIUM: CPT

## 2025-07-27 PROCEDURE — 80202 ASSAY OF VANCOMYCIN: CPT

## 2025-07-27 PROCEDURE — C1889: CPT

## 2025-07-27 PROCEDURE — 80048 BASIC METABOLIC PNL TOTAL CA: CPT

## 2025-07-27 PROCEDURE — 82805 BLOOD GASES W/O2 SATURATION: CPT

## 2025-07-27 PROCEDURE — 82803 BLOOD GASES ANY COMBINATION: CPT

## 2025-07-27 PROCEDURE — 0225U NFCT DS DNA&RNA 21 SARSCOV2: CPT

## 2025-07-27 PROCEDURE — 36415 COLL VENOUS BLD VENIPUNCTURE: CPT

## 2025-07-27 PROCEDURE — 76937 US GUIDE VASCULAR ACCESS: CPT

## 2025-07-27 PROCEDURE — 85007 BL SMEAR W/DIFF WBC COUNT: CPT

## 2025-07-27 PROCEDURE — 86140 C-REACTIVE PROTEIN: CPT

## 2025-07-27 PROCEDURE — C1894: CPT

## 2025-07-27 PROCEDURE — 85384 FIBRINOGEN ACTIVITY: CPT

## 2025-07-27 PROCEDURE — 85027 COMPLETE CBC AUTOMATED: CPT

## 2025-07-27 PROCEDURE — 83036 HEMOGLOBIN GLYCOSYLATED A1C: CPT

## 2025-07-27 PROCEDURE — 51703 INSERT BLADDER CATH COMPLEX: CPT

## 2025-07-27 PROCEDURE — 84145 PROCALCITONIN (PCT): CPT

## 2025-07-27 PROCEDURE — 87641 MR-STAPH DNA AMP PROBE: CPT

## 2025-07-27 PROCEDURE — 37244 VASC EMBOLIZE/OCCLUDE BLEED: CPT

## 2025-07-27 PROCEDURE — 87040 BLOOD CULTURE FOR BACTERIA: CPT

## 2025-07-27 PROCEDURE — 99232 SBSQ HOSP IP/OBS MODERATE 35: CPT

## 2025-07-27 PROCEDURE — 80053 COMPREHEN METABOLIC PANEL: CPT

## 2025-07-27 PROCEDURE — 86850 RBC ANTIBODY SCREEN: CPT

## 2025-07-27 PROCEDURE — C1887: CPT

## 2025-07-27 PROCEDURE — 85025 COMPLETE CBC W/AUTO DIFF WBC: CPT

## 2025-07-27 PROCEDURE — 97530 THERAPEUTIC ACTIVITIES: CPT

## 2025-07-27 PROCEDURE — 92610 EVALUATE SWALLOWING FUNCTION: CPT

## 2025-07-27 PROCEDURE — 97162 PT EVAL MOD COMPLEX 30 MIN: CPT

## 2025-07-27 PROCEDURE — 36430 TRANSFUSION BLD/BLD COMPNT: CPT

## 2025-07-27 PROCEDURE — 84100 ASSAY OF PHOSPHORUS: CPT

## 2025-07-27 PROCEDURE — 82962 GLUCOSE BLOOD TEST: CPT

## 2025-07-27 PROCEDURE — 93005 ELECTROCARDIOGRAM TRACING: CPT

## 2025-07-27 PROCEDURE — C9399: CPT

## 2025-07-27 PROCEDURE — 84295 ASSAY OF SERUM SODIUM: CPT

## 2025-07-27 PROCEDURE — P9073: CPT

## 2025-07-27 PROCEDURE — 86901 BLOOD TYPING SEROLOGIC RH(D): CPT

## 2025-07-27 PROCEDURE — P9016: CPT

## 2025-07-27 PROCEDURE — 97165 OT EVAL LOW COMPLEX 30 MIN: CPT

## 2025-07-27 PROCEDURE — 87640 STAPH A DNA AMP PROBE: CPT

## 2025-07-27 PROCEDURE — 85610 PROTHROMBIN TIME: CPT

## 2025-07-27 PROCEDURE — 82610 CYSTATIN C: CPT

## 2025-07-27 PROCEDURE — 81001 URINALYSIS AUTO W/SCOPE: CPT

## 2025-07-27 PROCEDURE — 82947 ASSAY GLUCOSE BLOOD QUANT: CPT

## 2025-07-27 PROCEDURE — 87086 URINE CULTURE/COLONY COUNT: CPT

## 2025-07-27 PROCEDURE — P9059: CPT

## 2025-07-27 PROCEDURE — 36247 INS CATH ABD/L-EXT ART 3RD: CPT | Mod: XS

## 2025-07-27 PROCEDURE — 93306 TTE W/DOPPLER COMPLETE: CPT

## 2025-07-27 PROCEDURE — 71045 X-RAY EXAM CHEST 1 VIEW: CPT

## 2025-07-27 PROCEDURE — C1751: CPT

## 2025-07-27 PROCEDURE — 83605 ASSAY OF LACTIC ACID: CPT

## 2025-07-27 PROCEDURE — 82330 ASSAY OF CALCIUM: CPT

## 2025-07-27 PROCEDURE — 84132 ASSAY OF SERUM POTASSIUM: CPT

## 2025-07-27 PROCEDURE — C1769: CPT

## 2025-07-27 PROCEDURE — 85730 THROMBOPLASTIN TIME PARTIAL: CPT

## 2025-07-27 PROCEDURE — 94002 VENT MGMT INPAT INIT DAY: CPT

## 2025-07-27 RX ORDER — POLYETHYLENE GLYCOL 3350 17 G/17G
17 POWDER, FOR SOLUTION ORAL DAILY
Refills: 0 | Status: DISCONTINUED | OUTPATIENT
Start: 2025-07-27 | End: 2025-07-28

## 2025-07-27 RX ORDER — ASPIRIN 325 MG
1 TABLET ORAL
Refills: 0 | DISCHARGE

## 2025-07-27 RX ORDER — TAMSULOSIN HYDROCHLORIDE 0.4 MG/1
1 CAPSULE ORAL
Refills: 0 | DISCHARGE

## 2025-07-27 RX ORDER — SENNA 187 MG
2 TABLET ORAL AT BEDTIME
Refills: 0 | Status: DISCONTINUED | OUTPATIENT
Start: 2025-07-27 | End: 2025-07-28

## 2025-07-27 RX ORDER — BISACODYL 5 MG
10 TABLET, DELAYED RELEASE (ENTERIC COATED) ORAL DAILY
Refills: 0 | Status: DISCONTINUED | OUTPATIENT
Start: 2025-07-27 | End: 2025-07-28

## 2025-07-27 RX ORDER — TAMSULOSIN HYDROCHLORIDE 0.4 MG/1
0.4 CAPSULE ORAL AT BEDTIME
Refills: 0 | Status: DISCONTINUED | OUTPATIENT
Start: 2025-07-27 | End: 2025-07-28

## 2025-07-27 RX ORDER — TAMSULOSIN HYDROCHLORIDE 0.4 MG/1
1 CAPSULE ORAL
Qty: 30 | Refills: 0
Start: 2025-07-27 | End: 2025-08-25

## 2025-07-27 RX ADMIN — TAMSULOSIN HYDROCHLORIDE 0.4 MILLIGRAM(S): 0.4 CAPSULE ORAL at 21:46

## 2025-07-27 RX ADMIN — SODIUM CHLORIDE 100 MILLILITER(S): 9 INJECTION, SOLUTION INTRAVENOUS at 05:49

## 2025-07-27 RX ADMIN — Medication 51.25 MILLIGRAM(S): at 05:49

## 2025-07-27 RX ADMIN — ESCITALOPRAM OXALATE 10 MILLIGRAM(S): 20 TABLET ORAL at 11:14

## 2025-07-27 RX ADMIN — Medication 2 TABLET(S): at 21:47

## 2025-07-27 RX ADMIN — Medication 1 APPLICATION(S): at 05:49

## 2025-07-27 RX ADMIN — APIXABAN 2.5 MILLIGRAM(S): 5 TABLET, FILM COATED ORAL at 17:11

## 2025-07-27 RX ADMIN — MIRTAZAPINE 15 MILLIGRAM(S): 30 TABLET, FILM COATED ORAL at 21:47

## 2025-07-27 RX ADMIN — POLYETHYLENE GLYCOL 3350 17 GRAM(S): 17 POWDER, FOR SOLUTION ORAL at 11:13

## 2025-07-27 RX ADMIN — Medication 51.25 MILLIGRAM(S): at 17:11

## 2025-07-27 RX ADMIN — TAMSULOSIN HYDROCHLORIDE 0.4 MILLIGRAM(S): 0.4 CAPSULE ORAL at 11:14

## 2025-07-27 RX ADMIN — DONEPEZIL HYDROCHLORIDE 5 MILLIGRAM(S): 5 TABLET ORAL at 21:47

## 2025-07-27 RX ADMIN — OLANZAPINE 5 MILLIGRAM(S): 10 TABLET ORAL at 21:46

## 2025-07-27 RX ADMIN — APIXABAN 2.5 MILLIGRAM(S): 5 TABLET, FILM COATED ORAL at 05:49

## 2025-07-28 VITALS
HEART RATE: 60 BPM | SYSTOLIC BLOOD PRESSURE: 103 MMHG | DIASTOLIC BLOOD PRESSURE: 59 MMHG | OXYGEN SATURATION: 99 % | RESPIRATION RATE: 20 BRPM

## 2025-07-28 LAB
ANION GAP SERPL CALC-SCNC: 7 MMOL/L — SIGNIFICANT CHANGE UP (ref 5–17)
BUN SERPL-MCNC: 20 MG/DL — SIGNIFICANT CHANGE UP (ref 7–23)
CALCIUM SERPL-MCNC: 8.2 MG/DL — LOW (ref 8.4–10.5)
CHLORIDE SERPL-SCNC: 108 MMOL/L — SIGNIFICANT CHANGE UP (ref 96–108)
CO2 SERPL-SCNC: 22 MMOL/L — SIGNIFICANT CHANGE UP (ref 22–31)
CREAT SERPL-MCNC: 0.77 MG/DL — SIGNIFICANT CHANGE UP (ref 0.5–1.3)
EGFR: 94 ML/MIN/1.73M2 — SIGNIFICANT CHANGE UP
EGFR: 94 ML/MIN/1.73M2 — SIGNIFICANT CHANGE UP
GLUCOSE SERPL-MCNC: 93 MG/DL — SIGNIFICANT CHANGE UP (ref 70–99)
HCT VFR BLD CALC: 22.9 % — LOW (ref 39–50)
HCT VFR BLD CALC: 24.4 % — LOW (ref 39–50)
HGB BLD-MCNC: 7.3 G/DL — LOW (ref 13–17)
HGB BLD-MCNC: 7.7 G/DL — LOW (ref 13–17)
MAGNESIUM SERPL-MCNC: 2 MG/DL — SIGNIFICANT CHANGE UP (ref 1.6–2.6)
MCHC RBC-ENTMCNC: 29.6 PG — SIGNIFICANT CHANGE UP (ref 27–34)
MCHC RBC-ENTMCNC: 29.9 PG — SIGNIFICANT CHANGE UP (ref 27–34)
MCHC RBC-ENTMCNC: 31.6 G/DL — LOW (ref 32–36)
MCHC RBC-ENTMCNC: 31.9 G/DL — LOW (ref 32–36)
MCV RBC AUTO: 93.8 FL — SIGNIFICANT CHANGE UP (ref 80–100)
MCV RBC AUTO: 93.9 FL — SIGNIFICANT CHANGE UP (ref 80–100)
NRBC # BLD AUTO: 0 K/UL — SIGNIFICANT CHANGE UP (ref 0–0)
NRBC # BLD AUTO: 0 K/UL — SIGNIFICANT CHANGE UP (ref 0–0)
NRBC # FLD: 0 K/UL — SIGNIFICANT CHANGE UP (ref 0–0)
NRBC # FLD: 0 K/UL — SIGNIFICANT CHANGE UP (ref 0–0)
NRBC BLD AUTO-RTO: 0 /100 WBCS — SIGNIFICANT CHANGE UP (ref 0–0)
NRBC BLD AUTO-RTO: 0 /100 WBCS — SIGNIFICANT CHANGE UP (ref 0–0)
PHOSPHATE SERPL-MCNC: 2.6 MG/DL — SIGNIFICANT CHANGE UP (ref 2.5–4.5)
PLATELET # BLD AUTO: 311 K/UL — SIGNIFICANT CHANGE UP (ref 150–400)
PLATELET # BLD AUTO: 319 K/UL — SIGNIFICANT CHANGE UP (ref 150–400)
PMV BLD: 9.1 FL — SIGNIFICANT CHANGE UP (ref 7–13)
PMV BLD: 9.3 FL — SIGNIFICANT CHANGE UP (ref 7–13)
POTASSIUM SERPL-MCNC: 4 MMOL/L — SIGNIFICANT CHANGE UP (ref 3.5–5.3)
POTASSIUM SERPL-SCNC: 4 MMOL/L — SIGNIFICANT CHANGE UP (ref 3.5–5.3)
RBC # BLD: 2.44 M/UL — LOW (ref 4.2–5.8)
RBC # BLD: 2.6 M/UL — LOW (ref 4.2–5.8)
RBC # FLD: 15.2 % — HIGH (ref 10.3–14.5)
RBC # FLD: 15.2 % — HIGH (ref 10.3–14.5)
SODIUM SERPL-SCNC: 137 MMOL/L — SIGNIFICANT CHANGE UP (ref 135–145)
WBC # BLD: 6.76 K/UL — SIGNIFICANT CHANGE UP (ref 3.8–10.5)
WBC # BLD: 6.8 K/UL — SIGNIFICANT CHANGE UP (ref 3.8–10.5)
WBC # FLD AUTO: 6.76 K/UL — SIGNIFICANT CHANGE UP (ref 3.8–10.5)
WBC # FLD AUTO: 6.8 K/UL — SIGNIFICANT CHANGE UP (ref 3.8–10.5)

## 2025-07-28 PROCEDURE — 97530 THERAPEUTIC ACTIVITIES: CPT

## 2025-07-28 PROCEDURE — 37244 VASC EMBOLIZE/OCCLUDE BLEED: CPT

## 2025-07-28 PROCEDURE — 82330 ASSAY OF CALCIUM: CPT

## 2025-07-28 PROCEDURE — 71045 X-RAY EXAM CHEST 1 VIEW: CPT

## 2025-07-28 PROCEDURE — 99291 CRITICAL CARE FIRST HOUR: CPT

## 2025-07-28 PROCEDURE — 82805 BLOOD GASES W/O2 SATURATION: CPT

## 2025-07-28 PROCEDURE — 82610 CYSTATIN C: CPT

## 2025-07-28 PROCEDURE — 85025 COMPLETE CBC W/AUTO DIFF WBC: CPT

## 2025-07-28 PROCEDURE — 82947 ASSAY GLUCOSE BLOOD QUANT: CPT

## 2025-07-28 PROCEDURE — 86140 C-REACTIVE PROTEIN: CPT

## 2025-07-28 PROCEDURE — 86901 BLOOD TYPING SEROLOGIC RH(D): CPT

## 2025-07-28 PROCEDURE — 85027 COMPLETE CBC AUTOMATED: CPT

## 2025-07-28 PROCEDURE — 94002 VENT MGMT INPAT INIT DAY: CPT

## 2025-07-28 PROCEDURE — 86900 BLOOD TYPING SEROLOGIC ABO: CPT

## 2025-07-28 PROCEDURE — 80053 COMPREHEN METABOLIC PANEL: CPT

## 2025-07-28 PROCEDURE — 83036 HEMOGLOBIN GLYCOSYLATED A1C: CPT

## 2025-07-28 PROCEDURE — 84132 ASSAY OF SERUM POTASSIUM: CPT

## 2025-07-28 PROCEDURE — 80202 ASSAY OF VANCOMYCIN: CPT

## 2025-07-28 PROCEDURE — C1894: CPT

## 2025-07-28 PROCEDURE — 86850 RBC ANTIBODY SCREEN: CPT

## 2025-07-28 PROCEDURE — 92610 EVALUATE SWALLOWING FUNCTION: CPT

## 2025-07-28 PROCEDURE — 85007 BL SMEAR W/DIFF WBC COUNT: CPT

## 2025-07-28 PROCEDURE — 85384 FIBRINOGEN ACTIVITY: CPT

## 2025-07-28 PROCEDURE — 87040 BLOOD CULTURE FOR BACTERIA: CPT

## 2025-07-28 PROCEDURE — C1889: CPT

## 2025-07-28 PROCEDURE — 81001 URINALYSIS AUTO W/SCOPE: CPT

## 2025-07-28 PROCEDURE — 36415 COLL VENOUS BLD VENIPUNCTURE: CPT

## 2025-07-28 PROCEDURE — C9399: CPT

## 2025-07-28 PROCEDURE — 84145 PROCALCITONIN (PCT): CPT

## 2025-07-28 PROCEDURE — 97165 OT EVAL LOW COMPLEX 30 MIN: CPT

## 2025-07-28 PROCEDURE — 36430 TRANSFUSION BLD/BLD COMPNT: CPT

## 2025-07-28 PROCEDURE — 84295 ASSAY OF SERUM SODIUM: CPT

## 2025-07-28 PROCEDURE — 87640 STAPH A DNA AMP PROBE: CPT

## 2025-07-28 PROCEDURE — P9016: CPT

## 2025-07-28 PROCEDURE — 76937 US GUIDE VASCULAR ACCESS: CPT

## 2025-07-28 PROCEDURE — C1751: CPT

## 2025-07-28 PROCEDURE — 87641 MR-STAPH DNA AMP PROBE: CPT

## 2025-07-28 PROCEDURE — 93005 ELECTROCARDIOGRAM TRACING: CPT

## 2025-07-28 PROCEDURE — 0225U NFCT DS DNA&RNA 21 SARSCOV2: CPT

## 2025-07-28 PROCEDURE — C1887: CPT

## 2025-07-28 PROCEDURE — 93306 TTE W/DOPPLER COMPLETE: CPT

## 2025-07-28 PROCEDURE — 87086 URINE CULTURE/COLONY COUNT: CPT

## 2025-07-28 PROCEDURE — 82962 GLUCOSE BLOOD TEST: CPT

## 2025-07-28 PROCEDURE — 85730 THROMBOPLASTIN TIME PARTIAL: CPT

## 2025-07-28 PROCEDURE — 84100 ASSAY OF PHOSPHORUS: CPT

## 2025-07-28 PROCEDURE — 86923 COMPATIBILITY TEST ELECTRIC: CPT

## 2025-07-28 PROCEDURE — 36247 INS CATH ABD/L-EXT ART 3RD: CPT

## 2025-07-28 PROCEDURE — P9059: CPT

## 2025-07-28 PROCEDURE — 83735 ASSAY OF MAGNESIUM: CPT

## 2025-07-28 PROCEDURE — 99232 SBSQ HOSP IP/OBS MODERATE 35: CPT

## 2025-07-28 PROCEDURE — 96374 THER/PROPH/DIAG INJ IV PUSH: CPT

## 2025-07-28 PROCEDURE — P9073: CPT

## 2025-07-28 PROCEDURE — 80048 BASIC METABOLIC PNL TOTAL CA: CPT

## 2025-07-28 PROCEDURE — 97164 PT RE-EVAL EST PLAN CARE: CPT

## 2025-07-28 PROCEDURE — 97162 PT EVAL MOD COMPLEX 30 MIN: CPT

## 2025-07-28 PROCEDURE — 85610 PROTHROMBIN TIME: CPT

## 2025-07-28 PROCEDURE — 83605 ASSAY OF LACTIC ACID: CPT

## 2025-07-28 PROCEDURE — 97168 OT RE-EVAL EST PLAN CARE: CPT

## 2025-07-28 PROCEDURE — C1769: CPT

## 2025-07-28 PROCEDURE — 82803 BLOOD GASES ANY COMBINATION: CPT

## 2025-07-28 RX ADMIN — Medication 1 APPLICATION(S): at 06:02

## 2025-07-28 RX ADMIN — Medication 51.25 MILLIGRAM(S): at 06:02

## 2025-07-28 RX ADMIN — POLYETHYLENE GLYCOL 3350 17 GRAM(S): 17 POWDER, FOR SOLUTION ORAL at 11:19

## 2025-07-28 RX ADMIN — APIXABAN 2.5 MILLIGRAM(S): 5 TABLET, FILM COATED ORAL at 06:02

## 2025-07-28 RX ADMIN — ESCITALOPRAM OXALATE 10 MILLIGRAM(S): 20 TABLET ORAL at 11:19

## 2025-08-01 ENCOUNTER — APPOINTMENT (OUTPATIENT)
Dept: UROLOGY | Facility: CLINIC | Age: 75
End: 2025-08-01
Payer: MEDICARE

## 2025-08-01 VITALS — HEART RATE: 88 BPM | SYSTOLIC BLOOD PRESSURE: 114 MMHG | OXYGEN SATURATION: 96 % | DIASTOLIC BLOOD PRESSURE: 78 MMHG

## 2025-08-01 DIAGNOSIS — N40.1 BENIGN PROSTATIC HYPERPLASIA WITH LOWER URINARY TRACT SYMPMS: ICD-10-CM

## 2025-08-01 PROCEDURE — 99214 OFFICE O/P EST MOD 30 MIN: CPT | Mod: 25,GC

## 2025-08-01 PROCEDURE — A4216: CPT | Mod: NC

## 2025-08-01 PROCEDURE — 51700 IRRIGATION OF BLADDER: CPT

## 2025-08-01 PROCEDURE — 51798 US URINE CAPACITY MEASURE: CPT

## 2025-08-06 DIAGNOSIS — E87.1 HYPO-OSMOLALITY AND HYPONATREMIA: ICD-10-CM

## 2025-08-06 DIAGNOSIS — Z98.42 CATARACT EXTRACTION STATUS, LEFT EYE: ICD-10-CM

## 2025-08-06 DIAGNOSIS — D62 ACUTE POSTHEMORRHAGIC ANEMIA: ICD-10-CM

## 2025-08-06 DIAGNOSIS — N17.9 ACUTE KIDNEY FAILURE, UNSPECIFIED: ICD-10-CM

## 2025-08-06 DIAGNOSIS — T83.511A INFECTION AND INFLAMMATORY REACTION DUE TO INDWELLING URETHRAL CATHETER, INITIAL ENCOUNTER: ICD-10-CM
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
## 2025-08-06 DIAGNOSIS — R53.1 WEAKNESS: ICD-10-CM

## 2025-08-06 DIAGNOSIS — F32.9 MAJOR DEPRESSIVE DISORDER, SINGLE EPISODE, UNSPECIFIED: ICD-10-CM

## 2025-08-06 DIAGNOSIS — E43 UNSPECIFIED SEVERE PROTEIN-CALORIE MALNUTRITION: ICD-10-CM

## 2025-08-06 DIAGNOSIS — R65.20 SEVERE SEPSIS WITHOUT SEPTIC SHOCK: ICD-10-CM

## 2025-08-06 DIAGNOSIS — Z66 DO NOT RESUSCITATE: ICD-10-CM

## 2025-08-06 DIAGNOSIS — R33.8 OTHER RETENTION OF URINE: ICD-10-CM

## 2025-08-06 DIAGNOSIS — N13.8 OTHER OBSTRUCTIVE AND REFLUX UROPATHY: ICD-10-CM

## 2025-08-06 DIAGNOSIS — J69.0 PNEUMONITIS DUE TO INHALATION OF FOOD AND VOMIT: ICD-10-CM

## 2025-08-06 DIAGNOSIS — D46.9 MYELODYSPLASTIC SYNDROME, UNSPECIFIED: ICD-10-CM

## 2025-08-06 DIAGNOSIS — F02.811 DEMENTIA IN OTHER DISEASES CLASSIFIED ELSEWHERE, UNSPECIFIED SEVERITY, WITH AGITATION: ICD-10-CM

## 2025-08-06 DIAGNOSIS — A41.9 SEPSIS, UNSPECIFIED ORGANISM: ICD-10-CM

## 2025-08-06 DIAGNOSIS — E78.5 HYPERLIPIDEMIA, UNSPECIFIED: ICD-10-CM

## 2025-08-06 DIAGNOSIS — Z79.82 LONG TERM (CURRENT) USE OF ASPIRIN: ICD-10-CM

## 2025-08-06 DIAGNOSIS — E87.6 HYPOKALEMIA: ICD-10-CM

## 2025-08-06 DIAGNOSIS — E83.42 HYPOMAGNESEMIA: ICD-10-CM

## 2025-08-06 DIAGNOSIS — G30.9 ALZHEIMER'S DISEASE, UNSPECIFIED: ICD-10-CM

## 2025-08-12 ENCOUNTER — NON-APPOINTMENT (OUTPATIENT)
Age: 75
End: 2025-08-12

## 2025-08-12 ENCOUNTER — INPATIENT (INPATIENT)
Facility: HOSPITAL | Age: 75
LOS: 1 days | Discharge: HOME HEALTH | End: 2025-08-14
Attending: STUDENT IN AN ORGANIZED HEALTH CARE EDUCATION/TRAINING PROGRAM | Admitting: INTERNAL MEDICINE
Payer: MEDICARE

## 2025-08-12 VITALS
RESPIRATION RATE: 18 BRPM | OXYGEN SATURATION: 93 % | SYSTOLIC BLOOD PRESSURE: 111 MMHG | HEIGHT: 72 IN | HEART RATE: 74 BPM | DIASTOLIC BLOOD PRESSURE: 81 MMHG | WEIGHT: 179.9 LBS | TEMPERATURE: 99 F

## 2025-08-12 DIAGNOSIS — G93.41 METABOLIC ENCEPHALOPATHY: ICD-10-CM

## 2025-08-12 DIAGNOSIS — Z98.49 CATARACT EXTRACTION STATUS, UNSPECIFIED EYE: Chronic | ICD-10-CM

## 2025-08-12 DIAGNOSIS — Z41.9 ENCOUNTER FOR PROCEDURE FOR PURPOSES OTHER THAN REMEDYING HEALTH STATE, UNSPECIFIED: Chronic | ICD-10-CM

## 2025-08-12 DIAGNOSIS — N39.0 URINARY TRACT INFECTION, SITE NOT SPECIFIED: ICD-10-CM

## 2025-08-12 DIAGNOSIS — Z98.890 OTHER SPECIFIED POSTPROCEDURAL STATES: Chronic | ICD-10-CM

## 2025-08-12 DIAGNOSIS — E78.5 HYPERLIPIDEMIA, UNSPECIFIED: ICD-10-CM

## 2025-08-12 DIAGNOSIS — N13.9 OBSTRUCTIVE AND REFLUX UROPATHY, UNSPECIFIED: ICD-10-CM

## 2025-08-12 DIAGNOSIS — F32.9 MAJOR DEPRESSIVE DISORDER, SINGLE EPISODE, UNSPECIFIED: ICD-10-CM

## 2025-08-12 DIAGNOSIS — Z29.9 ENCOUNTER FOR PROPHYLACTIC MEASURES, UNSPECIFIED: ICD-10-CM

## 2025-08-12 DIAGNOSIS — K59.00 CONSTIPATION, UNSPECIFIED: ICD-10-CM

## 2025-08-12 DIAGNOSIS — D64.9 ANEMIA, UNSPECIFIED: ICD-10-CM

## 2025-08-12 DIAGNOSIS — I10 ESSENTIAL (PRIMARY) HYPERTENSION: ICD-10-CM

## 2025-08-12 DIAGNOSIS — G30.9 ALZHEIMER'S DISEASE, UNSPECIFIED: ICD-10-CM

## 2025-08-12 LAB
ANION GAP SERPL CALC-SCNC: 13 MMOL/L — SIGNIFICANT CHANGE UP (ref 5–17)
APPEARANCE UR: ABNORMAL
BASOPHILS # BLD AUTO: 0.04 K/UL — SIGNIFICANT CHANGE UP (ref 0–0.2)
BASOPHILS NFR BLD AUTO: 0.5 % — SIGNIFICANT CHANGE UP (ref 0–2)
BILIRUB UR-MCNC: NEGATIVE — SIGNIFICANT CHANGE UP
BUN SERPL-MCNC: 22 MG/DL — SIGNIFICANT CHANGE UP (ref 7–23)
CALCIUM SERPL-MCNC: 9.6 MG/DL — SIGNIFICANT CHANGE UP (ref 8.4–10.5)
CHLORIDE SERPL-SCNC: 107 MMOL/L — SIGNIFICANT CHANGE UP (ref 96–108)
CO2 SERPL-SCNC: 23 MMOL/L — SIGNIFICANT CHANGE UP (ref 22–31)
COLOR SPEC: YELLOW — SIGNIFICANT CHANGE UP
CREAT SERPL-MCNC: 0.97 MG/DL — SIGNIFICANT CHANGE UP (ref 0.5–1.3)
DIFF PNL FLD: ABNORMAL
EGFR: 82 ML/MIN/1.73M2 — SIGNIFICANT CHANGE UP
EGFR: 82 ML/MIN/1.73M2 — SIGNIFICANT CHANGE UP
EOSINOPHIL # BLD AUTO: 0.04 K/UL — SIGNIFICANT CHANGE UP (ref 0–0.5)
EOSINOPHIL NFR BLD AUTO: 0.5 % — SIGNIFICANT CHANGE UP (ref 0–6)
FLUAV AG NPH QL: SIGNIFICANT CHANGE UP
FLUBV AG NPH QL: SIGNIFICANT CHANGE UP
GLUCOSE SERPL-MCNC: 119 MG/DL — HIGH (ref 70–99)
GLUCOSE UR QL: NEGATIVE MG/DL — SIGNIFICANT CHANGE UP
HCT VFR BLD CALC: 29.3 % — LOW (ref 39–50)
HGB BLD-MCNC: 8.9 G/DL — LOW (ref 13–17)
IMM GRANULOCYTES # BLD AUTO: 0.04 K/UL — SIGNIFICANT CHANGE UP (ref 0–0.07)
IMM GRANULOCYTES NFR BLD AUTO: 0.5 % — SIGNIFICANT CHANGE UP (ref 0–0.9)
KETONES UR QL: NEGATIVE MG/DL — SIGNIFICANT CHANGE UP
LACTATE SERPL-SCNC: 0.7 MMOL/L — SIGNIFICANT CHANGE UP (ref 0.5–2)
LACTATE SERPL-SCNC: 3.8 MMOL/L — HIGH (ref 0.5–2)
LEUKOCYTE ESTERASE UR-ACNC: ABNORMAL
LYMPHOCYTES # BLD AUTO: 0.75 K/UL — LOW (ref 1–3.3)
LYMPHOCYTES NFR BLD AUTO: 9.3 % — LOW (ref 13–44)
MCHC RBC-ENTMCNC: 28.2 PG — SIGNIFICANT CHANGE UP (ref 27–34)
MCHC RBC-ENTMCNC: 30.4 G/DL — LOW (ref 32–36)
MCV RBC AUTO: 92.7 FL — SIGNIFICANT CHANGE UP (ref 80–100)
MONOCYTES # BLD AUTO: 0.67 K/UL — SIGNIFICANT CHANGE UP (ref 0–0.9)
MONOCYTES NFR BLD AUTO: 8.3 % — SIGNIFICANT CHANGE UP (ref 2–14)
NEUTROPHILS # BLD AUTO: 6.55 K/UL — SIGNIFICANT CHANGE UP (ref 1.8–7.4)
NEUTROPHILS NFR BLD AUTO: 80.9 % — HIGH (ref 43–77)
NITRITE UR-MCNC: POSITIVE
NRBC # BLD AUTO: 0 K/UL — SIGNIFICANT CHANGE UP (ref 0–0)
NRBC # FLD: 0 K/UL — SIGNIFICANT CHANGE UP (ref 0–0)
NRBC BLD AUTO-RTO: 0 /100 WBCS — SIGNIFICANT CHANGE UP (ref 0–0)
PH UR: 5.5 — SIGNIFICANT CHANGE UP (ref 5–8)
PLATELET # BLD AUTO: 323 K/UL — SIGNIFICANT CHANGE UP (ref 150–400)
PMV BLD: 8.8 FL — SIGNIFICANT CHANGE UP (ref 7–13)
POTASSIUM SERPL-MCNC: 5 MMOL/L — SIGNIFICANT CHANGE UP (ref 3.5–5.3)
POTASSIUM SERPL-SCNC: 5 MMOL/L — SIGNIFICANT CHANGE UP (ref 3.5–5.3)
PROT UR-MCNC: 100 MG/DL
RBC # BLD: 3.16 M/UL — LOW (ref 4.2–5.8)
RBC # FLD: 15.3 % — HIGH (ref 10.3–14.5)
RSV RNA NPH QL NAA+NON-PROBE: SIGNIFICANT CHANGE UP
SARS-COV-2 RNA SPEC QL NAA+PROBE: SIGNIFICANT CHANGE UP
SODIUM SERPL-SCNC: 143 MMOL/L — SIGNIFICANT CHANGE UP (ref 135–145)
SOURCE RESPIRATORY: SIGNIFICANT CHANGE UP
SP GR SPEC: 1.03 — SIGNIFICANT CHANGE UP (ref 1–1.03)
UROBILINOGEN FLD QL: 1 MG/DL — SIGNIFICANT CHANGE UP (ref 0.2–1)
WBC # BLD: 8.09 K/UL — SIGNIFICANT CHANGE UP (ref 3.8–10.5)
WBC # FLD AUTO: 8.09 K/UL — SIGNIFICANT CHANGE UP (ref 3.8–10.5)

## 2025-08-12 PROCEDURE — 87637 SARSCOV2&INF A&B&RSV AMP PRB: CPT

## 2025-08-12 PROCEDURE — 87040 BLOOD CULTURE FOR BACTERIA: CPT

## 2025-08-12 PROCEDURE — 83605 ASSAY OF LACTIC ACID: CPT

## 2025-08-12 PROCEDURE — 71045 X-RAY EXAM CHEST 1 VIEW: CPT | Mod: 26

## 2025-08-12 PROCEDURE — 93010 ELECTROCARDIOGRAM REPORT: CPT

## 2025-08-12 PROCEDURE — 36415 COLL VENOUS BLD VENIPUNCTURE: CPT

## 2025-08-12 PROCEDURE — 99285 EMERGENCY DEPT VISIT HI MDM: CPT

## 2025-08-12 PROCEDURE — 80048 BASIC METABOLIC PNL TOTAL CA: CPT

## 2025-08-12 PROCEDURE — 85025 COMPLETE CBC W/AUTO DIFF WBC: CPT

## 2025-08-12 PROCEDURE — 81001 URINALYSIS AUTO W/SCOPE: CPT

## 2025-08-12 PROCEDURE — 99223 1ST HOSP IP/OBS HIGH 75: CPT | Mod: GC

## 2025-08-12 RX ORDER — ACETAMINOPHEN 500 MG/5ML
650 LIQUID (ML) ORAL EVERY 6 HOURS
Refills: 0 | Status: DISCONTINUED | OUTPATIENT
Start: 2025-08-12 | End: 2025-08-14

## 2025-08-12 RX ORDER — POLYETHYLENE GLYCOL 3350 17 G/17G
17 POWDER, FOR SOLUTION ORAL AT BEDTIME
Refills: 0 | Status: DISCONTINUED | OUTPATIENT
Start: 2025-08-12 | End: 2025-08-14

## 2025-08-12 RX ORDER — ENOXAPARIN SODIUM 100 MG/ML
40 INJECTION SUBCUTANEOUS
Refills: 0 | Status: DISCONTINUED | OUTPATIENT
Start: 2025-08-12 | End: 2025-08-14

## 2025-08-12 RX ORDER — AMLODIPINE BESYLATE 10 MG/1
5 TABLET ORAL DAILY
Refills: 0 | Status: DISCONTINUED | OUTPATIENT
Start: 2025-08-12 | End: 2025-08-14

## 2025-08-12 RX ORDER — ATORVASTATIN CALCIUM 80 MG/1
20 TABLET, FILM COATED ORAL AT BEDTIME
Refills: 0 | Status: DISCONTINUED | OUTPATIENT
Start: 2025-08-12 | End: 2025-08-14

## 2025-08-12 RX ORDER — SENNA 187 MG
1 TABLET ORAL AT BEDTIME
Refills: 0 | Status: DISCONTINUED | OUTPATIENT
Start: 2025-08-12 | End: 2025-08-14

## 2025-08-12 RX ORDER — ESCITALOPRAM OXALATE 20 MG/1
10 TABLET ORAL DAILY
Refills: 0 | Status: DISCONTINUED | OUTPATIENT
Start: 2025-08-12 | End: 2025-08-14

## 2025-08-12 RX ORDER — MELATONIN 5 MG
3 TABLET ORAL AT BEDTIME
Refills: 0 | Status: DISCONTINUED | OUTPATIENT
Start: 2025-08-12 | End: 2025-08-14

## 2025-08-12 RX ORDER — MIRTAZAPINE 30 MG/1
15 TABLET, FILM COATED ORAL AT BEDTIME
Refills: 0 | Status: DISCONTINUED | OUTPATIENT
Start: 2025-08-12 | End: 2025-08-14

## 2025-08-12 RX ORDER — ACETAMINOPHEN 500 MG/5ML
1000 LIQUID (ML) ORAL ONCE
Refills: 0 | Status: COMPLETED | OUTPATIENT
Start: 2025-08-12 | End: 2025-08-12

## 2025-08-12 RX ORDER — TAMSULOSIN HYDROCHLORIDE 0.4 MG/1
0.4 CAPSULE ORAL AT BEDTIME
Refills: 0 | Status: DISCONTINUED | OUTPATIENT
Start: 2025-08-12 | End: 2025-08-14

## 2025-08-12 RX ORDER — VANCOMYCIN HCL IN 5 % DEXTROSE 1.5G/250ML
1250 PLASTIC BAG, INJECTION (ML) INTRAVENOUS ONCE
Refills: 0 | Status: COMPLETED | OUTPATIENT
Start: 2025-08-12 | End: 2025-08-12

## 2025-08-12 RX ORDER — PIPERACILLIN-TAZO-DEXTROSE,ISO 3.375G/5
3.38 IV SOLUTION, PIGGYBACK PREMIX FROZEN(ML) INTRAVENOUS ONCE
Refills: 0 | Status: COMPLETED | OUTPATIENT
Start: 2025-08-12 | End: 2025-08-12

## 2025-08-12 RX ORDER — DONEPEZIL HYDROCHLORIDE 5 MG/1
5 TABLET ORAL AT BEDTIME
Refills: 0 | Status: DISCONTINUED | OUTPATIENT
Start: 2025-08-12 | End: 2025-08-14

## 2025-08-12 RX ORDER — ONDANSETRON HCL/PF 4 MG/2 ML
4 VIAL (ML) INJECTION EVERY 8 HOURS
Refills: 0 | Status: DISCONTINUED | OUTPATIENT
Start: 2025-08-12 | End: 2025-08-14

## 2025-08-12 RX ORDER — MAGNESIUM, ALUMINUM HYDROXIDE 200-200 MG
30 TABLET,CHEWABLE ORAL EVERY 4 HOURS
Refills: 0 | Status: DISCONTINUED | OUTPATIENT
Start: 2025-08-12 | End: 2025-08-14

## 2025-08-12 RX ADMIN — Medication 200 GRAM(S): at 18:45

## 2025-08-12 RX ADMIN — Medication 2500 MILLILITER(S): at 18:45

## 2025-08-12 RX ADMIN — Medication 166.67 MILLIGRAM(S): at 19:18

## 2025-08-12 RX ADMIN — Medication 400 MILLIGRAM(S): at 18:45

## 2025-08-13 DIAGNOSIS — Z51.5 ENCOUNTER FOR PALLIATIVE CARE: ICD-10-CM

## 2025-08-13 DIAGNOSIS — F03.911 UNSPECIFIED DEMENTIA, UNSPECIFIED SEVERITY, WITH AGITATION: ICD-10-CM

## 2025-08-13 DIAGNOSIS — Z91.89 OTHER SPECIFIED PERSONAL RISK FACTORS, NOT ELSEWHERE CLASSIFIED: ICD-10-CM

## 2025-08-13 DIAGNOSIS — Z71.89 OTHER SPECIFIED COUNSELING: ICD-10-CM

## 2025-08-13 LAB
ADD ON TEST-SPECIMEN IN LAB: SIGNIFICANT CHANGE UP
ALBUMIN SERPL ELPH-MCNC: 3 G/DL — LOW (ref 3.3–5)
ALP SERPL-CCNC: 79 U/L — SIGNIFICANT CHANGE UP (ref 40–120)
ALT FLD-CCNC: <5 U/L — LOW (ref 10–45)
ANION GAP SERPL CALC-SCNC: 10 MMOL/L — SIGNIFICANT CHANGE UP (ref 5–17)
AST SERPL-CCNC: 9 U/L — LOW (ref 10–40)
BASOPHILS # BLD AUTO: 0.04 K/UL — SIGNIFICANT CHANGE UP (ref 0–0.2)
BASOPHILS NFR BLD AUTO: 0.7 % — SIGNIFICANT CHANGE UP (ref 0–2)
BILIRUB SERPL-MCNC: 0.2 MG/DL — SIGNIFICANT CHANGE UP (ref 0.2–1.2)
BUN SERPL-MCNC: 15 MG/DL — SIGNIFICANT CHANGE UP (ref 7–23)
CALCIUM SERPL-MCNC: 8.5 MG/DL — SIGNIFICANT CHANGE UP (ref 8.4–10.5)
CHLORIDE SERPL-SCNC: 109 MMOL/L — HIGH (ref 96–108)
CO2 SERPL-SCNC: 22 MMOL/L — SIGNIFICANT CHANGE UP (ref 22–31)
CREAT SERPL-MCNC: 0.81 MG/DL — SIGNIFICANT CHANGE UP (ref 0.5–1.3)
EGFR: 93 ML/MIN/1.73M2 — SIGNIFICANT CHANGE UP
EGFR: 93 ML/MIN/1.73M2 — SIGNIFICANT CHANGE UP
EOSINOPHIL # BLD AUTO: 0.16 K/UL — SIGNIFICANT CHANGE UP (ref 0–0.5)
EOSINOPHIL NFR BLD AUTO: 2.7 % — SIGNIFICANT CHANGE UP (ref 0–6)
GLUCOSE SERPL-MCNC: 102 MG/DL — HIGH (ref 70–99)
HCT VFR BLD CALC: 27.8 % — LOW (ref 39–50)
HGB BLD-MCNC: 8.4 G/DL — LOW (ref 13–17)
IMM GRANULOCYTES # BLD AUTO: 0.03 K/UL — SIGNIFICANT CHANGE UP (ref 0–0.07)
IMM GRANULOCYTES NFR BLD AUTO: 0.5 % — SIGNIFICANT CHANGE UP (ref 0–0.9)
LYMPHOCYTES # BLD AUTO: 0.5 K/UL — LOW (ref 1–3.3)
LYMPHOCYTES NFR BLD AUTO: 8.3 % — LOW (ref 13–44)
MAGNESIUM SERPL-MCNC: 1.9 MG/DL — SIGNIFICANT CHANGE UP (ref 1.6–2.6)
MCHC RBC-ENTMCNC: 28.3 PG — SIGNIFICANT CHANGE UP (ref 27–34)
MCHC RBC-ENTMCNC: 30.2 G/DL — LOW (ref 32–36)
MCV RBC AUTO: 93.6 FL — SIGNIFICANT CHANGE UP (ref 80–100)
MONOCYTES # BLD AUTO: 0.66 K/UL — SIGNIFICANT CHANGE UP (ref 0–0.9)
MONOCYTES NFR BLD AUTO: 11 % — SIGNIFICANT CHANGE UP (ref 2–14)
NEUTROPHILS # BLD AUTO: 4.63 K/UL — SIGNIFICANT CHANGE UP (ref 1.8–7.4)
NEUTROPHILS NFR BLD AUTO: 76.8 % — SIGNIFICANT CHANGE UP (ref 43–77)
NRBC # BLD AUTO: 0 K/UL — SIGNIFICANT CHANGE UP (ref 0–0)
NRBC # FLD: 0 K/UL — SIGNIFICANT CHANGE UP (ref 0–0)
NRBC BLD AUTO-RTO: 0 /100 WBCS — SIGNIFICANT CHANGE UP (ref 0–0)
PHOSPHATE SERPL-MCNC: 3 MG/DL — SIGNIFICANT CHANGE UP (ref 2.5–4.5)
PLATELET # BLD AUTO: 274 K/UL — SIGNIFICANT CHANGE UP (ref 150–400)
PMV BLD: 8.8 FL — SIGNIFICANT CHANGE UP (ref 7–13)
POTASSIUM SERPL-MCNC: 4.1 MMOL/L — SIGNIFICANT CHANGE UP (ref 3.5–5.3)
POTASSIUM SERPL-SCNC: 4.1 MMOL/L — SIGNIFICANT CHANGE UP (ref 3.5–5.3)
PROT SERPL-MCNC: 5.8 G/DL — LOW (ref 6–8.3)
RBC # BLD: 2.97 M/UL — LOW (ref 4.2–5.8)
RBC # FLD: 15.1 % — HIGH (ref 10.3–14.5)
SODIUM SERPL-SCNC: 141 MMOL/L — SIGNIFICANT CHANGE UP (ref 135–145)
TSH SERPL-MCNC: 0.46 UIU/ML — SIGNIFICANT CHANGE UP (ref 0.27–4.2)
WBC # BLD: 6.02 K/UL — SIGNIFICANT CHANGE UP (ref 3.8–10.5)
WBC # FLD AUTO: 6.02 K/UL — SIGNIFICANT CHANGE UP (ref 3.8–10.5)

## 2025-08-13 PROCEDURE — 87040 BLOOD CULTURE FOR BACTERIA: CPT

## 2025-08-13 PROCEDURE — 83605 ASSAY OF LACTIC ACID: CPT

## 2025-08-13 PROCEDURE — 80164 ASSAY DIPROPYLACETIC ACD TOT: CPT

## 2025-08-13 PROCEDURE — 87086 URINE CULTURE/COLONY COUNT: CPT

## 2025-08-13 PROCEDURE — 82607 VITAMIN B-12: CPT

## 2025-08-13 PROCEDURE — 81001 URINALYSIS AUTO W/SCOPE: CPT

## 2025-08-13 PROCEDURE — 84100 ASSAY OF PHOSPHORUS: CPT

## 2025-08-13 PROCEDURE — 99233 SBSQ HOSP IP/OBS HIGH 50: CPT | Mod: GC

## 2025-08-13 PROCEDURE — 83735 ASSAY OF MAGNESIUM: CPT

## 2025-08-13 PROCEDURE — 82962 GLUCOSE BLOOD TEST: CPT

## 2025-08-13 PROCEDURE — 99223 1ST HOSP IP/OBS HIGH 75: CPT

## 2025-08-13 PROCEDURE — 80053 COMPREHEN METABOLIC PANEL: CPT

## 2025-08-13 PROCEDURE — 87637 SARSCOV2&INF A&B&RSV AMP PRB: CPT

## 2025-08-13 PROCEDURE — 36415 COLL VENOUS BLD VENIPUNCTURE: CPT

## 2025-08-13 PROCEDURE — 85025 COMPLETE CBC W/AUTO DIFF WBC: CPT

## 2025-08-13 PROCEDURE — 82746 ASSAY OF FOLIC ACID SERUM: CPT

## 2025-08-13 PROCEDURE — 84443 ASSAY THYROID STIM HORMONE: CPT

## 2025-08-13 PROCEDURE — 80048 BASIC METABOLIC PNL TOTAL CA: CPT

## 2025-08-13 RX ORDER — CEFTRIAXONE 500 MG/1
INJECTION, POWDER, FOR SOLUTION INTRAMUSCULAR; INTRAVENOUS
Refills: 0 | Status: DISCONTINUED | OUTPATIENT
Start: 2025-08-13 | End: 2025-08-14

## 2025-08-13 RX ORDER — CEFTRIAXONE 500 MG/1
2000 INJECTION, POWDER, FOR SOLUTION INTRAMUSCULAR; INTRAVENOUS EVERY 24 HOURS
Refills: 0 | Status: DISCONTINUED | OUTPATIENT
Start: 2025-08-14 | End: 2025-08-14

## 2025-08-13 RX ORDER — CEFTRIAXONE 500 MG/1
2000 INJECTION, POWDER, FOR SOLUTION INTRAMUSCULAR; INTRAVENOUS ONCE
Refills: 0 | Status: COMPLETED | OUTPATIENT
Start: 2025-08-13 | End: 2025-08-13

## 2025-08-13 RX ADMIN — MIRTAZAPINE 15 MILLIGRAM(S): 30 TABLET, FILM COATED ORAL at 22:29

## 2025-08-13 RX ADMIN — ATORVASTATIN CALCIUM 20 MILLIGRAM(S): 80 TABLET, FILM COATED ORAL at 22:29

## 2025-08-13 RX ADMIN — Medication 125 MILLIGRAM(S): at 18:34

## 2025-08-13 RX ADMIN — ESCITALOPRAM OXALATE 10 MILLIGRAM(S): 20 TABLET ORAL at 11:46

## 2025-08-13 RX ADMIN — TAMSULOSIN HYDROCHLORIDE 0.4 MILLIGRAM(S): 0.4 CAPSULE ORAL at 22:29

## 2025-08-13 RX ADMIN — Medication 1 TABLET(S): at 22:29

## 2025-08-13 RX ADMIN — DONEPEZIL HYDROCHLORIDE 5 MILLIGRAM(S): 5 TABLET ORAL at 22:28

## 2025-08-13 RX ADMIN — CEFTRIAXONE 100 MILLIGRAM(S): 500 INJECTION, POWDER, FOR SOLUTION INTRAMUSCULAR; INTRAVENOUS at 01:13

## 2025-08-13 RX ADMIN — POLYETHYLENE GLYCOL 3350 17 GRAM(S): 17 POWDER, FOR SOLUTION ORAL at 22:29

## 2025-08-13 RX ADMIN — Medication 3 MILLIGRAM(S): at 22:29

## 2025-08-14 ENCOUNTER — TRANSCRIPTION ENCOUNTER (OUTPATIENT)
Age: 75
End: 2025-08-14

## 2025-08-14 VITALS
TEMPERATURE: 98 F | SYSTOLIC BLOOD PRESSURE: 122 MMHG | HEART RATE: 66 BPM | OXYGEN SATURATION: 98 % | RESPIRATION RATE: 18 BRPM | DIASTOLIC BLOOD PRESSURE: 79 MMHG

## 2025-08-14 DIAGNOSIS — G93.41 METABOLIC ENCEPHALOPATHY: ICD-10-CM

## 2025-08-14 DIAGNOSIS — R53.81 OTHER MALAISE: ICD-10-CM

## 2025-08-14 DIAGNOSIS — T83.511A INFECTION AND INFLAMMATORY REACTION DUE TO INDWELLING URETHRAL CATHETER, INITIAL ENCOUNTER: ICD-10-CM

## 2025-08-14 LAB
FOLATE SERPL-MCNC: 8.4 NG/ML — SIGNIFICANT CHANGE UP
VIT B12 SERPL-MCNC: 417 PG/ML — SIGNIFICANT CHANGE UP (ref 232–1245)

## 2025-08-14 PROCEDURE — 87186 SC STD MICRODIL/AGAR DIL: CPT

## 2025-08-14 PROCEDURE — 96374 THER/PROPH/DIAG INJ IV PUSH: CPT

## 2025-08-14 PROCEDURE — 82746 ASSAY OF FOLIC ACID SERUM: CPT

## 2025-08-14 PROCEDURE — 80164 ASSAY DIPROPYLACETIC ACD TOT: CPT

## 2025-08-14 PROCEDURE — 99233 SBSQ HOSP IP/OBS HIGH 50: CPT

## 2025-08-14 PROCEDURE — 87637 SARSCOV2&INF A&B&RSV AMP PRB: CPT

## 2025-08-14 PROCEDURE — 36415 COLL VENOUS BLD VENIPUNCTURE: CPT

## 2025-08-14 PROCEDURE — 96375 TX/PRO/DX INJ NEW DRUG ADDON: CPT

## 2025-08-14 PROCEDURE — 83735 ASSAY OF MAGNESIUM: CPT

## 2025-08-14 PROCEDURE — 87077 CULTURE AEROBIC IDENTIFY: CPT

## 2025-08-14 PROCEDURE — 81001 URINALYSIS AUTO W/SCOPE: CPT

## 2025-08-14 PROCEDURE — 84443 ASSAY THYROID STIM HORMONE: CPT

## 2025-08-14 PROCEDURE — 82607 VITAMIN B-12: CPT

## 2025-08-14 PROCEDURE — 71045 X-RAY EXAM CHEST 1 VIEW: CPT

## 2025-08-14 PROCEDURE — 84100 ASSAY OF PHOSPHORUS: CPT

## 2025-08-14 PROCEDURE — 99285 EMERGENCY DEPT VISIT HI MDM: CPT

## 2025-08-14 PROCEDURE — 80048 BASIC METABOLIC PNL TOTAL CA: CPT

## 2025-08-14 PROCEDURE — 85025 COMPLETE CBC W/AUTO DIFF WBC: CPT

## 2025-08-14 PROCEDURE — 87040 BLOOD CULTURE FOR BACTERIA: CPT

## 2025-08-14 PROCEDURE — 87086 URINE CULTURE/COLONY COUNT: CPT

## 2025-08-14 PROCEDURE — 82962 GLUCOSE BLOOD TEST: CPT

## 2025-08-14 PROCEDURE — 80053 COMPREHEN METABOLIC PANEL: CPT

## 2025-08-14 PROCEDURE — 99239 HOSP IP/OBS DSCHRG MGMT >30: CPT

## 2025-08-14 PROCEDURE — 83605 ASSAY OF LACTIC ACID: CPT

## 2025-08-14 PROCEDURE — 99497 ADVNCD CARE PLAN 30 MIN: CPT | Mod: GC

## 2025-08-14 RX ORDER — CEFPODOXIME PROXETIL 200 MG/1
1 TABLET, FILM COATED ORAL
Qty: 6 | Refills: 0
Start: 2025-08-14 | End: 2025-08-16

## 2025-08-14 RX ADMIN — AMLODIPINE BESYLATE 5 MILLIGRAM(S): 10 TABLET ORAL at 07:02

## 2025-08-14 RX ADMIN — CEFTRIAXONE 100 MILLIGRAM(S): 500 INJECTION, POWDER, FOR SOLUTION INTRAMUSCULAR; INTRAVENOUS at 00:21

## 2025-08-14 RX ADMIN — Medication 125 MILLIGRAM(S): at 07:02

## 2025-08-14 RX ADMIN — ESCITALOPRAM OXALATE 10 MILLIGRAM(S): 20 TABLET ORAL at 14:16

## 2025-08-15 LAB
-  AMPICILLIN/SULBACTAM: SIGNIFICANT CHANGE UP
-  AMPICILLIN: SIGNIFICANT CHANGE UP
-  CEFAZOLIN: SIGNIFICANT CHANGE UP
-  CEFEPIME: SIGNIFICANT CHANGE UP
-  CEFTRIAXONE: SIGNIFICANT CHANGE UP
-  CEFUROXIME: SIGNIFICANT CHANGE UP
-  CIPROFLOXACIN: SIGNIFICANT CHANGE UP
-  ERTAPENEM: SIGNIFICANT CHANGE UP
-  GENTAMICIN: SIGNIFICANT CHANGE UP
-  IMIPENEM: SIGNIFICANT CHANGE UP
-  LEVOFLOXACIN: SIGNIFICANT CHANGE UP
-  MEROPENEM: SIGNIFICANT CHANGE UP
-  NITROFURANTOIN: SIGNIFICANT CHANGE UP
-  PIPERACILLIN/TAZOBACTAM: SIGNIFICANT CHANGE UP
-  TIGECYCLINE: SIGNIFICANT CHANGE UP
-  TOBRAMYCIN: SIGNIFICANT CHANGE UP
-  TRIMETHOPRIM/SULFAMETHOXAZOLE: SIGNIFICANT CHANGE UP
CULTURE RESULTS: ABNORMAL
METHOD TYPE: SIGNIFICANT CHANGE UP
ORGANISM # SPEC MICROSCOPIC CNT: ABNORMAL
ORGANISM # SPEC MICROSCOPIC CNT: SIGNIFICANT CHANGE UP
SPECIMEN SOURCE: SIGNIFICANT CHANGE UP

## 2025-08-17 RX ORDER — LEVOFLOXACIN 25 MG/ML
1 SOLUTION ORAL
Qty: 5 | Refills: 0
Start: 2025-08-17 | End: 2025-08-21

## 2025-08-19 DIAGNOSIS — G93.41 METABOLIC ENCEPHALOPATHY: ICD-10-CM

## 2025-08-19 DIAGNOSIS — Z11.52 ENCOUNTER FOR SCREENING FOR COVID-19: ICD-10-CM

## 2025-08-19 DIAGNOSIS — Z66 DO NOT RESUSCITATE: ICD-10-CM

## 2025-08-19 DIAGNOSIS — N39.0 URINARY TRACT INFECTION, SITE NOT SPECIFIED: ICD-10-CM

## 2025-08-19 DIAGNOSIS — G30.9 ALZHEIMER'S DISEASE, UNSPECIFIED: ICD-10-CM

## 2025-08-19 DIAGNOSIS — Z79.899 OTHER LONG TERM (CURRENT) DRUG THERAPY: ICD-10-CM

## 2025-08-19 DIAGNOSIS — E86.0 DEHYDRATION: ICD-10-CM

## 2025-08-19 DIAGNOSIS — T83.511A INFECTION AND INFLAMMATORY REACTION DUE TO INDWELLING URETHRAL CATHETER, INITIAL ENCOUNTER: ICD-10-CM

## 2025-08-19 DIAGNOSIS — R33.8 OTHER RETENTION OF URINE: ICD-10-CM

## 2025-08-19 DIAGNOSIS — F02.811 DEMENTIA IN OTHER DISEASES CLASSIFIED ELSEWHERE, UNSPECIFIED SEVERITY, WITH AGITATION: ICD-10-CM
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
## 2025-08-19 DIAGNOSIS — F02.83 DEMENTIA IN OTHER DISEASES CLASSIFIED ELSEWHERE, UNSPECIFIED SEVERITY, WITH MOOD DISTURBANCE: ICD-10-CM

## 2025-08-19 DIAGNOSIS — N13.8 OTHER OBSTRUCTIVE AND REFLUX UROPATHY: ICD-10-CM

## 2025-08-19 DIAGNOSIS — E43 UNSPECIFIED SEVERE PROTEIN-CALORIE MALNUTRITION: ICD-10-CM
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
## 2025-08-19 DIAGNOSIS — K59.00 CONSTIPATION, UNSPECIFIED: ICD-10-CM

## 2025-08-19 DIAGNOSIS — N40.1 BENIGN PROSTATIC HYPERPLASIA WITH LOWER URINARY TRACT SYMPTOMS: ICD-10-CM

## 2025-08-19 DIAGNOSIS — D64.9 ANEMIA, UNSPECIFIED: ICD-10-CM

## 2025-08-19 DIAGNOSIS — Z78.1 PHYSICAL RESTRAINT STATUS: ICD-10-CM

## 2025-08-29 ENCOUNTER — APPOINTMENT (OUTPATIENT)
Dept: UROLOGY | Facility: CLINIC | Age: 75
End: 2025-08-29

## 2025-08-29 DIAGNOSIS — R33.9 RETENTION OF URINE, UNSPECIFIED: ICD-10-CM

## 2025-08-29 RX ORDER — TAMSULOSIN HYDROCHLORIDE 0.4 MG/1
0.4 CAPSULE ORAL
Qty: 30 | Refills: 3 | Status: ACTIVE | COMMUNITY
Start: 2025-08-29 | End: 1900-01-01

## 2025-08-31 ENCOUNTER — EMERGENCY (EMERGENCY)
Facility: HOSPITAL | Age: 75
LOS: 1 days | End: 2025-08-31
Attending: STUDENT IN AN ORGANIZED HEALTH CARE EDUCATION/TRAINING PROGRAM | Admitting: STUDENT IN AN ORGANIZED HEALTH CARE EDUCATION/TRAINING PROGRAM
Payer: MEDICARE

## 2025-08-31 VITALS
OXYGEN SATURATION: 98 % | WEIGHT: 149.91 LBS | TEMPERATURE: 98 F | HEART RATE: 60 BPM | RESPIRATION RATE: 18 BRPM | DIASTOLIC BLOOD PRESSURE: 83 MMHG | SYSTOLIC BLOOD PRESSURE: 122 MMHG | HEIGHT: 72 IN

## 2025-08-31 VITALS
DIASTOLIC BLOOD PRESSURE: 75 MMHG | OXYGEN SATURATION: 96 % | RESPIRATION RATE: 20 BRPM | TEMPERATURE: 98 F | HEART RATE: 54 BPM | SYSTOLIC BLOOD PRESSURE: 134 MMHG

## 2025-08-31 DIAGNOSIS — Z98.49 CATARACT EXTRACTION STATUS, UNSPECIFIED EYE: Chronic | ICD-10-CM

## 2025-08-31 DIAGNOSIS — Z98.890 OTHER SPECIFIED POSTPROCEDURAL STATES: Chronic | ICD-10-CM

## 2025-08-31 DIAGNOSIS — Z41.9 ENCOUNTER FOR PROCEDURE FOR PURPOSES OTHER THAN REMEDYING HEALTH STATE, UNSPECIFIED: Chronic | ICD-10-CM

## 2025-08-31 PROCEDURE — 99283 EMERGENCY DEPT VISIT LOW MDM: CPT | Mod: 25

## 2025-08-31 PROCEDURE — 99284 EMERGENCY DEPT VISIT MOD MDM: CPT

## 2025-08-31 PROCEDURE — 51702 INSERT TEMP BLADDER CATH: CPT

## 2025-09-02 DIAGNOSIS — T83.9XXA UNSPECIFIED COMPLICATION OF GENITOURINARY PROSTHETIC DEVICE, IMPLANT AND GRAFT, INITIAL ENCOUNTER: ICD-10-CM

## 2025-09-12 ENCOUNTER — APPOINTMENT (OUTPATIENT)
Dept: UROLOGY | Facility: CLINIC | Age: 75
End: 2025-09-12

## 2025-09-12 VITALS
BODY MASS INDEX: 23.98 KG/M2 | WEIGHT: 177 LBS | SYSTOLIC BLOOD PRESSURE: 127 MMHG | OXYGEN SATURATION: 100 % | HEART RATE: 66 BPM | DIASTOLIC BLOOD PRESSURE: 77 MMHG | TEMPERATURE: 97.9 F | HEIGHT: 72 IN

## (undated) DEVICE — Device

## (undated) DEVICE — TUBING RANGER FLUID IRRIGATION SET DISP

## (undated) DEVICE — PACK ANTERIOR SEGMENT

## (undated) DEVICE — VENODYNE/SCD SLEEVE CALF MEDIUM

## (undated) DEVICE — ELCTR PLASMA LOOP MEDIUM ANGLED 24FR 12-30 DEG

## (undated) DEVICE — GLV 7.5 PROTEXIS (WHITE)

## (undated) DEVICE — WARMING BLANKET LOWER ADULT

## (undated) DEVICE — NDL RETROBULBAR VISITEC 25X1.5

## (undated) DEVICE — SUT NYLON 10-0 12" CU-5

## (undated) DEVICE — KNIFE ALCON CRESCENT ANGLED BEVEL UP 2.3MM (PINK)

## (undated) DEVICE — INFINITY 30K PHACO PACK

## (undated) DEVICE — PREP BETADINE 5% STERILE OPTHALMIC SOLUTION

## (undated) DEVICE — KNIFE ALCON MVR V-LANCE 20G (WHITE)

## (undated) DEVICE — DRAPE MICROSCOPE KNOB COVER SMALL (2 PCS)

## (undated) DEVICE — UROVAC

## (undated) DEVICE — SOL IRR BAG BSS 500ML

## (undated) DEVICE — WARMING BLANKET UPPER ADULT

## (undated) DEVICE — PACK CYSTO

## (undated) DEVICE — KNIFE FULL HANDLE ANGLE 2.75MM

## (undated) DEVICE — NDL HYPO SAFE 25G X 5/8" (ORANGE)

## (undated) DEVICE — SOL IRR BAG NS 0.9% 3000ML

## (undated) DEVICE — CAPSULE GUARD I/A

## (undated) DEVICE — POSITIONER FOAM EGG CRATE ULNAR 2PCS (PINK)

## (undated) DEVICE — FOLEY CATH 3-WAY 22FR 30CC LATEX LUBRICATH

## (undated) DEVICE — KIT CENTURION ANTERIOR

## (undated) DEVICE — GOWN ROYAL SILK XL

## (undated) DEVICE — CLIPPER BLADE GENERAL USE

## (undated) DEVICE — ELCTR PLASMA BUTTON 24FR 12-30 DEG

## (undated) DEVICE — TUBING TUR 2 PRONG